# Patient Record
Sex: MALE | Race: WHITE | Employment: OTHER | URBAN - METROPOLITAN AREA
[De-identification: names, ages, dates, MRNs, and addresses within clinical notes are randomized per-mention and may not be internally consistent; named-entity substitution may affect disease eponyms.]

---

## 2017-05-07 PROBLEM — J44.1 COPD EXACERBATION (HCC): Status: ACTIVE | Noted: 2017-05-07

## 2017-05-08 PROBLEM — J96.01 ACUTE HYPOXEMIC RESPIRATORY FAILURE (HCC): Status: ACTIVE | Noted: 2017-05-08

## 2017-05-08 PROBLEM — Z86.73 HISTORY OF STROKE: Chronic | Status: ACTIVE | Noted: 2017-05-08

## 2017-05-08 PROBLEM — B34.8 RHINOVIRUS: Status: ACTIVE | Noted: 2017-05-08

## 2017-05-09 PROBLEM — F17.200 TOBACCO DEPENDENCE: Status: ACTIVE | Noted: 2017-05-09

## 2017-05-09 PROBLEM — I71.40 ABDOMINAL AORTIC ANEURYSM WITHOUT RUPTURE: Status: ACTIVE | Noted: 2017-05-09

## 2017-08-04 PROBLEM — E43 SEVERE MALNUTRITION (HCC): Chronic | Status: ACTIVE | Noted: 2017-08-04

## 2017-09-19 PROBLEM — J44.9 COPD, SEVERE (HCC): Status: ACTIVE | Noted: 2017-09-19

## 2017-09-19 PROBLEM — J44.9 COPD, MODERATE (HCC): Status: ACTIVE | Noted: 2017-09-19

## 2018-04-24 ENCOUNTER — OFFICE VISIT (OUTPATIENT)
Dept: VASCULAR SURGERY | Age: 68
End: 2018-04-24
Payer: COMMERCIAL

## 2018-04-24 DIAGNOSIS — Z86.79 S/P AAA REPAIR USING BIFURCATION GRAFT: Primary | ICD-10-CM

## 2018-04-24 DIAGNOSIS — Z95.828 S/P AAA REPAIR USING BIFURCATION GRAFT: Primary | ICD-10-CM

## 2018-04-24 PROCEDURE — 99212 OFFICE O/P EST SF 10 MIN: CPT | Performed by: SURGERY

## 2018-06-25 ENCOUNTER — OFFICE VISIT (OUTPATIENT)
Dept: CARDIOLOGY CLINIC | Age: 68
End: 2018-06-25
Payer: COMMERCIAL

## 2018-06-25 VITALS
DIASTOLIC BLOOD PRESSURE: 78 MMHG | RESPIRATION RATE: 16 BRPM | BODY MASS INDEX: 18.88 KG/M2 | WEIGHT: 131.9 LBS | HEART RATE: 75 BPM | SYSTOLIC BLOOD PRESSURE: 102 MMHG | HEIGHT: 70 IN

## 2018-06-25 DIAGNOSIS — I71.40 ABDOMINAL AORTIC ANEURYSM WITHOUT RUPTURE: Primary | ICD-10-CM

## 2018-06-25 PROCEDURE — 93000 ELECTROCARDIOGRAM COMPLETE: CPT | Performed by: INTERNAL MEDICINE

## 2018-06-25 PROCEDURE — 99214 OFFICE O/P EST MOD 30 MIN: CPT | Performed by: INTERNAL MEDICINE

## 2018-09-24 ENCOUNTER — TELEPHONE (OUTPATIENT)
Dept: VASCULAR SURGERY | Age: 68
End: 2018-09-24

## 2018-09-24 NOTE — TELEPHONE ENCOUNTER
Message left on pt's voice mail for a return call to schedule a CTA abd/pelvis prior to next scheduled visit with Dr. Shahida Rothman

## 2018-10-17 DIAGNOSIS — I71.40 ABDOMINAL AORTIC ANEURYSM WITHOUT RUPTURE: Primary | ICD-10-CM

## 2018-10-18 ENCOUNTER — HOSPITAL ENCOUNTER (OUTPATIENT)
Age: 68
Discharge: HOME | End: 2018-10-18
Payer: COMMERCIAL

## 2018-10-18 DIAGNOSIS — I71.40 ABDOMINAL AORTIC ANEURYSM WITHOUT RUPTURE: ICD-10-CM

## 2018-10-18 LAB
ANION GAP SERPL CALCULATED.3IONS-SCNC: 9 MMOL/L (ref 7–16)
BUN BLDV-MCNC: 12 MG/DL (ref 8–23)
CALCIUM SERPL-MCNC: 8.7 MG/DL (ref 8.6–10.2)
CHLORIDE BLD-SCNC: 107 MMOL/L (ref 98–107)
CO2: 26 MMOL/L (ref 22–29)
CREAT SERPL-MCNC: 1.2 MG/DL (ref 0.7–1.2)
GFR AFRICAN AMERICAN: >60
GFR NON-AFRICAN AMERICAN: 60 ML/MIN/1.73
GLUCOSE BLD-MCNC: 101 MG/DL (ref 74–109)
POTASSIUM SERPL-SCNC: 4.2 MMOL/L (ref 3.5–5)
SODIUM BLD-SCNC: 142 MMOL/L (ref 132–146)

## 2018-10-18 PROCEDURE — 80048 BASIC METABOLIC PNL TOTAL CA: CPT

## 2018-10-18 PROCEDURE — 36415 COLL VENOUS BLD VENIPUNCTURE: CPT

## 2018-10-22 ENCOUNTER — HOSPITAL ENCOUNTER (OUTPATIENT)
Dept: CT IMAGING | Age: 68
Discharge: HOME | End: 2018-10-24
Payer: COMMERCIAL

## 2018-10-22 DIAGNOSIS — Z95.828 S/P AAA REPAIR USING BIFURCATION GRAFT: ICD-10-CM

## 2018-10-22 DIAGNOSIS — Z86.79 S/P AAA REPAIR USING BIFURCATION GRAFT: ICD-10-CM

## 2018-10-22 PROCEDURE — 74174 CTA ABD&PLVS W/CONTRAST: CPT

## 2018-10-22 PROCEDURE — 6360000004 HC RX CONTRAST MEDICATION: Performed by: RADIOLOGY

## 2018-10-22 RX ADMIN — IOPAMIDOL 100 ML: 755 INJECTION, SOLUTION INTRAVENOUS at 15:41

## 2018-10-23 ENCOUNTER — OFFICE VISIT (OUTPATIENT)
Dept: VASCULAR SURGERY | Age: 68
End: 2018-10-23
Payer: COMMERCIAL

## 2018-10-23 DIAGNOSIS — Z86.79 S/P AAA REPAIR USING BIFURCATION GRAFT: Primary | ICD-10-CM

## 2018-10-23 DIAGNOSIS — Z95.828 S/P AAA REPAIR USING BIFURCATION GRAFT: Primary | ICD-10-CM

## 2018-10-23 PROCEDURE — 99213 OFFICE O/P EST LOW 20 MIN: CPT | Performed by: SURGERY

## 2018-12-10 ENCOUNTER — TELEPHONE (OUTPATIENT)
Dept: PHARMACY | Facility: CLINIC | Age: 68
End: 2018-12-10

## 2018-12-11 NOTE — TELEPHONE ENCOUNTER
Noted PharmD candidate's outreach. Will sign off at this time. Dionna Pryor, PharmD, [address]: [phone] C: [phone]  Department, toll free [phone], option 7      For Pharmacy Admin Tracking Only    PHSO: Yes  Total # of Interventions Recommended: 1  - New Order #: 1 New Medication Order Reason(s):  Adherence  - Maintenance Safety Lab Monitoring #: 1  - New Therapy Lab Monitoring #: 0  Total Interventions Accepted: 0  Time Spent (min): 10

## 2019-05-23 ENCOUNTER — TELEPHONE (OUTPATIENT)
Dept: PHARMACY | Facility: CLINIC | Age: 69
End: 2019-05-23

## 2019-05-23 NOTE — TELEPHONE ENCOUNTER
Identified care gap per [employer]: ATORVASTATIN TAB 20MG   Per records, appears 30-day supply last filled 2019     Per Juan Jose Man: [address]: [phone]    Medication: ATORVASTATIN TAB 20MG  Last 3 fill dates: 2019, 2019, 2019  Day supply: 30, 30, 30  Refills remainin  Directions: 1 tab po daily   Insurance billed: Vincent Holcomb   Prescribing Provider: Guanaco Elena MD    Attempting to reach patient to discuss getting atorvastatin tab 20mg filled for a 90ds. Unable to leave a voicemail due to number being disconnected. Will send mychart and letter to patient.      [address] [facility], toll free: [phone], option 7

## 2019-05-23 NOTE — LETTER
[address]  [address], [place] [address]  Phone: [phone]  Fax: [phone] [facility]   [address] [address]           05/23/19     Dear Dilia Cee,    We tried to reach you recently regarding your atorvastatin tab 20mg, but were unable to reach you on the telephone. We have on file that you are currently taking atorvastatin tab 20mg. If you are no longer taking it or taking it differently than above, please call us at the number below so that we can discuss this and update your medication profile.      This medication can be filled for a 3-month supply to save you time and trips to the pharmacy  if you would like assistance in switching your prescriptions to a 3-month supply, please contact us        Sincerely,      [address]  Phone: [phone], option 7

## 2019-05-29 NOTE — TELEPHONE ENCOUNTER
2nd attempt to contact this patient regarding the previous message    CLINICAL PHARMACY: [facility]  Patient unavailable at the time of call. Left following message on home TAD: please call back at toll-free [phone] option 7 to retrieve previous message. Letter mailed to patient. [address] [facility], toll free: [phone], option 7    CLINICAL PHARMACY CONSULT: MED RECONCILIATION/REVIEW ADDENDUM    For Pharmacy Admin Tracking Only    PHSO: Yes  Total # of Interventions Recommended: 1  - New Order #: 0 New Medication Order Reason(s):  Adherence  - Maintenance Safety Lab Monitoring #: 1  - New Therapy Lab Monitoring #: 0  Recommended intervention potential cost savings: 0  Total Interventions Accepted: 0  Time Spent (min): [address]

## 2019-08-13 ENCOUNTER — OFFICE VISIT (OUTPATIENT)
Dept: CARDIOLOGY CLINIC | Age: 69
End: 2019-08-13
Payer: COMMERCIAL

## 2019-08-13 VITALS
HEIGHT: 70 IN | SYSTOLIC BLOOD PRESSURE: 128 MMHG | DIASTOLIC BLOOD PRESSURE: 84 MMHG | WEIGHT: 130 LBS | HEART RATE: 60 BPM | RESPIRATION RATE: 16 BRPM | BODY MASS INDEX: 18.61 KG/M2

## 2019-08-13 DIAGNOSIS — I35.0 NONRHEUMATIC AORTIC VALVE STENOSIS: Primary | ICD-10-CM

## 2019-08-13 PROCEDURE — 99214 OFFICE O/P EST MOD 30 MIN: CPT | Performed by: INTERNAL MEDICINE

## 2019-08-13 PROCEDURE — 93000 ELECTROCARDIOGRAM COMPLETE: CPT | Performed by: INTERNAL MEDICINE

## 2019-10-04 ENCOUNTER — TELEPHONE (OUTPATIENT)
Dept: ADMINISTRATIVE | Age: 69
End: 2019-10-04

## 2019-10-16 ENCOUNTER — TELEPHONE (OUTPATIENT)
Dept: PHARMACY | Facility: CLINIC | Age: 69
End: 2019-10-16

## 2019-10-20 ENCOUNTER — HOSPITAL ENCOUNTER (OUTPATIENT)
Dept: ULTRASOUND IMAGING | Age: 69
Discharge: HOME | End: 2019-10-22
Payer: COMMERCIAL

## 2019-10-20 DIAGNOSIS — Z86.79 S/P AAA REPAIR USING BIFURCATION GRAFT: ICD-10-CM

## 2019-10-20 DIAGNOSIS — Z95.828 S/P AAA REPAIR USING BIFURCATION GRAFT: ICD-10-CM

## 2019-10-20 PROCEDURE — 76775 US EXAM ABDO BACK WALL LIM: CPT

## 2019-10-29 ENCOUNTER — OFFICE VISIT (OUTPATIENT)
Dept: VASCULAR SURGERY | Age: 69
End: 2019-10-29
Payer: COMMERCIAL

## 2019-10-29 VITALS
HEIGHT: 70 IN | HEART RATE: 81 BPM | SYSTOLIC BLOOD PRESSURE: 118 MMHG | WEIGHT: 130 LBS | OXYGEN SATURATION: 95 % | DIASTOLIC BLOOD PRESSURE: 64 MMHG | BODY MASS INDEX: 18.61 KG/M2

## 2019-10-29 DIAGNOSIS — Z95.828 S/P AAA REPAIR USING BIFURCATION GRAFT: Primary | ICD-10-CM

## 2019-10-29 DIAGNOSIS — Z86.79 S/P AAA REPAIR USING BIFURCATION GRAFT: Primary | ICD-10-CM

## 2019-10-29 PROCEDURE — 99213 OFFICE O/P EST LOW 20 MIN: CPT | Performed by: SURGERY

## 2019-11-19 ENCOUNTER — TELEPHONE (OUTPATIENT)
Dept: PHARMACY | Facility: CLINIC | Age: 69
End: 2019-11-19

## 2019-12-22 ENCOUNTER — APPOINTMENT (OUTPATIENT)
Dept: GENERAL RADIOLOGY | Age: 69
End: 2019-12-22
Payer: COMMERCIAL

## 2019-12-22 ENCOUNTER — HOSPITAL ENCOUNTER (EMERGENCY)
Age: 69
Discharge: HOME | End: 2019-12-22
Attending: EMERGENCY MEDICINE
Payer: COMMERCIAL

## 2019-12-22 VITALS
DIASTOLIC BLOOD PRESSURE: 72 MMHG | HEIGHT: 70 IN | HEART RATE: 104 BPM | BODY MASS INDEX: 19.18 KG/M2 | SYSTOLIC BLOOD PRESSURE: 158 MMHG | WEIGHT: 134 LBS | RESPIRATION RATE: 18 BRPM | OXYGEN SATURATION: 94 % | TEMPERATURE: 98 F

## 2019-12-22 DIAGNOSIS — J44.1 COPD EXACERBATION (HCC): Primary | ICD-10-CM

## 2019-12-22 LAB
ALBUMIN SERPL-MCNC: 4.2 G/DL (ref 3.5–5.2)
ALP BLD-CCNC: 107 U/L (ref 40–129)
ALT SERPL-CCNC: 18 U/L (ref 0–40)
ANION GAP SERPL CALCULATED.3IONS-SCNC: 14 MMOL/L (ref 7–16)
AST SERPL-CCNC: 22 U/L (ref 0–39)
BASOPHILS ABSOLUTE: 0.01 E9/L (ref 0–0.2)
BASOPHILS RELATIVE PERCENT: 0.1 % (ref 0–2)
BILIRUB SERPL-MCNC: 0.4 MG/DL (ref 0–1.2)
BUN BLDV-MCNC: 31 MG/DL (ref 8–23)
CALCIUM SERPL-MCNC: 9.4 MG/DL (ref 8.6–10.2)
CHLORIDE BLD-SCNC: 101 MMOL/L (ref 98–107)
CO2: 23 MMOL/L (ref 22–29)
CREAT SERPL-MCNC: 1.2 MG/DL (ref 0.7–1.2)
EKG ATRIAL RATE: 119 BPM
EKG P AXIS: 91 DEGREES
EKG P-R INTERVAL: 216 MS
EKG Q-T INTERVAL: 450 MS
EKG QRS DURATION: 82 MS
EKG QTC CALCULATION (BAZETT): 633 MS
EKG R AXIS: 53 DEGREES
EKG T AXIS: 77 DEGREES
EKG VENTRICULAR RATE: 119 BPM
EOSINOPHILS ABSOLUTE: 0 E9/L (ref 0.05–0.5)
EOSINOPHILS RELATIVE PERCENT: 0 % (ref 0–6)
GFR AFRICAN AMERICAN: >60
GFR NON-AFRICAN AMERICAN: 60 ML/MIN/1.73
GLUCOSE BLD-MCNC: 137 MG/DL (ref 74–99)
HCT VFR BLD CALC: 45 % (ref 37–54)
HEMOGLOBIN: 14.7 G/DL (ref 12.5–16.5)
IMMATURE GRANULOCYTES #: 0.03 E9/L
IMMATURE GRANULOCYTES %: 0.3 % (ref 0–5)
LACTIC ACID: 3 MMOL/L (ref 0.5–2.2)
LYMPHOCYTES ABSOLUTE: 0.9 E9/L (ref 1.5–4)
LYMPHOCYTES RELATIVE PERCENT: 9.4 % (ref 20–42)
MCH RBC QN AUTO: 33.3 PG (ref 26–35)
MCHC RBC AUTO-ENTMCNC: 32.7 % (ref 32–34.5)
MCV RBC AUTO: 102 FL (ref 80–99.9)
MONOCYTES ABSOLUTE: 0.7 E9/L (ref 0.1–0.95)
MONOCYTES RELATIVE PERCENT: 7.3 % (ref 2–12)
NEUTROPHILS ABSOLUTE: 7.93 E9/L (ref 1.8–7.3)
NEUTROPHILS RELATIVE PERCENT: 82.9 % (ref 43–80)
PDW BLD-RTO: 13.2 FL (ref 11.5–15)
PLATELET # BLD: 131 E9/L (ref 130–450)
PMV BLD AUTO: 9.7 FL (ref 7–12)
POTASSIUM REFLEX MAGNESIUM: 4.2 MMOL/L (ref 3.5–5)
PRO-BNP: 1145 PG/ML (ref 0–125)
RBC # BLD: 4.41 E12/L (ref 3.8–5.8)
SODIUM BLD-SCNC: 138 MMOL/L (ref 132–146)
TOTAL PROTEIN: 7.1 G/DL (ref 6.4–8.3)
TROPONIN: <0.01 NG/ML (ref 0–0.03)
WBC # BLD: 9.6 E9/L (ref 4.5–11.5)

## 2019-12-22 PROCEDURE — 80053 COMPREHEN METABOLIC PANEL: CPT

## 2019-12-22 PROCEDURE — 83880 ASSAY OF NATRIURETIC PEPTIDE: CPT

## 2019-12-22 PROCEDURE — 93005 ELECTROCARDIOGRAM TRACING: CPT | Performed by: EMERGENCY MEDICINE

## 2019-12-22 PROCEDURE — 94664 DEMO&/EVAL PT USE INHALER: CPT

## 2019-12-22 PROCEDURE — 94640 AIRWAY INHALATION TREATMENT: CPT

## 2019-12-22 PROCEDURE — 83605 ASSAY OF LACTIC ACID: CPT

## 2019-12-22 PROCEDURE — 99285 EMERGENCY DEPT VISIT HI MDM: CPT

## 2019-12-22 PROCEDURE — 6370000000 HC RX 637 (ALT 250 FOR IP): Performed by: EMERGENCY MEDICINE

## 2019-12-22 PROCEDURE — 71045 X-RAY EXAM CHEST 1 VIEW: CPT

## 2019-12-22 PROCEDURE — 84484 ASSAY OF TROPONIN QUANT: CPT

## 2019-12-22 PROCEDURE — 85025 COMPLETE CBC W/AUTO DIFF WBC: CPT

## 2019-12-22 RX ORDER — IPRATROPIUM BROMIDE AND ALBUTEROL SULFATE 2.5; .5 MG/3ML; MG/3ML
1 SOLUTION RESPIRATORY (INHALATION) EVERY 4 HOURS PRN
Qty: 360 ML | Refills: 0 | Status: SHIPPED | OUTPATIENT
Start: 2019-12-22 | End: 2020-12-18

## 2019-12-22 RX ORDER — NEBULIZER ACCESSORIES
1 KIT MISCELLANEOUS DAILY PRN
Qty: 1 KIT | Refills: 0 | Status: SHIPPED | OUTPATIENT
Start: 2019-12-22 | End: 2020-12-18

## 2019-12-22 RX ORDER — IPRATROPIUM BROMIDE AND ALBUTEROL SULFATE 2.5; .5 MG/3ML; MG/3ML
3 SOLUTION RESPIRATORY (INHALATION) ONCE
Status: COMPLETED | OUTPATIENT
Start: 2019-12-22 | End: 2019-12-22

## 2019-12-22 RX ADMIN — IPRATROPIUM BROMIDE AND ALBUTEROL SULFATE 3 AMPULE: .5; 3 SOLUTION RESPIRATORY (INHALATION) at 01:19

## 2019-12-22 ASSESSMENT — ENCOUNTER SYMPTOMS
SHORTNESS OF BREATH: 1
FACIAL SWELLING: 0
CHEST TIGHTNESS: 0
EYE REDNESS: 0
WHEEZING: 1
COUGH: 1
ALLERGIC/IMMUNOLOGIC NEGATIVE: 1
SORE THROAT: 0
ABDOMINAL PAIN: 0
SPUTUM PRODUCTION: 1
DIARRHEA: 0
VOMITING: 0
PHOTOPHOBIA: 0
NAUSEA: 0
CONSTIPATION: 0
EYE PAIN: 0

## 2020-06-12 ENCOUNTER — TELEPHONE (OUTPATIENT)
Dept: CASE MANAGEMENT | Age: 70
End: 2020-06-12

## 2020-06-15 ENCOUNTER — HOSPITAL ENCOUNTER (OUTPATIENT)
Dept: CT IMAGING | Age: 70
Discharge: HOME | End: 2020-06-17
Payer: MEDICARE

## 2020-06-15 PROCEDURE — G0297 LDCT FOR LUNG CA SCREEN: HCPCS

## 2020-06-16 ENCOUNTER — TELEPHONE (OUTPATIENT)
Dept: CASE MANAGEMENT | Age: 70
End: 2020-06-16

## 2020-09-04 ENCOUNTER — HOSPITAL ENCOUNTER (EMERGENCY)
Age: 70
Discharge: HOME | End: 2020-09-04
Attending: EMERGENCY MEDICINE
Payer: MEDICARE

## 2020-09-04 ENCOUNTER — APPOINTMENT (OUTPATIENT)
Dept: ULTRASOUND IMAGING | Age: 70
End: 2020-09-04
Payer: MEDICARE

## 2020-09-04 VITALS
RESPIRATION RATE: 18 BRPM | OXYGEN SATURATION: 97 % | BODY MASS INDEX: 17.22 KG/M2 | DIASTOLIC BLOOD PRESSURE: 78 MMHG | WEIGHT: 123 LBS | HEART RATE: 83 BPM | SYSTOLIC BLOOD PRESSURE: 112 MMHG | TEMPERATURE: 97.5 F | HEIGHT: 71 IN

## 2020-09-04 PROCEDURE — 99283 EMERGENCY DEPT VISIT LOW MDM: CPT

## 2020-09-04 PROCEDURE — 99282 EMERGENCY DEPT VISIT SF MDM: CPT

## 2020-09-04 PROCEDURE — 93971 EXTREMITY STUDY: CPT

## 2020-09-04 ASSESSMENT — PAIN SCALES - GENERAL: PAINLEVEL_OUTOF10: 5

## 2020-09-04 ASSESSMENT — PAIN DESCRIPTION - PAIN TYPE: TYPE: ACUTE PAIN

## 2020-09-04 ASSESSMENT — PAIN DESCRIPTION - DESCRIPTORS: DESCRIPTORS: SHARP

## 2020-09-04 ASSESSMENT — PAIN DESCRIPTION - LOCATION: LOCATION: LEG

## 2020-09-04 ASSESSMENT — PAIN DESCRIPTION - ORIENTATION: ORIENTATION: LOWER

## 2020-09-04 ASSESSMENT — PAIN DESCRIPTION - FREQUENCY: FREQUENCY: INTERMITTENT

## 2020-09-04 NOTE — ED NOTES
Assisted MD with pedal pulse location via doppler. Patient tolerated well. Dorsalis pedis and posterior tibial located per doppler.      Denisse Gonzales RN  09/04/20 0286

## 2020-09-04 NOTE — ED PROVIDER NOTES
Chief complaint: Leg pain      HPI:  9/4/20, Time: 4:23 PM EDT      HPI       Ash Toney is a 69 y.o. male presenting to the ED for leg pain. The patient states that he began approximately 4 days ago with leg pain. The patient denies any injury. The patient states that the pain is located in the left leg around the left calf and lateral aspect of the distal left leg. Pain is described as aching currently rated 4 out of 10. The pain is mildly improved with ambulation. The pain is present mostly with rest.  There is no numbness, weakness or paresthesias. He did not try any treatments prior to arrival.  The patient notes that he feels he is having some increasing edema of the left lower extremity and is concerned that he does have potential blood clot. The patient has no history of DVT or PE, is not on any hormone replacement therapy, denies any active malignancy, recent surgeries or long periods of travel sitting in a car or plane. ROS:   Review of Systems   Musculoskeletal: Positive for arthralgias and myalgias. Neurological: Negative for weakness and numbness. All other systems reviewed and are negative.      --------------------------------------------- PAST HISTORY ---------------------------------------------  Past Medical History:  has a past medical history of Abdominal aortic aneurysm without rupture (HCC), Arthritis, AS (aortic stenosis), Cerebral artery occlusion with cerebral infarction ([facility] [address] COPD (chronic obstructive pulmonary disease) ([facility] [address] Emphysema of lung ([facility] [address] History of blood transfusion, Hyperlipidemia, Pneumonia, and Tobacco dependence. Past Surgical History:  has a past surgical history that includes hernia repair; Ankle surgery; Skull fracture elevation; eye surgery (Bilateral, approx 2014); Cardiac catheterization (07/14/2017); Tooth Extraction;  Aortic valve replacement; AAA repair, endovascular (10/12/2017); AAA repair, endovascular (10/12/2017); and Cardiac surgery. Social History:  reports that he has been smoking cigarettes. He has a 50.00 pack-year smoking history. He has never used smokeless tobacco. He reports that he does not drink alcohol or use drugs. Family History: family history includes Heart Disease in his brother and mother; Stroke in his father. The patients home medications have been reviewed. Allergies: Patient has no known allergies. ---------------------------------------------------PHYSICAL EXAM--------------------------------------    Constitutional/General: Alert and oriented x3, well appearing, non toxic in NAD  Head: Normocephalic and atraumatic  Mouth: Oropharynx clear, handling secretions, no trismus  Neck: Supple, full ROM,  Pulmonary: Lungs clear to auscultation bilaterally, no wheezes, rales, or rhonchi. Not in respiratory distress    Cardiovascular:  Regular rate. Regular rhythm. No murmurs  Chest: no chest wall tenderness  Abdomen: Soft. Non tender. Non distended. +BS. No rebound, guarding, or rigidity. No pulsatile masses appreciated. Musculoskeletal: Moves all extremities x 4. Warm and well perfused, no clubbing, cyanosis, or edema. Capillary refill <3 seconds, there is no point bony tenderness to palpation of the left lower extremity. All compartments of the left lower extremity are soft. There is 1+ dorsalis pedis and posterior tibial pulses which were confirmed with the Doppler. There is sensation to gross touch intact. Skin: warm and dry. No rashes. Neurologic: GCS 15, no gross focal neurologic deficits  Psych: Normal Affect    -------------------------------------------------- RESULTS -------------------------------------------------  I have personally reviewed all laboratory and imaging results for this patient. Results are listed below. LABS:  No results found for this visit on 09/04/20.     RADIOLOGY:  Interpreted by Radiologist.  US DUP LOWER EXTREMITY LEFT RAFAEL   Final Result   Negative for evidence of deep venous thrombosis in the left lower   extremity by color and spectral Doppler, as well as 2-D grayscale   ultrasound imaging.                   ------------------------- NURSING NOTES AND VITALS REVIEWED ---------------------------   The nursing notes within the ED encounter and vital signs as below have been reviewed by myself. /78   Pulse 83   Temp 97.5 °F (36.4 °C) (Infrared)   Resp 18   Ht 5' 11\" (1.803 m)   Wt 123 lb (55.8 kg)   SpO2 97%   BMI 17.16 kg/m²   Oxygen Saturation Interpretation: Normal    The patients available past medical records and past encounters were reviewed. ------------------------------ ED COURSE/MEDICAL DECISION MAKING----------------------  Medications - No data to display          Medical Decision Making:   The patient is a 78-year-old male presenting emergency department with a chief complaint of leg pain and swelling. The left lower extremity is neurovascular intact, all compartments are soft. No bony tenderness. The patient did have imaging which was negative for any DVT. Patient be discharged follow-up outpatient. Re-Evaluations/Consultations:             ED Course as of Sep 04 1625   Fri Sep 04, 2020   1620 The patient is in the bed in no acute distress. Results discussed. Patient be discharged. [MT]      ED Course User Index  [MT] Bianca Fortune DO               This patient's ED course included: History, physical examination, reevaluation prior to disposition, imaging    This patient has remained hemodynamically stable during their ED course. Counseling: The emergency provider has spoken with the patient and discussed todays results, in addition to providing specific details for the plan of care and counseling regarding the diagnosis and prognosis.   Questions are answered at this time and they are agreeable with the plan.       --------------------------------- IMPRESSION AND DISPOSITION

## 2020-09-24 ENCOUNTER — HOSPITAL ENCOUNTER (OUTPATIENT)
Dept: CT IMAGING | Age: 70
Discharge: HOME | End: 2020-09-26
Payer: MEDICARE

## 2020-09-24 PROCEDURE — 74176 CT ABD & PELVIS W/O CONTRAST: CPT

## 2020-10-06 ENCOUNTER — HOSPITAL ENCOUNTER (OUTPATIENT)
Age: 70
Discharge: HOME | End: 2020-10-08
Payer: MEDICARE

## 2020-10-06 PROCEDURE — 87088 URINE BACTERIA CULTURE: CPT

## 2020-10-06 PROCEDURE — 88112 CYTOPATH CELL ENHANCE TECH: CPT

## 2020-10-08 LAB — URINE CULTURE, ROUTINE: NORMAL

## 2020-11-10 ENCOUNTER — HOSPITAL ENCOUNTER (OUTPATIENT)
Dept: CARDIOLOGY | Age: 70
Discharge: HOME | End: 2020-11-10
Payer: MEDICARE

## 2020-11-10 ENCOUNTER — OFFICE VISIT (OUTPATIENT)
Dept: VASCULAR SURGERY | Age: 70
End: 2020-11-10
Payer: MEDICARE

## 2020-11-10 VITALS — BODY MASS INDEX: 17.47 KG/M2 | WEIGHT: 122 LBS | RESPIRATION RATE: 16 BRPM | HEIGHT: 70 IN

## 2020-11-10 PROCEDURE — 93978 VASCULAR STUDY: CPT

## 2020-11-10 PROCEDURE — 99213 OFFICE O/P EST LOW 20 MIN: CPT | Performed by: SURGERY

## 2020-11-10 NOTE — PROGRESS NOTES
[facility] - [facility]    This is a pre read worksheet - prior to official physician interpretation    bAbey Snyder  1950  Date of study: 11/10/20    Indication for study:  AAA  Study :  Aortic Ultrasound    Diameter Aorta:     Proximal:   cm     Mid:   cm     Distal:  5.4 x 5.9 cm     Right iliac: 1.7 x 1.7 cm     Left iliac: 1.4 x 1.5 cm    Additional comments: unable to document leak          LAST STUDY  9/24/2020 CT  5.4 cm

## 2020-11-10 NOTE — PROGRESS NOTES
Vascular Surgery Outpatient Progress Note      Chief Complaint   Patient presents with   Estrellita Soto     s/p AAA       HISTORY OF PRESENT ILLNESS:                The patient is a 69 y.o. male who returns for follow-up evaluation of endovascular repair of abdominal aortic aneurysm with fenestrated graft. The patient has been experiencing hematuria but denies any new problems since I saw him. Past Medical History:        Diagnosis Date    Abdominal aortic aneurysm without rupture ([facility] [address] 5/9/2017    Arthritis     AS (aortic stenosis)     Cerebral artery occlusion with cerebral infarction [facility]) 2009    TIA/CVA with aphasia that resolved    COPD (chronic obstructive pulmonary disease) (HCC)     Emphysema of lung ([facility] [address]     History of blood transfusion     Hyperlipidemia     Pneumonia     Tobacco dependence 5/9/2017     Past Surgical History:        Procedure Laterality Date    ABDOMINAL AORTIC ANEURYSM REPAIR, ENDOVASCULAR  10/12/2017    Zenith Fenestrated. Delatore    ABDOMINAL AORTIC ANEURYSM REPAIR, ENDOVASCULAR  10/12/2017    ANKLE SURGERY      AORTIC VALVE REPLACEMENT      CARDIAC CATHETERIZATION  07/14/2017    Dr. Kingsley Noss- No blockages    CARDIAC SURGERY      EYE SURGERY Bilateral approx 2014    cataracts removal w/lens implants    HERNIA REPAIR      SKULL FRACTURE ELEVATION      TOOTH EXTRACTION      full mouth extraction     Current Medications:   Prior to Admission medications    Medication Sig Start Date End Date Taking?  Authorizing Provider   metoprolol tartrate (LOPRESSOR) 25 MG tablet take 1 tablet by mouth twice a day 7/6/20  Yes Gavin Atkinson, DO   albuterol sulfate HFA (VENTOLIN HFA) 108 (90 Base) MCG/ACT inhaler Inhale 2 puffs into the lungs every 4 hours as needed for Wheezing or Shortness of Breath 5/29/20  Yes Bradley Spence, DO   ipratropium-albuterol (DUONEB) 0.5-2.5 (3) MG/3ML SOLN nebulizer solution Inhale 3 mLs into the lungs every 4 hours as needed for Shortness of Breath 12/22/19  Yes Berhane Motley, DO   Respiratory Therapy Supplies (NEBULIZER/TUBING/MOUTHPIECE) KIT 1 kit by Does not apply route daily as needed (COPD) 12/22/19  Yes Berhane Motley, DO   Respiratory Therapy Supplies (NEBULIZER COMPRESSOR) KIT 1 kit by Does not apply route once for 1 dose 12/22/19 11/10/20 Yes Rachel Luna, DO   Nutritional Supplements (BOOST HIGH PROTEIN PO) Take by mouth   Yes Historical Provider, MD   Tiotropium Bromide-Olodaterol (STIOLTO RESPIMAT) 2.5-2.5 MCG/ACT AERS Inhale 2 puffs into the lungs daily Takes 1 puff in morning and after dinner  Use day of surgery   Yes Historical Provider, MD   atorvastatin (LIPITOR) 20 MG tablet 20 mg daily  8/6/15  Yes Historical Provider, MD   aspirin 81 MG tablet Take 81 mg by mouth every other day    Yes Historical Provider, MD     Allergies:  Patient has no known allergies.     Social History     Socioeconomic History    Marital status:      Spouse name: Not on file    Number of children: Not on file    Years of education: Not on file    Highest education level: Not on file   Occupational History    Occupation: retired-   Social Needs    Financial resource strain: Not on file    Food insecurity     Worry: Not on file     Inability: Not on file   [place] needs     Medical: Not on file     Non-medical: Not on file   Tobacco Use    Smoking status: Current Every Day Smoker     Packs/day: 1.00     Years: 50.00     Pack years: 50.00     Types: Cigarettes    Smokeless tobacco: Never Used    Tobacco comment: currently smokes 1.0 ppd   Substance and Sexual Activity    Alcohol use: No     Alcohol/week: 0.0 standard drinks    Drug use: No    Sexual activity: Not Currently   Lifestyle    Physical activity     Days per week: Not on file     Minutes per session: Not on file    Stress: Not on file   Relationships    Social connections     Talks on phone: Not on file     Gets together: Not on file     Attends Advent service: Not on file     Active member of club or organization: Not on file     Attends meetings of clubs or organizations: Not on file     Relationship status: Not on file    Intimate partner violence     Fear of current or ex partner: Not on file     Emotionally abused: Not on file     Physically abused: Not on file     Forced sexual activity: Not on file   Other Topics Concern    Not on file   Social History Narrative    Not on file        Family History   Problem Relation Age of Onset    Heart Disease Mother     Stroke Father     Heart Disease Brother        REVIEW OF SYSTEMS (New symptoms):    Eyes:      Blurred vision:  No [x]/Yes []               Diplopia:   No [x]/Yes []               Vision loss:       No [x]/Yes []   Ears, nose, throat:             Hearing loss:    No [x]/Yes []      Vertigo:   No [x]/Yes []                       Swallowing problem:  No [x]/Yes []               Nose bleeds:   No [x]/Yes []      Voice hoarseness:  No [x]/Yes []  Respiratory:             Cough:   No [x]/Yes []      Pleuritic chest pain:  No [x]/Yes []                        Dyspnea:   No []/Yes [x]      Wheezing:   No []/Yes [x]  Cardiovascular:             Angina:   No [x]/Yes []      Palpitations:   No [x]/Yes []          Claudication:    No [x]/Yes []      Leg swelling:   No [x]/Yes []  Gastrointestinal:             Nausea or vomiting:  No [x]/Yes []               Abdominal pain:  No [x]/Yes []                     Intestinal bleeding: No [x]/Yes []  Musculoskeletal:             Leg pain:   No [x]/Yes []      Back pain:   No [x]/Yes []                    Weakness:   No [x]/Yes []  Neurologic:             Numbness:   No [x]/Yes []      Paralysis:   No [x]/Yes []                       Headaches:   No [x]/Yes []  Hematologic, lymphatic:   Anemia:   No [x]/Yes []              Bleeding or bruising:  No [x]/Yes []              Fevers or chills: No [x]/Yes []  Endocrine:             Temp intolerance:   No [x]/Yes [] Polydipsia, polyuria:  No [x]/Yes []  Skin:              Rash:    No [x]/Yes []      Ulcers:   No [x]/Yes []              Abnorm pigment: No [x]/Yes []  :              Frequency/urgency:  No [x]/Yes []      Hematuria:    No []/Yes [x]                      Incontinence:    No [x]/Yes []    PHYSICAL EXAM:  Vitals:    11/10/20 0823   Resp: 16     General Appearance: alert and oriented to person, place and time, in no acute distress, frail  Neurologic: no cranial nerve deficit, speech normal  Head: normocephalic and atraumatic  Eyes: extraocular eye movements intact, conjunctivae normal  ENT: external ear and ear canal normal bilaterally, nose without deformity, no carotid bruits  Pulmonary/Chest: normal air movement, no respiratory distress  Cardiovascular: normal rate, regular rhythm, no murmur  Abdomen: non-distended, no masses  Musculoskeletal: no joint deformity or tenderness  Extremities: no leg edema bilaterally  Skin: warm and dry, no rash or erythema    PULSE EXAM      Right      Left   Brachial     Radial     Femoral     Popliteal     Dorsalis Pedis     Posterior Tibial     (3=normal, 2=diminished, 1=barely palpable, 4=widened)    RADIOLOGY: SA today    Problem List Items Addressed This Visit     S/P AAA repair using bifurcation graft - Primary    Relevant Orders    US ABDOMINAL AORTA LIMITED          We performed an ultrasound today that measures the aorta slightly larger to 5.9 cm. Given his overall condition, I feel that I would not plan for CAT scan at this time as I would need intravenous contrast.  I will have him return in 6 months with a repeat ultrasound and perform a CAT scan if there is any continued aneurysm expansion. Return in about 6 months (around 5/10/2021) for testing.

## 2020-12-15 ENCOUNTER — HOSPITAL ENCOUNTER (EMERGENCY)
Age: 70
Discharge: HOME | DRG: 312 | End: 2020-12-15
Attending: EMERGENCY MEDICINE
Payer: MEDICARE

## 2020-12-15 ENCOUNTER — APPOINTMENT (OUTPATIENT)
Dept: CT IMAGING | Age: 70
DRG: 312 | End: 2020-12-15
Payer: MEDICARE

## 2020-12-15 ENCOUNTER — APPOINTMENT (OUTPATIENT)
Dept: GENERAL RADIOLOGY | Age: 70
DRG: 312 | End: 2020-12-15
Payer: MEDICARE

## 2020-12-15 VITALS
OXYGEN SATURATION: 96 % | BODY MASS INDEX: 16.52 KG/M2 | DIASTOLIC BLOOD PRESSURE: 84 MMHG | HEIGHT: 71 IN | SYSTOLIC BLOOD PRESSURE: 122 MMHG | RESPIRATION RATE: 16 BRPM | HEART RATE: 95 BPM | TEMPERATURE: 97.3 F | WEIGHT: 118 LBS

## 2020-12-15 LAB
ALBUMIN SERPL-MCNC: 3.6 G/DL (ref 3.5–5.2)
ALP BLD-CCNC: 83 U/L (ref 40–129)
ALT SERPL-CCNC: 12 U/L (ref 0–40)
ANION GAP SERPL CALCULATED.3IONS-SCNC: 10 MMOL/L (ref 7–16)
AST SERPL-CCNC: 16 U/L (ref 0–39)
BASOPHILS ABSOLUTE: 0.06 E9/L (ref 0–0.2)
BASOPHILS RELATIVE PERCENT: 0.8 % (ref 0–2)
BILIRUB SERPL-MCNC: 0.6 MG/DL (ref 0–1.2)
BUN BLDV-MCNC: 16 MG/DL (ref 8–23)
CALCIUM SERPL-MCNC: 9 MG/DL (ref 8.6–10.2)
CHLORIDE BLD-SCNC: 100 MMOL/L (ref 98–107)
CO2: 24 MMOL/L (ref 22–29)
CREAT SERPL-MCNC: 0.9 MG/DL (ref 0.7–1.2)
EKG ATRIAL RATE: 85 BPM
EKG P AXIS: 81 DEGREES
EKG P-R INTERVAL: 224 MS
EKG Q-T INTERVAL: 390 MS
EKG QRS DURATION: 88 MS
EKG QTC CALCULATION (BAZETT): 464 MS
EKG R AXIS: 25 DEGREES
EKG T AXIS: 66 DEGREES
EKG VENTRICULAR RATE: 85 BPM
EOSINOPHILS ABSOLUTE: 0.04 E9/L (ref 0.05–0.5)
EOSINOPHILS RELATIVE PERCENT: 0.5 % (ref 0–6)
GFR AFRICAN AMERICAN: >60
GFR NON-AFRICAN AMERICAN: >60 ML/MIN/1.73
GLUCOSE BLD-MCNC: 108 MG/DL (ref 74–99)
HCT VFR BLD CALC: 36.9 % (ref 37–54)
HEMOGLOBIN: 12.5 G/DL (ref 12.5–16.5)
IMMATURE GRANULOCYTES #: 0.02 E9/L
IMMATURE GRANULOCYTES %: 0.3 % (ref 0–5)
LACTIC ACID, SEPSIS: 0.6 MMOL/L (ref 0.5–1.9)
LIPASE: 18 U/L (ref 13–60)
LYMPHOCYTES ABSOLUTE: 1.29 E9/L (ref 1.5–4)
LYMPHOCYTES RELATIVE PERCENT: 16.7 % (ref 20–42)
MCH RBC QN AUTO: 32.7 PG (ref 26–35)
MCHC RBC AUTO-ENTMCNC: 33.9 % (ref 32–34.5)
MCV RBC AUTO: 96.6 FL (ref 80–99.9)
MONOCYTES ABSOLUTE: 0.67 E9/L (ref 0.1–0.95)
MONOCYTES RELATIVE PERCENT: 8.7 % (ref 2–12)
NEUTROPHILS ABSOLUTE: 5.65 E9/L (ref 1.8–7.3)
NEUTROPHILS RELATIVE PERCENT: 73 % (ref 43–80)
PDW BLD-RTO: 12.6 FL (ref 11.5–15)
PLATELET # BLD: 157 E9/L (ref 130–450)
PMV BLD AUTO: 9.2 FL (ref 7–12)
POTASSIUM REFLEX MAGNESIUM: 4.1 MMOL/L (ref 3.5–5)
RBC # BLD: 3.82 E12/L (ref 3.8–5.8)
SODIUM BLD-SCNC: 134 MMOL/L (ref 132–146)
TOTAL PROTEIN: 7.1 G/DL (ref 6.4–8.3)
TROPONIN: <0.01 NG/ML (ref 0–0.03)
WBC # BLD: 7.7 E9/L (ref 4.5–11.5)

## 2020-12-15 PROCEDURE — 2580000003 HC RX 258: Performed by: RADIOLOGY

## 2020-12-15 PROCEDURE — 83605 ASSAY OF LACTIC ACID: CPT

## 2020-12-15 PROCEDURE — 80053 COMPREHEN METABOLIC PANEL: CPT

## 2020-12-15 PROCEDURE — 74022 RADEX COMPL AQT ABD SERIES: CPT

## 2020-12-15 PROCEDURE — 99214 OFFICE O/P EST MOD 30 MIN: CPT | Performed by: SURGERY

## 2020-12-15 PROCEDURE — 6360000004 HC RX CONTRAST MEDICATION: Performed by: RADIOLOGY

## 2020-12-15 PROCEDURE — 83690 ASSAY OF LIPASE: CPT

## 2020-12-15 PROCEDURE — 74177 CT ABD & PELVIS W/CONTRAST: CPT

## 2020-12-15 PROCEDURE — 84484 ASSAY OF TROPONIN QUANT: CPT

## 2020-12-15 PROCEDURE — 99283 EMERGENCY DEPT VISIT LOW MDM: CPT

## 2020-12-15 PROCEDURE — 93005 ELECTROCARDIOGRAM TRACING: CPT | Performed by: NURSE PRACTITIONER

## 2020-12-15 PROCEDURE — 6370000000 HC RX 637 (ALT 250 FOR IP): Performed by: NURSE PRACTITIONER

## 2020-12-15 PROCEDURE — 85025 COMPLETE CBC W/AUTO DIFF WBC: CPT

## 2020-12-15 PROCEDURE — 93010 ELECTROCARDIOGRAM REPORT: CPT | Performed by: INTERNAL MEDICINE

## 2020-12-15 RX ORDER — ONDANSETRON 4 MG/1
4 TABLET, ORALLY DISINTEGRATING ORAL EVERY 8 HOURS PRN
Qty: 15 TABLET | Refills: 0 | Status: ON HOLD | OUTPATIENT
Start: 2020-12-15 | End: 2020-12-22

## 2020-12-15 RX ORDER — SODIUM CHLORIDE 0.9 % (FLUSH) 0.9 %
10 SYRINGE (ML) INJECTION ONCE
Status: COMPLETED | OUTPATIENT
Start: 2020-12-15 | End: 2020-12-15

## 2020-12-15 RX ORDER — POLYETHYLENE GLYCOL 3350 17 G/17G
17 POWDER, FOR SOLUTION ORAL DAILY PRN
Qty: 527 G | Refills: 1 | Status: ON HOLD | OUTPATIENT
Start: 2020-12-15 | End: 2020-12-22 | Stop reason: HOSPADM

## 2020-12-15 RX ORDER — ONDANSETRON 4 MG/1
4 TABLET, ORALLY DISINTEGRATING ORAL ONCE
Status: COMPLETED | OUTPATIENT
Start: 2020-12-15 | End: 2020-12-15

## 2020-12-15 RX ADMIN — Medication 10 ML: at 13:56

## 2020-12-15 RX ADMIN — IOPAMIDOL 90 ML: 755 INJECTION, SOLUTION INTRAVENOUS at 13:56

## 2020-12-15 RX ADMIN — ONDANSETRON 4 MG: 4 TABLET, ORALLY DISINTEGRATING ORAL at 13:19

## 2020-12-15 ASSESSMENT — PAIN DESCRIPTION - LOCATION: LOCATION: ABDOMEN

## 2020-12-15 ASSESSMENT — PAIN DESCRIPTION - DESCRIPTORS: DESCRIPTORS: ACHING

## 2020-12-15 ASSESSMENT — PAIN SCALES - GENERAL: PAINLEVEL_OUTOF10: 9

## 2020-12-15 ASSESSMENT — PAIN DESCRIPTION - PAIN TYPE: TYPE: ACUTE PAIN

## 2020-12-15 NOTE — ED NOTES
FIRST PROVIDER CONTACT ASSESSMENT NOTE      Department of Emergency Medicine   Admit Date: No admission date for patient encounter. Chief Complaint: Abdominal Pain, Constipation, and Emesis      History of Present Illness:    Opal Espana is a 69 y.o. male who presents to the ED for abdominal pain with constipation and emesis over the last several days. He is alert and oriented, abdomen soft without pulsatile mass.   Skin pink warm and dry.        -----------------END OF FIRST PROVIDER CONTACT ASSESSMENT NOTE--------------  Electronically signed by COREEN Fofana CNP   DD: 12/15/20               COREEN Fofana CNP  12/15/20 1112

## 2020-12-15 NOTE — ED NOTES
Per Tan Peter in XR, patient still over in radiology.       [id number], RN  12/15/20 6196

## 2020-12-15 NOTE — PROGRESS NOTES
Patient with a history of fenestrated stent graft. I reviewed the CT scan there is a slight increase in the aneurysm size. With some contrast.  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 At this point there is no gross signs of rupture. I do not find that this is causing any of his abdominal pain or discomfort. Suggest further work-up from GI. He can follow-up with Dr. Edgardo Wall upon discharge.

## 2020-12-15 NOTE — CONSULTS
07/14/2017    Dr. Kingsley Noss- No blockages    CARDIAC SURGERY      EYE SURGERY Bilateral approx 2014    cataracts removal w/lens implants    HERNIA REPAIR      SKULL FRACTURE ELEVATION      TOOTH EXTRACTION      full mouth extraction     Current Medications: Allergies:  Patient has no known allergies.     Social History     Socioeconomic History    Marital status:      Spouse name: Not on file    Number of children: Not on file    Years of education: Not on file    Highest education level: Not on file   Occupational History    Occupation: retired-   Social Needs    Financial resource strain: Not on file    Food insecurity     Worry: Not on file     Inability: Not on file   [employer] needs     Medical: Not on file     Non-medical: Not on file   Tobacco Use    Smoking status: Current Every Day Smoker     Packs/day: 1.00     Years: 50.00     Pack years: 50.00     Types: Cigarettes    Smokeless tobacco: Never Used    Tobacco comment: currently smokes 1.0 ppd   Substance and Sexual Activity    Alcohol use: No     Alcohol/week: 0.0 standard drinks    Drug use: No    Sexual activity: Not Currently   Lifestyle    Physical activity     Days per week: Not on file     Minutes per session: Not on file    Stress: Not on file   Relationships    Social connections     Talks on phone: Not on file     Gets together: Not on file     Attends Episcopal service: Not on file     Active member of club or organization: Not on file     Attends meetings of clubs or organizations: Not on file     Relationship status: Not on file    Intimate partner violence     Fear of current or ex partner: Not on file     Emotionally abused: Not on file     Physically abused: Not on file     Forced sexual activity: Not on file   Other Topics Concern    Not on file   Social History Narrative    Not on file        Family History   Problem Relation Age of Onset    Heart Disease Mother     Stroke Father    oRse Her Heart Disease Brother        PHYSICAL EXAM:    /84   Pulse 95   Temp 97.3 °F (36.3 °C)   Resp 16   Ht 5' 11\" (1.803 m)   Wt 118 lb (53.5 kg)   SpO2 96%   BMI 16.46 kg/m²   CONSTITUTIONAL:  awake, alert, cooperative, no apparent distress, and appears stated age, cachetic  NECK:  supple, symmetrical, trachea midline,no jugular venous distension  LUNGS:  no increased work of breathing, good air exchange  CARDIOVASCULAR:  regular rate and rhythm   ABDOMEN:  soft, non-distended, non-tender, Aorta is not palpable  SKIN:  no bruising or bleeding  EXTREMITIES:   R UE Swelling absent Incisions absent       5/5 Strength  L UE Swelling absent Incisions absent       5/5 Strength  R LE Edema absent  Incisions absent    Varicose veins absent    Wounds absent, normalcaprefill   5/5 Strength, neuropathy is absent  L LE Edema absent  Incisions absent    Varicose veins absent    Wounds absent, normalcaprefill   5/5 Strength, neuropathy is absent  R brachial  L brachial    R radial 2 L radial 2   R femoral 2 L femoral 2   R popliteal  L popliteal    R posterior tibial  L posterior tibial    R dorsalis pedis 1 L dorsalis pedis 1     LABS:    Lab Results   Component Value Date    WBC 7.7 12/15/2020    HGB 12.5 12/15/2020    HCT 36.9 (L) 12/15/2020     12/15/2020    PROTIME 12.6 (H) 09/29/2017    INR 1.2 09/29/2017    K 4.1 12/15/2020    BUN 16 12/15/2020    CREATININE 0.9 12/15/2020       RADIOLOGY:    Assesment/Plan  Patient is a 68 yo M who presents with constipation, abdominal pain, weight loss and 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    - No gross signs of rupture, slight increase in size. - Abdominal pain not likely caused by aneurysm  - Follow up with Dr. Kathy Avalos outpatient  - Patient seen and examined with Dr. Aristeo Kauffman      Electronically signed by Florian Lozada MD on 12/15/2020 at 4:17 PM      Patient was seen and examined.   Agree with above.    He has palpable bilateral brachial and radial pulses DP PTs are palpable. I personally reviewed the scan. 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 See my additional progress note. He will follow up with Dr. Hunter Flaherty. Is recently seen Dr. Hunter Flaherty and can follow-up with his regular scheduled appointment.     Kamari Croft

## 2020-12-15 NOTE — ED PROVIDER NOTES
ED Attending  CC: Vivian Bernstein [id number]  Department of Emergency Medicine   ED  Encounter Note  Admit Date/RoomTime: 12/15/2020 12:01 PM  ED Room: 40/40    NAME: Fanta Carter  : 1950  MRN: [id number]     Chief Complaint:  Abdominal Pain, Constipation, and Emesis    History of Present Illness        Fanta Carter is a 69 y.o. old male who presents to the emergency department by private vehicle, for waxing and waning aching, cramping pain in the periumbilical area without radiation which began several week(s) prior to arrival. This has been ongoing and he has been seen by Dr. Tai Castaneda, his PCP for this. He is awaiting to be seen by GI for a colonoscopy but denies having an appointment scheduled yet. He came to the ED today because he started having nausea and some dark colored emesis without black or coffee grounds. He denies being on anticoagulants. The pain is aggravated by eating and drinking which he has had poor appetite and associated weight loss, as well as constipation which is common for him and relieved by nothing. There has been NO syncope, chest pain, shortness of breath, back pain, dysuria, hematuria, black or bloody stools. ROS   Pertinent positives and negatives are stated within HPI, all other systems reviewed and are negative. Past Medical History:  has a past medical history of Abdominal aortic aneurysm without rupture ([address] Arthritis, AS (aortic stenosis), Cerebral artery occlusion with cerebral infarction ([address] COPD (chronic obstructive pulmonary disease) ([address] Emphysema of lung ([address] History of blood transfusion, Hyperlipidemia, Pneumonia, and Tobacco dependence. Surgical History:  has a past surgical history that includes hernia repair; Ankle surgery; Skull fracture elevation; eye surgery (Bilateral, approx ); Cardiac catheterization (2017); Tooth Extraction;  Aortic valve replacement; AAA repair, endovascular (10/12/2017); AAA repair, endovascular (10/12/2017); and Cardiac surgery. Social History:  reports that he has been smoking cigarettes. He has a 50.00 pack-year smoking history. He has never used smokeless tobacco. He reports that he does not drink alcohol or use drugs. Family History: family history includes Heart Disease in his brother and mother; Stroke in his father. Allergies: Patient has no known allergies. Physical Exam   Oxygen Saturation Interpretation: Normal.        ED Triage Vitals   BP Temp Temp src Pulse Resp SpO2 Height Weight   12/15/20 1106 12/15/20 1022 -- 12/15/20 1022 12/15/20 1106 12/15/20 1022 12/15/20 1106 12/15/20 1106   122/84 97.3 °F (36.3 °C)  118 16 95 % 5' 11\" (1.803 m) 118 lb (53.5 kg)         General Appearance/Constitutional:  Alert, development consistent with age. Patient cachectic. HEENT:  NC/NT. PERRLA. Airway patent. Oral mucosa moist.  Neck:  Supple. No lymphadenopathy. Respiratory: Lungs Clear to auscultation and breath sounds equal. No increased work of breathing or retractions. CV:  Regular rate and rhythm. No resting tachycardia. Strong radial pulses symmetrical. Strong posterior tibial pulses symmetrical.  GI:  Abdomen flat, non-distended. Bowel sounds: normal bowel sounds. Tenderness: There is mild tenderness present - located diffusely in the entire abdomen, there is no point tenderness. There is no rebound tenderness. There is no guarding. There is no distension. There is no pulsatile mass. .           Liver: not enlarged. Spleen:  non-palpable. Back: CVA Tenderness: No.  Integument:  Normal turgor. Warm, dry, without visible rash, unless noted elsewhere. Neurological:  Orientation age-appropriate. Motor functions intact.     Lab / Imaging Results   (All laboratory and radiology results have been personally reviewed by myself)  Labs:  Results for orders placed or performed during the hospital encounter of 12/15/20 Comprehensive Metabolic Panel w/ Reflex to MG   Result Value Ref Range    Sodium 134 132 - 146 mmol/L    Potassium reflex Magnesium 4.1 3.5 - 5.0 mmol/L    Chloride 100 98 - 107 mmol/L    CO2 24 22 - 29 mmol/L    Anion Gap 10 7 - 16 mmol/L    Glucose 108 (H) 74 - 99 mg/dL    BUN 16 8 - 23 mg/dL    CREATININE 0.9 0.7 - 1.2 mg/dL    GFR Non-African American >60 >=60 mL/min/1.73    GFR African American >60     Calcium 9.0 8.6 - 10.2 mg/dL    Total Protein 7.1 6.4 - 8.3 g/dL    Alb 3.6 3.5 - 5.2 g/dL    Total Bilirubin 0.6 0.0 - 1.2 mg/dL    Alkaline Phosphatase 83 40 - 129 U/L    ALT 12 0 - 40 U/L    AST 16 0 - 39 U/L   CBC Auto Differential   Result Value Ref Range    WBC 7.7 4.5 - 11.5 E9/L    RBC 3.82 3.80 - 5.80 E12/L    Hemoglobin 12.5 12.5 - 16.5 g/dL    Hematocrit 36.9 (L) 37.0 - 54.0 %    MCV 96.6 80.0 - 99.9 fL    MCH 32.7 26.0 - 35.0 pg    MCHC 33.9 32.0 - 34.5 %    RDW 12.6 11.5 - 15.0 fL    Platelets 989 251 - 011 E9/L    MPV 9.2 7.0 - 12.0 fL    Neutrophils % 73.0 43.0 - 80.0 %    Immature Granulocytes % 0.3 0.0 - 5.0 %    Lymphocytes % 16.7 (L) 20.0 - 42.0 %    Monocytes % 8.7 2.0 - 12.0 %    Eosinophils % 0.5 0.0 - 6.0 %    Basophils % 0.8 0.0 - 2.0 %    Neutrophils Absolute 5.65 1.80 - 7.30 E9/L    Immature Granulocytes # 0.02 E9/L    Lymphocytes Absolute 1.29 (L) 1.50 - 4.00 E9/L    Monocytes Absolute 0.67 0.10 - 0.95 E9/L    Eosinophils Absolute 0.04 (L) 0.05 - 0.50 E9/L    Basophils Absolute 0.06 0.00 - 0.20 E9/L   Troponin   Result Value Ref Range    Troponin <0.01 0.00 - 0.03 ng/mL   Lipase   Result Value Ref Range    Lipase 18 13 - 60 U/L   Lactate, Sepsis   Result Value Ref Range    Lactic Acid, Sepsis 0.6 0.5 - 1.9 mmol/L   EKG 12 Lead   Result Value Ref Range    Ventricular Rate 85 BPM    Atrial Rate 85 BPM    P-R Interval 224 ms    QRS Duration 88 ms    Q-T Interval 390 ms    QTc Calculation (Bazett) 464 ms    P Axis 81 degrees    R Axis 25 degrees    T Axis 66 degrees     Imaging:   All himself. Patient presentation discussed as well. Per him patient is nonsurgical at this time. Time: [facility] and spoke to Dr. Gracia Vides. Advised of labs and CT result. Also discussed vascular consult. Given the patient prefers outpatient management and this has been an ongoing issue, plan is for discharge home on miralax to address the previous complaints of constipation and zofran for vomiting and Dr. Gracia Vides will continue to facilitate outpatient colonoscopy with Dr. Brenda Sotelo. Time: 1 Dr. Maranda Craig with vascular surgery evaluated patient and per vascular stand point OK for patient to be discharged. Procedures:   none    MDM:  Patient evaluated by Dr. Eladio Mckeon as well. Labs and diagnostics as resulted above. Given the radiologist CT interpretation of endoleak with small increase in aneurysm size, vascular surgery was consulted as recommended. The patient was evaluated by a surgical resident and I spoke with Dr. Harleen Estrada who reviewed the images himself advising of no need for surgical intervention at this time. Patient may continue outpatient management with Dr. Bernadette Brunson. Given there is no other findings indicative of inpatient management and patient prefers to go home, plan is for miralax and zofran ODT with outpatient follow up with Dr. Gracia Vides as he will facilitate GI follow up. Patient is well-appearing, nontoxic, no emesis and tolerating oral fluids. He was offered to stay in the hospital and prefers this plan especially with COVID-19. He is aware of signs and symptoms to watch for indicative of reevaluation in the emergency department. Patient departed in stable condition. Plan of Care/Counseling:  Emergency Attending Physician, Surgery Resident and I reviewed today's visit with the patient in addition to providing specific details for the plan of care and counseling regarding the diagnosis and prognosis. Questions are answered at this time and are agreeable with the plan. Assessment      1. Generalized abdominal pain    2. Abdominal aneurysm ([address]    3. Constipation, unspecified constipation type      This patient's ED course included: a personal history and physicial examination, re-evaluation prior to disposition and multiple bedside re-evaluations  This patient has remained hemodynamically stable, remained unchanged and been closely monitored during their ED course. Plan   Discharge to home  Patient condition is stable. New Medications     Discharge Medication List as of 12/15/2020  3:24 PM      START taking these medications    Details   polyethylene glycol (MIRALAX) 17 g packet Take 17 g by mouth daily as needed for Constipation, Disp-527 g, R-1Print      ondansetron (ZOFRAN-ODT) 4 MG disintegrating tablet Take 1 tablet by mouth every 8 hours as needed for Nausea or Vomiting, Disp-15 tablet, R-0Print           Electronically signed by COREEN Lewis CNP   DD: 12/15/20  **This report was transcribed using voice recognition software. Every effort was made to ensure accuracy; however, inadvertent computerized transcription errors may be present.   END OF PROVIDER NOTE       COREEN Lewis CNP  12/15/20 [id number]

## 2020-12-15 NOTE — ED NOTES
Patient in XR at this time, XR to bring patient back to ED room 40 when done      Barney Rosales, RN  12/15/20 3583

## 2020-12-17 ASSESSMENT — PAIN SCALES - GENERAL: PAINLEVEL_OUTOF10: 0

## 2020-12-18 ENCOUNTER — HOSPITAL ENCOUNTER (INPATIENT)
Age: 70
LOS: 4 days | Discharge: HOME | DRG: 312 | End: 2020-12-22
Attending: EMERGENCY MEDICINE | Admitting: INTERNAL MEDICINE
Payer: MEDICARE

## 2020-12-18 ENCOUNTER — APPOINTMENT (OUTPATIENT)
Dept: GENERAL RADIOLOGY | Age: 70
DRG: 312 | End: 2020-12-18
Payer: MEDICARE

## 2020-12-18 PROBLEM — R62.7 FAILURE TO THRIVE IN ADULT: Status: ACTIVE | Noted: 2020-12-18

## 2020-12-18 PROBLEM — R55 SYNCOPE AND COLLAPSE: Status: ACTIVE | Noted: 2020-12-18

## 2020-12-18 LAB
ALBUMIN SERPL-MCNC: 3.9 G/DL (ref 3.5–5.2)
ALP BLD-CCNC: 87 U/L (ref 40–129)
ALT SERPL-CCNC: 12 U/L (ref 0–40)
ANION GAP SERPL CALCULATED.3IONS-SCNC: 12 MMOL/L (ref 7–16)
AST SERPL-CCNC: 18 U/L (ref 0–39)
BASOPHILS ABSOLUTE: 0.03 E9/L (ref 0–0.2)
BASOPHILS RELATIVE PERCENT: 0.3 % (ref 0–2)
BILIRUB SERPL-MCNC: 0.6 MG/DL (ref 0–1.2)
BUN BLDV-MCNC: 30 MG/DL (ref 8–23)
CALCIUM SERPL-MCNC: 9.3 MG/DL (ref 8.6–10.2)
CHLORIDE BLD-SCNC: 97 MMOL/L (ref 98–107)
CO2: 26 MMOL/L (ref 22–29)
CREAT SERPL-MCNC: 1.2 MG/DL (ref 0.7–1.2)
EKG ATRIAL RATE: 84 BPM
EKG P AXIS: 90 DEGREES
EKG P-R INTERVAL: 252 MS
EKG Q-T INTERVAL: 400 MS
EKG QRS DURATION: 86 MS
EKG QTC CALCULATION (BAZETT): 472 MS
EKG R AXIS: 79 DEGREES
EKG T AXIS: 72 DEGREES
EKG VENTRICULAR RATE: 84 BPM
EOSINOPHILS ABSOLUTE: 0.01 E9/L (ref 0.05–0.5)
EOSINOPHILS RELATIVE PERCENT: 0.1 % (ref 0–6)
GFR AFRICAN AMERICAN: >60
GFR NON-AFRICAN AMERICAN: 60 ML/MIN/1.73
GLUCOSE BLD-MCNC: 124 MG/DL (ref 74–99)
HCT VFR BLD CALC: 40.8 % (ref 37–54)
HEMOGLOBIN: 13.3 G/DL (ref 12.5–16.5)
IMMATURE GRANULOCYTES #: 0.04 E9/L
IMMATURE GRANULOCYTES %: 0.4 % (ref 0–5)
LACTIC ACID: 1.2 MMOL/L (ref 0.5–2.2)
LYMPHOCYTES ABSOLUTE: 0.84 E9/L (ref 1.5–4)
LYMPHOCYTES RELATIVE PERCENT: 8.7 % (ref 20–42)
MCH RBC QN AUTO: 32.4 PG (ref 26–35)
MCHC RBC AUTO-ENTMCNC: 32.6 % (ref 32–34.5)
MCV RBC AUTO: 99.3 FL (ref 80–99.9)
MONOCYTES ABSOLUTE: 0.67 E9/L (ref 0.1–0.95)
MONOCYTES RELATIVE PERCENT: 6.9 % (ref 2–12)
NEUTROPHILS ABSOLUTE: 8.12 E9/L (ref 1.8–7.3)
NEUTROPHILS RELATIVE PERCENT: 83.6 % (ref 43–80)
PDW BLD-RTO: 12.6 FL (ref 11.5–15)
PLATELET # BLD: 167 E9/L (ref 130–450)
PMV BLD AUTO: 9.2 FL (ref 7–12)
POTASSIUM REFLEX MAGNESIUM: 4.3 MMOL/L (ref 3.5–5)
RBC # BLD: 4.11 E12/L (ref 3.8–5.8)
SODIUM BLD-SCNC: 135 MMOL/L (ref 132–146)
TOTAL PROTEIN: 7.5 G/DL (ref 6.4–8.3)
TROPONIN: <0.01 NG/ML (ref 0–0.03)
WBC # BLD: 9.7 E9/L (ref 4.5–11.5)

## 2020-12-18 PROCEDURE — 93005 ELECTROCARDIOGRAM TRACING: CPT | Performed by: STUDENT IN AN ORGANIZED HEALTH CARE EDUCATION/TRAINING PROGRAM

## 2020-12-18 PROCEDURE — 74019 RADEX ABDOMEN 2 VIEWS: CPT

## 2020-12-18 PROCEDURE — 80053 COMPREHEN METABOLIC PANEL: CPT

## 2020-12-18 PROCEDURE — 2060000000 HC ICU INTERMEDIATE R&B

## 2020-12-18 PROCEDURE — 6370000000 HC RX 637 (ALT 250 FOR IP): Performed by: STUDENT IN AN ORGANIZED HEALTH CARE EDUCATION/TRAINING PROGRAM

## 2020-12-18 PROCEDURE — 84484 ASSAY OF TROPONIN QUANT: CPT

## 2020-12-18 PROCEDURE — 85025 COMPLETE CBC W/AUTO DIFF WBC: CPT

## 2020-12-18 PROCEDURE — 83605 ASSAY OF LACTIC ACID: CPT

## 2020-12-18 PROCEDURE — 6370000000 HC RX 637 (ALT 250 FOR IP): Performed by: INTERNAL MEDICINE

## 2020-12-18 PROCEDURE — 2580000003 HC RX 258: Performed by: STUDENT IN AN ORGANIZED HEALTH CARE EDUCATION/TRAINING PROGRAM

## 2020-12-18 PROCEDURE — 99284 EMERGENCY DEPT VISIT MOD MDM: CPT

## 2020-12-18 PROCEDURE — 93010 ELECTROCARDIOGRAM REPORT: CPT | Performed by: INTERNAL MEDICINE

## 2020-12-18 RX ORDER — ASPIRIN 81 MG/1
81 TABLET, CHEWABLE ORAL EVERY OTHER DAY
Status: DISCONTINUED | OUTPATIENT
Start: 2020-12-18 | End: 2020-12-22 | Stop reason: HOSPADM

## 2020-12-18 RX ORDER — ATORVASTATIN CALCIUM 20 MG/1
20 TABLET, FILM COATED ORAL DAILY
Status: DISCONTINUED | OUTPATIENT
Start: 2020-12-18 | End: 2020-12-22 | Stop reason: HOSPADM

## 2020-12-18 RX ORDER — POLYETHYLENE GLYCOL 3350 17 G/17G
17 POWDER, FOR SOLUTION ORAL DAILY PRN
Status: DISCONTINUED | OUTPATIENT
Start: 2020-12-18 | End: 2020-12-22 | Stop reason: HOSPADM

## 2020-12-18 RX ORDER — IPRATROPIUM BROMIDE AND ALBUTEROL SULFATE 2.5; .5 MG/3ML; MG/3ML
1 SOLUTION RESPIRATORY (INHALATION) EVERY 4 HOURS PRN
Status: DISCONTINUED | OUTPATIENT
Start: 2020-12-18 | End: 2020-12-22 | Stop reason: HOSPADM

## 2020-12-18 RX ORDER — POLYETHYLENE GLYCOL 3350, SODIUM CHLORIDE, SODIUM BICARBONATE, POTASSIUM CHLORIDE 420; 11.2; 5.72; 1.48 G/4L; G/4L; G/4L; G/4L
4000 POWDER, FOR SOLUTION ORAL ONCE
Status: COMPLETED | OUTPATIENT
Start: 2020-12-20 | End: 2020-12-20

## 2020-12-18 RX ORDER — POLYETHYLENE GLYCOL 3350 17 G/17G
17 POWDER, FOR SOLUTION ORAL DAILY PRN
Status: DISCONTINUED | OUTPATIENT
Start: 2020-12-18 | End: 2020-12-18

## 2020-12-18 RX ORDER — 0.9 % SODIUM CHLORIDE 0.9 %
1000 INTRAVENOUS SOLUTION INTRAVENOUS ONCE
Status: COMPLETED | OUTPATIENT
Start: 2020-12-18 | End: 2020-12-18

## 2020-12-18 RX ORDER — ONDANSETRON 4 MG/1
4 TABLET, ORALLY DISINTEGRATING ORAL EVERY 8 HOURS PRN
Status: DISCONTINUED | OUTPATIENT
Start: 2020-12-18 | End: 2020-12-22 | Stop reason: HOSPADM

## 2020-12-18 RX ORDER — SODIUM PHOSPHATE, DIBASIC AND SODIUM PHOSPHATE, MONOBASIC 7; 19 G/133ML; G/133ML
1 ENEMA RECTAL DAILY
Status: ACTIVE | OUTPATIENT
Start: 2020-12-18 | End: 2020-12-21

## 2020-12-18 RX ADMIN — BISACODYL 5 MG: 5 TABLET, COATED ORAL at 18:06

## 2020-12-18 RX ADMIN — POLYETHYLENE GLYCOL 3350, SODIUM SULFATE ANHYDROUS, SODIUM BICARBONATE, SODIUM CHLORIDE, POTASSIUM CHLORIDE 2000 ML: 236; 22.74; 6.74; 5.86; 2.97 POWDER, FOR SOLUTION ORAL at 18:06

## 2020-12-18 RX ADMIN — METOPROLOL TARTRATE 25 MG: 25 TABLET, FILM COATED ORAL at 21:59

## 2020-12-18 RX ADMIN — ATORVASTATIN CALCIUM 20 MG: 20 TABLET, FILM COATED ORAL at 18:06

## 2020-12-18 RX ADMIN — SODIUM CHLORIDE 1000 ML: 9 INJECTION, SOLUTION INTRAVENOUS at 11:02

## 2020-12-18 ASSESSMENT — ENCOUNTER SYMPTOMS
NAUSEA: 1
COUGH: 0
SHORTNESS OF BREATH: 0
DIARRHEA: 0
VOMITING: 1
ABDOMINAL PAIN: 1
BACK PAIN: 0
VOICE CHANGE: 0
PHOTOPHOBIA: 0

## 2020-12-18 ASSESSMENT — PAIN DESCRIPTION - PAIN TYPE: TYPE: ACUTE PAIN

## 2020-12-18 ASSESSMENT — PAIN SCALES - GENERAL
PAINLEVEL_OUTOF10: 0
PAINLEVEL_OUTOF10: 0
PAINLEVEL_OUTOF10: 10
PAINLEVEL_OUTOF10: 0

## 2020-12-18 ASSESSMENT — PAIN DESCRIPTION - ORIENTATION: ORIENTATION: MID

## 2020-12-18 ASSESSMENT — PAIN DESCRIPTION - LOCATION: LOCATION: ABDOMEN

## 2020-12-18 ASSESSMENT — PAIN DESCRIPTION - DESCRIPTORS: DESCRIPTORS: BURNING;CONSTANT;SHARP

## 2020-12-18 NOTE — ED PROVIDER NOTES
The patient is a 65-year-old male with a history of AAA status post surgical repair in October 2017 who presents to the emergency department complaining of abdominal pain. Patient symptoms were sudden onset, have been constant, and moderate in severity. The patient states that he has been constipated for the past 1/2 to 2 weeks. Patient states that his last bowel movement was about 2 weeks ago. He states that he was seen here 5 days ago for similar symptoms. He was discharged home on laxatives, but he still has not had a bowel movement. The patient complains of mid periumbilical abdominal pain. He states it is nonradiating. Patient is also complaining of nausea with several episodes of vomiting prior to arrival.  The patient tried laxatives at home without any relief. Per chart review the patient was found to have a slight endovascular leak. Vascular surgery was consulted to evaluate the patient and recommended outpatient follow-up, and no surgical intervention at this time. The patient denies any fever, chills, hematochezia, melena, hematemesis, dysuria, hematuria, recent abdominal surgeries, or other acute symptoms or concerns. The history is provided by the patient. Review of Systems   Constitutional: Negative for chills, fatigue and fever. HENT: Negative for congestion and voice change. Eyes: Negative for photophobia and visual disturbance. Respiratory: Negative for cough and shortness of breath. Cardiovascular: Negative for chest pain, palpitations and leg swelling. Gastrointestinal: Positive for abdominal pain, nausea and vomiting. Negative for diarrhea. Genitourinary: Negative for dysuria, flank pain and frequency. Musculoskeletal: Negative for back pain and neck pain. Skin: Negative for rash and wound. Neurological: Negative for dizziness, syncope, weakness, light-headedness and headaches. All other systems reviewed and are negative.        Physical Exam  Vitals signs and nursing note reviewed. Constitutional:       General: He is not in acute distress. Appearance: He is well-developed and underweight. He is not ill-appearing, toxic-appearing or diaphoretic. HENT:      Head: Normocephalic and atraumatic. Nose: Nose normal.      Mouth/Throat:      Lips: Pink. No lesions. Mouth: Mucous membranes are moist.   Eyes:      General: Lids are normal.   Neck:      Musculoskeletal: Normal range of motion and neck supple. Cardiovascular:      Rate and Rhythm: Normal rate and regular rhythm. Pulses:           Dorsalis pedis pulses are 2+ on the right side and 2+ on the left side. Heart sounds: Normal heart sounds, S1 normal and S2 normal. No murmur. Pulmonary:      Effort: Pulmonary effort is normal. No respiratory distress. Breath sounds: Normal breath sounds and air entry. No decreased breath sounds, wheezing, rhonchi or rales. Abdominal:      General: Bowel sounds are normal.      Palpations: Abdomen is soft. Tenderness: There is abdominal tenderness (Mild diffuse abdominal pain, no rigidity, no rebound tenderness, no guarding) in the periumbilical area. There is no guarding or rebound. Skin:     General: Skin is warm and dry. Neurological:      Mental Status: He is alert and oriented to person, place, and time. Motor: No tremor or abnormal muscle tone. Coordination: Coordination normal.          Procedures     MDM  Number of Diagnoses or Management Options  Constipation, unspecified constipation type  Endoleak of aortic graft ([address]  Generalized abdominal pain  Diagnosis management comments: The patient is a 77-year-old male who presents to the emergency department complaint abdominal pain. He is slightly hypertensive otherwise hemodynamically stable, nontoxic in appearance, no acute distress. Labs within normal limits, and x-ray evident of constipation bowel pattern.   Did provide patient with an enema in the emergency department. Consulted with family doctor who states that he would like the patient admitted for failure to thrive. He states that the patient has been losing a significant amount of weight and has had multiple ER visits for constipation. He states that he is concerned he has not had a colonoscopy in the past.  He states that he has been try to set this up for him as an outpatient but has been unsuccessful. Discussed results and plan of care with patient he verbalized understanding and agreement to treatment plan and admission. ED Course as of Dec 18 1319   Fri Dec 18, 2020   1312 Re-evaluated the patient. He is resting comfortably and in no acute distress. [KG]   18 Consulted with the patient's PCP, Dr. Valentino Rhein, who states that he would like the patient admitted. He states that the patient has been losing weight and has had multiple visits to the emergency department for constipation. He states that he has never had a colonoscopy. He states that he has tried to schedule this as an outpatient multiple times but has been unsuccessful. [KG]      ED Course User Index  [KG] Pollo Reza DO          EKG: This EKG is signed and interpreted by me. Rate: 84  Rhythm: Sinus  Interpretation: Sinus rhythm with first-degree AV block, normal axis, LVH,  Comparison: stable as compared to patient's most recent EKG      ED Course as of Dec 18 1319   Fri Dec 18, 2020   1312 Re-evaluated the patient. He is resting comfortably and in no acute distress. [KG]   18 Consulted with the patient's PCP, Dr. Valentino Rhein, who states that he would like the patient admitted. He states that the patient has been losing weight and has had multiple visits to the emergency department for constipation. He states that he has never had a colonoscopy. He states that he has tried to schedule this as an outpatient multiple times but has been unsuccessful.       [KG]      ED Course User Index  [KG] Nelsy Yi DO Milady       --------------------------------------------- PAST HISTORY ---------------------------------------------  Past Medical History:  has a past medical history of Abdominal aortic aneurysm without rupture ([facility] Arthritis, AS (aortic stenosis), Cerebral artery occlusion with cerebral infarction ([facility] COPD (chronic obstructive pulmonary disease) ([facility] Emphysema of lung ([facility] History of blood transfusion, Hyperlipidemia, Pneumonia, and Tobacco dependence. Past Surgical History:  has a past surgical history that includes hernia repair; Ankle surgery; Skull fracture elevation; eye surgery (Bilateral, approx 2014); Cardiac catheterization (07/14/2017); Tooth Extraction; Aortic valve replacement; AAA repair, endovascular (10/12/2017); AAA repair, endovascular (10/12/2017); and Cardiac surgery. Social History:  reports that he has been smoking cigarettes. He has a 50.00 pack-year smoking history. He has never used smokeless tobacco. He reports that he does not drink alcohol or use drugs. Family History: family history includes Heart Disease in his brother and mother; Stroke in his father. The patients home medications have been reviewed. Allergies: Patient has no known allergies.     -------------------------------------------------- RESULTS -------------------------------------------------    LABS:  Results for orders placed or performed during the hospital encounter of 12/18/20   CBC auto differential   Result Value Ref Range    WBC 9.7 4.5 - 11.5 E9/L    RBC 4.11 3.80 - 5.80 E12/L    Hemoglobin 13.3 12.5 - 16.5 g/dL    Hematocrit 40.8 37.0 - 54.0 %    MCV 99.3 80.0 - 99.9 fL    MCH 32.4 26.0 - 35.0 pg    MCHC 32.6 32.0 - 34.5 %    RDW 12.6 11.5 - 15.0 fL    Platelets 411 804 - 565 E9/L    MPV 9.2 7.0 - 12.0 fL    Neutrophils % 83.6 (H) 43.0 - 80.0 %    Immature Granulocytes % 0.4 0.0 - 5.0 %    Lymphocytes % 8.7 (L) 20.0 - 42.0 %    Monocytes % 6.9 2.0 - 12.0 %    Eosinophils % 0.1 0.0 - 6.0 %    Basophils % 0.3 0.0 - 2.0 %    Neutrophils Absolute 8.12 (H) 1.80 - 7.30 E9/L    Immature Granulocytes # 0.04 E9/L    Lymphocytes Absolute 0.84 (L) 1.50 - 4.00 E9/L    Monocytes Absolute 0.67 0.10 - 0.95 E9/L    Eosinophils Absolute 0.01 (L) 0.05 - 0.50 E9/L    Basophils Absolute 0.03 0.00 - 0.20 E9/L   Comprehensive Metabolic Panel w/ Reflex to MG   Result Value Ref Range    Sodium 135 132 - 146 mmol/L    Potassium reflex Magnesium 4.3 3.5 - 5.0 mmol/L    Chloride 97 (L) 98 - 107 mmol/L    CO2 26 22 - 29 mmol/L    Anion Gap 12 7 - 16 mmol/L    Glucose 124 (H) 74 - 99 mg/dL    BUN 30 (H) 8 - 23 mg/dL    CREATININE 1.2 0.7 - 1.2 mg/dL    GFR Non-African American 60 >=60 mL/min/1.73    GFR African American >60     Calcium 9.3 8.6 - 10.2 mg/dL    Total Protein 7.5 6.4 - 8.3 g/dL    Alb 3.9 3.5 - 5.2 g/dL    Total Bilirubin 0.6 0.0 - 1.2 mg/dL    Alkaline Phosphatase 87 40 - 129 U/L    ALT 12 0 - 40 U/L    AST 18 0 - 39 U/L   Troponin   Result Value Ref Range    Troponin <0.01 0.00 - 0.03 ng/mL   LACTIC ACID, PLASMA   Result Value Ref Range    Lactic Acid 1.2 0.5 - 2.2 mmol/L   EKG 12 Lead   Result Value Ref Range    Ventricular Rate 84 BPM    Atrial Rate 84 BPM    P-R Interval 252 ms    QRS Duration 86 ms    Q-T Interval 400 ms    QTc Calculation (Bazett) 472 ms    P Axis 90 degrees    R Axis 79 degrees    T Axis 72 degrees       RADIOLOGY:  XR ABDOMEN (2 VIEWS)   Final Result   1. Moderate gaseous distension of the bowel. No radiographic evidence of a   mechanical obstruction. 2. Abdominal aortic aneurysm repair with an endovascular stent.          ------------------------- NURSING NOTES AND VITALS REVIEWED ---------------------------  Date / Time Roomed:  12/18/2020 10:03 AM  ED Bed Assignment:  14A/14A-14    The nursing notes within the ED encounter and vital signs as below have been reviewed.      Patient Vitals for the past 24 hrs:   BP Temp Pulse Resp SpO2 Height Weight   12/18/20 1000 (!) 166/104 -- -- 15 -- 5' 11\" (1.803 m) 118 lb (53.5 kg)   12/18/20 0956 -- 97.5 °F (36.4 °C) 82 -- 97 % -- --       Oxygen Saturation Interpretation: Normal    ------------------------------------------ PROGRESS NOTES ------------------------------------------  Re-evaluation(s): Patients symptoms show no change  Repeat physical examination is not changed    Counseling:  I have spoken with the patient and discussed todays results, in addition to providing specific details for the plan of care and counseling regarding the diagnosis and prognosis. Their questions are answered at this time and they are agreeable with the plan of admission.    --------------------------------- ADDITIONAL PROVIDER NOTES ---------------------------------  Consultations: Spoke with Dr. Juan Hartman. Discussed case. They will admit the patient. This patient's ED course included: a personal history and physicial examination, re-evaluation prior to disposition, multiple bedside re-evaluations, IV medications, cardiac monitoring, continuous pulse oximetry and complex medical decision making and emergency management    This patient has remained hemodynamically stable during their ED course. Diagnosis:  1. Adult failure to thrive    2. Generalized abdominal pain    3. Constipation, unspecified constipation type    4. Endoleak of aortic graft (HCC)        Disposition:  Patient's disposition: Admit to med/surg floor  Patient's condition is stable.          Tyra Faulkner DO  Resident  12/18/20 0223

## 2020-12-18 NOTE — FLOWSHEET NOTE
Consult placed to Walter Harding answering service. Dr. Walter Esqueda given consult and [facility] also given consult.

## 2020-12-18 NOTE — H&P
[address]                  [address] [facility],  [facility]                              HISTORY AND PHYSICAL    PATIENT NAME: Sedrick Benitez                   :        1950  MED REC NO:   [id number]                            ROOM:       9902  ACCOUNT NO:   [de-identified]                           ADMIT DATE: 2020  PROVIDER:     Tae Bhatti DO    CHIEF COMPLAINT:  Abdominal pain and \"I passed out. \"    HISTORY OF PRESENT ILLNESS:  The patient is a 79-year-old gentleman with  longstanding history of chronic obstructive pulmonary disease, severe,  with previous repair of abdominal aortic aneurysm who describes onset of  symptoms over the last several months including progressively worsening  lower abdominal pain with constipation and progressive weight loss. He  has actually lost approximately 23 pounds over the last 12 months or so. He was in the ER several days earlier, sent home with plans to follow up  for outpatient colonoscopy. In the interim, he has had worsening  constipation, more abdominal pain, and actually had a sudden syncopal  event on the a.m. of this admission witnessed by his family. There were  no prodromal symptoms or postictal lethargy or confusion. He describes  severe abdominal pain with no bowel movement for a long period of time  over several weeks or more. No melena. No hematochezia. Some nausea,  some vomiting. No fevers or night sweats. No chest pain. MEDICATIONS:  See chart. PAST MEDICAL HISTORY:  Chronic obstructive pulmonary disease,  hypertension, hyperlipidemia, abdominal aortic aneurysm repair, negative  recent urologic evaluation. SOCIAL HISTORY:  He is an active smoker. No known alcohol. FAMILY HISTORY:  Not available. REVIEW OF SYSTEMS:  NEUROMUSCULAR:  No headache or dizziness. Syncopal episode x1 on a.m.  of this admission. RESPIRATORY:  Admits to chronic shortness of breath.   No cough, fevers,  night sweats. CARDIOVASCULAR:  No chest pains or palpitations reported. GASTROINTESTINAL:  Constipation, abdominal pain. No melena. No  hematochezia. Nausea and vomiting last several days. GENITOURINARY:  No dysuria, hematuria, oliguria. MUSCULOSKELETAL:  Denies. HEMATOLOGIC:  Denies. PSYCHIATRIC:  Denies. PHYSICAL EXAMINATION:  GENERAL:  The patient is an ill-appearing, thin 65-year-old gentleman  who is alert and oriented. HEENT:  Head normal size and shape. Pupils equal and reactive. Sclerae  were clear. Extraocular movements were intact. Fundi were not  visualized. Tympanic membranes were not visualized. Oral mucous  membranes were clear. NECK:  Supple. Neck veins were flat. No jugular venous distention. No  carotid bruits. No palpable lymphadenopathy. LUNGS:  Diminished breath sounds bilaterally. CARDIOVASCULAR:  Regular rate and rhythm with distant tones. ABDOMEN:  Scaphoid contour, local tenderness lower abdomen. Hypoactive  bowel sounds. Femoral pulses symmetric. GENITOURINARY:  Deferred. RECTAL:  Deferred. EXTREMITIES:  No edema, no clubbing, no cyanosis. No asymmetry of lower  extremity circumference. NEUROLOGIC:  No focal neurologic deficits. CLINICAL IMPRESSION:  1. Syncope. 2.  Abdominal pain with weight loss and constipation. 3.  Severe COPD. 4.  Status post repair of abdominal aortic aneurysm. 5.  Hypertension. 6.  Hyperlipidemia.         eJns Mayo DO    D: 12/18/2020 15:12:15       T: 12/18/2020 15:21:40     [email or web address]  Job#: [id number]     Doc#: [id number]    CC:

## 2020-12-18 NOTE — PROGRESS NOTES
Patient seen and examined earlier today in the ED  Ongoing abdominal pain with constipation and weight loss   This is second ED visit this week for these symptoms  Family describes sudden syncopal event this AM as well  Alert and oriented  RRR with distant tones  Diminished breath sounds secondary to COPD  Abdomen guarded with hypoactive BS  Will admit to telemetry   Consult general surgery

## 2020-12-18 NOTE — CONSULTS
GENERAL SURGERY  CONSULT NOTE  12/18/2020    Physician Consulted: Dr. Lowell Michael   Reason for Consult: Abdominal pain, constipation, weight loss  Referring Physician: Dr. Freddy ROGERS  Nazanin Fitzgerald is a 69 y.o. male who presents for evaluation of abdominal pain, constipation, weight loss. This is patient's second hospital visit in the last week. Patient presented 12/15 with the same symptoms. On CT scan it showed a small type II endovascular leak of a previous fenestrated EVAR (Román 2017). Determined that this was most likely not the cause of his abdominal pain as it was recently evaluated in the outpatient setting. Patient was discharged at this time on laxatives to which he has not responded to. When discussing current symptoms with patient patient states that he has not had a bowel movement in over a week. On personally reviewing the scan that were completed on the 15th, there is a considerable amount of stool within the colon. Abdominal x-ray today 12/18 shows gas bubbles throughout the bowel. Patient tells me that he has had over a 40 pound weight loss since the summer as well as ongoing chronic constipation around the same time. Patient states that only recently has become slightly nauseated and has had a decreased ability to eat due to the nausea. Patient tells me that he has never had a colonoscopy before. Patient is an active smoker with a little over a 50-pack-year smoking history. Patient states that he no longer drinks any alcohol and has not for some time.       Past Medical History:   Diagnosis Date    Abdominal aortic aneurysm without rupture ([address] 5/9/2017    Arthritis     AS (aortic stenosis)     Cerebral artery occlusion with cerebral infarction [facility]) 2009    TIA/CVA with aphasia that resolved    COPD (chronic obstructive pulmonary disease) (HCC)     Emphysema of lung (HCC)     History of blood transfusion     Hyperlipidemia     Pneumonia     Tobacco dependence 5/9/2017 Past Surgical History:   Procedure Laterality Date    ABDOMINAL AORTIC ANEURYSM REPAIR, ENDOVASCULAR  10/12/2017    Zenith Fenestrated. Román    ABDOMINAL AORTIC ANEURYSM REPAIR, ENDOVASCULAR  10/12/2017    ANKLE SURGERY      AORTIC VALVE REPLACEMENT      CARDIAC CATHETERIZATION  07/14/2017    Dr. Phillip Spring- No blockages    CARDIAC SURGERY      EYE SURGERY Bilateral approx 2014    cataracts removal w/lens implants    HERNIA REPAIR      SKULL FRACTURE ELEVATION      TOOTH EXTRACTION      full mouth extraction       Medications Prior to Admission:    Prior to Admission medications    Medication Sig Start Date End Date Taking?  Authorizing Provider   metoprolol tartrate (LOPRESSOR) 25 MG tablet Take 25 mg by mouth 2 times daily   Yes Historical Provider, MD   polyethylene glycol (MIRALAX) 17 g packet Take 17 g by mouth daily as needed for Constipation 12/15/20 1/14/21 Yes COREEN Perez CNP   ondansetron (ZOFRAN-ODT) 4 MG disintegrating tablet Take 1 tablet by mouth every 8 hours as needed for Nausea or Vomiting 12/15/20 12/20/20 Yes COREEN Perez CNP   atorvastatin (LIPITOR) 20 MG tablet Take 20 mg by mouth daily  8/6/15  Yes Historical Provider, MD   aspirin 81 MG tablet Take 81 mg by mouth every other day     Historical Provider, MD       No Known Allergies    Family History   Problem Relation Age of Onset    Heart Disease Mother     Stroke Father     Heart Disease Brother        Social History     Tobacco Use    Smoking status: Current Every Day Smoker     Packs/day: 1.00     Years: 50.00     Pack years: 50.00     Types: Cigarettes    Smokeless tobacco: Never Used    Tobacco comment: currently smokes 1.0 ppd   Substance Use Topics    Alcohol use: No     Alcohol/week: 0.0 standard drinks    Drug use: No         Review of Systems   General ROS: positive for  - weight loss  Hematological and Lymphatic ROS: negative for - bleeding problems or blood clots  Respiratory ROS: no cough, shortness of breath, or wheezing  Cardiovascular ROS: no chest pain or dyspnea on exertion, AAA-endovascular repair in October 2017 recently evaluated 3 days ago for possible small type II endoleak  Gastrointestinal ROS: Constipation, new onset since spring. Recent hospital ER visit for constipation 3 days ago  Genito-Urinary ROS: no dysuria, trouble voiding, or hematuria  Musculoskeletal ROS: positive for - muscular weakness  negative for - joint pain or joint swelling      PHYSICAL EXAM:    Vitals:    12/18/20 1507   BP: (!) 140/85   Pulse: 79   Resp:    Temp:    SpO2:        General Appearance:  awake, alert, oriented, in no acute distress  Skin: Cachectic, frail appearing  Head/face:  Temporal wasting  Eyes:  PERRL  Lungs:  No chest wall tenderness. Heart:  Heart regular rate and rhythm  Abdomen: Soft, nondistended, nontender however patient states he had abdominal pain and is now just uncomfortable. Unable to reproduce any abdominal pain on exam  Extremities: pulses present in all extremities  Male Rectal:  Rectal exam refused by patient. LABS:    CBC  Recent Labs     12/18/20  1054   WBC 9.7   HGB 13.3   HCT 40.8        BMP  Recent Labs     12/18/20  1054      K 4.3   CL 97*   CO2 26   BUN 30*   CREATININE 1.2   CALCIUM 9.3     Liver Function  Recent Labs     12/18/20  1054   BILITOT 0.6   AST 18   ALT 12   ALKPHOS 87   PROT 7.5   LABALBU 3.9     No results for input(s): LACTATE in the last 72 hours. No results for input(s): INR, PTT in the last 72 hours. Invalid input(s): PT    RADIOLOGY    Xr Abdomen (2 Views)    Result Date: 12/18/2020  EXAMINATION: TWO XRAY VIEWS OF THE ABDOMEN 12/18/2020 11:29 am COMPARISON: None. HISTORY: ORDERING SYSTEM PROVIDED HISTORY: abd pain x2 weeks TECHNOLOGIST PROVIDED HISTORY: Reason for exam:->abd pain x2 weeks What reading provider will be dictating this exam?->CRC FINDINGS: Moderate gaseous distension of the bowel loops is seen.   There is no radiographic evidence of bowel obstruction. A bifurcated aortoiliac stent is seen. The heart is normal in size. A prosthetic aortic valve is seen. 1. Moderate gaseous distension of the bowel. No radiographic evidence of a mechanical obstruction. 2. Abdominal aortic aneurysm repair with an endovascular stent. ASSESSMENT:  69 y.o. male with abdominal pain, constipation, weight loss ongoing for the last several months.   This is patient's second ER visit in the last week    PLAN:  Overall care per primary  We will start patient on 2-day prep using GoLYTELY  Will give enemas and suppositories to help aid in evacuation of stool contents  We will attempt colonoscopy on Sunday or Monday to evaluate constipation and weight loss  Clear liquid diet, NPO on Sunday 12/20  EGD and c-scope on 12/21  Pain per primary  Discussed with Dr. Yonathan Soto    Electronically signed by Ana Luisa Mason DO on 12/18/20 at 4:07 PM EST

## 2020-12-19 LAB
ALBUMIN SERPL-MCNC: 3.5 G/DL (ref 3.5–5.2)
ALP BLD-CCNC: 83 U/L (ref 40–129)
ALT SERPL-CCNC: 10 U/L (ref 0–40)
ANION GAP SERPL CALCULATED.3IONS-SCNC: 13 MMOL/L (ref 7–16)
AST SERPL-CCNC: 14 U/L (ref 0–39)
BILIRUB SERPL-MCNC: 0.7 MG/DL (ref 0–1.2)
BUN BLDV-MCNC: 30 MG/DL (ref 8–23)
C-REACTIVE PROTEIN: 7.4 MG/DL (ref 0–0.4)
CALCIUM SERPL-MCNC: 9.2 MG/DL (ref 8.6–10.2)
CHLORIDE BLD-SCNC: 99 MMOL/L (ref 98–107)
CO2: 23 MMOL/L (ref 22–29)
CREAT SERPL-MCNC: 1.2 MG/DL (ref 0.7–1.2)
GFR AFRICAN AMERICAN: >60
GFR NON-AFRICAN AMERICAN: 60 ML/MIN/1.73
GLUCOSE BLD-MCNC: 83 MG/DL (ref 74–99)
HCT VFR BLD CALC: 38.2 % (ref 37–54)
HEMOGLOBIN: 12.6 G/DL (ref 12.5–16.5)
MCH RBC QN AUTO: 32.4 PG (ref 26–35)
MCHC RBC AUTO-ENTMCNC: 33 % (ref 32–34.5)
MCV RBC AUTO: 98.2 FL (ref 80–99.9)
PDW BLD-RTO: 12.8 FL (ref 11.5–15)
PLATELET # BLD: 163 E9/L (ref 130–450)
PMV BLD AUTO: 10.3 FL (ref 7–12)
POTASSIUM SERPL-SCNC: 4 MMOL/L (ref 3.5–5)
RBC # BLD: 3.89 E12/L (ref 3.8–5.8)
SEDIMENTATION RATE, ERYTHROCYTE: 45 MM/HR (ref 0–15)
SODIUM BLD-SCNC: 135 MMOL/L (ref 132–146)
TOTAL PROTEIN: 7.1 G/DL (ref 6.4–8.3)
WBC # BLD: 12.7 E9/L (ref 4.5–11.5)

## 2020-12-19 PROCEDURE — 6370000000 HC RX 637 (ALT 250 FOR IP): Performed by: INTERNAL MEDICINE

## 2020-12-19 PROCEDURE — 85651 RBC SED RATE NONAUTOMATED: CPT

## 2020-12-19 PROCEDURE — 6370000000 HC RX 637 (ALT 250 FOR IP): Performed by: STUDENT IN AN ORGANIZED HEALTH CARE EDUCATION/TRAINING PROGRAM

## 2020-12-19 PROCEDURE — 80053 COMPREHEN METABOLIC PANEL: CPT

## 2020-12-19 PROCEDURE — 36415 COLL VENOUS BLD VENIPUNCTURE: CPT

## 2020-12-19 PROCEDURE — 85027 COMPLETE CBC AUTOMATED: CPT

## 2020-12-19 PROCEDURE — 86140 C-REACTIVE PROTEIN: CPT

## 2020-12-19 PROCEDURE — 2060000000 HC ICU INTERMEDIATE R&B

## 2020-12-19 PROCEDURE — 2580000003 HC RX 258

## 2020-12-19 RX ORDER — NICOTINE 21 MG/24HR
1 PATCH, TRANSDERMAL 24 HOURS TRANSDERMAL DAILY
Status: DISCONTINUED | OUTPATIENT
Start: 2020-12-19 | End: 2020-12-22 | Stop reason: HOSPADM

## 2020-12-19 RX ORDER — SODIUM CHLORIDE 0.9 % (FLUSH) 0.9 %
SYRINGE (ML) INJECTION
Status: COMPLETED
Start: 2020-12-19 | End: 2020-12-19

## 2020-12-19 RX ADMIN — ATORVASTATIN CALCIUM 20 MG: 20 TABLET, FILM COATED ORAL at 09:48

## 2020-12-19 RX ADMIN — BISACODYL 5 MG: 5 TABLET, COATED ORAL at 09:46

## 2020-12-19 RX ADMIN — SODIUM CHLORIDE, PRESERVATIVE FREE 10 ML: 5 INJECTION INTRAVENOUS at 22:23

## 2020-12-19 RX ADMIN — SODIUM PHOSPHATE 1 ENEMA: 7; 19 ENEMA RECTAL at 09:47

## 2020-12-19 RX ADMIN — METOPROLOL TARTRATE 25 MG: 25 TABLET, FILM COATED ORAL at 09:46

## 2020-12-19 RX ADMIN — METOPROLOL TARTRATE 25 MG: 25 TABLET, FILM COATED ORAL at 22:23

## 2020-12-19 ASSESSMENT — PAIN SCALES - GENERAL: PAINLEVEL_OUTOF10: 1

## 2020-12-19 NOTE — PROGRESS NOTES
Feels somewhat better today  Afebrile , vs stable  RRR  Lungs clear with decreased BS  Abdomen soft with bowel sounds present  Elevated CRP and ESR  Continue present plan

## 2020-12-19 NOTE — PROGRESS NOTES
Perfect serve sent to Dr. Hero Villalpando regarding patients refusal of bowel prep. Will continue to encourage the patient to continue prep.

## 2020-12-19 NOTE — PROGRESS NOTES
Patient seen this morning. He has his prep at bedside and will drink over the course of two days, Planning for colonoscopy and EGD on 12/21.     Electronically signed by Alverto Villarreal DO on 12/19/2020 at 7:17 AM

## 2020-12-20 PROBLEM — E43 SEVERE PROTEIN-CALORIE MALNUTRITION (HCC): Status: ACTIVE | Noted: 2017-08-04

## 2020-12-20 LAB
ALBUMIN SERPL-MCNC: 3.3 G/DL (ref 3.5–5.2)
ALP BLD-CCNC: 85 U/L (ref 40–129)
ALT SERPL-CCNC: 10 U/L (ref 0–40)
ANION GAP SERPL CALCULATED.3IONS-SCNC: 13 MMOL/L (ref 7–16)
AST SERPL-CCNC: 17 U/L (ref 0–39)
BILIRUB SERPL-MCNC: 0.7 MG/DL (ref 0–1.2)
BUN BLDV-MCNC: 34 MG/DL (ref 8–23)
CALCIUM SERPL-MCNC: 8.7 MG/DL (ref 8.6–10.2)
CHLORIDE BLD-SCNC: 96 MMOL/L (ref 98–107)
CO2: 27 MMOL/L (ref 22–29)
CREAT SERPL-MCNC: 1.3 MG/DL (ref 0.7–1.2)
GFR AFRICAN AMERICAN: >60
GFR NON-AFRICAN AMERICAN: 54 ML/MIN/1.73
GLUCOSE BLD-MCNC: 73 MG/DL (ref 74–99)
HCT VFR BLD CALC: 36.7 % (ref 37–54)
HEMOGLOBIN: 11.9 G/DL (ref 12.5–16.5)
MCH RBC QN AUTO: 32.2 PG (ref 26–35)
MCHC RBC AUTO-ENTMCNC: 32.4 % (ref 32–34.5)
MCV RBC AUTO: 99.5 FL (ref 80–99.9)
PDW BLD-RTO: 12.7 FL (ref 11.5–15)
PLATELET # BLD: 155 E9/L (ref 130–450)
PMV BLD AUTO: 10.5 FL (ref 7–12)
POTASSIUM SERPL-SCNC: 3.7 MMOL/L (ref 3.5–5)
RBC # BLD: 3.69 E12/L (ref 3.8–5.8)
SODIUM BLD-SCNC: 136 MMOL/L (ref 132–146)
TOTAL PROTEIN: 6.5 G/DL (ref 6.4–8.3)
WBC # BLD: 6.9 E9/L (ref 4.5–11.5)

## 2020-12-20 PROCEDURE — 6370000000 HC RX 637 (ALT 250 FOR IP): Performed by: INTERNAL MEDICINE

## 2020-12-20 PROCEDURE — 6370000000 HC RX 637 (ALT 250 FOR IP): Performed by: STUDENT IN AN ORGANIZED HEALTH CARE EDUCATION/TRAINING PROGRAM

## 2020-12-20 PROCEDURE — 85027 COMPLETE CBC AUTOMATED: CPT

## 2020-12-20 PROCEDURE — 6360000002 HC RX W HCPCS: Performed by: INTERNAL MEDICINE

## 2020-12-20 PROCEDURE — 80053 COMPREHEN METABOLIC PANEL: CPT

## 2020-12-20 PROCEDURE — 2580000003 HC RX 258: Performed by: INTERNAL MEDICINE

## 2020-12-20 PROCEDURE — 2060000000 HC ICU INTERMEDIATE R&B

## 2020-12-20 PROCEDURE — 36415 COLL VENOUS BLD VENIPUNCTURE: CPT

## 2020-12-20 RX ORDER — POLYETHYLENE GLYCOL 3350, SODIUM CHLORIDE, SODIUM BICARBONATE, POTASSIUM CHLORIDE 420; 11.2; 5.72; 1.48 G/4L; G/4L; G/4L; G/4L
2000 POWDER, FOR SOLUTION ORAL ONCE
Status: DISCONTINUED | OUTPATIENT
Start: 2020-12-20 | End: 2020-12-20 | Stop reason: ALTCHOICE

## 2020-12-20 RX ORDER — SODIUM CHLORIDE 0.9 % (FLUSH) 0.9 %
10 SYRINGE (ML) INJECTION EVERY 12 HOURS SCHEDULED
Status: DISCONTINUED | OUTPATIENT
Start: 2020-12-20 | End: 2020-12-22 | Stop reason: HOSPADM

## 2020-12-20 RX ORDER — SODIUM CHLORIDE 0.9 % (FLUSH) 0.9 %
10 SYRINGE (ML) INJECTION PRN
Status: DISCONTINUED | OUTPATIENT
Start: 2020-12-20 | End: 2020-12-22 | Stop reason: HOSPADM

## 2020-12-20 RX ADMIN — BISACODYL 10 MG: 5 TABLET, COATED ORAL at 14:52

## 2020-12-20 RX ADMIN — POLYETHYLENE GLYCOL 3350, SODIUM SULFATE ANHYDROUS, SODIUM BICARBONATE, SODIUM CHLORIDE, POTASSIUM CHLORIDE 2000 ML: 236; 22.74; 6.74; 5.86; 2.97 POWDER, FOR SOLUTION ORAL at 16:05

## 2020-12-20 RX ADMIN — POLYETHYLENE GLYCOL 3350, SODIUM CHLORIDE, SODIUM BICARBONATE AND POTASSIUM CHLORIDE 4000 ML: 420; 5.72; 11.2; 1.48 POWDER, FOR SOLUTION ORAL at 08:00

## 2020-12-20 RX ADMIN — Medication 10 ML: at 21:38

## 2020-12-20 RX ADMIN — METOPROLOL TARTRATE 25 MG: 25 TABLET, FILM COATED ORAL at 08:49

## 2020-12-20 RX ADMIN — BISACODYL 10 MG: 5 TABLET, COATED ORAL at 11:55

## 2020-12-20 RX ADMIN — ATORVASTATIN CALCIUM 20 MG: 20 TABLET, FILM COATED ORAL at 08:46

## 2020-12-20 RX ADMIN — BISACODYL 5 MG: 5 TABLET, COATED ORAL at 08:45

## 2020-12-20 RX ADMIN — ASPIRIN 81 MG: 81 TABLET, CHEWABLE ORAL at 08:46

## 2020-12-20 RX ADMIN — ENOXAPARIN SODIUM 30 MG: 30 INJECTION SUBCUTANEOUS at 14:53

## 2020-12-20 RX ADMIN — SODIUM PHOSPHATE 1 ENEMA: 7; 19 ENEMA RECTAL at 11:54

## 2020-12-20 ASSESSMENT — PAIN SCALES - GENERAL
PAINLEVEL_OUTOF10: 0

## 2020-12-20 NOTE — PROGRESS NOTES
Feels better overall  Afebrile , vs stable  RRR  Lungs clear  Abdomen soft without tenderness  Have stopped metoprolol due to relative hypotension  For panendoscopy tomorrow

## 2020-12-20 NOTE — PLAN OF CARE
Problem: Skin Integrity:  Goal: Will show no infection signs and symptoms  Description: Will show no infection signs and symptoms  12/20/2020 0649 by Deborah Lopes RN  Outcome: Met This Shift  12/19/2020 2320 by Jasmin Dodge RN  Outcome: Met This Shift  Goal: Absence of new skin breakdown  Description: Absence of new skin breakdown  12/20/2020 0649 by Deborah Lopes RN  Outcome: Met This Shift  12/19/2020 2320 by Jasmin Dodge RN  Outcome: Met This Shift     Problem: Falls - Risk of:  Goal: Will remain free from falls  Description: Will remain free from falls  12/20/2020 0649 by Deborah Lopes RN  Outcome: Met This Shift  12/19/2020 2320 by Jasmin Dodge RN  Outcome: Met This Shift  Goal: Absence of physical injury  Description: Absence of physical injury  12/20/2020 0649 by Deborah Lopes RN  Outcome: Met This Shift  12/19/2020 2320 by Jasmin Dodge RN  Outcome: Met This Shift

## 2020-12-20 NOTE — PLAN OF CARE
Problem: Skin Integrity:  Goal: Will show no infection signs and symptoms  Outcome: Met This Shift  Goal: Absence of new skin breakdown  Outcome: Met This Shift

## 2020-12-20 NOTE — PROGRESS NOTES
Comprehensive Nutrition Assessment    Type and Reason for Visit:  Initial, Positive Nutrition Screen    Nutrition Recommendations/Plan:  Continue Current Diet  Pending EGD/CScope 12/21 w/ pending NPO status. Recommend to order ONS when medically appropriate. Nutrition Assessment:  Pt admit 2/2 abd pain, constipation, nausea w/ decreased oral intake r/t FTT. Pending EGD/Cscope. H/o COPD and AAA repair. Remains on clears. Malnutrition Assessment:  Malnutrition Status:  Severe malnutrition    Context:  Chronic Illness     Findings of the 6 clinical characteristics of malnutrition:  Energy Intake:  7 - 75% or less estimated energy requirements for 1 month or longer  Weight Loss:  Unable to assess     Body Fat Loss:  7 - Severe body fat loss     Muscle Mass Loss:  7 - Severe muscle mass loss    Fluid Accumulation:  No significant fluid accumulation     Strength:  Not Performed    Estimated Daily Nutrient Needs:  Energy (kcal):  MSJ 1312 x1.3= 1705; ; Weight Used for Energy Requirements:  Current     Protein (g):  70-80(1.3-1.5gm/kg CBW);  Weight Used for Protein Requirements:  Current          Nutrition Related Findings:  active BS, soft abd, + I/Os, no noted edema, constipation, abd pain, nausea, cachetic      Wounds:  None       Current Nutrition Therapies:    DIET CLEAR LIQUID;  Diet NPO, After Midnight    Anthropometric Measures:  · Height: 5' 11\" (180.3 cm)  · Current Body Weight: 118 lb (53.5 kg)(12/18 no method; UTO updated wt x2 attempts)   · Usual Body Weight: (pt stated \"23-40#\" wt loss \"x6-12 mo\" per EMR wt range from 122-134# no methods on any wt's)     · Ideal Body Weight: 172 lbs; % Ideal Body Weight 68.6 %   · BMI: 16.5  · BMI Categories: Underweight (BMI less than 22) age over 72       Nutrition Diagnosis:   · Severe malnutrition, In context of chronic illness related to altered GI function as evidenced by severe loss of subcutaneous fat, severe muscle loss, poor intake prior to admission(subjective wt loss however unable to assess)    Nutrition Interventions:   Nutrition Education/Counseling:  Education not indicated   Coordination of Nutrition Care:  Continue to monitor while inpatient, Coordination of Community Care    Goals:  Pt to consume >75% of meals       Nutrition Monitoring and Evaluation:   Food/Nutrient Intake Outcomes:  Diet Advancement/Tolerance, Food and Nutrient Intake  Physical Signs/Symptoms Outcomes:  Biochemical Data, Nutrition Focused Physical Findings, Skin, Weight, GI Status, Nausea or Vomiting, Constipation, Fluid Status or Edema, Hemodynamic Status     Electronically signed by Barrera Lee, MS, RD, LD on 12/20/20 at 10:40 AM EST

## 2020-12-20 NOTE — PROGRESS NOTES
GENERAL SURGERY  DAILY PROGRESS NOTE  12/20/2020    Subjective:  No acute events overnight  He has his prep at bedside and will drink over the course of two days,   Planning for colonoscopy and EGD on 12/21. Having BM      Objective:  BP (!) 92/58   Pulse 76   Temp 97.8 °F (36.6 °C) (Temporal)   Resp 18   Ht 5' 11\" (1.803 m)   Wt 118 lb (53.5 kg)   SpO2 98%   BMI 16.46 kg/m²     GENERAL:  Laying in bed, awake,no apparent distress  HEAD: Normocephalic  LUNGS:  No increased work of breathing  CARDIOVASCULAR:  RR  ABDOMEN:  Soft, non-tender, non-distended      Assessment/Plan:  69 y.o. male with abdominal pain, constipation, weight loss ongoing for the last several months.   This is patient's second ER visit in the last week    Overall care per primary   Will continue with prep for EGD and c-scope      Electronically signed by Maeve Mathew DO on 12/20/2020 at 8:25 AMPatient seen

## 2020-12-21 ENCOUNTER — ANESTHESIA (OUTPATIENT)
Dept: ENDOSCOPY | Age: 70
DRG: 312 | End: 2020-12-21
Payer: MEDICARE

## 2020-12-21 ENCOUNTER — ANESTHESIA EVENT (OUTPATIENT)
Dept: ENDOSCOPY | Age: 70
DRG: 312 | End: 2020-12-21
Payer: MEDICARE

## 2020-12-21 VITALS
RESPIRATION RATE: 16 BRPM | DIASTOLIC BLOOD PRESSURE: 61 MMHG | SYSTOLIC BLOOD PRESSURE: 102 MMHG | OXYGEN SATURATION: 100 %

## 2020-12-21 LAB
ALBUMIN SERPL-MCNC: 3.1 G/DL (ref 3.5–5.2)
ALP BLD-CCNC: 76 U/L (ref 40–129)
ALT SERPL-CCNC: 10 U/L (ref 0–40)
ANION GAP SERPL CALCULATED.3IONS-SCNC: 13 MMOL/L (ref 7–16)
AST SERPL-CCNC: 16 U/L (ref 0–39)
BILIRUB SERPL-MCNC: 0.5 MG/DL (ref 0–1.2)
BUN BLDV-MCNC: 27 MG/DL (ref 8–23)
CALCIUM SERPL-MCNC: 8.5 MG/DL (ref 8.6–10.2)
CHLORIDE BLD-SCNC: 97 MMOL/L (ref 98–107)
CO2: 24 MMOL/L (ref 22–29)
CREAT SERPL-MCNC: 1 MG/DL (ref 0.7–1.2)
GFR AFRICAN AMERICAN: >60
GFR NON-AFRICAN AMERICAN: >60 ML/MIN/1.73
GLUCOSE BLD-MCNC: 73 MG/DL (ref 74–99)
HCT VFR BLD CALC: 36.1 % (ref 37–54)
HEMOGLOBIN: 12.1 G/DL (ref 12.5–16.5)
MCH RBC QN AUTO: 32.7 PG (ref 26–35)
MCHC RBC AUTO-ENTMCNC: 33.5 % (ref 32–34.5)
MCV RBC AUTO: 97.6 FL (ref 80–99.9)
PDW BLD-RTO: 12.6 FL (ref 11.5–15)
PLATELET # BLD: 146 E9/L (ref 130–450)
PMV BLD AUTO: 10.2 FL (ref 7–12)
POTASSIUM SERPL-SCNC: 3.3 MMOL/L (ref 3.5–5)
RBC # BLD: 3.7 E12/L (ref 3.8–5.8)
SODIUM BLD-SCNC: 134 MMOL/L (ref 132–146)
TOTAL PROTEIN: 6.5 G/DL (ref 6.4–8.3)
WBC # BLD: 7.1 E9/L (ref 4.5–11.5)

## 2020-12-21 PROCEDURE — 6360000002 HC RX W HCPCS: Performed by: INTERNAL MEDICINE

## 2020-12-21 PROCEDURE — 2140000000 HC CCU INTERMEDIATE R&B

## 2020-12-21 PROCEDURE — 2709999900 HC NON-CHARGEABLE SUPPLY: Performed by: SURGERY

## 2020-12-21 PROCEDURE — 2580000003 HC RX 258: Performed by: SURGERY

## 2020-12-21 PROCEDURE — 0DJ08ZZ INSPECTION OF UPPER INTESTINAL TRACT, VIA NATURAL OR ARTIFICIAL OPENING ENDOSCOPIC: ICD-10-PCS | Performed by: SURGERY

## 2020-12-21 PROCEDURE — 3609027000 HC COLONOSCOPY: Performed by: SURGERY

## 2020-12-21 PROCEDURE — 85027 COMPLETE CBC AUTOMATED: CPT

## 2020-12-21 PROCEDURE — 3700000000 HC ANESTHESIA ATTENDED CARE: Performed by: SURGERY

## 2020-12-21 PROCEDURE — 7100000000 HC PACU RECOVERY - FIRST 15 MIN: Performed by: SURGERY

## 2020-12-21 PROCEDURE — 3609017100 HC EGD: Performed by: SURGERY

## 2020-12-21 PROCEDURE — 80053 COMPREHEN METABOLIC PANEL: CPT

## 2020-12-21 PROCEDURE — 6370000000 HC RX 637 (ALT 250 FOR IP): Performed by: INTERNAL MEDICINE

## 2020-12-21 PROCEDURE — 2580000003 HC RX 258: Performed by: INTERNAL MEDICINE

## 2020-12-21 PROCEDURE — 36415 COLL VENOUS BLD VENIPUNCTURE: CPT

## 2020-12-21 PROCEDURE — 7100000001 HC PACU RECOVERY - ADDTL 15 MIN: Performed by: SURGERY

## 2020-12-21 PROCEDURE — 6360000002 HC RX W HCPCS: Performed by: NURSE ANESTHETIST, CERTIFIED REGISTERED

## 2020-12-21 PROCEDURE — 6360000002 HC RX W HCPCS: Performed by: SURGERY

## 2020-12-21 PROCEDURE — 0DJD8ZZ INSPECTION OF LOWER INTESTINAL TRACT, VIA NATURAL OR ARTIFICIAL OPENING ENDOSCOPIC: ICD-10-PCS | Performed by: SURGERY

## 2020-12-21 PROCEDURE — 2580000003 HC RX 258: Performed by: NURSE ANESTHETIST, CERTIFIED REGISTERED

## 2020-12-21 PROCEDURE — 3700000001 HC ADD 15 MINUTES (ANESTHESIA): Performed by: SURGERY

## 2020-12-21 RX ORDER — PROPOFOL 10 MG/ML
INJECTION, EMULSION INTRAVENOUS PRN
Status: DISCONTINUED | OUTPATIENT
Start: 2020-12-21 | End: 2020-12-21 | Stop reason: SDUPTHER

## 2020-12-21 RX ORDER — POTASSIUM CHLORIDE 7.45 MG/ML
10 INJECTION INTRAVENOUS
Status: COMPLETED | OUTPATIENT
Start: 2020-12-21 | End: 2020-12-21

## 2020-12-21 RX ORDER — PROPOFOL 10 MG/ML
INJECTION, EMULSION INTRAVENOUS CONTINUOUS PRN
Status: DISCONTINUED | OUTPATIENT
Start: 2020-12-21 | End: 2020-12-21 | Stop reason: SDUPTHER

## 2020-12-21 RX ORDER — SODIUM CHLORIDE 9 MG/ML
INJECTION, SOLUTION INTRAVENOUS CONTINUOUS PRN
Status: DISCONTINUED | OUTPATIENT
Start: 2020-12-21 | End: 2020-12-21 | Stop reason: SDUPTHER

## 2020-12-21 RX ORDER — POTASSIUM CHLORIDE 7.45 MG/ML
INJECTION INTRAVENOUS
Status: COMPLETED
Start: 2020-12-21 | End: 2020-12-21

## 2020-12-21 RX ADMIN — Medication 10 ML: at 09:19

## 2020-12-21 RX ADMIN — PROPOFOL 100 MG: 10 INJECTION, EMULSION INTRAVENOUS at 15:04

## 2020-12-21 RX ADMIN — Medication 10 ML: at 21:15

## 2020-12-21 RX ADMIN — SODIUM CHLORIDE: 9 INJECTION, SOLUTION INTRAVENOUS at 15:00

## 2020-12-21 RX ADMIN — PROPOFOL 100 MCG/KG/MIN: 10 INJECTION, EMULSION INTRAVENOUS at 15:04

## 2020-12-21 RX ADMIN — POTASSIUM CHLORIDE 10 MEQ: 10 INJECTION, SOLUTION INTRAVENOUS at 10:59

## 2020-12-21 RX ADMIN — POTASSIUM CHLORIDE 10 MEQ: 10 INJECTION, SOLUTION INTRAVENOUS at 16:40

## 2020-12-21 RX ADMIN — SODIUM CHLORIDE: 9 INJECTION, SOLUTION INTRAVENOUS at 15:34

## 2020-12-21 ASSESSMENT — PAIN SCALES - GENERAL
PAINLEVEL_OUTOF10: 0
PAINLEVEL_OUTOF10: 0

## 2020-12-21 NOTE — OP NOTE
scope was advanced through the pylorus into the first and second portion of the duodenum making the note of grossly normal mucosa. The scope was then withdrawn back into the stomach where it was retroflexed. There was no hiatal hernia noted. The scope was then straightened and stomach was visualized revealing no masses. The stomach was desufflated. The scope was then withdrawn the entire length of the esophagus, making the note of a normal esophagus without masses, ulcerations, or lesions noted. The scope was withdrawn entirely. The patient tolerated the procedure well and there were no complications. Next a digital rectal exam was performed and failed to reveal any obstructing masses or lesions other than decreased rectal tone. A colonoscope was inserted into the patient's anus and passed through the rectum, sigmoid, descending, transverse, and ascending colon all the way to the level of the cecum with some difficulty due to tortous colon. Occasional pressure by nursing staff was applied. Visualization of the cecum was confirmed by visualization of the ileo-cecal valve, confluence of the tinea, and by visualization of the light in the RLQ on the anterior abdominal wall. The scope was then withdrawn the entire length of the colon. Fair prep was visualized. There were no masses, polyps, or lesions noted except a ulceration in the rectum. Upon reaching the anus, the scope was retroflexed. There were grade 1, nonbleeding internal hemorrhoids noted. The scope was straightened and withdrawn entirely. The patient tolerated the procedure well and there were no complications.         Electronically signed by Fernanda Alvarenga MD on 12/21/2020 at 4:10 PM

## 2020-12-21 NOTE — ANESTHESIA PRE PROCEDURE
Department of Anesthesiology  Preprocedure Note       Name:  Rafat Daley   Age:  69 y.o.  :  1950                                          MRN:  [id number]         Date:  2020      Surgeon: Thea Mercado):  Clifton Up MD    Procedure: Procedure(s):  EGD DIAGNOSTIC ONLY  COLONOSCOPY DIAGNOSTIC    Medications prior to admission:   Prior to Admission medications    Medication Sig Start Date End Date Taking?  Authorizing Provider   metoprolol tartrate (LOPRESSOR) 25 MG tablet Take 25 mg by mouth 2 times daily   Yes Historical Provider, MD   polyethylene glycol (MIRALAX) 17 g packet Take 17 g by mouth daily as needed for Constipation 12/15/20 1/14/21 Yes Sahara Rivas APRN - CNP   atorvastatin (LIPITOR) 20 MG tablet Take 20 mg by mouth daily  8/6/15  Yes Historical Provider, MD   aspirin 81 MG tablet Take 81 mg by mouth every other day     Historical Provider, MD       Current medications:    Current Facility-Administered Medications   Medication Dose Route Frequency Provider Last Rate Last Admin    potassium chloride 10 MEQ/100ML IVPB (Peripheral Line)             enoxaparin (LOVENOX) injection 30 mg  30 mg Subcutaneous Daily Deshawn Jhaveri MD   Stopped at 20 0321    sodium chloride flush 0.9 % injection 10 mL  10 mL Intravenous 2 times per day Deshawn Jhaveri MD   10 mL at 20 0919    sodium chloride flush 0.9 % injection 10 mL  10 mL Intravenous PRN Deshawn Jhaveri MD        nicotine (NICODERM CQ) 21 MG/24HR 1 patch  1 patch Transdermal Daily Deshawn Jhaveri MD   1 patch at 20 0920    aspirin chewable tablet 81 mg  81 mg Oral Every Other Day Deshawn Jhaveri MD   81 mg at 20 0846    atorvastatin (LIPITOR) tablet 20 mg  20 mg Oral Daily Deshawn Jhaveri MD   Stopped at 20 0937    ipratropium-albuterol (DUONEB) nebulizer solution 3 mL  1 vial Inhalation Q4H PRN Deshawn Jhaveri MD        ondansetron (ZOFRAN-ODT) disintegrating tablet 4 mg  4 mg Oral Q8H PRN Wendy Leon MD        bisacodyl (DULCOLAX) EC tablet 5 mg  5 mg Oral Daily Gianmarino C Gianfrate, DO   Stopped at 12/21/20 9162    polyethylene glycol (GLYCOLAX) packet 17 g  17 g Oral Daily PRN Gianmarino C Gianfrate, DO           Allergies:  No Known Allergies    Problem List:    Patient Active Problem List   Diagnosis Code    Mixed hyperlipidemia E78.2    Acute hypoxemic respiratory failure (HCC) J96.01    Rhinovirus B34.8    History of stroke Z86.73    Abdominal aneurysm (HCC) I71.4    Tobacco dependence F17.200    Aortic valve stenosis I35.0    Nonrheumatic aortic valve stenosis I35.0    Severe protein-calorie malnutrition ([facility] E43    COPD, moderate (HCC) J44.9    S/P AAA repair using bifurcation graft Z95.828, Z86.79    Generalized abdominal pain R10.84    Failure to thrive in adult R62.7    Syncope and collapse R55       Past Medical History:        Diagnosis Date    Abdominal aortic aneurysm without rupture ([facility] 5/9/2017    Arthritis     AS (aortic stenosis)     Cerebral artery occlusion with cerebral infarction [facility]) 2009    TIA/CVA with aphasia that resolved    COPD (chronic obstructive pulmonary disease) (HCC)     Emphysema of lung ([facility]     History of blood transfusion     Hyperlipidemia     Pneumonia     Tobacco dependence 5/9/2017       Past Surgical History:        Procedure Laterality Date    ABDOMINAL AORTIC ANEURYSM REPAIR, ENDOVASCULAR  10/12/2017    Zenith Fenestrated.   [place]    ABDOMINAL AORTIC ANEURYSM REPAIR, ENDOVASCULAR  10/12/2017    ANKLE SURGERY      AORTIC VALVE REPLACEMENT      CARDIAC CATHETERIZATION  07/14/2017    Dr. Forman Neighbors- No blockages    CARDIAC SURGERY      EYE SURGERY Bilateral approx 2014    cataracts removal w/lens implants    HERNIA REPAIR      SKULL FRACTURE ELEVATION      TOOTH EXTRACTION      full mouth extraction       Social History:    Social History     Tobacco Use    Smoking status: Current Every Day Smoker     Packs/day: 1.00     Years: 50.00     Pack years: 50.00     Types: Cigarettes    Smokeless tobacco: Never Used    Tobacco comment: currently smokes 1.0 ppd   Substance Use Topics    Alcohol use: No     Alcohol/week: 0.0 standard drinks                                Ready to quit: Not Answered  Counseling given: Not Answered  Comment: currently smokes 1.0 ppd      Vital Signs (Current):   Vitals:    12/20/20 2315 12/21/20 0740 12/21/20 1158 12/21/20 1541   BP: (!) 97/59 127/69 (!) 151/84 102/61   Pulse: 75 72 67 69   Resp: 16 16 16 16   Temp: 36.9 °C (98.4 °F) 36.8 °C (98.2 °F) 36.9 °C (98.4 °F)    TempSrc: Temporal  Oral    SpO2: 97% 96% 94% 98%   Weight:   102 lb 4 oz (46.4 kg)    Height:   5' 11\" (1.803 m)                                               BP Readings from Last 3 Encounters:   12/21/20 102/61   12/21/20 102/61   12/15/20 122/84       NPO Status:                                                                                 BMI:   Wt Readings from Last 3 Encounters:   12/21/20 102 lb 4 oz (46.4 kg)   12/15/20 118 lb (53.5 kg)   11/10/20 122 lb (55.3 kg)     Body mass index is 14.26 kg/m². CBC:   Lab Results   Component Value Date    WBC 7.1 12/21/2020    RBC 3.70 12/21/2020    HGB 12.1 12/21/2020    HCT 36.1 12/21/2020    MCV 97.6 12/21/2020    RDW 12.6 12/21/2020     12/21/2020       CMP:   Lab Results   Component Value Date     12/21/2020    K 3.3 12/21/2020    K 4.3 12/18/2020    CL 97 12/21/2020    CO2 24 12/21/2020    BUN 27 12/21/2020    CREATININE 1.0 12/21/2020    GFRAA >60 12/21/2020    LABGLOM >60 12/21/2020    GLUCOSE 73 12/21/2020    PROT 6.5 12/21/2020    CALCIUM 8.5 12/21/2020    BILITOT 0.5 12/21/2020    ALKPHOS 76 12/21/2020    AST 16 12/21/2020    ALT 10 12/21/2020       POC Tests: No results for input(s): POCGLU, POCNA, POCK, POCCL, POCBUN, POCHEMO, POCHCT in the last 72 hours.     Coags:   Lab Results   Component Value Date    PROTIME 12.6 09/29/2017 INR 1.2 09/29/2017    APTT 31.3 09/29/2017       HCG (If Applicable): No results found for: PREGTESTUR, PREGSERUM, HCG, HCGQUANT     ABGs: No results found for: PHART, PO2ART, NUD5XTH, BZN3PLP, BEART, F8EPSHXE     Type & Screen (If Applicable):  No results found for: LABABO, LABRH    Drug/Infectious Status (If Applicable):  No results found for: HIV, HEPCAB    COVID-19 Screening (If Applicable): No results found for: COVID19      Anesthesia Evaluation  Patient summary reviewed and Nursing notes reviewed no history of anesthetic complications:   Airway: Mallampati: II        Dental:    (+) upper dentures      Pulmonary: breath sounds clear to auscultation  (+) pneumonia:                             Cardiovascular:            Rhythm: regular  Rate: normal                    Neuro/Psych:   (+) CVA:,             GI/Hepatic/Renal:             Endo/Other:                     Abdominal:       Abdomen: soft. Vascular:                                        Anesthesia Plan      MAC     ASA 4       Induction: intravenous. Anesthetic plan and risks discussed with patient. Plan discussed with attending.                   COREEN Oconnor - GRADY   12/21/2020

## 2020-12-21 NOTE — PLAN OF CARE
Problem: Skin Integrity:  Goal: Will show no infection signs and symptoms  Description: Will show no infection signs and symptoms  12/21/2020 0227 by Dora Mckeon RN  Outcome: Met This Shift  12/20/2020 2345 by Kiet Enrique RN  Outcome: Met This Shift  Goal: Absence of new skin breakdown  Description: Absence of new skin breakdown  12/21/2020 0227 by Dora Mckeon RN  Outcome: Met This Shift  12/20/2020 2345 by Kiet Enrique RN  Outcome: Met This Shift     Problem: Falls - Risk of:  Goal: Will remain free from falls  Description: Will remain free from falls  12/21/2020 0227 by Dora Mckeon RN  Outcome: Met This Shift  12/20/2020 2345 by Kiet Enrique RN  Outcome: Met This Shift  Goal: Absence of physical injury  Description: Absence of physical injury  12/21/2020 0227 by Dora Mckeon RN  Outcome: Met This Shift  12/20/2020 2345 by Kiet Enrique RN  Outcome: Met This Shift

## 2020-12-21 NOTE — PROGRESS NOTES
GENERAL SURGERY  DAILY PROGRESS NOTE  12/21/2020    Subjective:  No acute events overnight. Per the nursing staff, patient is having mucous/clear BMs. Plan for scopes today.     Objective:  BP (!) 97/59   Pulse 75   Temp 98.4 °F (36.9 °C) (Temporal)   Resp 16   Ht 5' 11\" (1.803 m)   Wt 118 lb (53.5 kg)   SpO2 97%   BMI 16.46 kg/m²     GENERAL:  Laying in bed, awake, no apparent distress  HEAD: Normocephalic  LUNGS:  No increased work of breathing  CARDIOVASCULAR:  RR and slightly hypotensive  ABDOMEN:  Soft, non-tender, non-distended      Assessment/Plan:  69 y.o. male with abdominal pain, constipation, weight loss ongoing for the last several months.    - NPO for procedure today  - Plan for EGD/colonoscopy  - Dispo pending findings of scopes  - Overall care per primary     Electronically signed by Romina Chowdary MD on 12/21/2020 at 6:12 AM

## 2020-12-21 NOTE — CARE COORDINATION
SW met with patient in room. He states he lives home with his wife, daughter and grandchildren in a 1 story home. He states he uses no DME at home and is independent. He does still drive. PCP is Dr Nickolas Hebert, he states pharmacy is [facility] in the [place]. Patient reports history of [facility] at [place] and [place]  through [facility]. He is to have Scopes done today. We talked about transition of care and he reports plan is home, he does not feel he will have any needs but [facility].

## 2020-12-21 NOTE — ANESTHESIA POSTPROCEDURE EVALUATION
Department of Anesthesiology  Postprocedure Note    Patient: Mindy Patino  MRN: [id number]  YOB: 1950  Date of evaluation: 12/21/2020  Time:  5:05 PM     Procedure Summary     Date: 12/21/20 Room / Location: [address] [place] [address] / [facility]    Anesthesia Start: 1500 Anesthesia Stop: 1308    Procedures:       EGD DIAGNOSTIC ONLY (N/A )      COLONOSCOPY DIAGNOSTIC (N/A ) Diagnosis: (.)    Surgeons: Analilia aSlas MD Responsible Provider: Rk Wilhelm MD    Anesthesia Type: MAC ASA Status: 4          Anesthesia Type: No value filed. Alcira Phase I: Alcira Score: 10    Alcira Phase II:      Last vitals: Reviewed and per EMR flowsheets.        Anesthesia Post Evaluation    Patient location during evaluation: PACU  Patient participation: complete - patient participated  Level of consciousness: awake  Pain score: 0  Airway patency: patent  Nausea & Vomiting: no nausea  Complications: no  Cardiovascular status: hemodynamically stable  Respiratory status: acceptable  Hydration status: stable

## 2020-12-21 NOTE — PROGRESS NOTES
Pt states he can't drink any more prep. Stool light brown liquid with small sheds of stool. Encouraged to drink one more glass to complete the 2000 cc.

## 2020-12-22 VITALS
TEMPERATURE: 97.7 F | SYSTOLIC BLOOD PRESSURE: 141 MMHG | OXYGEN SATURATION: 96 % | HEART RATE: 78 BPM | RESPIRATION RATE: 16 BRPM | BODY MASS INDEX: 14.31 KG/M2 | DIASTOLIC BLOOD PRESSURE: 94 MMHG | WEIGHT: 102.25 LBS | HEIGHT: 71 IN

## 2020-12-22 LAB
ALBUMIN SERPL-MCNC: 3.3 G/DL (ref 3.5–5.2)
ALP BLD-CCNC: 82 U/L (ref 40–129)
ALT SERPL-CCNC: 10 U/L (ref 0–40)
ANION GAP SERPL CALCULATED.3IONS-SCNC: 18 MMOL/L (ref 7–16)
AST SERPL-CCNC: 17 U/L (ref 0–39)
BACTERIA: ABNORMAL /HPF
BILIRUB SERPL-MCNC: 0.6 MG/DL (ref 0–1.2)
BILIRUBIN URINE: NEGATIVE
BLOOD, URINE: ABNORMAL
BUN BLDV-MCNC: 17 MG/DL (ref 8–23)
CALCIUM SERPL-MCNC: 8.7 MG/DL (ref 8.6–10.2)
CHLORIDE BLD-SCNC: 97 MMOL/L (ref 98–107)
CLARITY: CLEAR
CO2: 20 MMOL/L (ref 22–29)
COLOR: YELLOW
CREAT SERPL-MCNC: 0.8 MG/DL (ref 0.7–1.2)
EPITHELIAL CELLS, UA: ABNORMAL /HPF
GFR AFRICAN AMERICAN: >60
GFR NON-AFRICAN AMERICAN: >60 ML/MIN/1.73
GLUCOSE BLD-MCNC: 61 MG/DL (ref 74–99)
GLUCOSE URINE: NEGATIVE MG/DL
HCT VFR BLD CALC: 38.1 % (ref 37–54)
HEMOGLOBIN: 12.8 G/DL (ref 12.5–16.5)
KETONES, URINE: >=80 MG/DL
LEUKOCYTE ESTERASE, URINE: NEGATIVE
MCH RBC QN AUTO: 32.7 PG (ref 26–35)
MCHC RBC AUTO-ENTMCNC: 33.6 % (ref 32–34.5)
MCV RBC AUTO: 97.4 FL (ref 80–99.9)
NITRITE, URINE: NEGATIVE
PDW BLD-RTO: 12.4 FL (ref 11.5–15)
PH UA: 6 (ref 5–9)
PLATELET # BLD: 159 E9/L (ref 130–450)
PMV BLD AUTO: 10.4 FL (ref 7–12)
POTASSIUM SERPL-SCNC: 3.9 MMOL/L (ref 3.5–5)
PROTEIN UA: NEGATIVE MG/DL
RBC # BLD: 3.91 E12/L (ref 3.8–5.8)
RBC UA: >20 /HPF (ref 0–2)
SODIUM BLD-SCNC: 135 MMOL/L (ref 132–146)
SPECIFIC GRAVITY UA: 1.02 (ref 1–1.03)
TOTAL PROTEIN: 6.9 G/DL (ref 6.4–8.3)
UROBILINOGEN, URINE: 0.2 E.U./DL
WBC # BLD: 7.9 E9/L (ref 4.5–11.5)
WBC UA: ABNORMAL /HPF (ref 0–5)

## 2020-12-22 PROCEDURE — 6370000000 HC RX 637 (ALT 250 FOR IP): Performed by: SURGERY

## 2020-12-22 PROCEDURE — 36415 COLL VENOUS BLD VENIPUNCTURE: CPT

## 2020-12-22 PROCEDURE — 80053 COMPREHEN METABOLIC PANEL: CPT

## 2020-12-22 PROCEDURE — 85027 COMPLETE CBC AUTOMATED: CPT

## 2020-12-22 PROCEDURE — 2580000003 HC RX 258: Performed by: SURGERY

## 2020-12-22 PROCEDURE — 81001 URINALYSIS AUTO W/SCOPE: CPT

## 2020-12-22 RX ORDER — TIOTROPIUM BROMIDE AND OLODATEROL 3.124; 2.736 UG/1; UG/1
2 SPRAY, METERED RESPIRATORY (INHALATION) DAILY
Qty: 1 INHALER | Refills: 0
Start: 2020-12-22 | End: 2021-01-01 | Stop reason: ALTCHOICE

## 2020-12-22 RX ORDER — IPRATROPIUM BROMIDE AND ALBUTEROL SULFATE 2.5; .5 MG/3ML; MG/3ML
1 SOLUTION RESPIRATORY (INHALATION) EVERY 4 HOURS PRN
Qty: 360 ML | Refills: 0 | Status: ON HOLD
Start: 2020-12-22 | End: 2021-01-01 | Stop reason: HOSPADM

## 2020-12-22 RX ORDER — ALBUTEROL SULFATE 90 UG/1
2 AEROSOL, METERED RESPIRATORY (INHALATION) EVERY 4 HOURS PRN
Qty: 1 INHALER | Refills: 6
Start: 2020-12-22 | End: 2021-01-01 | Stop reason: SDUPTHER

## 2020-12-22 RX ORDER — ONDANSETRON 4 MG/1
4 TABLET, ORALLY DISINTEGRATING ORAL EVERY 8 HOURS PRN
Qty: 15 TABLET | Refills: 0
Start: 2020-12-22 | End: 2020-12-27

## 2020-12-22 RX ADMIN — ASPIRIN 81 MG: 81 TABLET, CHEWABLE ORAL at 08:39

## 2020-12-22 RX ADMIN — ATORVASTATIN CALCIUM 20 MG: 20 TABLET, FILM COATED ORAL at 08:39

## 2020-12-22 RX ADMIN — Medication 10 ML: at 09:00

## 2020-12-22 RX ADMIN — BISACODYL 5 MG: 5 TABLET, COATED ORAL at 08:38

## 2020-12-22 ASSESSMENT — PAIN SCALES - GENERAL: PAINLEVEL_OUTOF10: 0

## 2020-12-22 NOTE — PROGRESS NOTES
GENERAL SURGERY  DAILY PROGRESS NOTE  12/22/2020    Subjective:  No acute events overnight. Pt doing well. Complains of some abdominal cramps. Denies any nausea/vomiting. Wants to go home. Objective:  BP (!) 142/78   Pulse 68   Temp 97.5 °F (36.4 °C) (Temporal)   Resp 18   Ht 5' 11\" (1.803 m)   Wt 102 lb 4 oz (46.4 kg)   SpO2 96%   BMI 14.26 kg/m²     GENERAL:  Laying in bed, awake, no apparent distress  HEAD: Normocephalic  LUNGS:  No increased work of breathing  CARDIOVASCULAR:  RR and normotensive  ABDOMEN:  Soft, non-tender, non-distended    Assessment/Plan:  69 y.o. male with abdominal pain, constipation, weight loss ongoing for the last several months.  EGD 12/21 normal, colonoscopy with rectal ulcer    - Okay for diet from surgical POV, advance as tolerated  - Pain control  - Likely will need to follow-up with GI outpatient  - Overall care per primary     Electronically signed by Haley Berger MD on 12/22/2020 at 5:57 AM

## 2020-12-24 NOTE — DISCHARGE SUMMARY
[address]                  [address] [facility],  [facility]                               DISCHARGE SUMMARY    PATIENT NAME: Ashok Newman                   :        1950  MED REC NO:   [id number]                            ROOM:       8211  ACCOUNT NO:   [de-identified]                           ADMIT DATE: 2020  PROVIDER:     Linda Cortes DO                  DISCHARGE DATE:  2020      This patient is a 29-year-old male with longstanding history of severe  chronic obstructive pulmonary disease with previous repair of abdominal  aortic aneurysm. He describes onset of symptoms over the last several  months including progressively worsening lower abdominal pain with  constipation and unexplained progressive weight loss. He has actually  lost about 23 pounds over the past 12 months or so. He was in the ER  several days earlier, sent home with plans for followup colonoscopy. In  the interim, he had worsening constipation, more abdominal pain and a  sudden syncopal episode, which prompted return back to the emergency  room. This time, he was admitted. There were no prodromal symptoms or  postictal lethargy involved with the syncopal event. He describes  severe abdominal pain with no bowel movements for a long period of time. The patient's routine labs were unremarkable. CT imaging was also  within normal limits. The patient was admitted to the telemetry unit. He had no arrhythmias. He was seen by General Surgery. He was given  laxatives, enemas and GoLYTELY bowel prep with resolution of his  abdominal pain. He ultimately underwent panendoscopy. EGD and  colonoscopy were within normal limits. The patient was discharged to  home on 2020. CONDITION ON DISCHARGE:  Improved. DISPOSITION:  Home. FINAL DIAGNOSES:  1. Syncope, likely secondary to poor oral intake and orthostatic  hypotension. 2.  Abdominal pain.   3. Weight loss. 4.  Constipation. 5.  Severe COPD. 6.  History of abdominal aortic aneurysm repair. 7.  Hypertension. 8.  Hyperlipidemia. 9.  Moderate protein-calorie malnutrition. During the course of his hospitalization, he had relative hypotension. At that point, his beta blockers were stopped. However, his blood  pressures did increase and the plan was to resume these at the time of  discharge. The patient was discharged to home. Condition on discharge  improved. Disposition home. Diagnoses as above. MEDICATIONS:  1. Ventolin HFA two puffs every 4 hours p.r.n.  2.  DuoNeb aerosols every 4 hours p.r.n.  3.  Stiolto Respimat two puffs daily. 4.  Zofran 4 mg every 8 hours p.r.n.  5.  Metoprolol tartrate 25 mg twice a day. 6.  Atorvastatin 20 mg daily. 7.  Aspirin 81 mg daily.         Alem Short DO    D: 12/23/2020 15:22:34       T: 12/23/2020 15:29:09     [email or web address]  Job#: [id number]     Doc#: [id number]    CC:

## 2021-01-01 ENCOUNTER — TELEPHONE (OUTPATIENT)
Dept: CARDIOLOGY CLINIC | Age: 71
End: 2021-01-01

## 2021-01-01 ENCOUNTER — APPOINTMENT (OUTPATIENT)
Dept: GENERAL RADIOLOGY | Age: 71
DRG: 682 | End: 2021-01-01
Payer: MEDICARE

## 2021-01-01 ENCOUNTER — OFFICE VISIT (OUTPATIENT)
Dept: VASCULAR SURGERY | Age: 71
End: 2021-01-01
Payer: MEDICARE

## 2021-01-01 ENCOUNTER — APPOINTMENT (OUTPATIENT)
Dept: CT IMAGING | Age: 71
DRG: 314 | End: 2021-01-01
Payer: MEDICARE

## 2021-01-01 ENCOUNTER — APPOINTMENT (OUTPATIENT)
Dept: GENERAL RADIOLOGY | Age: 71
End: 2021-01-01
Payer: MEDICARE

## 2021-01-01 ENCOUNTER — HOSPITAL ENCOUNTER (OUTPATIENT)
Dept: INFUSION THERAPY | Age: 71
Setting detail: INFUSION SERIES
Discharge: HOME | End: 2021-12-15
Payer: MEDICARE

## 2021-01-01 ENCOUNTER — APPOINTMENT (OUTPATIENT)
Dept: GENERAL RADIOLOGY | Age: 71
DRG: 314 | End: 2021-01-01
Payer: MEDICARE

## 2021-01-01 ENCOUNTER — HOSPITAL ENCOUNTER (INPATIENT)
Age: 71
LOS: 10 days | Discharge: ANOTHER HOSPITAL | DRG: 314 | End: 2021-06-06
Attending: EMERGENCY MEDICINE | Admitting: INTERNAL MEDICINE
Payer: MEDICARE

## 2021-01-01 ENCOUNTER — APPOINTMENT (OUTPATIENT)
Dept: CT IMAGING | Age: 71
DRG: 190 | End: 2021-01-01
Payer: MEDICARE

## 2021-01-01 ENCOUNTER — HOSPITAL ENCOUNTER (OUTPATIENT)
Dept: INFUSION THERAPY | Age: 71
Setting detail: INFUSION SERIES
Discharge: HOME | End: 2021-11-03
Payer: MEDICARE

## 2021-01-01 ENCOUNTER — HOSPITAL ENCOUNTER (OUTPATIENT)
Dept: INFUSION THERAPY | Age: 71
Setting detail: INFUSION SERIES
Discharge: HOME | End: 2021-07-29
Payer: MEDICARE

## 2021-01-01 ENCOUNTER — APPOINTMENT (OUTPATIENT)
Dept: CT IMAGING | Age: 71
DRG: 682 | End: 2021-01-01
Payer: MEDICARE

## 2021-01-01 ENCOUNTER — HOSPITAL ENCOUNTER (OUTPATIENT)
Dept: INFUSION THERAPY | Age: 71
Setting detail: INFUSION SERIES
Discharge: HOME | End: 2021-12-22
Payer: MEDICARE

## 2021-01-01 ENCOUNTER — HOSPITAL ENCOUNTER (EMERGENCY)
Age: 71
Discharge: HOME | End: 2021-03-17
Attending: EMERGENCY MEDICINE
Payer: MEDICARE

## 2021-01-01 ENCOUNTER — HOSPITAL ENCOUNTER (EMERGENCY)
Age: 71
Discharge: HOME | End: 2021-05-20
Attending: EMERGENCY MEDICINE
Payer: MEDICARE

## 2021-01-01 ENCOUNTER — HOSPITAL ENCOUNTER (OUTPATIENT)
Dept: INFUSION THERAPY | Age: 71
Setting detail: INFUSION SERIES
Discharge: HOME | End: 2021-12-29
Payer: MEDICARE

## 2021-01-01 ENCOUNTER — TELEPHONE (OUTPATIENT)
Dept: VASCULAR SURGERY | Age: 71
End: 2021-01-01

## 2021-01-01 ENCOUNTER — APPOINTMENT (OUTPATIENT)
Dept: CT IMAGING | Age: 71
End: 2021-01-01
Payer: MEDICARE

## 2021-01-01 ENCOUNTER — HOSPITAL ENCOUNTER (OUTPATIENT)
Dept: INFUSION THERAPY | Age: 71
Setting detail: INFUSION SERIES
Discharge: HOME | End: 2021-11-17
Payer: MEDICARE

## 2021-01-01 ENCOUNTER — HOSPITAL ENCOUNTER (OUTPATIENT)
Dept: INFUSION THERAPY | Age: 71
Setting detail: INFUSION SERIES
Discharge: HOME | End: 2021-11-24
Payer: MEDICARE

## 2021-01-01 ENCOUNTER — HOSPITAL ENCOUNTER (INPATIENT)
Age: 71
LOS: 5 days | Discharge: HOME | DRG: 190 | End: 2021-09-21
Attending: STUDENT IN AN ORGANIZED HEALTH CARE EDUCATION/TRAINING PROGRAM | Admitting: INTERNAL MEDICINE
Payer: MEDICARE

## 2021-01-01 ENCOUNTER — APPOINTMENT (OUTPATIENT)
Dept: GENERAL RADIOLOGY | Age: 71
DRG: 190 | End: 2021-01-01
Payer: MEDICARE

## 2021-01-01 ENCOUNTER — HOSPITAL ENCOUNTER (OUTPATIENT)
Dept: INFUSION THERAPY | Age: 71
Setting detail: INFUSION SERIES
Discharge: HOME | End: 2021-10-27
Payer: MEDICARE

## 2021-01-01 ENCOUNTER — HOSPITAL ENCOUNTER (OUTPATIENT)
Dept: INFUSION THERAPY | Age: 71
Setting detail: INFUSION SERIES
Discharge: HOME | End: 2021-10-20
Payer: MEDICARE

## 2021-01-01 ENCOUNTER — HOSPITAL ENCOUNTER (OUTPATIENT)
Dept: ULTRASOUND IMAGING | Age: 71
Discharge: HOME | End: 2021-11-10
Payer: MEDICARE

## 2021-01-01 ENCOUNTER — HOSPITAL ENCOUNTER (OUTPATIENT)
Age: 71
Discharge: HOME | End: 2021-07-28
Payer: MEDICARE

## 2021-01-01 ENCOUNTER — HOSPITAL ENCOUNTER (OUTPATIENT)
Dept: INFUSION THERAPY | Age: 71
Setting detail: INFUSION SERIES
Discharge: HOME | End: 2021-11-10
Payer: MEDICARE

## 2021-01-01 ENCOUNTER — HOSPITAL ENCOUNTER (OUTPATIENT)
Dept: INFUSION THERAPY | Age: 71
Setting detail: INFUSION SERIES
Discharge: HOME | End: 2021-10-04
Payer: MEDICARE

## 2021-01-01 ENCOUNTER — HOSPITAL ENCOUNTER (OUTPATIENT)
Dept: INFUSION THERAPY | Age: 71
Setting detail: INFUSION SERIES
Discharge: HOME | End: 2021-10-07
Payer: MEDICARE

## 2021-01-01 ENCOUNTER — HOSPITAL ENCOUNTER (EMERGENCY)
Age: 71
Discharge: ANOTHER HOSPITAL | End: 2021-01-29
Attending: EMERGENCY MEDICINE
Payer: MEDICARE

## 2021-01-01 ENCOUNTER — TELEPHONE (OUTPATIENT)
Dept: ADMINISTRATIVE | Age: 71
End: 2021-01-01

## 2021-01-01 ENCOUNTER — HOSPITAL ENCOUNTER (OUTPATIENT)
Dept: INFUSION THERAPY | Age: 71
Setting detail: INFUSION SERIES
Discharge: HOME | End: 2021-10-13
Payer: MEDICARE

## 2021-01-01 ENCOUNTER — HOSPITAL ENCOUNTER (INPATIENT)
Age: 71
LOS: 9 days | Discharge: HOME HEALTH | DRG: 682 | End: 2021-04-19
Attending: EMERGENCY MEDICINE | Admitting: INTERNAL MEDICINE
Payer: MEDICARE

## 2021-01-01 ENCOUNTER — HOSPITAL ENCOUNTER (OUTPATIENT)
Dept: INFUSION THERAPY | Age: 71
Setting detail: INFUSION SERIES
Discharge: HOME | End: 2021-12-08
Payer: MEDICARE

## 2021-01-01 ENCOUNTER — OFFICE VISIT (OUTPATIENT)
Dept: CARDIOLOGY CLINIC | Age: 71
End: 2021-01-01
Payer: MEDICARE

## 2021-01-01 ENCOUNTER — HOSPITAL ENCOUNTER (OUTPATIENT)
Dept: INFUSION THERAPY | Age: 71
Setting detail: INFUSION SERIES
Discharge: HOME | End: 2021-12-01
Payer: MEDICARE

## 2021-01-01 VITALS
HEIGHT: 70 IN | OXYGEN SATURATION: 100 % | TEMPERATURE: 97.4 F | SYSTOLIC BLOOD PRESSURE: 90 MMHG | HEART RATE: 61 BPM | RESPIRATION RATE: 16 BRPM | DIASTOLIC BLOOD PRESSURE: 56 MMHG | WEIGHT: 81 LBS | BODY MASS INDEX: 11.6 KG/M2

## 2021-01-01 VITALS
TEMPERATURE: 97.5 F | WEIGHT: 82 LBS | HEART RATE: 65 BPM | DIASTOLIC BLOOD PRESSURE: 50 MMHG | DIASTOLIC BLOOD PRESSURE: 56 MMHG | BODY MASS INDEX: 11.48 KG/M2 | HEART RATE: 62 BPM | HEIGHT: 71 IN | TEMPERATURE: 97.1 F | SYSTOLIC BLOOD PRESSURE: 97 MMHG | RESPIRATION RATE: 18 BRPM | OXYGEN SATURATION: 100 % | SYSTOLIC BLOOD PRESSURE: 80 MMHG | OXYGEN SATURATION: 100 % | RESPIRATION RATE: 18 BRPM

## 2021-01-01 VITALS
RESPIRATION RATE: 18 BRPM | OXYGEN SATURATION: 98 % | HEART RATE: 77 BPM | SYSTOLIC BLOOD PRESSURE: 108 MMHG | DIASTOLIC BLOOD PRESSURE: 70 MMHG | TEMPERATURE: 98.8 F

## 2021-01-01 VITALS
OXYGEN SATURATION: 100 % | HEART RATE: 79 BPM | WEIGHT: 83 LBS | TEMPERATURE: 94.3 F | HEIGHT: 71 IN | DIASTOLIC BLOOD PRESSURE: 58 MMHG | BODY MASS INDEX: 11.62 KG/M2 | RESPIRATION RATE: 18 BRPM | SYSTOLIC BLOOD PRESSURE: 90 MMHG

## 2021-01-01 VITALS
HEART RATE: 78 BPM | TEMPERATURE: 97.8 F | OXYGEN SATURATION: 97 % | DIASTOLIC BLOOD PRESSURE: 57 MMHG | SYSTOLIC BLOOD PRESSURE: 104 MMHG | RESPIRATION RATE: 14 BRPM

## 2021-01-01 VITALS
RESPIRATION RATE: 16 BRPM | SYSTOLIC BLOOD PRESSURE: 84 MMHG | HEIGHT: 70 IN | DIASTOLIC BLOOD PRESSURE: 60 MMHG | OXYGEN SATURATION: 97 % | TEMPERATURE: 97.4 F | OXYGEN SATURATION: 99 % | DIASTOLIC BLOOD PRESSURE: 66 MMHG | SYSTOLIC BLOOD PRESSURE: 86 MMHG | BODY MASS INDEX: 14.32 KG/M2 | BODY MASS INDEX: 14.35 KG/M2 | HEART RATE: 78 BPM | TEMPERATURE: 97.4 F | WEIGHT: 100 LBS | HEART RATE: 62 BPM | WEIGHT: 100 LBS | RESPIRATION RATE: 16 BRPM

## 2021-01-01 VITALS
RESPIRATION RATE: 16 BRPM | BODY MASS INDEX: 13.58 KG/M2 | WEIGHT: 97 LBS | OXYGEN SATURATION: 96 % | SYSTOLIC BLOOD PRESSURE: 113 MMHG | HEART RATE: 87 BPM | DIASTOLIC BLOOD PRESSURE: 53 MMHG | HEIGHT: 71 IN | TEMPERATURE: 97.8 F

## 2021-01-01 VITALS — HEIGHT: 71 IN | BODY MASS INDEX: 11.2 KG/M2 | WEIGHT: 80 LBS

## 2021-01-01 VITALS
WEIGHT: 97 LBS | TEMPERATURE: 97.6 F | HEART RATE: 84 BPM | BODY MASS INDEX: 13.89 KG/M2 | HEIGHT: 70 IN | RESPIRATION RATE: 16 BRPM | DIASTOLIC BLOOD PRESSURE: 65 MMHG | OXYGEN SATURATION: 100 % | SYSTOLIC BLOOD PRESSURE: 110 MMHG

## 2021-01-01 VITALS
DIASTOLIC BLOOD PRESSURE: 61 MMHG | HEART RATE: 80 BPM | RESPIRATION RATE: 16 BRPM | TEMPERATURE: 97.6 F | OXYGEN SATURATION: 97 % | SYSTOLIC BLOOD PRESSURE: 90 MMHG

## 2021-01-01 VITALS
BODY MASS INDEX: 14.32 KG/M2 | TEMPERATURE: 97.7 F | RESPIRATION RATE: 18 BRPM | HEART RATE: 95 BPM | HEIGHT: 70 IN | DIASTOLIC BLOOD PRESSURE: 66 MMHG | WEIGHT: 100 LBS | SYSTOLIC BLOOD PRESSURE: 87 MMHG | OXYGEN SATURATION: 98 %

## 2021-01-01 VITALS
TEMPERATURE: 97.8 F | DIASTOLIC BLOOD PRESSURE: 72 MMHG | OXYGEN SATURATION: 96 % | HEIGHT: 71 IN | RESPIRATION RATE: 16 BRPM | HEART RATE: 76 BPM | SYSTOLIC BLOOD PRESSURE: 110 MMHG | WEIGHT: 93 LBS | BODY MASS INDEX: 13.02 KG/M2

## 2021-01-01 VITALS
SYSTOLIC BLOOD PRESSURE: 100 MMHG | BODY MASS INDEX: 11.62 KG/M2 | OXYGEN SATURATION: 100 % | TEMPERATURE: 94 F | DIASTOLIC BLOOD PRESSURE: 62 MMHG | WEIGHT: 81 LBS | HEART RATE: 75 BPM | RESPIRATION RATE: 18 BRPM

## 2021-01-01 VITALS
RESPIRATION RATE: 16 BRPM | TEMPERATURE: 97.8 F | HEART RATE: 71 BPM | OXYGEN SATURATION: 100 % | SYSTOLIC BLOOD PRESSURE: 87 MMHG | DIASTOLIC BLOOD PRESSURE: 61 MMHG

## 2021-01-01 VITALS
HEART RATE: 97 BPM | RESPIRATION RATE: 16 BRPM | DIASTOLIC BLOOD PRESSURE: 60 MMHG | WEIGHT: 106 LBS | BODY MASS INDEX: 15.17 KG/M2 | HEIGHT: 70 IN | SYSTOLIC BLOOD PRESSURE: 108 MMHG

## 2021-01-01 VITALS — WEIGHT: 93 LBS | HEIGHT: 71 IN | BODY MASS INDEX: 13.02 KG/M2

## 2021-01-01 VITALS
WEIGHT: 104.4 LBS | DIASTOLIC BLOOD PRESSURE: 80 MMHG | RESPIRATION RATE: 16 BRPM | BODY MASS INDEX: 14.62 KG/M2 | HEIGHT: 71 IN | HEART RATE: 76 BPM | SYSTOLIC BLOOD PRESSURE: 118 MMHG

## 2021-01-01 VITALS
HEART RATE: 73 BPM | TEMPERATURE: 93.5 F | OXYGEN SATURATION: 96 % | DIASTOLIC BLOOD PRESSURE: 64 MMHG | RESPIRATION RATE: 18 BRPM | SYSTOLIC BLOOD PRESSURE: 86 MMHG

## 2021-01-01 VITALS
HEIGHT: 70 IN | HEART RATE: 67 BPM | RESPIRATION RATE: 18 BRPM | SYSTOLIC BLOOD PRESSURE: 121 MMHG | TEMPERATURE: 97.6 F | OXYGEN SATURATION: 95 % | WEIGHT: 101.2 LBS | BODY MASS INDEX: 14.49 KG/M2 | DIASTOLIC BLOOD PRESSURE: 67 MMHG

## 2021-01-01 VITALS
OXYGEN SATURATION: 99 % | HEART RATE: 89 BPM | RESPIRATION RATE: 18 BRPM | HEIGHT: 70 IN | WEIGHT: 96 LBS | SYSTOLIC BLOOD PRESSURE: 137 MMHG | TEMPERATURE: 97.5 F | BODY MASS INDEX: 13.74 KG/M2 | DIASTOLIC BLOOD PRESSURE: 86 MMHG

## 2021-01-01 VITALS
HEART RATE: 65 BPM | RESPIRATION RATE: 18 BRPM | DIASTOLIC BLOOD PRESSURE: 60 MMHG | TEMPERATURE: 94.7 F | OXYGEN SATURATION: 100 % | SYSTOLIC BLOOD PRESSURE: 83 MMHG

## 2021-01-01 VITALS
RESPIRATION RATE: 16 BRPM | SYSTOLIC BLOOD PRESSURE: 80 MMHG | OXYGEN SATURATION: 97 % | HEART RATE: 95 BPM | TEMPERATURE: 97.4 F | DIASTOLIC BLOOD PRESSURE: 67 MMHG

## 2021-01-01 DIAGNOSIS — N18.31 CHRONIC KIDNEY DISEASE, STAGE 3A (HCC): Primary | ICD-10-CM

## 2021-01-01 DIAGNOSIS — I10 ESSENTIAL HYPERTENSION: Primary | ICD-10-CM

## 2021-01-01 DIAGNOSIS — R77.8 ELEVATED TROPONIN: ICD-10-CM

## 2021-01-01 DIAGNOSIS — N18.31 CHRONIC KIDNEY DISEASE, STAGE 3A (HCC): ICD-10-CM

## 2021-01-01 DIAGNOSIS — Z95.828 S/P REPAIR OF ABDOMINAL AORTIC ANEURYSM USING BIFURCATION GRAFT: Primary | ICD-10-CM

## 2021-01-01 DIAGNOSIS — R31.9 URINARY TRACT INFECTION WITH HEMATURIA, SITE UNSPECIFIED: Primary | ICD-10-CM

## 2021-01-01 DIAGNOSIS — J18.9 PNEUMONIA DUE TO INFECTIOUS ORGANISM, UNSPECIFIED LATERALITY, UNSPECIFIED PART OF LUNG: Primary | ICD-10-CM

## 2021-01-01 DIAGNOSIS — Z86.79 S/P REPAIR OF ABDOMINAL AORTIC ANEURYSM USING BIFURCATION GRAFT: Primary | ICD-10-CM

## 2021-01-01 DIAGNOSIS — T85.598A FEEDING TUBE OBSTRUCTION, INITIAL ENCOUNTER: Primary | ICD-10-CM

## 2021-01-01 DIAGNOSIS — N30.00 ACUTE CYSTITIS WITHOUT HEMATURIA: Primary | ICD-10-CM

## 2021-01-01 DIAGNOSIS — I48.0 PAF (PAROXYSMAL ATRIAL FIBRILLATION) (HCC): ICD-10-CM

## 2021-01-01 DIAGNOSIS — Z86.79 S/P AAA REPAIR USING BIFURCATION GRAFT: Primary | ICD-10-CM

## 2021-01-01 DIAGNOSIS — J44.9 COPD, MODERATE (HCC): ICD-10-CM

## 2021-01-01 DIAGNOSIS — J96.01 ACUTE RESPIRATORY FAILURE WITH HYPOXIA (HCC): ICD-10-CM

## 2021-01-01 DIAGNOSIS — E43 SEVERE PROTEIN-CALORIE MALNUTRITION (HCC): ICD-10-CM

## 2021-01-01 DIAGNOSIS — Z95.828 S/P AAA REPAIR USING BIFURCATION GRAFT: ICD-10-CM

## 2021-01-01 DIAGNOSIS — I72.8 ANEURYSM OF SUPERIOR MESENTERIC ARTERY (HCC): Primary | ICD-10-CM

## 2021-01-01 DIAGNOSIS — D64.9 ANEMIA, UNSPECIFIED TYPE: ICD-10-CM

## 2021-01-01 DIAGNOSIS — N17.9 AKI (ACUTE KIDNEY INJURY) (HCC): ICD-10-CM

## 2021-01-01 DIAGNOSIS — I71.40 ABDOMINAL AORTIC ANEURYSM (AAA) WITHOUT RUPTURE: ICD-10-CM

## 2021-01-01 DIAGNOSIS — J43.9 PULMONARY EMPHYSEMA, UNSPECIFIED EMPHYSEMA TYPE (HCC): ICD-10-CM

## 2021-01-01 DIAGNOSIS — I71.40 ABDOMINAL AORTIC ANEURYSM (AAA) WITHOUT RUPTURE: Primary | ICD-10-CM

## 2021-01-01 DIAGNOSIS — N39.0 URINARY TRACT INFECTION WITH HEMATURIA, SITE UNSPECIFIED: Primary | ICD-10-CM

## 2021-01-01 DIAGNOSIS — Z86.79 S/P AAA REPAIR USING BIFURCATION GRAFT: ICD-10-CM

## 2021-01-01 DIAGNOSIS — J96.01 ACUTE RESPIRATORY FAILURE WITH HYPOXIA (HCC): Primary | ICD-10-CM

## 2021-01-01 DIAGNOSIS — Z95.828 S/P AAA REPAIR USING BIFURCATION GRAFT: Primary | ICD-10-CM

## 2021-01-01 DIAGNOSIS — R62.7 FAILURE TO THRIVE IN ADULT: ICD-10-CM

## 2021-01-01 LAB
ABO/RH: NORMAL
ACINETOBACTER BAUMANNII BY PCR: NOT DETECTED
ADDENDUM ELECTROPHORESIS URINE RANDOM: NORMAL
ADENOVIRUS BY PCR: NOT DETECTED
ALBUMIN SERPL-MCNC: 2.3 G/DL (ref 3.5–5.2)
ALBUMIN SERPL-MCNC: 2.6 G/DL (ref 3.5–5.2)
ALBUMIN SERPL-MCNC: 2.7 G/DL (ref 3.5–5.2)
ALBUMIN SERPL-MCNC: 2.7 G/DL (ref 3.5–5.2)
ALBUMIN SERPL-MCNC: 2.8 G/DL (ref 3.5–4.7)
ALBUMIN SERPL-MCNC: 3 G/DL (ref 3.5–5.2)
ALBUMIN SERPL-MCNC: 3.2 G/DL (ref 3.5–5.2)
ALBUMIN SERPL-MCNC: 3.3 G/DL (ref 3.5–5.2)
ALBUMIN SERPL-MCNC: 3.8 G/DL (ref 3.5–5.2)
ALP BLD-CCNC: 106 U/L (ref 40–129)
ALP BLD-CCNC: 130 U/L (ref 40–129)
ALP BLD-CCNC: 155 U/L (ref 40–129)
ALP BLD-CCNC: 444 U/L (ref 40–129)
ALP BLD-CCNC: 616 U/L (ref 40–129)
ALP BLD-CCNC: 75 U/L (ref 40–129)
ALP BLD-CCNC: 870 U/L (ref 40–129)
ALP BLD-CCNC: 91 U/L (ref 40–129)
ALPHA-1 ANTITRYPSIN PHENOTYPE: ABNORMAL
ALPHA-1 ANTITRYPSIN: 206 MG/DL (ref 90–200)
ALPHA-1-GLOBULIN: 0.3 G/DL (ref 0.2–0.4)
ALPHA-2-GLOBULIN: 0.8 G/FL (ref 0.5–1)
ALT SERPL-CCNC: 104 U/L (ref 0–40)
ALT SERPL-CCNC: 14 U/L (ref 0–40)
ALT SERPL-CCNC: 15 U/L (ref 0–40)
ALT SERPL-CCNC: 27 U/L (ref 0–40)
ALT SERPL-CCNC: 32 U/L (ref 0–40)
ALT SERPL-CCNC: 41 U/L (ref 0–40)
ALT SERPL-CCNC: 43 U/L (ref 0–40)
ALT SERPL-CCNC: 49 U/L (ref 0–40)
ANION GAP SERPL CALCULATED.3IONS-SCNC: 10 MMOL/L (ref 7–16)
ANION GAP SERPL CALCULATED.3IONS-SCNC: 11 MMOL/L (ref 7–16)
ANION GAP SERPL CALCULATED.3IONS-SCNC: 12 MMOL/L (ref 7–16)
ANION GAP SERPL CALCULATED.3IONS-SCNC: 13 MMOL/L (ref 7–16)
ANION GAP SERPL CALCULATED.3IONS-SCNC: 14 MMOL/L (ref 7–16)
ANION GAP SERPL CALCULATED.3IONS-SCNC: 16 MMOL/L (ref 7–16)
ANION GAP SERPL CALCULATED.3IONS-SCNC: 17 MMOL/L (ref 7–16)
ANION GAP SERPL CALCULATED.3IONS-SCNC: 20 MMOL/L (ref 7–16)
ANION GAP SERPL CALCULATED.3IONS-SCNC: 22 MMOL/L (ref 7–16)
ANION GAP SERPL CALCULATED.3IONS-SCNC: 5 MMOL/L (ref 7–16)
ANION GAP SERPL CALCULATED.3IONS-SCNC: 5 MMOL/L (ref 7–16)
ANION GAP SERPL CALCULATED.3IONS-SCNC: 6 MMOL/L (ref 7–16)
ANION GAP SERPL CALCULATED.3IONS-SCNC: 6 MMOL/L (ref 7–16)
ANION GAP SERPL CALCULATED.3IONS-SCNC: 8 MMOL/L (ref 7–16)
ANION GAP SERPL CALCULATED.3IONS-SCNC: 9 MMOL/L (ref 7–16)
ANION GAP SERPL CALCULATED.3IONS-SCNC: 9 MMOL/L (ref 7–16)
ANISOCYTOSIS: ABNORMAL
ANTIBODY SCREEN: NORMAL
APTT: 27.5 SEC (ref 24.5–35.1)
APTT: 29.6 SEC (ref 24.5–35.1)
AST SERPL-CCNC: 17 U/L (ref 0–39)
AST SERPL-CCNC: 19 U/L (ref 0–39)
AST SERPL-CCNC: 22 U/L (ref 0–39)
AST SERPL-CCNC: 28 U/L (ref 0–39)
AST SERPL-CCNC: 37 U/L (ref 0–39)
AST SERPL-CCNC: 39 U/L (ref 0–39)
AST SERPL-CCNC: 45 U/L (ref 0–39)
AST SERPL-CCNC: 83 U/L (ref 0–39)
B.E.: -3.3 MMOL/L (ref -3–3)
BACTERIA: ABNORMAL /HPF
BASOPHILS ABSOLUTE: 0 E9/L (ref 0–0.2)
BASOPHILS ABSOLUTE: 0 E9/L (ref 0–0.2)
BASOPHILS ABSOLUTE: 0.01 E9/L (ref 0–0.2)
BASOPHILS ABSOLUTE: 0.02 E9/L (ref 0–0.2)
BASOPHILS ABSOLUTE: 0.03 E9/L (ref 0–0.2)
BASOPHILS ABSOLUTE: 0.04 E9/L (ref 0–0.2)
BASOPHILS ABSOLUTE: 0.05 E9/L (ref 0–0.2)
BASOPHILS RELATIVE PERCENT: 0 % (ref 0–2)
BASOPHILS RELATIVE PERCENT: 0 % (ref 0–2)
BASOPHILS RELATIVE PERCENT: 0.1 % (ref 0–2)
BASOPHILS RELATIVE PERCENT: 0.1 % (ref 0–2)
BASOPHILS RELATIVE PERCENT: 0.2 % (ref 0–2)
BASOPHILS RELATIVE PERCENT: 0.3 % (ref 0–2)
BASOPHILS RELATIVE PERCENT: 0.3 % (ref 0–2)
BASOPHILS RELATIVE PERCENT: 0.4 % (ref 0–2)
BASOPHILS RELATIVE PERCENT: 0.5 % (ref 0–2)
BASOPHILS RELATIVE PERCENT: 0.5 % (ref 0–2)
BASOPHILS RELATIVE PERCENT: 0.6 % (ref 0–2)
BASOPHILS RELATIVE PERCENT: 0.6 % (ref 0–2)
BASOPHILS RELATIVE PERCENT: 0.7 % (ref 0–2)
BASOPHILS RELATIVE PERCENT: 0.8 % (ref 0–2)
BETA GLOBULIN: 0.8 G/DL (ref 0.8–1.3)
BILIRUB SERPL-MCNC: 0.3 MG/DL (ref 0–1.2)
BILIRUB SERPL-MCNC: 0.4 MG/DL (ref 0–1.2)
BILIRUB SERPL-MCNC: 0.7 MG/DL (ref 0–1.2)
BILIRUB SERPL-MCNC: 0.9 MG/DL (ref 0–1.2)
BILIRUB SERPL-MCNC: 1 MG/DL (ref 0–1.2)
BILIRUB SERPL-MCNC: 1.4 MG/DL (ref 0–1.2)
BILIRUBIN DIRECT: 0.3 MG/DL (ref 0–0.3)
BILIRUBIN URINE: ABNORMAL
BILIRUBIN URINE: NEGATIVE
BILIRUBIN, INDIRECT: 0.6 MG/DL (ref 0–1)
BLOOD BANK DISPENSE STATUS: NORMAL
BLOOD BANK PRODUCT CODE: NORMAL
BLOOD CULTURE, ROUTINE: NORMAL
BLOOD, URINE: ABNORMAL
BORDETELLA PARAPERTUSSIS BY PCR: NOT DETECTED
BORDETELLA PERTUSSIS BY PCR: NOT DETECTED
BOTTLE TYPE: ABNORMAL
BPU ID: NORMAL
BUN BLDV-MCNC: 14 MG/DL (ref 8–23)
BUN BLDV-MCNC: 20 MG/DL (ref 6–23)
BUN BLDV-MCNC: 24 MG/DL (ref 6–23)
BUN BLDV-MCNC: 32 MG/DL (ref 6–23)
BUN BLDV-MCNC: 35 MG/DL (ref 6–23)
BUN BLDV-MCNC: 35 MG/DL (ref 8–23)
BUN BLDV-MCNC: 35 MG/DL (ref 8–23)
BUN BLDV-MCNC: 36 MG/DL (ref 6–23)
BUN BLDV-MCNC: 38 MG/DL (ref 6–23)
BUN BLDV-MCNC: 38 MG/DL (ref 8–23)
BUN BLDV-MCNC: 41 MG/DL (ref 6–23)
BUN BLDV-MCNC: 44 MG/DL (ref 8–23)
BUN BLDV-MCNC: 45 MG/DL (ref 6–23)
BUN BLDV-MCNC: 45 MG/DL (ref 6–23)
BUN BLDV-MCNC: 46 MG/DL (ref 6–23)
BUN BLDV-MCNC: 46 MG/DL (ref 6–23)
BUN BLDV-MCNC: 49 MG/DL (ref 6–23)
BUN BLDV-MCNC: 54 MG/DL (ref 6–23)
BUN BLDV-MCNC: 55 MG/DL (ref 6–23)
BUN BLDV-MCNC: 64 MG/DL (ref 6–23)
BUN BLDV-MCNC: 69 MG/DL (ref 6–23)
BUN BLDV-MCNC: 70 MG/DL (ref 8–23)
BUN BLDV-MCNC: 74 MG/DL (ref 8–23)
BUN BLDV-MCNC: 74 MG/DL (ref 8–23)
BUN BLDV-MCNC: 77 MG/DL (ref 6–23)
BUN BLDV-MCNC: 78 MG/DL (ref 6–23)
BUN BLDV-MCNC: 82 MG/DL (ref 8–23)
BUN BLDV-MCNC: 83 MG/DL (ref 6–23)
BUN BLDV-MCNC: 89 MG/DL (ref 8–23)
BUN BLDV-MCNC: 91 MG/DL (ref 6–23)
BUN BLDV-MCNC: 91 MG/DL (ref 8–23)
BUN BLDV-MCNC: 96 MG/DL (ref 8–23)
BURR CELLS: ABNORMAL
C-REACTIVE PROTEIN: 10 MG/DL (ref 0–0.4)
C-REACTIVE PROTEIN: 4.6 MG/DL (ref 0–0.4)
CALCIUM IONIZED: 1.12 MMOL/L (ref 1.15–1.33)
CALCIUM IONIZED: 1.14 MMOL/L (ref 1.15–1.33)
CALCIUM IONIZED: 1.18 MMOL/L (ref 1.15–1.33)
CALCIUM SERPL-MCNC: 10.3 MG/DL (ref 8.6–10.2)
CALCIUM SERPL-MCNC: 10.6 MG/DL (ref 8.6–10.2)
CALCIUM SERPL-MCNC: 7.3 MG/DL (ref 8.6–10.2)
CALCIUM SERPL-MCNC: 7.5 MG/DL (ref 8.6–10.2)
CALCIUM SERPL-MCNC: 7.5 MG/DL (ref 8.6–10.2)
CALCIUM SERPL-MCNC: 7.6 MG/DL (ref 8.6–10.2)
CALCIUM SERPL-MCNC: 7.7 MG/DL (ref 8.6–10.2)
CALCIUM SERPL-MCNC: 7.7 MG/DL (ref 8.6–10.2)
CALCIUM SERPL-MCNC: 7.8 MG/DL (ref 8.6–10.2)
CALCIUM SERPL-MCNC: 7.9 MG/DL (ref 8.6–10.2)
CALCIUM SERPL-MCNC: 7.9 MG/DL (ref 8.6–10.2)
CALCIUM SERPL-MCNC: 8 MG/DL (ref 8.6–10.2)
CALCIUM SERPL-MCNC: 8.1 MG/DL (ref 8.6–10.2)
CALCIUM SERPL-MCNC: 8.2 MG/DL (ref 8.6–10.2)
CALCIUM SERPL-MCNC: 8.2 MG/DL (ref 8.6–10.2)
CALCIUM SERPL-MCNC: 8.3 MG/DL (ref 8.6–10.2)
CALCIUM SERPL-MCNC: 8.4 MG/DL (ref 8.6–10.2)
CALCIUM SERPL-MCNC: 8.8 MG/DL (ref 8.6–10.2)
CALCIUM SERPL-MCNC: 8.8 MG/DL (ref 8.6–10.2)
CALCIUM SERPL-MCNC: 8.9 MG/DL (ref 8.6–10.2)
CALCIUM SERPL-MCNC: 9 MG/DL (ref 8.6–10.2)
CALCIUM SERPL-MCNC: 9.1 MG/DL (ref 8.6–10.2)
CALCIUM SERPL-MCNC: 9.3 MG/DL (ref 8.6–10.2)
CALCIUM SERPL-MCNC: 9.4 MG/DL (ref 8.6–10.2)
CALCIUM SERPL-MCNC: 9.7 MG/DL (ref 8.6–10.2)
CANDIDA ALBICANS BY PCR: NOT DETECTED
CANDIDA GLABRATA BY PCR: NOT DETECTED
CANDIDA KRUSEI BY PCR: NOT DETECTED
CANDIDA PARAPSILOSIS BY PCR: NOT DETECTED
CANDIDA TROPICALIS BY PCR: NOT DETECTED
CHLAMYDOPHILIA PNEUMONIAE BY PCR: NOT DETECTED
CHLORIDE BLD-SCNC: 101 MMOL/L (ref 98–107)
CHLORIDE BLD-SCNC: 102 MMOL/L (ref 98–107)
CHLORIDE BLD-SCNC: 103 MMOL/L (ref 98–107)
CHLORIDE BLD-SCNC: 104 MMOL/L (ref 98–107)
CHLORIDE BLD-SCNC: 104 MMOL/L (ref 98–107)
CHLORIDE BLD-SCNC: 105 MMOL/L (ref 98–107)
CHLORIDE BLD-SCNC: 105 MMOL/L (ref 98–107)
CHLORIDE BLD-SCNC: 106 MMOL/L (ref 98–107)
CHLORIDE BLD-SCNC: 106 MMOL/L (ref 98–107)
CHLORIDE BLD-SCNC: 109 MMOL/L (ref 98–107)
CHLORIDE BLD-SCNC: 110 MMOL/L (ref 98–107)
CHLORIDE BLD-SCNC: 90 MMOL/L (ref 98–107)
CHLORIDE BLD-SCNC: 91 MMOL/L (ref 98–107)
CHLORIDE BLD-SCNC: 93 MMOL/L (ref 98–107)
CHLORIDE BLD-SCNC: 95 MMOL/L (ref 98–107)
CHLORIDE BLD-SCNC: 96 MMOL/L (ref 98–107)
CHLORIDE BLD-SCNC: 97 MMOL/L (ref 98–107)
CHLORIDE BLD-SCNC: 98 MMOL/L (ref 98–107)
CHLORIDE BLD-SCNC: 99 MMOL/L (ref 98–107)
CHLORIDE URINE RANDOM: <20 MMOL/L
CK MB: 1.9 NG/ML (ref 0–7.7)
CLARITY: ABNORMAL
CLARITY: ABNORMAL
CLARITY: CLEAR
CLARITY: CLEAR
CO2: 17 MMOL/L (ref 22–29)
CO2: 18 MMOL/L (ref 22–29)
CO2: 19 MMOL/L (ref 22–29)
CO2: 21 MMOL/L (ref 22–29)
CO2: 24 MMOL/L (ref 22–29)
CO2: 25 MMOL/L (ref 22–29)
CO2: 26 MMOL/L (ref 22–29)
CO2: 27 MMOL/L (ref 22–29)
CO2: 28 MMOL/L (ref 22–29)
CO2: 29 MMOL/L (ref 22–29)
CO2: 29 MMOL/L (ref 22–29)
CO2: 32 MMOL/L (ref 22–29)
CO2: 33 MMOL/L (ref 22–29)
CO2: 33 MMOL/L (ref 22–29)
CO2: 34 MMOL/L (ref 22–29)
COHB: 0.8 % (ref 0–1.5)
COLOR: YELLOW
CORONAVIRUS 229E BY PCR: NOT DETECTED
CORONAVIRUS HKU1 BY PCR: NOT DETECTED
CORONAVIRUS NL63 BY PCR: NOT DETECTED
CORONAVIRUS OC43 BY PCR: NOT DETECTED
CREAT SERPL-MCNC: 0.9 MG/DL (ref 0.7–1.2)
CREAT SERPL-MCNC: 1.2 MG/DL (ref 0.7–1.2)
CREAT SERPL-MCNC: 1.3 MG/DL (ref 0.7–1.2)
CREAT SERPL-MCNC: 1.4 MG/DL (ref 0.7–1.2)
CREAT SERPL-MCNC: 1.5 MG/DL (ref 0.7–1.2)
CREAT SERPL-MCNC: 1.7 MG/DL (ref 0.7–1.2)
CREAT SERPL-MCNC: 1.7 MG/DL (ref 0.7–1.2)
CREAT SERPL-MCNC: 1.8 MG/DL (ref 0.7–1.2)
CREAT SERPL-MCNC: 2.1 MG/DL (ref 0.7–1.2)
CREAT SERPL-MCNC: 2.2 MG/DL (ref 0.7–1.2)
CREAT SERPL-MCNC: 2.3 MG/DL (ref 0.7–1.2)
CREAT SERPL-MCNC: 2.3 MG/DL (ref 0.7–1.2)
CREAT SERPL-MCNC: 2.5 MG/DL (ref 0.7–1.2)
CREAT SERPL-MCNC: 2.6 MG/DL (ref 0.7–1.2)
CREAT SERPL-MCNC: 2.7 MG/DL (ref 0.7–1.2)
CREAT SERPL-MCNC: 2.7 MG/DL (ref 0.7–1.2)
CREAT SERPL-MCNC: 2.8 MG/DL (ref 0.7–1.2)
CREAT SERPL-MCNC: 2.8 MG/DL (ref 0.7–1.2)
CREAT SERPL-MCNC: 2.9 MG/DL (ref 0.7–1.2)
CREAT SERPL-MCNC: 2.9 MG/DL (ref 0.7–1.2)
CREAT SERPL-MCNC: 3 MG/DL (ref 0.7–1.2)
CREAT SERPL-MCNC: 3.1 MG/DL (ref 0.7–1.2)
CREAT SERPL-MCNC: 3.1 MG/DL (ref 0.7–1.2)
CREAT SERPL-MCNC: 3.3 MG/DL (ref 0.7–1.2)
CREAT SERPL-MCNC: 3.8 MG/DL (ref 0.7–1.2)
CREAT SERPL-MCNC: 4.1 MG/DL (ref 0.7–1.2)
CREATININE URINE: 187 MG/DL (ref 40–278)
CREATININE URINE: 55 MG/DL (ref 40–278)
CREATININE URINE: 80 MG/DL (ref 40–278)
CRITICAL: ABNORMAL
CULTURE CATHETER TIP: ABNORMAL
CULTURE CATHETER TIP: ABNORMAL
CULTURE, BLOOD 2: ABNORMAL
CULTURE, BLOOD 2: ABNORMAL
CULTURE, BLOOD 2: NORMAL
CULTURE, BLOOD 3: NORMAL
CULTURE, RESPIRATORY: ABNORMAL
CULTURE, RESPIRATORY: ABNORMAL
DATE ANALYZED: ABNORMAL
DATE OF COLLECTION: ABNORMAL
DESCRIPTION BLOOD BANK: NORMAL
EKG ATRIAL RATE: 119 BPM
EKG ATRIAL RATE: 122 BPM
EKG ATRIAL RATE: 129 BPM
EKG ATRIAL RATE: 77 BPM
EKG ATRIAL RATE: 98 BPM
EKG ATRIAL RATE: 99 BPM
EKG P AXIS: 81 DEGREES
EKG P AXIS: 88 DEGREES
EKG P AXIS: 88 DEGREES
EKG P AXIS: 89 DEGREES
EKG P AXIS: 90 DEGREES
EKG P AXIS: 95 DEGREES
EKG P-R INTERVAL: 160 MS
EKG P-R INTERVAL: 162 MS
EKG P-R INTERVAL: 166 MS
EKG P-R INTERVAL: 170 MS
EKG P-R INTERVAL: 170 MS
EKG P-R INTERVAL: 176 MS
EKG Q-T INTERVAL: 300 MS
EKG Q-T INTERVAL: 304 MS
EKG Q-T INTERVAL: 344 MS
EKG Q-T INTERVAL: 356 MS
EKG Q-T INTERVAL: 370 MS
EKG Q-T INTERVAL: 400 MS
EKG QRS DURATION: 70 MS
EKG QRS DURATION: 72 MS
EKG QRS DURATION: 84 MS
EKG QRS DURATION: 88 MS
EKG QTC CALCULATION (BAZETT): 433 MS
EKG QTC CALCULATION (BAZETT): 439 MS
EKG QTC CALCULATION (BAZETT): 452 MS
EKG QTC CALCULATION (BAZETT): 456 MS
EKG QTC CALCULATION (BAZETT): 472 MS
EKG QTC CALCULATION (BAZETT): 483 MS
EKG R AXIS: -5 DEGREES
EKG R AXIS: -6 DEGREES
EKG R AXIS: 12 DEGREES
EKG R AXIS: 24 DEGREES
EKG R AXIS: 5 DEGREES
EKG R AXIS: 69 DEGREES
EKG T AXIS: 71 DEGREES
EKG T AXIS: 75 DEGREES
EKG T AXIS: 82 DEGREES
EKG T AXIS: 83 DEGREES
EKG T AXIS: 88 DEGREES
EKG T AXIS: 89 DEGREES
EKG VENTRICULAR RATE: 119 BPM
EKG VENTRICULAR RATE: 122 BPM
EKG VENTRICULAR RATE: 129 BPM
EKG VENTRICULAR RATE: 77 BPM
EKG VENTRICULAR RATE: 98 BPM
EKG VENTRICULAR RATE: 99 BPM
ELECTROPHORESIS: ABNORMAL
ENTEROBACTER CLOACAE COMPLEX BY PCR: NOT DETECTED
ENTEROBACTERALES BY PCR: NOT DETECTED
ENTEROCOCCUS BY PCR: NOT DETECTED
EOSINOPHIL, URINE: 0 % (ref 0–1)
EOSINOPHIL, URINE: 0 % (ref 0–1)
EOSINOPHILS ABSOLUTE: 0 E9/L (ref 0.05–0.5)
EOSINOPHILS ABSOLUTE: 0.01 E9/L (ref 0.05–0.5)
EOSINOPHILS ABSOLUTE: 0.03 E9/L (ref 0.05–0.5)
EOSINOPHILS ABSOLUTE: 0.04 E9/L (ref 0.05–0.5)
EOSINOPHILS ABSOLUTE: 0.05 E9/L (ref 0.05–0.5)
EOSINOPHILS ABSOLUTE: 0.06 E9/L (ref 0.05–0.5)
EOSINOPHILS ABSOLUTE: 0.12 E9/L (ref 0.05–0.5)
EOSINOPHILS RELATIVE PERCENT: 0 % (ref 0–6)
EOSINOPHILS RELATIVE PERCENT: 0.1 % (ref 0–6)
EOSINOPHILS RELATIVE PERCENT: 0.4 % (ref 0–6)
EOSINOPHILS RELATIVE PERCENT: 0.5 % (ref 0–6)
EOSINOPHILS RELATIVE PERCENT: 0.6 % (ref 0–6)
EOSINOPHILS RELATIVE PERCENT: 0.7 % (ref 0–6)
EOSINOPHILS RELATIVE PERCENT: 0.9 % (ref 0–6)
EOSINOPHILS RELATIVE PERCENT: 0.9 % (ref 0–6)
EOSINOPHILS RELATIVE PERCENT: 1 % (ref 0–6)
EOSINOPHILS RELATIVE PERCENT: 2.9 % (ref 0–6)
EPITHELIAL CELLS, UA: ABNORMAL /HPF
ESCHERICHIA COLI BY PCR: NOT DETECTED
FERRITIN: 1006 NG/ML
FERRITIN: 545 NG/ML
FERRITIN: 649 NG/ML
FOLATE: 10.7 NG/ML (ref 4.8–24.2)
FOLATE: 19.5 NG/ML (ref 4.8–24.2)
GAMMA GLOBULIN: 1.6 G/DL (ref 0.7–1.6)
GFR AFRICAN AMERICAN: 17
GFR AFRICAN AMERICAN: 19
GFR AFRICAN AMERICAN: 22
GFR AFRICAN AMERICAN: 24
GFR AFRICAN AMERICAN: 24
GFR AFRICAN AMERICAN: 25
GFR AFRICAN AMERICAN: 26
GFR AFRICAN AMERICAN: 26
GFR AFRICAN AMERICAN: 27
GFR AFRICAN AMERICAN: 27
GFR AFRICAN AMERICAN: 28
GFR AFRICAN AMERICAN: 28
GFR AFRICAN AMERICAN: 30
GFR AFRICAN AMERICAN: 31
GFR AFRICAN AMERICAN: 34
GFR AFRICAN AMERICAN: 34
GFR AFRICAN AMERICAN: 36
GFR AFRICAN AMERICAN: 38
GFR AFRICAN AMERICAN: 45
GFR AFRICAN AMERICAN: 48
GFR AFRICAN AMERICAN: 48
GFR AFRICAN AMERICAN: 56
GFR AFRICAN AMERICAN: >60
GFR NON-AFRICAN AMERICAN: 14 ML/MIN/1.73
GFR NON-AFRICAN AMERICAN: 16 ML/MIN/1.73
GFR NON-AFRICAN AMERICAN: 19 ML/MIN/1.73
GFR NON-AFRICAN AMERICAN: 20 ML/MIN/1.73
GFR NON-AFRICAN AMERICAN: 20 ML/MIN/1.73
GFR NON-AFRICAN AMERICAN: 21 ML/MIN/1.73
GFR NON-AFRICAN AMERICAN: 21 ML/MIN/1.73
GFR NON-AFRICAN AMERICAN: 22 ML/MIN/1.73
GFR NON-AFRICAN AMERICAN: 23 ML/MIN/1.73
GFR NON-AFRICAN AMERICAN: 23 ML/MIN/1.73
GFR NON-AFRICAN AMERICAN: 24 ML/MIN/1.73
GFR NON-AFRICAN AMERICAN: 25 ML/MIN/1.73
GFR NON-AFRICAN AMERICAN: 28 ML/MIN/1.73
GFR NON-AFRICAN AMERICAN: 28 ML/MIN/1.73
GFR NON-AFRICAN AMERICAN: 30 ML/MIN/1.73
GFR NON-AFRICAN AMERICAN: 31 ML/MIN/1.73
GFR NON-AFRICAN AMERICAN: 37 ML/MIN/1.73
GFR NON-AFRICAN AMERICAN: 40 ML/MIN/1.73
GFR NON-AFRICAN AMERICAN: 40 ML/MIN/1.73
GFR NON-AFRICAN AMERICAN: 46 ML/MIN/1.73
GFR NON-AFRICAN AMERICAN: 50 ML/MIN/1.73
GFR NON-AFRICAN AMERICAN: 54 ML/MIN/1.73
GFR NON-AFRICAN AMERICAN: 60 ML/MIN/1.73
GFR NON-AFRICAN AMERICAN: >60 ML/MIN/1.73
GLUCOSE BLD-MCNC: 102 MG/DL (ref 74–99)
GLUCOSE BLD-MCNC: 103 MG/DL (ref 74–99)
GLUCOSE BLD-MCNC: 104 MG/DL (ref 74–99)
GLUCOSE BLD-MCNC: 105 MG/DL (ref 74–99)
GLUCOSE BLD-MCNC: 106 MG/DL (ref 74–99)
GLUCOSE BLD-MCNC: 108 MG/DL (ref 74–99)
GLUCOSE BLD-MCNC: 110 MG/DL (ref 74–99)
GLUCOSE BLD-MCNC: 115 MG/DL (ref 74–99)
GLUCOSE BLD-MCNC: 115 MG/DL (ref 74–99)
GLUCOSE BLD-MCNC: 117 MG/DL (ref 74–99)
GLUCOSE BLD-MCNC: 118 MG/DL (ref 74–99)
GLUCOSE BLD-MCNC: 120 MG/DL (ref 74–99)
GLUCOSE BLD-MCNC: 120 MG/DL (ref 74–99)
GLUCOSE BLD-MCNC: 124 MG/DL (ref 74–99)
GLUCOSE BLD-MCNC: 126 MG/DL (ref 74–99)
GLUCOSE BLD-MCNC: 132 MG/DL (ref 74–99)
GLUCOSE BLD-MCNC: 133 MG/DL (ref 74–99)
GLUCOSE BLD-MCNC: 137 MG/DL (ref 74–99)
GLUCOSE BLD-MCNC: 139 MG/DL (ref 74–99)
GLUCOSE BLD-MCNC: 153 MG/DL (ref 74–99)
GLUCOSE BLD-MCNC: 162 MG/DL (ref 74–99)
GLUCOSE BLD-MCNC: 187 MG/DL (ref 74–99)
GLUCOSE BLD-MCNC: 201 MG/DL (ref 74–99)
GLUCOSE BLD-MCNC: 211 MG/DL (ref 74–99)
GLUCOSE BLD-MCNC: 230 MG/DL (ref 74–99)
GLUCOSE BLD-MCNC: 51 MG/DL (ref 74–99)
GLUCOSE BLD-MCNC: 72 MG/DL (ref 74–99)
GLUCOSE BLD-MCNC: 75 MG/DL (ref 74–99)
GLUCOSE BLD-MCNC: 76 MG/DL (ref 74–99)
GLUCOSE BLD-MCNC: 88 MG/DL (ref 74–99)
GLUCOSE BLD-MCNC: 98 MG/DL (ref 74–99)
GLUCOSE BLD-MCNC: 99 MG/DL (ref 74–99)
GLUCOSE URINE: NEGATIVE MG/DL
HAEMOPHILUS INFLUENZAE BY PCR: NOT DETECTED
HCO3: 20.5 MMOL/L (ref 22–26)
HCT VFR BLD CALC: 20.8 % (ref 37–54)
HCT VFR BLD CALC: 21.5 % (ref 37–54)
HCT VFR BLD CALC: 21.5 % (ref 37–54)
HCT VFR BLD CALC: 21.8 % (ref 37–54)
HCT VFR BLD CALC: 22.7 % (ref 37–54)
HCT VFR BLD CALC: 23.2 % (ref 37–54)
HCT VFR BLD CALC: 23.5 % (ref 37–54)
HCT VFR BLD CALC: 23.6 % (ref 37–54)
HCT VFR BLD CALC: 23.9 % (ref 37–54)
HCT VFR BLD CALC: 24.1 % (ref 37–54)
HCT VFR BLD CALC: 24.2 % (ref 37–54)
HCT VFR BLD CALC: 24.4 % (ref 37–54)
HCT VFR BLD CALC: 25 % (ref 37–54)
HCT VFR BLD CALC: 25.1 % (ref 37–54)
HCT VFR BLD CALC: 25.3 % (ref 37–54)
HCT VFR BLD CALC: 25.7 % (ref 37–54)
HCT VFR BLD CALC: 25.7 % (ref 37–54)
HCT VFR BLD CALC: 25.8 % (ref 37–54)
HCT VFR BLD CALC: 25.9 % (ref 37–54)
HCT VFR BLD CALC: 26.3 % (ref 37–54)
HCT VFR BLD CALC: 26.5 % (ref 37–54)
HCT VFR BLD CALC: 26.6 % (ref 37–54)
HCT VFR BLD CALC: 26.6 % (ref 37–54)
HCT VFR BLD CALC: 26.7 % (ref 37–54)
HCT VFR BLD CALC: 28.3 % (ref 37–54)
HCT VFR BLD CALC: 28.6 % (ref 37–54)
HCT VFR BLD CALC: 28.7 % (ref 37–54)
HCT VFR BLD CALC: 28.8 % (ref 37–54)
HCT VFR BLD CALC: 28.9 % (ref 37–54)
HCT VFR BLD CALC: 29.5 % (ref 37–54)
HCT VFR BLD CALC: 29.7 % (ref 37–54)
HCT VFR BLD CALC: 29.8 % (ref 37–54)
HCT VFR BLD CALC: 30.2 % (ref 37–54)
HCT VFR BLD CALC: 31.1 % (ref 37–54)
HCT VFR BLD CALC: 32.7 % (ref 37–54)
HCT VFR BLD CALC: 33.2 % (ref 37–54)
HCT VFR BLD CALC: 33.5 % (ref 37–54)
HCT VFR BLD CALC: 34 % (ref 37–54)
HCT VFR BLD CALC: 34.5 % (ref 37–54)
HCT VFR BLD CALC: 35.8 % (ref 37–54)
HCT VFR BLD CALC: 36 % (ref 37–54)
HCT VFR BLD CALC: 36.1 % (ref 37–54)
HCT VFR BLD CALC: 36.2 % (ref 37–54)
HCT VFR BLD CALC: 36.2 % (ref 37–54)
HCT VFR BLD CALC: 37.9 % (ref 37–54)
HCT VFR BLD CALC: 42.2 % (ref 37–54)
HEMOGLOBIN: 10.1 G/DL (ref 12.5–16.5)
HEMOGLOBIN: 10.9 G/DL (ref 12.5–16.5)
HEMOGLOBIN: 11 G/DL (ref 12.5–16.5)
HEMOGLOBIN: 11.4 G/DL (ref 12.5–16.5)
HEMOGLOBIN: 11.5 G/DL (ref 12.5–16.5)
HEMOGLOBIN: 11.6 G/DL (ref 12.5–16.5)
HEMOGLOBIN: 11.6 G/DL (ref 12.5–16.5)
HEMOGLOBIN: 11.8 G/DL (ref 12.5–16.5)
HEMOGLOBIN: 12.3 G/DL (ref 12.5–16.5)
HEMOGLOBIN: 12.7 G/DL (ref 12.5–16.5)
HEMOGLOBIN: 13.9 G/DL (ref 12.5–16.5)
HEMOGLOBIN: 5.9 G/DL (ref 12.5–16.5)
HEMOGLOBIN: 6.9 G/DL (ref 12.5–16.5)
HEMOGLOBIN: 6.9 G/DL (ref 12.5–16.5)
HEMOGLOBIN: 7 G/DL (ref 12.5–16.5)
HEMOGLOBIN: 7.3 G/DL (ref 12.5–16.5)
HEMOGLOBIN: 7.5 G/DL (ref 12.5–16.5)
HEMOGLOBIN: 7.5 G/DL (ref 12.5–16.5)
HEMOGLOBIN: 7.6 G/DL (ref 12.5–16.5)
HEMOGLOBIN: 7.7 G/DL (ref 12.5–16.5)
HEMOGLOBIN: 7.8 G/DL (ref 12.5–16.5)
HEMOGLOBIN: 7.8 G/DL (ref 12.5–16.5)
HEMOGLOBIN: 7.9 G/DL (ref 12.5–16.5)
HEMOGLOBIN: 8.1 G/DL (ref 12.5–16.5)
HEMOGLOBIN: 8.1 G/DL (ref 12.5–16.5)
HEMOGLOBIN: 8.2 G/DL (ref 12.5–16.5)
HEMOGLOBIN: 8.3 G/DL (ref 12.5–16.5)
HEMOGLOBIN: 8.3 G/DL (ref 12.5–16.5)
HEMOGLOBIN: 8.4 G/DL (ref 12.5–16.5)
HEMOGLOBIN: 8.5 G/DL (ref 12.5–16.5)
HEMOGLOBIN: 8.6 G/DL (ref 12.5–16.5)
HEMOGLOBIN: 8.6 G/DL (ref 12.5–16.5)
HEMOGLOBIN: 8.7 G/DL (ref 12.5–16.5)
HEMOGLOBIN: 9.2 G/DL (ref 12.5–16.5)
HEMOGLOBIN: 9.3 G/DL (ref 12.5–16.5)
HEMOGLOBIN: 9.4 G/DL (ref 12.5–16.5)
HEMOGLOBIN: 9.6 G/DL (ref 12.5–16.5)
HEMOGLOBIN: 9.8 G/DL (ref 12.5–16.5)
HEMOGLOBIN: 9.9 G/DL (ref 12.5–16.5)
HHB: 1.4 % (ref 0–5)
HUMAN METAPNEUMOVIRUS BY PCR: NOT DETECTED
HUMAN RHINOVIRUS/ENTEROVIRUS BY PCR: NOT DETECTED
HYPOCHROMIA: ABNORMAL
HYPOCHROMIA: ABNORMAL
IMMATURE GRANULOCYTES #: 0.01 E9/L
IMMATURE GRANULOCYTES #: 0.02 E9/L
IMMATURE GRANULOCYTES #: 0.03 E9/L
IMMATURE GRANULOCYTES #: 0.04 E9/L
IMMATURE GRANULOCYTES #: 0.05 E9/L
IMMATURE GRANULOCYTES #: 0.05 E9/L
IMMATURE GRANULOCYTES #: 0.06 E9/L
IMMATURE GRANULOCYTES #: 0.07 E9/L
IMMATURE GRANULOCYTES %: 0.2 % (ref 0–5)
IMMATURE GRANULOCYTES %: 0.3 % (ref 0–5)
IMMATURE GRANULOCYTES %: 0.4 % (ref 0–5)
IMMATURE GRANULOCYTES %: 0.5 % (ref 0–5)
IMMATURE GRANULOCYTES %: 0.6 % (ref 0–5)
IMMATURE GRANULOCYTES %: 0.6 % (ref 0–5)
IMMATURE GRANULOCYTES %: 0.7 % (ref 0–5)
IMMUNOFIXATION URINE: NORMAL
INFLUENZA A BY PCR: NOT DETECTED
INFLUENZA B BY PCR: NOT DETECTED
INR BLD: 1
INR BLD: 1.1
IRON SATURATION: 24 % (ref 20–55)
IRON SATURATION: 31 % (ref 20–55)
IRON SATURATION: 65 % (ref 20–55)
IRON: 38 MCG/DL (ref 59–158)
IRON: 48 MCG/DL (ref 59–158)
IRON: 70 MCG/DL (ref 59–158)
KETONES, URINE: NEGATIVE MG/DL
KLEBSIELLA OXYTOCA BY PCR: NOT DETECTED
KLEBSIELLA PNEUMONIAE GROUP BY PCR: NOT DETECTED
L. PNEUMOPHILA SEROGP 1 UR AG: NORMAL
LAB: ABNORMAL
LACTIC ACID, SEPSIS: 0.9 MMOL/L (ref 0.5–1.9)
LACTIC ACID, SEPSIS: 1.5 MMOL/L (ref 0.5–1.9)
LACTIC ACID: 0.5 MMOL/L (ref 0.5–2.2)
LACTIC ACID: 0.7 MMOL/L (ref 0.5–2.2)
LACTIC ACID: 1 MMOL/L (ref 0.5–2.2)
LACTIC ACID: 1.2 MMOL/L (ref 0.5–2.2)
LACTIC ACID: 1.8 MMOL/L (ref 0.5–2.2)
LACTIC ACID: 2.1 MMOL/L (ref 0.5–2.2)
LACTIC ACID: 2.3 MMOL/L (ref 0.5–2.2)
LACTIC ACID: 5.2 MMOL/L (ref 0.5–2.2)
LEUKOCYTE ESTERASE, URINE: ABNORMAL
LEUKOCYTE ESTERASE, URINE: NEGATIVE
LIPASE: 15 U/L (ref 13–60)
LIPASE: 91 U/L (ref 13–60)
LISTERIA MONOCYTOGENES BY PCR: NOT DETECTED
LV EF: 60 %
LVEF MODALITY: NORMAL
LYMPHOCYTES ABSOLUTE: 0.34 E9/L (ref 1.5–4)
LYMPHOCYTES ABSOLUTE: 0.44 E9/L (ref 1.5–4)
LYMPHOCYTES ABSOLUTE: 0.48 E9/L (ref 1.5–4)
LYMPHOCYTES ABSOLUTE: 0.51 E9/L (ref 1.5–4)
LYMPHOCYTES ABSOLUTE: 0.52 E9/L (ref 1.5–4)
LYMPHOCYTES ABSOLUTE: 0.89 E9/L (ref 1.5–4)
LYMPHOCYTES ABSOLUTE: 0.89 E9/L (ref 1.5–4)
LYMPHOCYTES ABSOLUTE: 1.04 E9/L (ref 1.5–4)
LYMPHOCYTES ABSOLUTE: 1.15 E9/L (ref 1.5–4)
LYMPHOCYTES ABSOLUTE: 1.24 E9/L (ref 1.5–4)
LYMPHOCYTES ABSOLUTE: 1.25 E9/L (ref 1.5–4)
LYMPHOCYTES ABSOLUTE: 1.26 E9/L (ref 1.5–4)
LYMPHOCYTES ABSOLUTE: 1.3 E9/L (ref 1.5–4)
LYMPHOCYTES ABSOLUTE: 1.37 E9/L (ref 1.5–4)
LYMPHOCYTES ABSOLUTE: 1.47 E9/L (ref 1.5–4)
LYMPHOCYTES ABSOLUTE: 1.56 E9/L (ref 1.5–4)
LYMPHOCYTES ABSOLUTE: 1.62 E9/L (ref 1.5–4)
LYMPHOCYTES ABSOLUTE: 1.77 E9/L (ref 1.5–4)
LYMPHOCYTES ABSOLUTE: 1.81 E9/L (ref 1.5–4)
LYMPHOCYTES ABSOLUTE: 2.11 E9/L (ref 1.5–4)
LYMPHOCYTES RELATIVE PERCENT: 11.7 % (ref 20–42)
LYMPHOCYTES RELATIVE PERCENT: 12.4 % (ref 20–42)
LYMPHOCYTES RELATIVE PERCENT: 13.7 % (ref 20–42)
LYMPHOCYTES RELATIVE PERCENT: 14.5 % (ref 20–42)
LYMPHOCYTES RELATIVE PERCENT: 16.1 % (ref 20–42)
LYMPHOCYTES RELATIVE PERCENT: 17.5 % (ref 20–42)
LYMPHOCYTES RELATIVE PERCENT: 17.9 % (ref 20–42)
LYMPHOCYTES RELATIVE PERCENT: 20.4 % (ref 20–42)
LYMPHOCYTES RELATIVE PERCENT: 21.2 % (ref 20–42)
LYMPHOCYTES RELATIVE PERCENT: 22.5 % (ref 20–42)
LYMPHOCYTES RELATIVE PERCENT: 22.6 % (ref 20–42)
LYMPHOCYTES RELATIVE PERCENT: 23.7 % (ref 20–42)
LYMPHOCYTES RELATIVE PERCENT: 23.7 % (ref 20–42)
LYMPHOCYTES RELATIVE PERCENT: 25.5 % (ref 20–42)
LYMPHOCYTES RELATIVE PERCENT: 28.8 % (ref 20–42)
LYMPHOCYTES RELATIVE PERCENT: 33.2 % (ref 20–42)
LYMPHOCYTES RELATIVE PERCENT: 4.6 % (ref 20–42)
LYMPHOCYTES RELATIVE PERCENT: 4.6 % (ref 20–42)
LYMPHOCYTES RELATIVE PERCENT: 8.2 % (ref 20–42)
LYMPHOCYTES RELATIVE PERCENT: 9.7 % (ref 20–42)
Lab: ABNORMAL
MAGNESIUM: 1.7 MG/DL (ref 1.6–2.6)
MAGNESIUM: 1.7 MG/DL (ref 1.6–2.6)
MAGNESIUM: 1.8 MG/DL (ref 1.6–2.6)
MAGNESIUM: 1.9 MG/DL (ref 1.6–2.6)
MAGNESIUM: 2 MG/DL (ref 1.6–2.6)
MAGNESIUM: 2.1 MG/DL (ref 1.6–2.6)
MAGNESIUM: 2.3 MG/DL (ref 1.6–2.6)
MAGNESIUM: 2.3 MG/DL (ref 1.6–2.6)
MAGNESIUM: 2.5 MG/DL (ref 1.6–2.6)
MAGNESIUM: 2.6 MG/DL (ref 1.6–2.6)
MCH RBC QN AUTO: 31.4 PG (ref 26–35)
MCH RBC QN AUTO: 31.4 PG (ref 26–35)
MCH RBC QN AUTO: 31.6 PG (ref 26–35)
MCH RBC QN AUTO: 31.6 PG (ref 26–35)
MCH RBC QN AUTO: 31.8 PG (ref 26–35)
MCH RBC QN AUTO: 31.9 PG (ref 26–35)
MCH RBC QN AUTO: 32 PG (ref 26–35)
MCH RBC QN AUTO: 32 PG (ref 26–35)
MCH RBC QN AUTO: 32.1 PG (ref 26–35)
MCH RBC QN AUTO: 32.1 PG (ref 26–35)
MCH RBC QN AUTO: 32.2 PG (ref 26–35)
MCH RBC QN AUTO: 32.3 PG (ref 26–35)
MCH RBC QN AUTO: 32.3 PG (ref 26–35)
MCH RBC QN AUTO: 32.4 PG (ref 26–35)
MCH RBC QN AUTO: 32.5 PG (ref 26–35)
MCH RBC QN AUTO: 32.6 PG (ref 26–35)
MCH RBC QN AUTO: 32.6 PG (ref 26–35)
MCH RBC QN AUTO: 32.8 PG (ref 26–35)
MCH RBC QN AUTO: 32.8 PG (ref 26–35)
MCH RBC QN AUTO: 32.9 PG (ref 26–35)
MCH RBC QN AUTO: 33 PG (ref 26–35)
MCH RBC QN AUTO: 33.1 PG (ref 26–35)
MCH RBC QN AUTO: 33.2 PG (ref 26–35)
MCH RBC QN AUTO: 33.2 PG (ref 26–35)
MCH RBC QN AUTO: 33.3 PG (ref 26–35)
MCH RBC QN AUTO: 33.5 PG (ref 26–35)
MCH RBC QN AUTO: 33.6 PG (ref 26–35)
MCH RBC QN AUTO: 34.2 PG (ref 26–35)
MCH RBC QN AUTO: 34.3 PG (ref 26–35)
MCH RBC QN AUTO: 34.6 PG (ref 26–35)
MCHC RBC AUTO-ENTMCNC: 30.7 % (ref 32–34.5)
MCHC RBC AUTO-ENTMCNC: 30.9 % (ref 32–34.5)
MCHC RBC AUTO-ENTMCNC: 31.5 % (ref 32–34.5)
MCHC RBC AUTO-ENTMCNC: 31.7 % (ref 32–34.5)
MCHC RBC AUTO-ENTMCNC: 31.8 % (ref 32–34.5)
MCHC RBC AUTO-ENTMCNC: 31.8 % (ref 32–34.5)
MCHC RBC AUTO-ENTMCNC: 31.9 % (ref 32–34.5)
MCHC RBC AUTO-ENTMCNC: 32 % (ref 32–34.5)
MCHC RBC AUTO-ENTMCNC: 32 % (ref 32–34.5)
MCHC RBC AUTO-ENTMCNC: 32.1 % (ref 32–34.5)
MCHC RBC AUTO-ENTMCNC: 32.2 % (ref 32–34.5)
MCHC RBC AUTO-ENTMCNC: 32.3 % (ref 32–34.5)
MCHC RBC AUTO-ENTMCNC: 32.4 % (ref 32–34.5)
MCHC RBC AUTO-ENTMCNC: 32.6 % (ref 32–34.5)
MCHC RBC AUTO-ENTMCNC: 32.6 % (ref 32–34.5)
MCHC RBC AUTO-ENTMCNC: 32.7 % (ref 32–34.5)
MCHC RBC AUTO-ENTMCNC: 32.8 % (ref 32–34.5)
MCHC RBC AUTO-ENTMCNC: 32.8 % (ref 32–34.5)
MCHC RBC AUTO-ENTMCNC: 32.9 % (ref 32–34.5)
MCHC RBC AUTO-ENTMCNC: 33 % (ref 32–34.5)
MCHC RBC AUTO-ENTMCNC: 33.1 % (ref 32–34.5)
MCHC RBC AUTO-ENTMCNC: 33.5 % (ref 32–34.5)
MCHC RBC AUTO-ENTMCNC: 33.6 % (ref 32–34.5)
MCHC RBC AUTO-ENTMCNC: 33.9 % (ref 32–34.5)
MCHC RBC AUTO-ENTMCNC: 34 % (ref 32–34.5)
MCHC RBC AUTO-ENTMCNC: 34.4 % (ref 32–34.5)
MCV RBC AUTO: 100 FL (ref 80–99.9)
MCV RBC AUTO: 100.3 FL (ref 80–99.9)
MCV RBC AUTO: 100.4 FL (ref 80–99.9)
MCV RBC AUTO: 100.6 FL (ref 80–99.9)
MCV RBC AUTO: 100.7 FL (ref 80–99.9)
MCV RBC AUTO: 100.9 FL (ref 80–99.9)
MCV RBC AUTO: 101.3 FL (ref 80–99.9)
MCV RBC AUTO: 101.5 FL (ref 80–99.9)
MCV RBC AUTO: 102 FL (ref 80–99.9)
MCV RBC AUTO: 102.2 FL (ref 80–99.9)
MCV RBC AUTO: 102.5 FL (ref 80–99.9)
MCV RBC AUTO: 102.9 FL (ref 80–99.9)
MCV RBC AUTO: 103.5 FL (ref 80–99.9)
MCV RBC AUTO: 104 FL (ref 80–99.9)
MCV RBC AUTO: 104 FL (ref 80–99.9)
MCV RBC AUTO: 104.4 FL (ref 80–99.9)
MCV RBC AUTO: 104.5 FL (ref 80–99.9)
MCV RBC AUTO: 104.6 FL (ref 80–99.9)
MCV RBC AUTO: 105.3 FL (ref 80–99.9)
MCV RBC AUTO: 105.7 FL (ref 80–99.9)
MCV RBC AUTO: 106.6 FL (ref 80–99.9)
MCV RBC AUTO: 106.8 FL (ref 80–99.9)
MCV RBC AUTO: 107.5 FL (ref 80–99.9)
MCV RBC AUTO: 93.7 FL (ref 80–99.9)
MCV RBC AUTO: 95.5 FL (ref 80–99.9)
MCV RBC AUTO: 95.9 FL (ref 80–99.9)
MCV RBC AUTO: 96 FL (ref 80–99.9)
MCV RBC AUTO: 96.7 FL (ref 80–99.9)
MCV RBC AUTO: 98.1 FL (ref 80–99.9)
MCV RBC AUTO: 98.4 FL (ref 80–99.9)
MCV RBC AUTO: 98.4 FL (ref 80–99.9)
MCV RBC AUTO: 98.5 FL (ref 80–99.9)
MCV RBC AUTO: 98.6 FL (ref 80–99.9)
MCV RBC AUTO: 98.8 FL (ref 80–99.9)
MCV RBC AUTO: 98.8 FL (ref 80–99.9)
MCV RBC AUTO: 99.2 FL (ref 80–99.9)
MCV RBC AUTO: 99.4 FL (ref 80–99.9)
MCV RBC AUTO: 99.7 FL (ref 80–99.9)
METER GLUCOSE: 107 MG/DL (ref 74–99)
METER GLUCOSE: 119 MG/DL (ref 74–99)
METER GLUCOSE: 119 MG/DL (ref 74–99)
METER GLUCOSE: 120 MG/DL (ref 74–99)
METER GLUCOSE: 127 MG/DL (ref 74–99)
METER GLUCOSE: 130 MG/DL (ref 74–99)
METER GLUCOSE: 132 MG/DL (ref 74–99)
METER GLUCOSE: 135 MG/DL (ref 74–99)
METER GLUCOSE: 138 MG/DL (ref 74–99)
METER GLUCOSE: 146 MG/DL (ref 74–99)
METER GLUCOSE: 146 MG/DL (ref 74–99)
METER GLUCOSE: 149 MG/DL (ref 74–99)
METER GLUCOSE: 150 MG/DL (ref 74–99)
METER GLUCOSE: 153 MG/DL (ref 74–99)
METER GLUCOSE: 166 MG/DL (ref 74–99)
METHB: 0.1 % (ref 0–1.5)
METHICILLIN RESISTANCE MECA/C  BY PCR: NOT DETECTED
MODE: ABNORMAL
MONOCYTES ABSOLUTE: 0.12 E9/L (ref 0.1–0.95)
MONOCYTES ABSOLUTE: 0.15 E9/L (ref 0.1–0.95)
MONOCYTES ABSOLUTE: 0.18 E9/L (ref 0.1–0.95)
MONOCYTES ABSOLUTE: 0.18 E9/L (ref 0.1–0.95)
MONOCYTES ABSOLUTE: 0.33 E9/L (ref 0.1–0.95)
MONOCYTES ABSOLUTE: 0.43 E9/L (ref 0.1–0.95)
MONOCYTES ABSOLUTE: 0.49 E9/L (ref 0.1–0.95)
MONOCYTES ABSOLUTE: 0.51 E9/L (ref 0.1–0.95)
MONOCYTES ABSOLUTE: 0.52 E9/L (ref 0.1–0.95)
MONOCYTES ABSOLUTE: 0.52 E9/L (ref 0.1–0.95)
MONOCYTES ABSOLUTE: 0.56 E9/L (ref 0.1–0.95)
MONOCYTES ABSOLUTE: 0.58 E9/L (ref 0.1–0.95)
MONOCYTES ABSOLUTE: 0.63 E9/L (ref 0.1–0.95)
MONOCYTES ABSOLUTE: 0.64 E9/L (ref 0.1–0.95)
MONOCYTES ABSOLUTE: 0.67 E9/L (ref 0.1–0.95)
MONOCYTES ABSOLUTE: 0.68 E9/L (ref 0.1–0.95)
MONOCYTES ABSOLUTE: 0.76 E9/L (ref 0.1–0.95)
MONOCYTES ABSOLUTE: 0.85 E9/L (ref 0.1–0.95)
MONOCYTES ABSOLUTE: 1.05 E9/L (ref 0.1–0.95)
MONOCYTES ABSOLUTE: 1.11 E9/L (ref 0.1–0.95)
MONOCYTES RELATIVE PERCENT: 10.2 % (ref 2–12)
MONOCYTES RELATIVE PERCENT: 10.2 % (ref 2–12)
MONOCYTES RELATIVE PERCENT: 10.4 % (ref 2–12)
MONOCYTES RELATIVE PERCENT: 10.5 % (ref 2–12)
MONOCYTES RELATIVE PERCENT: 11.2 % (ref 2–12)
MONOCYTES RELATIVE PERCENT: 2 % (ref 2–12)
MONOCYTES RELATIVE PERCENT: 2.2 % (ref 2–12)
MONOCYTES RELATIVE PERCENT: 2.9 % (ref 2–12)
MONOCYTES RELATIVE PERCENT: 20.2 % (ref 2–12)
MONOCYTES RELATIVE PERCENT: 4.4 % (ref 2–12)
MONOCYTES RELATIVE PERCENT: 5.1 % (ref 2–12)
MONOCYTES RELATIVE PERCENT: 6.3 % (ref 2–12)
MONOCYTES RELATIVE PERCENT: 7.3 % (ref 2–12)
MONOCYTES RELATIVE PERCENT: 7.6 % (ref 2–12)
MONOCYTES RELATIVE PERCENT: 7.8 % (ref 2–12)
MONOCYTES RELATIVE PERCENT: 8 % (ref 2–12)
MONOCYTES RELATIVE PERCENT: 8.9 % (ref 2–12)
MONOCYTES RELATIVE PERCENT: 9.1 % (ref 2–12)
MONOCYTES RELATIVE PERCENT: 9.1 % (ref 2–12)
MONOCYTES RELATIVE PERCENT: 9.5 % (ref 2–12)
MYCOPLASMA PNEUMONIAE BY PCR: NOT DETECTED
NEISSERIA MENINGITIDIS BY PCR: NOT DETECTED
NEUTROPHILS ABSOLUTE: 10.42 E9/L (ref 1.8–7.3)
NEUTROPHILS ABSOLUTE: 11.19 E9/L (ref 1.8–7.3)
NEUTROPHILS ABSOLUTE: 2.16 E9/L (ref 1.8–7.3)
NEUTROPHILS ABSOLUTE: 2.82 E9/L (ref 1.8–7.3)
NEUTROPHILS ABSOLUTE: 2.94 E9/L (ref 1.8–7.3)
NEUTROPHILS ABSOLUTE: 3.01 E9/L (ref 1.8–7.3)
NEUTROPHILS ABSOLUTE: 3.08 E9/L (ref 1.8–7.3)
NEUTROPHILS ABSOLUTE: 3.4 E9/L (ref 1.8–7.3)
NEUTROPHILS ABSOLUTE: 3.61 E9/L (ref 1.8–7.3)
NEUTROPHILS ABSOLUTE: 4.38 E9/L (ref 1.8–7.3)
NEUTROPHILS ABSOLUTE: 4.53 E9/L (ref 1.8–7.3)
NEUTROPHILS ABSOLUTE: 4.59 E9/L (ref 1.8–7.3)
NEUTROPHILS ABSOLUTE: 4.7 E9/L (ref 1.8–7.3)
NEUTROPHILS ABSOLUTE: 4.76 E9/L (ref 1.8–7.3)
NEUTROPHILS ABSOLUTE: 5.03 E9/L (ref 1.8–7.3)
NEUTROPHILS ABSOLUTE: 5.36 E9/L (ref 1.8–7.3)
NEUTROPHILS ABSOLUTE: 5.72 E9/L (ref 1.8–7.3)
NEUTROPHILS ABSOLUTE: 6.09 E9/L (ref 1.8–7.3)
NEUTROPHILS ABSOLUTE: 6.84 E9/L (ref 1.8–7.3)
NEUTROPHILS ABSOLUTE: 9.84 E9/L (ref 1.8–7.3)
NEUTROPHILS RELATIVE PERCENT: 52.3 % (ref 43–80)
NEUTROPHILS RELATIVE PERCENT: 56 % (ref 43–80)
NEUTROPHILS RELATIVE PERCENT: 58.8 % (ref 43–80)
NEUTROPHILS RELATIVE PERCENT: 63.8 % (ref 43–80)
NEUTROPHILS RELATIVE PERCENT: 65.1 % (ref 43–80)
NEUTROPHILS RELATIVE PERCENT: 66.7 % (ref 43–80)
NEUTROPHILS RELATIVE PERCENT: 67.3 % (ref 43–80)
NEUTROPHILS RELATIVE PERCENT: 67.5 % (ref 43–80)
NEUTROPHILS RELATIVE PERCENT: 68.4 % (ref 43–80)
NEUTROPHILS RELATIVE PERCENT: 71.2 % (ref 43–80)
NEUTROPHILS RELATIVE PERCENT: 71.7 % (ref 43–80)
NEUTROPHILS RELATIVE PERCENT: 74.5 % (ref 43–80)
NEUTROPHILS RELATIVE PERCENT: 75 % (ref 43–80)
NEUTROPHILS RELATIVE PERCENT: 79.8 % (ref 43–80)
NEUTROPHILS RELATIVE PERCENT: 80.3 % (ref 43–80)
NEUTROPHILS RELATIVE PERCENT: 82.7 % (ref 43–80)
NEUTROPHILS RELATIVE PERCENT: 83.4 % (ref 43–80)
NEUTROPHILS RELATIVE PERCENT: 89.2 % (ref 43–80)
NEUTROPHILS RELATIVE PERCENT: 92 % (ref 43–80)
NEUTROPHILS RELATIVE PERCENT: 93 % (ref 43–80)
NITRITE, URINE: NEGATIVE
NITRITE, URINE: POSITIVE
O2 CONTENT: 17.5 ML/DL
O2 SATURATION: 98.6 % (ref 92–98.5)
O2HB: 97.7 % (ref 94–97)
OCCULT BLOOD, OTHER: ABNORMAL
OPERATOR ID: 2485
ORDER NUMBER: ABNORMAL
ORGANISM: ABNORMAL
OSMOLALITY URINE: 490 MOSM/KG (ref 300–900)
OVALOCYTES: ABNORMAL
PARAINFLUENZA VIRUS 1 BY PCR: NOT DETECTED
PARAINFLUENZA VIRUS 2 BY PCR: NOT DETECTED
PARAINFLUENZA VIRUS 3 BY PCR: NOT DETECTED
PARAINFLUENZA VIRUS 4 BY PCR: NOT DETECTED
PARATHYROID HORMONE INTACT: 101 PG/ML (ref 15–65)
PARATHYROID HORMONE INTACT: 32 PG/ML (ref 15–65)
PARATHYROID HORMONE INTACT: 97 PG/ML (ref 15–65)
PATIENT TEMP: 37 C
PCO2: 32.9 MMHG (ref 35–45)
PDW BLD-RTO: 14 FL (ref 11.5–15)
PDW BLD-RTO: 14.9 FL (ref 11.5–15)
PDW BLD-RTO: 14.9 FL (ref 11.5–15)
PDW BLD-RTO: 15.2 FL (ref 11.5–15)
PDW BLD-RTO: 15.2 FL (ref 11.5–15)
PDW BLD-RTO: 15.3 FL (ref 11.5–15)
PDW BLD-RTO: 15.4 FL (ref 11.5–15)
PDW BLD-RTO: 15.4 FL (ref 11.5–15)
PDW BLD-RTO: 15.5 FL (ref 11.5–15)
PDW BLD-RTO: 15.5 FL (ref 11.5–15)
PDW BLD-RTO: 15.6 FL (ref 11.5–15)
PDW BLD-RTO: 15.7 FL (ref 11.5–15)
PDW BLD-RTO: 15.7 FL (ref 11.5–15)
PDW BLD-RTO: 15.8 FL (ref 11.5–15)
PDW BLD-RTO: 15.9 FL (ref 11.5–15)
PDW BLD-RTO: 15.9 FL (ref 11.5–15)
PDW BLD-RTO: 16 FL (ref 11.5–15)
PDW BLD-RTO: 16 FL (ref 11.5–15)
PDW BLD-RTO: 16.1 FL (ref 11.5–15)
PDW BLD-RTO: 16.2 FL (ref 11.5–15)
PDW BLD-RTO: 16.3 FL (ref 11.5–15)
PDW BLD-RTO: 16.4 FL (ref 11.5–15)
PDW BLD-RTO: 16.4 FL (ref 11.5–15)
PDW BLD-RTO: 16.5 FL (ref 11.5–15)
PDW BLD-RTO: 16.6 FL (ref 11.5–15)
PDW BLD-RTO: 16.7 FL (ref 11.5–15)
PDW BLD-RTO: 17 FL (ref 11.5–15)
PDW BLD-RTO: 17.6 FL (ref 11.5–15)
PDW BLD-RTO: 17.6 FL (ref 11.5–15)
PDW BLD-RTO: 17.7 FL (ref 11.5–15)
PDW BLD-RTO: 17.9 FL (ref 11.5–15)
PDW BLD-RTO: 18.1 FL (ref 11.5–15)
PH BLOOD GAS: 7.41 (ref 7.35–7.45)
PH UA: 6 (ref 5–9)
PH UA: 6.5 (ref 5–9)
PH UA: 7.5 (ref 5–9)
PH UA: 7.5 (ref 5–9)
PHOSPHORUS: 1.9 MG/DL (ref 2.5–4.5)
PHOSPHORUS: 2.1 MG/DL (ref 2.5–4.5)
PHOSPHORUS: 3.2 MG/DL (ref 2.5–4.5)
PHOSPHORUS: 3.3 MG/DL (ref 2.5–4.5)
PHOSPHORUS: 3.4 MG/DL (ref 2.5–4.5)
PHOSPHORUS: 3.5 MG/DL (ref 2.5–4.5)
PHOSPHORUS: 3.6 MG/DL (ref 2.5–4.5)
PHOSPHORUS: 3.8 MG/DL (ref 2.5–4.5)
PHOSPHORUS: 3.8 MG/DL (ref 2.5–4.5)
PHOSPHORUS: 4.7 MG/DL (ref 2.5–4.5)
PHOSPHORUS: 5.5 MG/DL (ref 2.5–4.5)
PHOSPHORUS: 5.8 MG/DL (ref 2.5–4.5)
PHOSPHORUS: 5.8 MG/DL (ref 2.5–4.5)
PHOSPHORUS: 5.9 MG/DL (ref 2.5–4.5)
PHOSPHORUS: 7.7 MG/DL (ref 2.5–4.5)
PHOSPHORUS: 9.2 MG/DL (ref 2.5–4.5)
PLATELET # BLD: 106 E9/L (ref 130–450)
PLATELET # BLD: 107 E9/L (ref 130–450)
PLATELET # BLD: 108 E9/L (ref 130–450)
PLATELET # BLD: 108 E9/L (ref 130–450)
PLATELET # BLD: 109 E9/L (ref 130–450)
PLATELET # BLD: 110 E9/L (ref 130–450)
PLATELET # BLD: 111 E9/L (ref 130–450)
PLATELET # BLD: 113 E9/L (ref 130–450)
PLATELET # BLD: 113 E9/L (ref 130–450)
PLATELET # BLD: 114 E9/L (ref 130–450)
PLATELET # BLD: 115 E9/L (ref 130–450)
PLATELET # BLD: 115 E9/L (ref 130–450)
PLATELET # BLD: 116 E9/L (ref 130–450)
PLATELET # BLD: 117 E9/L (ref 130–450)
PLATELET # BLD: 117 E9/L (ref 130–450)
PLATELET # BLD: 119 E9/L (ref 130–450)
PLATELET # BLD: 123 E9/L (ref 130–450)
PLATELET # BLD: 126 E9/L (ref 130–450)
PLATELET # BLD: 127 E9/L (ref 130–450)
PLATELET # BLD: 127 E9/L (ref 130–450)
PLATELET # BLD: 128 E9/L (ref 130–450)
PLATELET # BLD: 128 E9/L (ref 130–450)
PLATELET # BLD: 129 E9/L (ref 130–450)
PLATELET # BLD: 131 E9/L (ref 130–450)
PLATELET # BLD: 132 E9/L (ref 130–450)
PLATELET # BLD: 133 E9/L (ref 130–450)
PLATELET # BLD: 134 E9/L (ref 130–450)
PLATELET # BLD: 136 E9/L (ref 130–450)
PLATELET # BLD: 141 E9/L (ref 130–450)
PLATELET # BLD: 145 E9/L (ref 130–450)
PLATELET # BLD: 152 E9/L (ref 130–450)
PLATELET # BLD: 154 E9/L (ref 130–450)
PLATELET # BLD: 158 E9/L (ref 130–450)
PLATELET # BLD: 166 E9/L (ref 130–450)
PLATELET # BLD: 185 E9/L (ref 130–450)
PLATELET # BLD: 190 E9/L (ref 130–450)
PLATELET # BLD: 203 E9/L (ref 130–450)
PLATELET # BLD: 229 E9/L (ref 130–450)
PLATELET # BLD: 83 E9/L (ref 130–450)
PLATELET # BLD: 85 E9/L (ref 130–450)
PLATELET # BLD: 95 E9/L (ref 130–450)
PLATELET # BLD: 96 E9/L (ref 130–450)
PLATELET CONFIRMATION: NORMAL
PMV BLD AUTO: 10.1 FL (ref 7–12)
PMV BLD AUTO: 10.3 FL (ref 7–12)
PMV BLD AUTO: 10.4 FL (ref 7–12)
PMV BLD AUTO: 10.5 FL (ref 7–12)
PMV BLD AUTO: 10.5 FL (ref 7–12)
PMV BLD AUTO: 10.8 FL (ref 7–12)
PMV BLD AUTO: 10.9 FL (ref 7–12)
PMV BLD AUTO: 11 FL (ref 7–12)
PMV BLD AUTO: 11.2 FL (ref 7–12)
PMV BLD AUTO: 11.4 FL (ref 7–12)
PMV BLD AUTO: 11.5 FL (ref 7–12)
PMV BLD AUTO: 11.6 FL (ref 7–12)
PMV BLD AUTO: 11.7 FL (ref 7–12)
PMV BLD AUTO: 11.8 FL (ref 7–12)
PMV BLD AUTO: 11.8 FL (ref 7–12)
PMV BLD AUTO: 12 FL (ref 7–12)
PMV BLD AUTO: 12.1 FL (ref 7–12)
PMV BLD AUTO: 12.4 FL (ref 7–12)
PMV BLD AUTO: 12.8 FL (ref 7–12)
PMV BLD AUTO: 13 FL (ref 7–12)
PMV BLD AUTO: 13.6 FL (ref 7–12)
PMV BLD AUTO: 9.2 FL (ref 7–12)
PMV BLD AUTO: 9.5 FL (ref 7–12)
PMV BLD AUTO: 9.5 FL (ref 7–12)
PMV BLD AUTO: 9.7 FL (ref 7–12)
PMV BLD AUTO: 9.8 FL (ref 7–12)
PMV BLD AUTO: 9.8 FL (ref 7–12)
PMV BLD AUTO: 9.9 FL (ref 7–12)
PMV BLD AUTO: 9.9 FL (ref 7–12)
PO2: 137.6 MMHG (ref 75–100)
POIKILOCYTES: ABNORMAL
POLYCHROMASIA: ABNORMAL
POTASSIUM REFLEX MAGNESIUM: 3.9 MMOL/L (ref 3.5–5)
POTASSIUM REFLEX MAGNESIUM: 4 MMOL/L (ref 3.5–5)
POTASSIUM REFLEX MAGNESIUM: 4.3 MMOL/L (ref 3.5–5)
POTASSIUM REFLEX MAGNESIUM: 4.4 MMOL/L (ref 3.5–5)
POTASSIUM REFLEX MAGNESIUM: 4.6 MMOL/L (ref 3.5–5)
POTASSIUM REFLEX MAGNESIUM: 4.7 MMOL/L (ref 3.5–5)
POTASSIUM REFLEX MAGNESIUM: 5.1 MMOL/L (ref 3.5–5)
POTASSIUM REFLEX MAGNESIUM: 6 MMOL/L (ref 3.5–5)
POTASSIUM SERPL-SCNC: 3.1 MMOL/L (ref 3.5–5)
POTASSIUM SERPL-SCNC: 3.2 MMOL/L (ref 3.5–5)
POTASSIUM SERPL-SCNC: 3.2 MMOL/L (ref 3.5–5)
POTASSIUM SERPL-SCNC: 3.4 MMOL/L (ref 3.5–5)
POTASSIUM SERPL-SCNC: 3.5 MMOL/L (ref 3.5–5)
POTASSIUM SERPL-SCNC: 3.5 MMOL/L (ref 3.5–5)
POTASSIUM SERPL-SCNC: 3.6 MMOL/L (ref 3.5–5)
POTASSIUM SERPL-SCNC: 3.6 MMOL/L (ref 3.5–5)
POTASSIUM SERPL-SCNC: 3.7 MMOL/L (ref 3.5–5)
POTASSIUM SERPL-SCNC: 3.9 MMOL/L (ref 3.5–5)
POTASSIUM SERPL-SCNC: 3.9 MMOL/L (ref 3.5–5)
POTASSIUM SERPL-SCNC: 4.3 MMOL/L (ref 3.5–5)
POTASSIUM SERPL-SCNC: 4.3 MMOL/L (ref 3.5–5)
POTASSIUM SERPL-SCNC: 4.4 MMOL/L (ref 3.5–5)
POTASSIUM SERPL-SCNC: 4.4 MMOL/L (ref 3.5–5)
POTASSIUM SERPL-SCNC: 4.5 MMOL/L (ref 3.5–5)
POTASSIUM SERPL-SCNC: 4.6 MMOL/L (ref 3.5–5)
POTASSIUM SERPL-SCNC: 4.7 MMOL/L (ref 3.5–5)
POTASSIUM SERPL-SCNC: 4.7 MMOL/L (ref 3.5–5)
POTASSIUM SERPL-SCNC: 4.9 MMOL/L (ref 3.5–5)
POTASSIUM SERPL-SCNC: 4.9 MMOL/L (ref 3.5–5)
POTASSIUM SERPL-SCNC: 5.2 MMOL/L (ref 3.5–5)
POTASSIUM SERPL-SCNC: 5.2 MMOL/L (ref 3.5–5)
POTASSIUM, UR: 85.3 MMOL/L
PRO-BNP: 1813 PG/ML (ref 0–125)
PRO-BNP: 2206 PG/ML (ref 0–125)
PRO-BNP: 2604 PG/ML (ref 0–125)
PROCALCITONIN: 2.87 NG/ML (ref 0–0.08)
PROCALCITONIN: 5.77 NG/ML (ref 0–0.08)
PROCALCITONIN: 8.99 NG/ML (ref 0–0.08)
PROSTATE SPECIFIC ANTIGEN: 0.1 NG/ML (ref 0–4)
PROTEIN PROTEIN: 23 MG/DL (ref 0–12)
PROTEIN UA: 30 MG/DL
PROTEIN UA: 30 MG/DL
PROTEIN UA: NEGATIVE MG/DL
PROTEIN UA: NEGATIVE MG/DL
PROTEUS SPECIES BY PCR: NOT DETECTED
PROTHROMBIN TIME: 11.3 SEC (ref 9.3–12.4)
PROTHROMBIN TIME: 12.1 SEC (ref 9.3–12.4)
PROTHROMBIN TIME: 12.6 SEC (ref 9.3–12.4)
PROTHROMBIN TIME: 12.6 SEC (ref 9.3–12.4)
PSEUDOMONAS AERUGINOSA BY PCR: NOT DETECTED
RBC # BLD: 2.06 E12/L (ref 3.8–5.8)
RBC # BLD: 2.09 E12/L (ref 3.8–5.8)
RBC # BLD: 2.13 E12/L (ref 3.8–5.8)
RBC # BLD: 2.16 E12/L (ref 3.8–5.8)
RBC # BLD: 2.27 E12/L (ref 3.8–5.8)
RBC # BLD: 2.28 E12/L (ref 3.8–5.8)
RBC # BLD: 2.29 E12/L (ref 3.8–5.8)
RBC # BLD: 2.3 E12/L (ref 3.8–5.8)
RBC # BLD: 2.34 E12/L (ref 3.8–5.8)
RBC # BLD: 2.44 E12/L (ref 3.8–5.8)
RBC # BLD: 2.44 E12/L (ref 3.8–5.8)
RBC # BLD: 2.53 E12/L (ref 3.8–5.8)
RBC # BLD: 2.58 E12/L (ref 3.8–5.8)
RBC # BLD: 2.58 E12/L (ref 3.8–5.8)
RBC # BLD: 2.6 E12/L (ref 3.8–5.8)
RBC # BLD: 2.61 E12/L (ref 3.8–5.8)
RBC # BLD: 2.62 E12/L (ref 3.8–5.8)
RBC # BLD: 2.62 E12/L (ref 3.8–5.8)
RBC # BLD: 2.71 E12/L (ref 3.8–5.8)
RBC # BLD: 2.76 E12/L (ref 3.8–5.8)
RBC # BLD: 2.77 E12/L (ref 3.8–5.8)
RBC # BLD: 2.79 E12/L (ref 3.8–5.8)
RBC # BLD: 2.82 E12/L (ref 3.8–5.8)
RBC # BLD: 2.82 E12/L (ref 3.8–5.8)
RBC # BLD: 2.89 E12/L (ref 3.8–5.8)
RBC # BLD: 2.96 E12/L (ref 3.8–5.8)
RBC # BLD: 2.97 E12/L (ref 3.8–5.8)
RBC # BLD: 3.01 E12/L (ref 3.8–5.8)
RBC # BLD: 3.05 E12/L (ref 3.8–5.8)
RBC # BLD: 3.11 E12/L (ref 3.8–5.8)
RBC # BLD: 3.32 E12/L (ref 3.8–5.8)
RBC # BLD: 3.37 E12/L (ref 3.8–5.8)
RBC # BLD: 3.38 E12/L (ref 3.8–5.8)
RBC # BLD: 3.39 E12/L (ref 3.8–5.8)
RBC # BLD: 3.47 E12/L (ref 3.8–5.8)
RBC # BLD: 3.63 E12/L (ref 3.8–5.8)
RBC # BLD: 3.66 E12/L (ref 3.8–5.8)
RBC # BLD: 3.67 E12/L (ref 3.8–5.8)
RBC # BLD: 3.67 E12/L (ref 3.8–5.8)
RBC # BLD: 3.82 E12/L (ref 3.8–5.8)
RBC # BLD: 3.92 E12/L (ref 3.8–5.8)
RBC # BLD: 4.4 E12/L (ref 3.8–5.8)
RBC UA: ABNORMAL /HPF (ref 0–2)
REASON FOR REJECTION: NORMAL
REASON FOR REJECTION: NORMAL
REJECTED TEST: NORMAL
REJECTED TEST: NORMAL
RESPIRATORY SYNCYTIAL VIRUS BY PCR: NOT DETECTED
SARS-COV-2, NAAT: NOT DETECTED
SARS-COV-2, PCR: NOT DETECTED
SEDIMENTATION RATE, ERYTHROCYTE: 47 MM/HR (ref 0–15)
SEDIMENTATION RATE, ERYTHROCYTE: 72 MM/HR (ref 0–15)
SERRATIA MARCESCENS BY PCR: NOT DETECTED
SMEAR, RESPIRATORY: ABNORMAL
SODIUM BLD-SCNC: 132 MMOL/L (ref 132–146)
SODIUM BLD-SCNC: 133 MMOL/L (ref 132–146)
SODIUM BLD-SCNC: 133 MMOL/L (ref 132–146)
SODIUM BLD-SCNC: 134 MMOL/L (ref 132–146)
SODIUM BLD-SCNC: 135 MMOL/L (ref 132–146)
SODIUM BLD-SCNC: 136 MMOL/L (ref 132–146)
SODIUM BLD-SCNC: 137 MMOL/L (ref 132–146)
SODIUM BLD-SCNC: 138 MMOL/L (ref 132–146)
SODIUM BLD-SCNC: 139 MMOL/L (ref 132–146)
SODIUM BLD-SCNC: 139 MMOL/L (ref 132–146)
SODIUM BLD-SCNC: 140 MMOL/L (ref 132–146)
SODIUM BLD-SCNC: 140 MMOL/L (ref 132–146)
SODIUM BLD-SCNC: 141 MMOL/L (ref 132–146)
SODIUM URINE: 37 MMOL/L
SODIUM URINE: <20 MMOL/L
SODIUM URINE: <20 MMOL/L
SOURCE OF BLOOD CULTURE: ABNORMAL
SOURCE, BLOOD GAS: ABNORMAL
SPECIFIC GRAVITY UA: 1.01 (ref 1–1.03)
SPECIFIC GRAVITY UA: 1.02 (ref 1–1.03)
STAPHYLOCOCCUS AUREUS BY PCR: NOT DETECTED
STAPHYLOCOCCUS SPECIES BY PCR: DETECTED
STOMATOCYTES: ABNORMAL
STREP PNEUMONIAE ANTIGEN, URINE: NORMAL
STREPTOCOCCUS AGALACTIAE BY PCR: NOT DETECTED
STREPTOCOCCUS PNEUMONIAE BY PCR: NOT DETECTED
STREPTOCOCCUS PYOGENES  BY PCR: NOT DETECTED
STREPTOCOCCUS SPECIES BY PCR: NOT DETECTED
THB: 12.6 G/DL (ref 11.5–16.5)
TIME ANALYZED: 30
TOTAL CK: 36 U/L (ref 20–200)
TOTAL IRON BINDING CAPACITY: 107 MCG/DL (ref 250–450)
TOTAL IRON BINDING CAPACITY: 156 MCG/DL (ref 250–450)
TOTAL IRON BINDING CAPACITY: 161 MCG/DL (ref 250–450)
TOTAL PROTEIN: 5.2 G/DL (ref 6.4–8.3)
TOTAL PROTEIN: 6.2 G/DL (ref 6.4–8.3)
TOTAL PROTEIN: 6.3 G/DL (ref 6.4–8.3)
TOTAL PROTEIN: 6.6 G/DL (ref 6.4–8.3)
TOTAL PROTEIN: 6.7 G/DL (ref 6.4–8.3)
TOTAL PROTEIN: 6.8 G/DL (ref 6.4–8.3)
TOTAL PROTEIN: 7.1 G/DL (ref 6.4–8.3)
TOTAL PROTEIN: 7.6 G/DL (ref 6.4–8.3)
TOTAL PROTEIN: 9.2 G/DL (ref 6.4–8.3)
TRIGL SERPL-MCNC: 66 MG/DL (ref 0–149)
TROPONIN, HIGH SENSITIVITY: 51 NG/L (ref 0–11)
TROPONIN, HIGH SENSITIVITY: 52 NG/L (ref 0–11)
TROPONIN: 0.02 NG/ML (ref 0–0.03)
TROPONIN: 0.03 NG/ML (ref 0–0.03)
TROPONIN: 0.05 NG/ML (ref 0–0.03)
URIC ACID, SERUM: 10.9 MG/DL (ref 3.4–7)
URIC ACID, SERUM: 5.4 MG/DL (ref 3.4–7)
URIC ACID, SERUM: 8.2 MG/DL (ref 3.4–7)
URIC ACID, SERUM: 8.4 MG/DL (ref 3.4–7)
URINE CULTURE, ROUTINE: ABNORMAL
URINE CULTURE, ROUTINE: NORMAL
UROBILINOGEN, URINE: 0.2 E.U./DL
UROBILINOGEN, URINE: 1 E.U./DL
VANCOMYCIN RANDOM: 15.6 MCG/ML (ref 5–40)
VANCOMYCIN RANDOM: 19.3 MCG/ML (ref 5–40)
VANCOMYCIN RANDOM: 22 MCG/ML (ref 5–40)
VANCOMYCIN RANDOM: 23.1 MCG/ML (ref 5–40)
VANCOMYCIN RANDOM: 25.9 MCG/ML (ref 5–40)
VANCOMYCIN TROUGH: 16.9 MCG/ML (ref 5–16)
VITAMIN B-12: 608 PG/ML (ref 211–946)
VITAMIN B-12: 814 PG/ML (ref 211–946)
VITAMIN D 25-HYDROXY: 26 NG/ML (ref 30–100)
VITAMIN D 25-HYDROXY: 32 NG/ML (ref 30–100)
WBC # BLD: 11.3 E9/L (ref 4.5–11.5)
WBC # BLD: 13.1 E9/L (ref 4.5–11.5)
WBC # BLD: 13.4 E9/L (ref 4.5–11.5)
WBC # BLD: 3.5 E9/L (ref 4.5–11.5)
WBC # BLD: 4.1 E9/L (ref 4.5–11.5)
WBC # BLD: 4.1 E9/L (ref 4.5–11.5)
WBC # BLD: 4.4 E9/L (ref 4.5–11.5)
WBC # BLD: 4.5 E9/L (ref 4.5–11.5)
WBC # BLD: 4.7 E9/L (ref 4.5–11.5)
WBC # BLD: 4.9 E9/L (ref 4.5–11.5)
WBC # BLD: 5.1 E9/L (ref 4.5–11.5)
WBC # BLD: 5.3 E9/L (ref 4.5–11.5)
WBC # BLD: 5.4 E9/L (ref 4.5–11.5)
WBC # BLD: 5.4 E9/L (ref 4.5–11.5)
WBC # BLD: 5.5 E9/L (ref 4.5–11.5)
WBC # BLD: 5.6 E9/L (ref 4.5–11.5)
WBC # BLD: 5.7 E9/L (ref 4.5–11.5)
WBC # BLD: 5.8 E9/L (ref 4.5–11.5)
WBC # BLD: 5.9 E9/L (ref 4.5–11.5)
WBC # BLD: 5.9 E9/L (ref 4.5–11.5)
WBC # BLD: 6 E9/L (ref 4.5–11.5)
WBC # BLD: 6.1 E9/L (ref 4.5–11.5)
WBC # BLD: 6.3 E9/L (ref 4.5–11.5)
WBC # BLD: 6.5 E9/L (ref 4.5–11.5)
WBC # BLD: 6.6 E9/L (ref 4.5–11.5)
WBC # BLD: 6.6 E9/L (ref 4.5–11.5)
WBC # BLD: 6.7 E9/L (ref 4.5–11.5)
WBC # BLD: 6.8 E9/L (ref 4.5–11.5)
WBC # BLD: 7 E9/L (ref 4.5–11.5)
WBC # BLD: 7 E9/L (ref 4.5–11.5)
WBC # BLD: 7.1 E9/L (ref 4.5–11.5)
WBC # BLD: 7.2 E9/L (ref 4.5–11.5)
WBC # BLD: 7.3 E9/L (ref 4.5–11.5)
WBC # BLD: 7.4 E9/L (ref 4.5–11.5)
WBC # BLD: 7.6 E9/L (ref 4.5–11.5)
WBC # BLD: 7.8 E9/L (ref 4.5–11.5)
WBC # BLD: 7.9 E9/L (ref 4.5–11.5)
WBC # BLD: 8.4 E9/L (ref 4.5–11.5)
WBC UA: ABNORMAL /HPF (ref 0–5)
YEAST: PRESENT /HPF

## 2021-01-01 PROCEDURE — 74176 CT ABD & PELVIS W/O CONTRAST: CPT

## 2021-01-01 PROCEDURE — 71045 X-RAY EXAM CHEST 1 VIEW: CPT

## 2021-01-01 PROCEDURE — 80048 BASIC METABOLIC PNL TOTAL CA: CPT

## 2021-01-01 PROCEDURE — 6360000002 HC RX W HCPCS: Performed by: NURSE PRACTITIONER

## 2021-01-01 PROCEDURE — 2580000003 HC RX 258: Performed by: INTERNAL MEDICINE

## 2021-01-01 PROCEDURE — 36415 COLL VENOUS BLD VENIPUNCTURE: CPT

## 2021-01-01 PROCEDURE — 97530 THERAPEUTIC ACTIVITIES: CPT

## 2021-01-01 PROCEDURE — 87106 FUNGI IDENTIFICATION YEAST: CPT

## 2021-01-01 PROCEDURE — 83735 ASSAY OF MAGNESIUM: CPT

## 2021-01-01 PROCEDURE — 99213 OFFICE O/P EST LOW 20 MIN: CPT | Performed by: SURGERY

## 2021-01-01 PROCEDURE — 2580000003 HC RX 258: Performed by: SPECIALIST

## 2021-01-01 PROCEDURE — 2060000000 HC ICU INTERMEDIATE R&B

## 2021-01-01 PROCEDURE — 84550 ASSAY OF BLOOD/URIC ACID: CPT

## 2021-01-01 PROCEDURE — 86850 RBC ANTIBODY SCREEN: CPT

## 2021-01-01 PROCEDURE — 6360000002 HC RX W HCPCS: Performed by: INTERNAL MEDICINE

## 2021-01-01 PROCEDURE — U0002 COVID-19 LAB TEST NON-CDC: HCPCS

## 2021-01-01 PROCEDURE — 85025 COMPLETE CBC W/AUTO DIFF WBC: CPT

## 2021-01-01 PROCEDURE — 83550 IRON BINDING TEST: CPT

## 2021-01-01 PROCEDURE — 6360000002 HC RX W HCPCS: Performed by: SURGERY

## 2021-01-01 PROCEDURE — 94664 DEMO&/EVAL PT USE INHALER: CPT

## 2021-01-01 PROCEDURE — 96372 THER/PROPH/DIAG INJ SC/IM: CPT

## 2021-01-01 PROCEDURE — 93306 TTE W/DOPPLER COMPLETE: CPT

## 2021-01-01 PROCEDURE — 6370000000 HC RX 637 (ALT 250 FOR IP): Performed by: INTERNAL MEDICINE

## 2021-01-01 PROCEDURE — 83605 ASSAY OF LACTIC ACID: CPT

## 2021-01-01 PROCEDURE — 6370000000 HC RX 637 (ALT 250 FOR IP): Performed by: NURSE PRACTITIONER

## 2021-01-01 PROCEDURE — 83540 ASSAY OF IRON: CPT

## 2021-01-01 PROCEDURE — 85027 COMPLETE CBC AUTOMATED: CPT

## 2021-01-01 PROCEDURE — 2580000003 HC RX 258: Performed by: NURSE PRACTITIONER

## 2021-01-01 PROCEDURE — 70450 CT HEAD/BRAIN W/O DYE: CPT

## 2021-01-01 PROCEDURE — 84156 ASSAY OF PROTEIN URINE: CPT

## 2021-01-01 PROCEDURE — 36592 COLLECT BLOOD FROM PICC: CPT

## 2021-01-01 PROCEDURE — 2700000000 HC OXYGEN THERAPY PER DAY

## 2021-01-01 PROCEDURE — 86923 COMPATIBILITY TEST ELECTRIC: CPT

## 2021-01-01 PROCEDURE — 84100 ASSAY OF PHOSPHORUS: CPT

## 2021-01-01 PROCEDURE — 83880 ASSAY OF NATRIURETIC PEPTIDE: CPT

## 2021-01-01 PROCEDURE — 2500000003 HC RX 250 WO HCPCS: Performed by: SURGERY

## 2021-01-01 PROCEDURE — 2580000003 HC RX 258: Performed by: SURGERY

## 2021-01-01 PROCEDURE — 85610 PROTHROMBIN TIME: CPT

## 2021-01-01 PROCEDURE — 97535 SELF CARE MNGMENT TRAINING: CPT

## 2021-01-01 PROCEDURE — 82103 ALPHA-1-ANTITRYPSIN TOTAL: CPT

## 2021-01-01 PROCEDURE — 2580000003 HC RX 258: Performed by: PHYSICIAN ASSISTANT

## 2021-01-01 PROCEDURE — 84484 ASSAY OF TROPONIN QUANT: CPT

## 2021-01-01 PROCEDURE — 74018 RADEX ABDOMEN 1 VIEW: CPT

## 2021-01-01 PROCEDURE — 82728 ASSAY OF FERRITIN: CPT

## 2021-01-01 PROCEDURE — 94640 AIRWAY INHALATION TREATMENT: CPT

## 2021-01-01 PROCEDURE — 99233 SBSQ HOSP IP/OBS HIGH 50: CPT | Performed by: STUDENT IN AN ORGANIZED HEALTH CARE EDUCATION/TRAINING PROGRAM

## 2021-01-01 PROCEDURE — 36591 DRAW BLOOD OFF VENOUS DEVICE: CPT

## 2021-01-01 PROCEDURE — 2580000003 HC RX 258

## 2021-01-01 PROCEDURE — 6360000002 HC RX W HCPCS: Performed by: SPECIALIST

## 2021-01-01 PROCEDURE — 99284 EMERGENCY DEPT VISIT MOD MDM: CPT

## 2021-01-01 PROCEDURE — 80053 COMPREHEN METABOLIC PANEL: CPT

## 2021-01-01 PROCEDURE — 83970 ASSAY OF PARATHORMONE: CPT

## 2021-01-01 PROCEDURE — 99285 EMERGENCY DEPT VISIT HI MDM: CPT

## 2021-01-01 PROCEDURE — 87150 DNA/RNA AMPLIFIED PROBE: CPT

## 2021-01-01 PROCEDURE — 85014 HEMATOCRIT: CPT

## 2021-01-01 PROCEDURE — 2580000003 HC RX 258: Performed by: EMERGENCY MEDICINE

## 2021-01-01 PROCEDURE — 82550 ASSAY OF CK (CPK): CPT

## 2021-01-01 PROCEDURE — 80202 ASSAY OF VANCOMYCIN: CPT

## 2021-01-01 PROCEDURE — 85018 HEMOGLOBIN: CPT

## 2021-01-01 PROCEDURE — 87077 CULTURE AEROBIC IDENTIFY: CPT

## 2021-01-01 PROCEDURE — 82962 GLUCOSE BLOOD TEST: CPT

## 2021-01-01 PROCEDURE — 81001 URINALYSIS AUTO W/SCOPE: CPT

## 2021-01-01 PROCEDURE — 84153 ASSAY OF PSA TOTAL: CPT

## 2021-01-01 PROCEDURE — 2580000003 HC RX 258: Performed by: STUDENT IN AN ORGANIZED HEALTH CARE EDUCATION/TRAINING PROGRAM

## 2021-01-01 PROCEDURE — 2500000003 HC RX 250 WO HCPCS: Performed by: INTERNAL MEDICINE

## 2021-01-01 PROCEDURE — 1200000000 HC SEMI PRIVATE

## 2021-01-01 PROCEDURE — 93010 ELECTROCARDIOGRAM REPORT: CPT | Performed by: INTERNAL MEDICINE

## 2021-01-01 PROCEDURE — 6370000000 HC RX 637 (ALT 250 FOR IP): Performed by: CLINICAL NURSE SPECIALIST

## 2021-01-01 PROCEDURE — 87070 CULTURE OTHR SPECIMN AEROBIC: CPT

## 2021-01-01 PROCEDURE — 97165 OT EVAL LOW COMPLEX 30 MIN: CPT

## 2021-01-01 PROCEDURE — 83935 ASSAY OF URINE OSMOLALITY: CPT

## 2021-01-01 PROCEDURE — C9113 INJ PANTOPRAZOLE SODIUM, VIA: HCPCS | Performed by: SURGERY

## 2021-01-01 PROCEDURE — 87186 SC STD MICRODIL/AGAR DIL: CPT

## 2021-01-01 PROCEDURE — 93005 ELECTROCARDIOGRAM TRACING: CPT | Performed by: INTERNAL MEDICINE

## 2021-01-01 PROCEDURE — 82607 VITAMIN B-12: CPT

## 2021-01-01 PROCEDURE — 82570 ASSAY OF URINE CREATININE: CPT

## 2021-01-01 PROCEDURE — 74022 RADEX COMPL AQT ABD SERIES: CPT

## 2021-01-01 PROCEDURE — 2500000003 HC RX 250 WO HCPCS: Performed by: NURSE PRACTITIONER

## 2021-01-01 PROCEDURE — 87088 URINE BACTERIA CULTURE: CPT

## 2021-01-01 PROCEDURE — 85730 THROMBOPLASTIN TIME PARTIAL: CPT

## 2021-01-01 PROCEDURE — 93005 ELECTROCARDIOGRAM TRACING: CPT | Performed by: EMERGENCY MEDICINE

## 2021-01-01 PROCEDURE — 97161 PT EVAL LOW COMPLEX 20 MIN: CPT

## 2021-01-01 PROCEDURE — 87040 BLOOD CULTURE FOR BACTERIA: CPT

## 2021-01-01 PROCEDURE — 87206 SMEAR FLUORESCENT/ACID STAI: CPT

## 2021-01-01 PROCEDURE — 99291 CRITICAL CARE FIRST HOUR: CPT | Performed by: INTERNAL MEDICINE

## 2021-01-01 PROCEDURE — 82306 VITAMIN D 25 HYDROXY: CPT

## 2021-01-01 PROCEDURE — 82553 CREATINE MB FRACTION: CPT

## 2021-01-01 PROCEDURE — 84145 PROCALCITONIN (PCT): CPT

## 2021-01-01 PROCEDURE — 86900 BLOOD TYPING SEROLOGIC ABO: CPT

## 2021-01-01 PROCEDURE — 93005 ELECTROCARDIOGRAM TRACING: CPT | Performed by: PHYSICIAN ASSISTANT

## 2021-01-01 PROCEDURE — 36569 INSJ PICC 5 YR+ W/O IMAGING: CPT

## 2021-01-01 PROCEDURE — 83690 ASSAY OF LIPASE: CPT

## 2021-01-01 PROCEDURE — 82330 ASSAY OF CALCIUM: CPT

## 2021-01-01 PROCEDURE — 6360000002 HC RX W HCPCS: Performed by: CLINICAL NURSE SPECIALIST

## 2021-01-01 PROCEDURE — 6360000002 HC RX W HCPCS

## 2021-01-01 PROCEDURE — P9016 RBC LEUKOCYTES REDUCED: HCPCS

## 2021-01-01 PROCEDURE — 86901 BLOOD TYPING SEROLOGIC RH(D): CPT

## 2021-01-01 PROCEDURE — 96523 IRRIG DRUG DELIVERY DEVICE: CPT

## 2021-01-01 PROCEDURE — 87635 SARS-COV-2 COVID-19 AMP PRB: CPT

## 2021-01-01 PROCEDURE — 84300 ASSAY OF URINE SODIUM: CPT

## 2021-01-01 PROCEDURE — 93005 ELECTROCARDIOGRAM TRACING: CPT | Performed by: NURSE PRACTITIONER

## 2021-01-01 PROCEDURE — 82746 ASSAY OF FOLIC ACID SERUM: CPT

## 2021-01-01 PROCEDURE — C1751 CATH, INF, PER/CENT/MIDLINE: HCPCS

## 2021-01-01 PROCEDURE — 6360000004 HC RX CONTRAST MEDICATION: Performed by: RADIOLOGY

## 2021-01-01 PROCEDURE — 82104 ALPHA-1-ANTITRYPSIN PHENO: CPT

## 2021-01-01 PROCEDURE — 0202U NFCT DS 22 TRGT SARS-COV-2: CPT

## 2021-01-01 PROCEDURE — 84478 ASSAY OF TRIGLYCERIDES: CPT

## 2021-01-01 PROCEDURE — 99214 OFFICE O/P EST MOD 30 MIN: CPT | Performed by: INTERNAL MEDICINE

## 2021-01-01 PROCEDURE — C9113 INJ PANTOPRAZOLE SODIUM, VIA: HCPCS | Performed by: STUDENT IN AN ORGANIZED HEALTH CARE EDUCATION/TRAINING PROGRAM

## 2021-01-01 PROCEDURE — 99222 1ST HOSP IP/OBS MODERATE 55: CPT | Performed by: NURSE PRACTITIONER

## 2021-01-01 PROCEDURE — 86140 C-REACTIVE PROTEIN: CPT

## 2021-01-01 PROCEDURE — 36430 TRANSFUSION BLD/BLD COMPNT: CPT

## 2021-01-01 PROCEDURE — 80076 HEPATIC FUNCTION PANEL: CPT

## 2021-01-01 PROCEDURE — 84133 ASSAY OF URINE POTASSIUM: CPT

## 2021-01-01 PROCEDURE — 88112 CYTOPATH CELL ENHANCE TECH: CPT

## 2021-01-01 PROCEDURE — 6370000000 HC RX 637 (ALT 250 FOR IP): Performed by: FAMILY MEDICINE

## 2021-01-01 PROCEDURE — 85651 RBC SED RATE NONAUTOMATED: CPT

## 2021-01-01 PROCEDURE — 84165 PROTEIN E-PHORESIS SERUM: CPT

## 2021-01-01 PROCEDURE — 84166 PROTEIN E-PHORESIS/URINE/CSF: CPT

## 2021-01-01 PROCEDURE — 71250 CT THORAX DX C-: CPT

## 2021-01-01 PROCEDURE — 6360000002 HC RX W HCPCS: Performed by: EMERGENCY MEDICINE

## 2021-01-01 PROCEDURE — 99232 SBSQ HOSP IP/OBS MODERATE 35: CPT | Performed by: SURGERY

## 2021-01-01 PROCEDURE — 86334 IMMUNOFIX E-PHORESIS SERUM: CPT

## 2021-01-01 PROCEDURE — 82436 ASSAY OF URINE CHLORIDE: CPT

## 2021-01-01 PROCEDURE — 6370000000 HC RX 637 (ALT 250 FOR IP): Performed by: EMERGENCY MEDICINE

## 2021-01-01 PROCEDURE — 96365 THER/PROPH/DIAG IV INF INIT: CPT

## 2021-01-01 PROCEDURE — 82805 BLOOD GASES W/O2 SATURATION: CPT

## 2021-01-01 PROCEDURE — 96374 THER/PROPH/DIAG INJ IV PUSH: CPT

## 2021-01-01 PROCEDURE — 2500000003 HC RX 250 WO HCPCS: Performed by: STUDENT IN AN ORGANIZED HEALTH CARE EDUCATION/TRAINING PROGRAM

## 2021-01-01 PROCEDURE — 74177 CT ABD & PELVIS W/CONTRAST: CPT

## 2021-01-01 PROCEDURE — 76937 US GUIDE VASCULAR ACCESS: CPT

## 2021-01-01 PROCEDURE — 6360000002 HC RX W HCPCS: Performed by: PHYSICIAN ASSISTANT

## 2021-01-01 PROCEDURE — 02HV33Z INSERTION OF INFUSION DEVICE INTO SUPERIOR VENA CAVA, PERCUTANEOUS APPROACH: ICD-10-PCS | Performed by: STUDENT IN AN ORGANIZED HEALTH CARE EDUCATION/TRAINING PROGRAM

## 2021-01-01 PROCEDURE — 76775 US EXAM ABDO BACK WALL LIM: CPT

## 2021-01-01 PROCEDURE — 84132 ASSAY OF SERUM POTASSIUM: CPT

## 2021-01-01 PROCEDURE — 93000 ELECTROCARDIOGRAM COMPLETE: CPT | Performed by: INTERNAL MEDICINE

## 2021-01-01 PROCEDURE — 87205 SMEAR GRAM STAIN: CPT

## 2021-01-01 PROCEDURE — 6360000002 HC RX W HCPCS: Performed by: STUDENT IN AN ORGANIZED HEALTH CARE EDUCATION/TRAINING PROGRAM

## 2021-01-01 PROCEDURE — 87449 NOS EACH ORGANISM AG IA: CPT

## 2021-01-01 PROCEDURE — 0HQ1XZZ REPAIR FACE SKIN, EXTERNAL APPROACH: ICD-10-PCS | Performed by: SURGERY

## 2021-01-01 PROCEDURE — 97110 THERAPEUTIC EXERCISES: CPT

## 2021-01-01 PROCEDURE — 99283 EMERGENCY DEPT VISIT LOW MDM: CPT

## 2021-01-01 PROCEDURE — 71046 X-RAY EXAM CHEST 2 VIEWS: CPT

## 2021-01-01 RX ORDER — SODIUM CHLORIDE, SODIUM LACTATE, POTASSIUM CHLORIDE, CALCIUM CHLORIDE 600; 310; 30; 20 MG/100ML; MG/100ML; MG/100ML; MG/100ML
INJECTION, SOLUTION INTRAVENOUS CONTINUOUS
Status: ACTIVE | OUTPATIENT
Start: 2021-01-01 | End: 2021-01-01

## 2021-01-01 RX ORDER — LIDOCAINE HYDROCHLORIDE 10 MG/ML
5 INJECTION, SOLUTION EPIDURAL; INFILTRATION; INTRACAUDAL; PERINEURAL ONCE
Status: COMPLETED | OUTPATIENT
Start: 2021-01-01 | End: 2021-01-01

## 2021-01-01 RX ORDER — HEPARIN SODIUM (PORCINE) LOCK FLUSH IV SOLN 100 UNIT/ML 100 UNIT/ML
300 SOLUTION INTRAVENOUS PRN
Status: CANCELLED | OUTPATIENT
Start: 2021-01-01

## 2021-01-01 RX ORDER — ATORVASTATIN CALCIUM 20 MG/1
20 TABLET, FILM COATED ORAL NIGHTLY
Status: DISCONTINUED | OUTPATIENT
Start: 2021-01-01 | End: 2021-01-01 | Stop reason: HOSPADM

## 2021-01-01 RX ORDER — ALBUTEROL SULFATE 2.5 MG/3ML
2.5 SOLUTION RESPIRATORY (INHALATION) EVERY 6 HOURS PRN
Status: DISCONTINUED | OUTPATIENT
Start: 2021-01-01 | End: 2021-01-01 | Stop reason: HOSPADM

## 2021-01-01 RX ORDER — HEPARIN SODIUM (PORCINE) LOCK FLUSH IV SOLN 100 UNIT/ML 100 UNIT/ML
3 SOLUTION INTRAVENOUS EVERY 12 HOURS SCHEDULED
Status: DISCONTINUED | OUTPATIENT
Start: 2021-01-01 | End: 2021-01-01 | Stop reason: HOSPADM

## 2021-01-01 RX ORDER — LEVOFLOXACIN 500 MG/1
500 TABLET, FILM COATED ORAL DAILY
Status: DISCONTINUED | OUTPATIENT
Start: 2021-01-01 | End: 2021-01-01

## 2021-01-01 RX ORDER — HEPARIN SODIUM (PORCINE) LOCK FLUSH IV SOLN 100 UNIT/ML 100 UNIT/ML
300 SOLUTION INTRAVENOUS PRN
Status: DISCONTINUED | OUTPATIENT
Start: 2021-01-01 | End: 2021-01-01 | Stop reason: HOSPADM

## 2021-01-01 RX ORDER — SODIUM CHLORIDE 9 MG/ML
25 INJECTION, SOLUTION INTRAVENOUS PRN
Status: DISCONTINUED | OUTPATIENT
Start: 2021-01-01 | End: 2021-01-01 | Stop reason: HOSPADM

## 2021-01-01 RX ORDER — SODIUM CHLORIDE 0.9 % (FLUSH) 0.9 %
10 SYRINGE (ML) INJECTION PRN
Status: CANCELLED | OUTPATIENT
Start: 2021-01-01

## 2021-01-01 RX ORDER — PREDNISONE 20 MG/1
20 TABLET ORAL DAILY
Qty: 3 TABLET | Refills: 0 | Status: SHIPPED | OUTPATIENT
Start: 2021-01-01 | End: 2021-01-01 | Stop reason: SDUPTHER

## 2021-01-01 RX ORDER — SODIUM CHLORIDE 0.9 % (FLUSH) 0.9 %
10 SYRINGE (ML) INJECTION PRN
Status: DISCONTINUED | OUTPATIENT
Start: 2021-01-01 | End: 2021-01-01 | Stop reason: HOSPADM

## 2021-01-01 RX ORDER — PANTOPRAZOLE SODIUM 40 MG/10ML
40 INJECTION, POWDER, LYOPHILIZED, FOR SOLUTION INTRAVENOUS 2 TIMES DAILY
Status: DISCONTINUED | OUTPATIENT
Start: 2021-01-01 | End: 2021-01-01

## 2021-01-01 RX ORDER — ASPIRIN 81 MG/1
81 TABLET ORAL DAILY
Status: DISCONTINUED | OUTPATIENT
Start: 2021-01-01 | End: 2021-01-01 | Stop reason: HOSPADM

## 2021-01-01 RX ORDER — ASPIRIN 81 MG/1
81 TABLET ORAL EVERY OTHER DAY
Status: DISCONTINUED | OUTPATIENT
Start: 2021-01-01 | End: 2021-01-01 | Stop reason: HOSPADM

## 2021-01-01 RX ORDER — IPRATROPIUM BROMIDE AND ALBUTEROL SULFATE 2.5; .5 MG/3ML; MG/3ML
1 SOLUTION RESPIRATORY (INHALATION)
Status: DISCONTINUED | OUTPATIENT
Start: 2021-01-01 | End: 2021-01-01

## 2021-01-01 RX ORDER — CHOLECALCIFEROL (VITAMIN D3) 50 MCG
2000 TABLET ORAL DAILY
Qty: 60 TABLET | Refills: 0 | Status: SHIPPED | OUTPATIENT
Start: 2021-01-01 | End: 2021-01-01 | Stop reason: ALTCHOICE

## 2021-01-01 RX ORDER — SODIUM CHLORIDE 0.9 % (FLUSH) 0.9 %
5-40 SYRINGE (ML) INJECTION PRN
Status: DISCONTINUED | OUTPATIENT
Start: 2021-01-01 | End: 2021-01-01 | Stop reason: HOSPADM

## 2021-01-01 RX ORDER — BUDESONIDE 0.5 MG/2ML
0.5 INHALANT ORAL 2 TIMES DAILY
Status: DISCONTINUED | OUTPATIENT
Start: 2021-01-01 | End: 2021-01-01 | Stop reason: HOSPADM

## 2021-01-01 RX ORDER — SODIUM CHLORIDE 9 MG/ML
INJECTION, SOLUTION INTRAVENOUS PRN
Status: DISCONTINUED | OUTPATIENT
Start: 2021-01-01 | End: 2021-01-01 | Stop reason: HOSPADM

## 2021-01-01 RX ORDER — IPRATROPIUM BROMIDE AND ALBUTEROL SULFATE 2.5; .5 MG/3ML; MG/3ML
1 SOLUTION RESPIRATORY (INHALATION)
Status: DISCONTINUED | OUTPATIENT
Start: 2021-01-01 | End: 2021-01-01 | Stop reason: HOSPADM

## 2021-01-01 RX ORDER — SODIUM CHLORIDE 9 MG/ML
INJECTION, SOLUTION INTRAVENOUS CONTINUOUS
Status: DISCONTINUED | OUTPATIENT
Start: 2021-01-01 | End: 2021-01-01

## 2021-01-01 RX ORDER — DRONABINOL 5 MG/1
5 CAPSULE ORAL
COMMUNITY
End: 2021-01-01 | Stop reason: ALTCHOICE

## 2021-01-01 RX ORDER — GUAIFENESIN 400 MG/1
400 TABLET ORAL 4 TIMES DAILY
Status: DISCONTINUED | OUTPATIENT
Start: 2021-01-01 | End: 2021-01-01 | Stop reason: HOSPADM

## 2021-01-01 RX ORDER — HEPARIN SODIUM (PORCINE) LOCK FLUSH IV SOLN 100 UNIT/ML 100 UNIT/ML
300 SOLUTION INTRAVENOUS PRN
Status: ACTIVE | OUTPATIENT
Start: 2021-01-01 | End: 2021-01-01

## 2021-01-01 RX ORDER — HEPARIN SODIUM (PORCINE) LOCK FLUSH IV SOLN 100 UNIT/ML 100 UNIT/ML
300 SOLUTION INTRAVENOUS 2 TIMES DAILY
Status: DISCONTINUED | OUTPATIENT
Start: 2021-01-01 | End: 2021-01-01 | Stop reason: SDUPTHER

## 2021-01-01 RX ORDER — HEPARIN SODIUM (PORCINE) LOCK FLUSH IV SOLN 100 UNIT/ML 100 UNIT/ML
3 SOLUTION INTRAVENOUS PRN
Status: DISCONTINUED | OUTPATIENT
Start: 2021-01-01 | End: 2021-01-01 | Stop reason: HOSPADM

## 2021-01-01 RX ORDER — 0.9 % SODIUM CHLORIDE 0.9 %
500 INTRAVENOUS SOLUTION INTRAVENOUS ONCE
Status: COMPLETED | OUTPATIENT
Start: 2021-01-01 | End: 2021-01-01

## 2021-01-01 RX ORDER — ONDANSETRON 2 MG/ML
4 INJECTION INTRAMUSCULAR; INTRAVENOUS ONCE
Status: COMPLETED | OUTPATIENT
Start: 2021-01-01 | End: 2021-01-01

## 2021-01-01 RX ORDER — PROCHLORPERAZINE EDISYLATE 5 MG/ML
5 INJECTION INTRAMUSCULAR; INTRAVENOUS EVERY 6 HOURS PRN
Status: DISCONTINUED | OUTPATIENT
Start: 2021-01-01 | End: 2021-01-01 | Stop reason: HOSPADM

## 2021-01-01 RX ORDER — DOXYCYCLINE HYCLATE 100 MG/1
100 CAPSULE ORAL EVERY 12 HOURS SCHEDULED
Status: DISCONTINUED | OUTPATIENT
Start: 2021-01-01 | End: 2021-01-01

## 2021-01-01 RX ORDER — PANTOPRAZOLE SODIUM 40 MG/1
40 TABLET, DELAYED RELEASE ORAL DAILY
COMMUNITY
Start: 2021-01-01

## 2021-01-01 RX ORDER — BUDESONIDE 0.5 MG/2ML
500 INHALANT ORAL 2 TIMES DAILY
Status: DISCONTINUED | OUTPATIENT
Start: 2021-01-01 | End: 2021-01-01 | Stop reason: HOSPADM

## 2021-01-01 RX ORDER — PANTOPRAZOLE SODIUM 40 MG/1
40 TABLET, DELAYED RELEASE ORAL
Status: DISCONTINUED | OUTPATIENT
Start: 2021-01-01 | End: 2021-01-01 | Stop reason: HOSPADM

## 2021-01-01 RX ORDER — POTASSIUM CHLORIDE 7.45 MG/ML
10 INJECTION INTRAVENOUS
Status: COMPLETED | OUTPATIENT
Start: 2021-01-01 | End: 2021-01-01

## 2021-01-01 RX ORDER — ACETAMINOPHEN 325 MG/1
650 TABLET ORAL EVERY 6 HOURS PRN
Status: DISCONTINUED | OUTPATIENT
Start: 2021-01-01 | End: 2021-01-01 | Stop reason: HOSPADM

## 2021-01-01 RX ORDER — SODIUM BICARBONATE 325 MG/1
325 TABLET ORAL 2 TIMES DAILY
COMMUNITY

## 2021-01-01 RX ORDER — SENNA PLUS 8.6 MG/1
1 TABLET ORAL DAILY PRN
Status: DISCONTINUED | OUTPATIENT
Start: 2021-01-01 | End: 2021-01-01 | Stop reason: HOSPADM

## 2021-01-01 RX ORDER — PROMETHAZINE HYDROCHLORIDE 25 MG/1
12.5 TABLET ORAL EVERY 6 HOURS PRN
Status: DISCONTINUED | OUTPATIENT
Start: 2021-01-01 | End: 2021-01-01 | Stop reason: HOSPADM

## 2021-01-01 RX ORDER — PREDNISONE 20 MG/1
40 TABLET ORAL DAILY
Qty: 6 TABLET | Refills: 0 | Status: SHIPPED | OUTPATIENT
Start: 2021-01-01 | End: 2021-01-01

## 2021-01-01 RX ORDER — LEVOFLOXACIN 500 MG/1
500 TABLET, FILM COATED ORAL EVERY OTHER DAY
Status: DISCONTINUED | OUTPATIENT
Start: 2021-01-01 | End: 2021-01-01 | Stop reason: HOSPADM

## 2021-01-01 RX ORDER — ONDANSETRON 2 MG/ML
4 INJECTION INTRAMUSCULAR; INTRAVENOUS EVERY 6 HOURS PRN
Status: DISCONTINUED | OUTPATIENT
Start: 2021-01-01 | End: 2021-01-01 | Stop reason: HOSPADM

## 2021-01-01 RX ORDER — ONDANSETRON 4 MG/1
4 TABLET, ORALLY DISINTEGRATING ORAL EVERY 8 HOURS PRN
Status: DISCONTINUED | OUTPATIENT
Start: 2021-01-01 | End: 2021-01-01 | Stop reason: HOSPADM

## 2021-01-01 RX ORDER — DRONABINOL 2.5 MG/1
5 CAPSULE ORAL
Status: DISCONTINUED | OUTPATIENT
Start: 2021-01-01 | End: 2021-01-01 | Stop reason: HOSPADM

## 2021-01-01 RX ORDER — IPRATROPIUM BROMIDE AND ALBUTEROL SULFATE 2.5; .5 MG/3ML; MG/3ML
1 SOLUTION RESPIRATORY (INHALATION) ONCE
Status: COMPLETED | OUTPATIENT
Start: 2021-01-01 | End: 2021-01-01

## 2021-01-01 RX ORDER — LEVALBUTEROL INHALATION SOLUTION 0.63 MG/3ML
0.63 SOLUTION RESPIRATORY (INHALATION) EVERY 8 HOURS
Status: DISCONTINUED | OUTPATIENT
Start: 2021-01-01 | End: 2021-01-01 | Stop reason: HOSPADM

## 2021-01-01 RX ORDER — ACETAMINOPHEN 650 MG/1
650 SUPPOSITORY RECTAL EVERY 6 HOURS PRN
Status: DISCONTINUED | OUTPATIENT
Start: 2021-01-01 | End: 2021-01-01 | Stop reason: HOSPADM

## 2021-01-01 RX ORDER — ARFORMOTEROL TARTRATE 15 UG/2ML
15 SOLUTION RESPIRATORY (INHALATION) 2 TIMES DAILY
Status: DISCONTINUED | OUTPATIENT
Start: 2021-01-01 | End: 2021-01-01 | Stop reason: HOSPADM

## 2021-01-01 RX ORDER — SODIUM CHLORIDE 0.9 % (FLUSH) 0.9 %
SYRINGE (ML) INJECTION
Status: COMPLETED
Start: 2021-01-01 | End: 2021-01-01

## 2021-01-01 RX ORDER — NICOTINE 21 MG/24HR
1 PATCH, TRANSDERMAL 24 HOURS TRANSDERMAL DAILY
Status: DISCONTINUED | OUTPATIENT
Start: 2021-01-01 | End: 2021-01-01 | Stop reason: HOSPADM

## 2021-01-01 RX ORDER — HEPARIN SODIUM 10000 [USP'U]/ML
5000 INJECTION, SOLUTION INTRAVENOUS; SUBCUTANEOUS EVERY 12 HOURS
Status: DISCONTINUED | OUTPATIENT
Start: 2021-01-01 | End: 2021-01-01 | Stop reason: HOSPADM

## 2021-01-01 RX ORDER — PREDNISONE 10 MG/1
10 TABLET ORAL DAILY
Qty: 4 TABLET | Refills: 0 | Status: SHIPPED | OUTPATIENT
Start: 2021-01-01 | End: 2021-01-01

## 2021-01-01 RX ORDER — SODIUM CHLORIDE 0.9 % (FLUSH) 0.9 %
5-40 SYRINGE (ML) INJECTION EVERY 12 HOURS SCHEDULED
Status: DISCONTINUED | OUTPATIENT
Start: 2021-01-01 | End: 2021-01-01 | Stop reason: SDUPTHER

## 2021-01-01 RX ORDER — SODIUM CHLORIDE 9 MG/ML
INJECTION, SOLUTION INTRAVENOUS ONCE
Status: DISCONTINUED | OUTPATIENT
Start: 2021-01-01 | End: 2021-01-01

## 2021-01-01 RX ORDER — DRONABINOL 2.5 MG/1
2.5 CAPSULE ORAL 2 TIMES DAILY
Qty: 60 CAPSULE | Refills: 0 | Status: SHIPPED | OUTPATIENT
Start: 2021-01-01 | End: 2021-01-01

## 2021-01-01 RX ORDER — METHYLPREDNISOLONE SODIUM SUCCINATE 40 MG/ML
40 INJECTION, POWDER, LYOPHILIZED, FOR SOLUTION INTRAMUSCULAR; INTRAVENOUS EVERY 8 HOURS
Status: DISCONTINUED | OUTPATIENT
Start: 2021-01-01 | End: 2021-01-01

## 2021-01-01 RX ORDER — ALBUTEROL SULFATE 90 UG/1
2 AEROSOL, METERED RESPIRATORY (INHALATION) EVERY 4 HOURS PRN
Status: DISCONTINUED | OUTPATIENT
Start: 2021-01-01 | End: 2021-01-01 | Stop reason: SDUPTHER

## 2021-01-01 RX ORDER — TIOTROPIUM BROMIDE AND OLODATEROL 3.124; 2.736 UG/1; UG/1
2 SPRAY, METERED RESPIRATORY (INHALATION) NIGHTLY
COMMUNITY
Start: 2020-12-22

## 2021-01-01 RX ORDER — PREDNISONE 20 MG/1
40 TABLET ORAL DAILY
Status: DISCONTINUED | OUTPATIENT
Start: 2021-01-01 | End: 2021-01-01 | Stop reason: HOSPADM

## 2021-01-01 RX ORDER — DRONABINOL 2.5 MG/1
2.5 CAPSULE ORAL 2 TIMES DAILY
Status: DISCONTINUED | OUTPATIENT
Start: 2021-01-01 | End: 2021-01-01 | Stop reason: HOSPADM

## 2021-01-01 RX ORDER — DEXTROSE, SODIUM CHLORIDE, SODIUM LACTATE, POTASSIUM CHLORIDE, AND CALCIUM CHLORIDE 5; .6; .31; .03; .02 G/100ML; G/100ML; G/100ML; G/100ML; G/100ML
INJECTION, SOLUTION INTRAVENOUS CONTINUOUS
Status: DISCONTINUED | OUTPATIENT
Start: 2021-01-01 | End: 2021-01-01 | Stop reason: HOSPADM

## 2021-01-01 RX ORDER — SODIUM BICARBONATE 650 MG/1
1300 TABLET ORAL 2 TIMES DAILY
Status: DISCONTINUED | OUTPATIENT
Start: 2021-01-01 | End: 2021-01-01 | Stop reason: HOSPADM

## 2021-01-01 RX ORDER — SODIUM CHLORIDE 0.9 % (FLUSH) 0.9 %
5-40 SYRINGE (ML) INJECTION EVERY 12 HOURS SCHEDULED
Status: DISCONTINUED | OUTPATIENT
Start: 2021-01-01 | End: 2021-01-01 | Stop reason: HOSPADM

## 2021-01-01 RX ORDER — ATORVASTATIN CALCIUM 20 MG/1
20 TABLET, FILM COATED ORAL DAILY
Status: DISCONTINUED | OUTPATIENT
Start: 2021-01-01 | End: 2021-01-01 | Stop reason: HOSPADM

## 2021-01-01 RX ORDER — ACETAMINOPHEN 650 MG/1
650 SUPPOSITORY RECTAL EVERY 6 HOURS PRN
Status: DISCONTINUED | OUTPATIENT
Start: 2021-01-01 | End: 2021-01-01

## 2021-01-01 RX ORDER — IPRATROPIUM BROMIDE AND ALBUTEROL SULFATE 2.5; .5 MG/3ML; MG/3ML
1 SOLUTION RESPIRATORY (INHALATION) EVERY 4 HOURS PRN
Status: DISCONTINUED | OUTPATIENT
Start: 2021-01-01 | End: 2021-01-01 | Stop reason: HOSPADM

## 2021-01-01 RX ORDER — ONDANSETRON 4 MG/1
4 TABLET, ORALLY DISINTEGRATING ORAL EVERY 8 HOURS PRN
COMMUNITY
End: 2021-01-01 | Stop reason: ALTCHOICE

## 2021-01-01 RX ORDER — GUAIFENESIN 400 MG/1
400 TABLET ORAL PRN
Qty: 30 TABLET | Refills: 0 | Status: SHIPPED | OUTPATIENT
Start: 2021-01-01 | End: 2021-01-01

## 2021-01-01 RX ORDER — SODIUM CHLORIDE 9 MG/ML
25 INJECTION, SOLUTION INTRAVENOUS PRN
Status: DISCONTINUED | OUTPATIENT
Start: 2021-01-01 | End: 2021-01-01 | Stop reason: SDUPTHER

## 2021-01-01 RX ORDER — HEPARIN SODIUM (PORCINE) LOCK FLUSH IV SOLN 100 UNIT/ML 100 UNIT/ML
300 SOLUTION INTRAVENOUS PRN
Status: DISCONTINUED | OUTPATIENT
Start: 2021-01-01 | End: 2021-01-01 | Stop reason: SDUPTHER

## 2021-01-01 RX ORDER — SULFAMETHOXAZOLE AND TRIMETHOPRIM 800; 160 MG/1; MG/1
1 TABLET ORAL 2 TIMES DAILY
Status: ON HOLD | COMMUNITY
Start: 2021-01-01 | End: 2021-01-01 | Stop reason: HOSPADM

## 2021-01-01 RX ORDER — ASPIRIN 81 MG/1
81 TABLET, CHEWABLE ORAL EVERY OTHER DAY
Status: DISCONTINUED | OUTPATIENT
Start: 2021-01-01 | End: 2021-01-01 | Stop reason: HOSPADM

## 2021-01-01 RX ORDER — METHYLPREDNISOLONE SODIUM SUCCINATE 40 MG/ML
40 INJECTION, POWDER, LYOPHILIZED, FOR SOLUTION INTRAMUSCULAR; INTRAVENOUS DAILY
Status: COMPLETED | OUTPATIENT
Start: 2021-01-01 | End: 2021-01-01

## 2021-01-01 RX ORDER — ALBUTEROL SULFATE 2.5 MG/3ML
2.5 SOLUTION RESPIRATORY (INHALATION) EVERY 6 HOURS PRN
Qty: 360 ML | Refills: 3 | Status: SHIPPED | OUTPATIENT
Start: 2021-01-01

## 2021-01-01 RX ORDER — METHYLPREDNISOLONE SODIUM SUCCINATE 40 MG/ML
40 INJECTION, POWDER, LYOPHILIZED, FOR SOLUTION INTRAMUSCULAR; INTRAVENOUS EVERY 12 HOURS
Status: COMPLETED | OUTPATIENT
Start: 2021-01-01 | End: 2021-01-01

## 2021-01-01 RX ORDER — SODIUM CHLORIDE 9 MG/ML
INJECTION, SOLUTION INTRAVENOUS ONCE
Status: COMPLETED | OUTPATIENT
Start: 2021-01-01 | End: 2021-01-01

## 2021-01-01 RX ORDER — METOPROLOL SUCCINATE 25 MG/1
25 TABLET, EXTENDED RELEASE ORAL DAILY
Status: DISCONTINUED | OUTPATIENT
Start: 2021-01-01 | End: 2021-01-01 | Stop reason: HOSPADM

## 2021-01-01 RX ORDER — SODIUM CHLORIDE 9 MG/ML
INJECTION, SOLUTION INTRAVENOUS CONTINUOUS
Status: DISCONTINUED | OUTPATIENT
Start: 2021-01-01 | End: 2021-01-01 | Stop reason: HOSPADM

## 2021-01-01 RX ORDER — METOPROLOL TARTRATE 5 MG/5ML
INJECTION INTRAVENOUS
Status: DISCONTINUED
Start: 2021-01-01 | End: 2021-01-01 | Stop reason: DRUGHIGH

## 2021-01-01 RX ORDER — HEPARIN SODIUM (PORCINE) LOCK FLUSH IV SOLN 100 UNIT/ML 100 UNIT/ML
SOLUTION INTRAVENOUS
Status: DISCONTINUED
Start: 2021-01-01 | End: 2021-01-01 | Stop reason: HOSPADM

## 2021-01-01 RX ORDER — IPRATROPIUM BROMIDE AND ALBUTEROL SULFATE 2.5; .5 MG/3ML; MG/3ML
1 SOLUTION RESPIRATORY (INHALATION) EVERY 4 HOURS PRN
Status: DISCONTINUED | OUTPATIENT
Start: 2021-01-01 | End: 2021-01-01

## 2021-01-01 RX ORDER — PREDNISONE 20 MG/1
20 TABLET ORAL DAILY
Qty: 3 TABLET | Refills: 0 | Status: SHIPPED | OUTPATIENT
Start: 2021-01-01 | End: 2021-01-01

## 2021-01-01 RX ORDER — SODIUM BICARBONATE 650 MG/1
1300 TABLET ORAL 2 TIMES DAILY
Qty: 60 TABLET | Refills: 0 | Status: SHIPPED | OUTPATIENT
Start: 2021-01-01 | End: 2021-01-01

## 2021-01-01 RX ORDER — SODIUM CHLORIDE, SODIUM LACTATE, POTASSIUM CHLORIDE, CALCIUM CHLORIDE 600; 310; 30; 20 MG/100ML; MG/100ML; MG/100ML; MG/100ML
INJECTION, SOLUTION INTRAVENOUS CONTINUOUS
Status: DISCONTINUED | OUTPATIENT
Start: 2021-01-01 | End: 2021-01-01

## 2021-01-01 RX ORDER — DEXTROSE MONOHYDRATE 25 G/50ML
12.5 INJECTION, SOLUTION INTRAVENOUS ONCE
Status: COMPLETED | OUTPATIENT
Start: 2021-01-01 | End: 2021-01-01

## 2021-01-01 RX ORDER — SODIUM CHLORIDE 0.9 % (FLUSH) 0.9 %
10 SYRINGE (ML) INJECTION PRN
Status: ACTIVE | OUTPATIENT
Start: 2021-01-01 | End: 2021-01-01

## 2021-01-01 RX ORDER — VITAMIN B COMPLEX
2000 TABLET ORAL DAILY
Status: DISCONTINUED | OUTPATIENT
Start: 2021-01-01 | End: 2021-01-01 | Stop reason: HOSPADM

## 2021-01-01 RX ORDER — POLYETHYLENE GLYCOL 3350 17 G/17G
17 POWDER, FOR SOLUTION ORAL DAILY PRN
Status: DISCONTINUED | OUTPATIENT
Start: 2021-01-01 | End: 2021-01-01 | Stop reason: HOSPADM

## 2021-01-01 RX ORDER — ONDANSETRON 2 MG/ML
4 INJECTION INTRAMUSCULAR; INTRAVENOUS EVERY 6 HOURS PRN
Status: DISCONTINUED | OUTPATIENT
Start: 2021-01-01 | End: 2021-01-01 | Stop reason: ALTCHOICE

## 2021-01-01 RX ORDER — LIDOCAINE HYDROCHLORIDE 10 MG/ML
5 INJECTION, SOLUTION EPIDURAL; INFILTRATION; INTRACAUDAL; PERINEURAL ONCE
Status: DISCONTINUED | OUTPATIENT
Start: 2021-01-01 | End: 2021-01-01 | Stop reason: HOSPADM

## 2021-01-01 RX ORDER — LEVOFLOXACIN 500 MG/1
500 TABLET, FILM COATED ORAL EVERY OTHER DAY
Qty: 5 TABLET | Refills: 0 | Status: SHIPPED | OUTPATIENT
Start: 2021-01-01 | End: 2021-01-01

## 2021-01-01 RX ORDER — FAMOTIDINE 20 MG/1
20 TABLET, FILM COATED ORAL 2 TIMES DAILY
Status: DISCONTINUED | OUTPATIENT
Start: 2021-01-01 | End: 2021-01-01 | Stop reason: HOSPADM

## 2021-01-01 RX ORDER — DEXTROSE, SODIUM CHLORIDE, AND POTASSIUM CHLORIDE 5; .45; .15 G/100ML; G/100ML; G/100ML
INJECTION INTRAVENOUS DAILY
Status: DISCONTINUED | OUTPATIENT
Start: 2021-01-01 | End: 2021-01-01

## 2021-01-01 RX ORDER — DEXTROSE, SODIUM CHLORIDE, AND POTASSIUM CHLORIDE 5; .45; .15 G/100ML; G/100ML; G/100ML
INJECTION INTRAVENOUS CONTINUOUS
Status: DISCONTINUED | OUTPATIENT
Start: 2021-01-01 | End: 2021-01-01 | Stop reason: HOSPADM

## 2021-01-01 RX ORDER — CEFDINIR 300 MG/1
300 CAPSULE ORAL 2 TIMES DAILY
Qty: 14 CAPSULE | Refills: 0 | Status: SHIPPED | OUTPATIENT
Start: 2021-01-01 | End: 2021-01-01

## 2021-01-01 RX ORDER — ACETAMINOPHEN 325 MG/1
650 TABLET ORAL EVERY 6 HOURS PRN
Status: DISCONTINUED | OUTPATIENT
Start: 2021-01-01 | End: 2021-01-01

## 2021-01-01 RX ORDER — SODIUM CHLORIDE 0.9 % (FLUSH) 0.9 %
5-40 SYRINGE (ML) INJECTION PRN
Status: DISCONTINUED | OUTPATIENT
Start: 2021-01-01 | End: 2021-01-01 | Stop reason: SDUPTHER

## 2021-01-01 RX ORDER — PANTOPRAZOLE SODIUM 40 MG/1
40 TABLET, DELAYED RELEASE ORAL
Status: DISCONTINUED | OUTPATIENT
Start: 2021-01-01 | End: 2021-01-01

## 2021-01-01 RX ORDER — METOPROLOL TARTRATE 5 MG/5ML
2.5 INJECTION INTRAVENOUS EVERY 6 HOURS
Status: DISCONTINUED | OUTPATIENT
Start: 2021-01-01 | End: 2021-01-01 | Stop reason: HOSPADM

## 2021-01-01 RX ORDER — ACETYLCYSTEINE 100 MG/ML
4 SOLUTION ORAL; RESPIRATORY (INHALATION) 2 TIMES DAILY
Status: DISCONTINUED | OUTPATIENT
Start: 2021-01-01 | End: 2021-01-01

## 2021-01-01 RX ORDER — SODIUM CHLORIDE 9 MG/ML
INJECTION, SOLUTION INTRAVENOUS EVERY 12 HOURS
Status: DISCONTINUED | OUTPATIENT
Start: 2021-01-01 | End: 2021-01-01 | Stop reason: ALTCHOICE

## 2021-01-01 RX ORDER — SODIUM CHLORIDE 9 MG/ML
INJECTION, SOLUTION INTRAVENOUS CONTINUOUS
Status: ACTIVE | OUTPATIENT
Start: 2021-01-01 | End: 2021-01-01

## 2021-01-01 RX ORDER — PANTOPRAZOLE SODIUM 40 MG/1
40 TABLET, DELAYED RELEASE ORAL DAILY
Status: DISCONTINUED | OUTPATIENT
Start: 2021-01-01 | End: 2021-01-01 | Stop reason: HOSPADM

## 2021-01-01 RX ADMIN — ONDANSETRON 4 MG: 2 INJECTION INTRAMUSCULAR; INTRAVENOUS at 17:49

## 2021-01-01 RX ADMIN — POTASSIUM CHLORIDE: 2 INJECTION, SOLUTION, CONCENTRATE INTRAVENOUS at 11:46

## 2021-01-01 RX ADMIN — SODIUM CHLORIDE, PRESERVATIVE FREE 10 ML: 5 INJECTION INTRAVENOUS at 22:02

## 2021-01-01 RX ADMIN — WATER 1000 MG: 1 INJECTION INTRAMUSCULAR; INTRAVENOUS; SUBCUTANEOUS at 20:01

## 2021-01-01 RX ADMIN — POTASSIUM CHLORIDE 10 MEQ: 10 INJECTION, SOLUTION INTRAVENOUS at 14:30

## 2021-01-01 RX ADMIN — IPRATROPIUM BROMIDE 0.5 MG: 0.5 SOLUTION RESPIRATORY (INHALATION) at 16:42

## 2021-01-01 RX ADMIN — SODIUM CHLORIDE, PRESERVATIVE FREE 10 ML: 5 INJECTION INTRAVENOUS at 20:00

## 2021-01-01 RX ADMIN — POTASSIUM CHLORIDE: 2 INJECTION, SOLUTION, CONCENTRATE INTRAVENOUS at 02:00

## 2021-01-01 RX ADMIN — DRONABINOL 2.5 MG: 2.5 CAPSULE ORAL at 09:20

## 2021-01-01 RX ADMIN — DRONABINOL 2.5 MG: 2.5 CAPSULE ORAL at 21:02

## 2021-01-01 RX ADMIN — BUDESONIDE 500 MCG: 0.5 SUSPENSION RESPIRATORY (INHALATION) at 20:52

## 2021-01-01 RX ADMIN — IPRATROPIUM BROMIDE 0.5 MG: 0.5 SOLUTION RESPIRATORY (INHALATION) at 11:53

## 2021-01-01 RX ADMIN — LEVALBUTEROL HYDROCHLORIDE 0.63 MG: 0.63 SOLUTION RESPIRATORY (INHALATION) at 08:38

## 2021-01-01 RX ADMIN — METOPROLOL TARTRATE 25 MG: 25 TABLET, FILM COATED ORAL at 22:50

## 2021-01-01 RX ADMIN — Medication 300 UNITS: at 15:03

## 2021-01-01 RX ADMIN — PANTOPRAZOLE SODIUM 40 MG: 40 TABLET, DELAYED RELEASE ORAL at 15:28

## 2021-01-01 RX ADMIN — IOHEXOL 50 ML: 240 INJECTION, SOLUTION INTRATHECAL; INTRAVASCULAR; INTRAVENOUS; ORAL at 14:45

## 2021-01-01 RX ADMIN — LEVALBUTEROL HYDROCHLORIDE 0.63 MG: 0.63 SOLUTION RESPIRATORY (INHALATION) at 08:22

## 2021-01-01 RX ADMIN — ENOXAPARIN SODIUM 30 MG: 30 INJECTION SUBCUTANEOUS at 08:54

## 2021-01-01 RX ADMIN — BUDESONIDE 500 MCG: 0.5 SUSPENSION RESPIRATORY (INHALATION) at 09:31

## 2021-01-01 RX ADMIN — ACETYLCYSTEINE 400 MG: 100 SOLUTION ORAL; RESPIRATORY (INHALATION) at 13:52

## 2021-01-01 RX ADMIN — SODIUM CHLORIDE: 9 INJECTION, SOLUTION INTRAVENOUS at 17:05

## 2021-01-01 RX ADMIN — SODIUM BICARBONATE: 84 INJECTION, SOLUTION INTRAVENOUS at 01:13

## 2021-01-01 RX ADMIN — Medication 5 ML: at 23:15

## 2021-01-01 RX ADMIN — SODIUM CHLORIDE, PRESERVATIVE FREE 10 ML: 5 INJECTION INTRAVENOUS at 13:46

## 2021-01-01 RX ADMIN — EPOETIN ALFA-EPBX 10000 UNITS: 10000 INJECTION, SOLUTION INTRAVENOUS; SUBCUTANEOUS at 15:16

## 2021-01-01 RX ADMIN — METHYLPREDNISOLONE SODIUM SUCCINATE 40 MG: 40 INJECTION, POWDER, LYOPHILIZED, FOR SOLUTION INTRAMUSCULAR; INTRAVENOUS at 23:59

## 2021-01-01 RX ADMIN — SODIUM CHLORIDE, PRESERVATIVE FREE 300 UNITS: 5 INJECTION INTRAVENOUS at 20:36

## 2021-01-01 RX ADMIN — METOPROLOL TARTRATE 37.5 MG: 25 TABLET, FILM COATED ORAL at 08:55

## 2021-01-01 RX ADMIN — DRONABINOL 5 MG: 2.5 CAPSULE ORAL at 06:26

## 2021-01-01 RX ADMIN — SODIUM CHLORIDE, POTASSIUM CHLORIDE, SODIUM LACTATE AND CALCIUM CHLORIDE: 600; 310; 30; 20 INJECTION, SOLUTION INTRAVENOUS at 17:29

## 2021-01-01 RX ADMIN — POTASSIUM CHLORIDE, DEXTROSE MONOHYDRATE AND SODIUM CHLORIDE: 150; 5; 450 INJECTION, SOLUTION INTRAVENOUS at 01:33

## 2021-01-01 RX ADMIN — SODIUM CHLORIDE, PRESERVATIVE FREE 10 ML: 5 INJECTION INTRAVENOUS at 08:58

## 2021-01-01 RX ADMIN — SODIUM CHLORIDE, PRESERVATIVE FREE 10 ML: 5 INJECTION INTRAVENOUS at 14:59

## 2021-01-01 RX ADMIN — FAMOTIDINE 20 MG: 20 TABLET ORAL at 20:14

## 2021-01-01 RX ADMIN — ARFORMOTEROL TARTRATE 15 MCG: 15 SOLUTION RESPIRATORY (INHALATION) at 09:08

## 2021-01-01 RX ADMIN — FAMOTIDINE 20 MG: 20 TABLET ORAL at 22:01

## 2021-01-01 RX ADMIN — PROMETHAZINE HYDROCHLORIDE 12.5 MG: 25 TABLET ORAL at 21:18

## 2021-01-01 RX ADMIN — ONDANSETRON 4 MG: 2 INJECTION INTRAMUSCULAR; INTRAVENOUS at 22:40

## 2021-01-01 RX ADMIN — IPRATROPIUM BROMIDE 0.5 MG: 0.5 SOLUTION RESPIRATORY (INHALATION) at 08:07

## 2021-01-01 RX ADMIN — METHYLPREDNISOLONE SODIUM SUCCINATE 40 MG: 40 INJECTION, POWDER, LYOPHILIZED, FOR SOLUTION INTRAMUSCULAR; INTRAVENOUS at 00:39

## 2021-01-01 RX ADMIN — Medication 300 UNITS: at 14:20

## 2021-01-01 RX ADMIN — GUAIFENESIN 400 MG: 400 TABLET ORAL at 12:01

## 2021-01-01 RX ADMIN — SODIUM CHLORIDE, PRESERVATIVE FREE 300 UNITS: 5 INJECTION INTRAVENOUS at 08:16

## 2021-01-01 RX ADMIN — METOPROLOL TARTRATE 37.5 MG: 25 TABLET, FILM COATED ORAL at 09:21

## 2021-01-01 RX ADMIN — PANTOPRAZOLE SODIUM 40 MG: 40 TABLET, DELAYED RELEASE ORAL at 08:55

## 2021-01-01 RX ADMIN — BUDESONIDE 500 MCG: 0.5 SUSPENSION RESPIRATORY (INHALATION) at 08:19

## 2021-01-01 RX ADMIN — SODIUM CHLORIDE, PRESERVATIVE FREE 10 ML: 5 INJECTION INTRAVENOUS at 13:32

## 2021-01-01 RX ADMIN — ATORVASTATIN CALCIUM 20 MG: 20 TABLET, FILM COATED ORAL at 08:24

## 2021-01-01 RX ADMIN — IPRATROPIUM BROMIDE 0.5 MG: 0.5 SOLUTION RESPIRATORY (INHALATION) at 21:14

## 2021-01-01 RX ADMIN — LEVALBUTEROL HYDROCHLORIDE 0.63 MG: 0.63 SOLUTION RESPIRATORY (INHALATION) at 16:42

## 2021-01-01 RX ADMIN — SODIUM CHLORIDE, PRESERVATIVE FREE 10 ML: 5 INJECTION INTRAVENOUS at 13:35

## 2021-01-01 RX ADMIN — SODIUM CHLORIDE, PRESERVATIVE FREE 10 ML: 5 INJECTION INTRAVENOUS at 23:31

## 2021-01-01 RX ADMIN — SODIUM BICARBONATE 1300 MG: 650 TABLET ORAL at 20:41

## 2021-01-01 RX ADMIN — GUAIFENESIN 400 MG: 400 TABLET ORAL at 12:54

## 2021-01-01 RX ADMIN — Medication 10 ML: at 21:07

## 2021-01-01 RX ADMIN — SODIUM CHLORIDE, PRESERVATIVE FREE 10 ML: 5 INJECTION INTRAVENOUS at 14:01

## 2021-01-01 RX ADMIN — ONDANSETRON 4 MG: 2 INJECTION INTRAMUSCULAR; INTRAVENOUS at 04:17

## 2021-01-01 RX ADMIN — SODIUM CHLORIDE, PRESERVATIVE FREE 10 ML: 5 INJECTION INTRAVENOUS at 14:36

## 2021-01-01 RX ADMIN — LEVOFLOXACIN 500 MG: 500 TABLET, FILM COATED ORAL at 13:40

## 2021-01-01 RX ADMIN — BUDESONIDE 500 MCG: 0.5 SUSPENSION RESPIRATORY (INHALATION) at 07:34

## 2021-01-01 RX ADMIN — ARFORMOTEROL TARTRATE 15 MCG: 15 SOLUTION RESPIRATORY (INHALATION) at 09:31

## 2021-01-01 RX ADMIN — ATORVASTATIN CALCIUM 20 MG: 20 TABLET, FILM COATED ORAL at 21:19

## 2021-01-01 RX ADMIN — POTASSIUM CHLORIDE, DEXTROSE MONOHYDRATE AND SODIUM CHLORIDE: 150; 5; 450 INJECTION, SOLUTION INTRAVENOUS at 00:46

## 2021-01-01 RX ADMIN — WATER 1000 MG: 1 INJECTION INTRAMUSCULAR; INTRAVENOUS; SUBCUTANEOUS at 12:08

## 2021-01-01 RX ADMIN — SODIUM CHLORIDE, PRESERVATIVE FREE 10 ML: 5 INJECTION INTRAVENOUS at 21:04

## 2021-01-01 RX ADMIN — METHYLPREDNISOLONE SODIUM SUCCINATE 40 MG: 40 INJECTION, POWDER, LYOPHILIZED, FOR SOLUTION INTRAMUSCULAR; INTRAVENOUS at 05:11

## 2021-01-01 RX ADMIN — HEPARIN SODIUM (PORCINE) LOCK FLUSH IV SOLN 100 UNIT/ML 300 UNITS: 100 SOLUTION at 13:37

## 2021-01-01 RX ADMIN — BUDESONIDE 500 MCG: 0.5 SUSPENSION RESPIRATORY (INHALATION) at 09:29

## 2021-01-01 RX ADMIN — METOPROLOL TARTRATE 2.5 MG: 5 INJECTION, SOLUTION INTRAVENOUS at 01:35

## 2021-01-01 RX ADMIN — SODIUM CHLORIDE, PRESERVATIVE FREE 20 ML: 5 INJECTION INTRAVENOUS at 17:45

## 2021-01-01 RX ADMIN — POTASSIUM CHLORIDE, DEXTROSE MONOHYDRATE AND SODIUM CHLORIDE: 150; 5; 450 INJECTION, SOLUTION INTRAVENOUS at 13:46

## 2021-01-01 RX ADMIN — HEPARIN SODIUM (PORCINE) LOCK FLUSH IV SOLN 100 UNIT/ML 500 UNITS: 100 SOLUTION at 13:36

## 2021-01-01 RX ADMIN — ASPIRIN 81 MG: 81 TABLET, COATED ORAL at 10:06

## 2021-01-01 RX ADMIN — GUAIFENESIN 400 MG: 400 TABLET ORAL at 08:55

## 2021-01-01 RX ADMIN — SODIUM CHLORIDE, PRESERVATIVE FREE 10 ML: 5 INJECTION INTRAVENOUS at 10:01

## 2021-01-01 RX ADMIN — HEPARIN SODIUM 5000 UNITS: 10000 INJECTION, SOLUTION INTRAVENOUS; SUBCUTANEOUS at 08:41

## 2021-01-01 RX ADMIN — SODIUM CHLORIDE, PRESERVATIVE FREE 10 ML: 5 INJECTION INTRAVENOUS at 10:23

## 2021-01-01 RX ADMIN — ARFORMOTEROL TARTRATE 15 MCG: 15 SOLUTION RESPIRATORY (INHALATION) at 07:34

## 2021-01-01 RX ADMIN — HEPARIN SODIUM (PORCINE) LOCK FLUSH IV SOLN 100 UNIT/ML 500 UNITS: 100 SOLUTION at 13:34

## 2021-01-01 RX ADMIN — POTASSIUM CHLORIDE: 2 INJECTION, SOLUTION, CONCENTRATE INTRAVENOUS at 18:01

## 2021-01-01 RX ADMIN — DRONABINOL 2.5 MG: 2.5 CAPSULE ORAL at 21:19

## 2021-01-01 RX ADMIN — GUAIFENESIN 400 MG: 400 TABLET ORAL at 09:20

## 2021-01-01 RX ADMIN — BUDESONIDE 500 MCG: 0.5 SUSPENSION RESPIRATORY (INHALATION) at 20:36

## 2021-01-01 RX ADMIN — ASPIRIN 81 MG: 81 TABLET, COATED ORAL at 08:56

## 2021-01-01 RX ADMIN — IPRATROPIUM BROMIDE 0.5 MG: 0.5 SOLUTION RESPIRATORY (INHALATION) at 20:27

## 2021-01-01 RX ADMIN — DIATRIZOATE MEGLUMINE AND DIATRIZOATE SODIUM 30 ML: 660; 100 LIQUID ORAL; RECTAL at 11:18

## 2021-01-01 RX ADMIN — DRONABINOL 2.5 MG: 2.5 CAPSULE ORAL at 08:54

## 2021-01-01 RX ADMIN — PANTOPRAZOLE SODIUM 40 MG: 40 INJECTION, POWDER, FOR SOLUTION INTRAVENOUS at 08:18

## 2021-01-01 RX ADMIN — SODIUM CHLORIDE, PRESERVATIVE FREE 10 ML: 5 INJECTION INTRAVENOUS at 20:31

## 2021-01-01 RX ADMIN — SODIUM CHLORIDE, PRESERVATIVE FREE 10 ML: 5 INJECTION INTRAVENOUS at 14:15

## 2021-01-01 RX ADMIN — SODIUM CHLORIDE, PRESERVATIVE FREE 10 ML: 5 INJECTION INTRAVENOUS at 19:21

## 2021-01-01 RX ADMIN — IPRATROPIUM BROMIDE 0.5 MG: 0.5 SOLUTION RESPIRATORY (INHALATION) at 09:31

## 2021-01-01 RX ADMIN — POTASSIUM CHLORIDE, DEXTROSE MONOHYDRATE AND SODIUM CHLORIDE: 150; 5; 450 INJECTION, SOLUTION INTRAVENOUS at 05:57

## 2021-01-01 RX ADMIN — METOPROLOL TARTRATE 37.5 MG: 25 TABLET, FILM COATED ORAL at 21:04

## 2021-01-01 RX ADMIN — PROCHLORPERAZINE EDISYLATE 5 MG: 5 INJECTION INTRAMUSCULAR; INTRAVENOUS at 22:55

## 2021-01-01 RX ADMIN — EPOETIN ALFA-EPBX 10000 UNITS: 10000 INJECTION, SOLUTION INTRAVENOUS; SUBCUTANEOUS at 16:28

## 2021-01-01 RX ADMIN — LIDOCAINE HYDROCHLORIDE 1.5 ML: 10 INJECTION, SOLUTION EPIDURAL; INFILTRATION; INTRACAUDAL; PERINEURAL at 16:30

## 2021-01-01 RX ADMIN — SODIUM CHLORIDE, PRESERVATIVE FREE 300 UNITS: 5 INJECTION INTRAVENOUS at 20:14

## 2021-01-01 RX ADMIN — Medication 10 ML: at 00:45

## 2021-01-01 RX ADMIN — SODIUM BICARBONATE: 84 INJECTION, SOLUTION INTRAVENOUS at 11:56

## 2021-01-01 RX ADMIN — METOPROLOL TARTRATE 25 MG: 25 TABLET, FILM COATED ORAL at 20:14

## 2021-01-01 RX ADMIN — Medication 10 ML: at 13:27

## 2021-01-01 RX ADMIN — POTASSIUM PHOSPHATE, MONOBASIC AND POTASSIUM PHOSPHATE, DIBASIC 30 MMOL: 224; 236 INJECTION, SOLUTION, CONCENTRATE INTRAVENOUS at 10:01

## 2021-01-01 RX ADMIN — BUDESONIDE 500 MCG: 0.5 SUSPENSION RESPIRATORY (INHALATION) at 09:09

## 2021-01-01 RX ADMIN — METHYLPREDNISOLONE SODIUM SUCCINATE 40 MG: 40 INJECTION, POWDER, LYOPHILIZED, FOR SOLUTION INTRAMUSCULAR; INTRAVENOUS at 13:12

## 2021-01-01 RX ADMIN — SODIUM CHLORIDE, POTASSIUM CHLORIDE, SODIUM LACTATE AND CALCIUM CHLORIDE: 600; 310; 30; 20 INJECTION, SOLUTION INTRAVENOUS at 14:03

## 2021-01-01 RX ADMIN — SODIUM CHLORIDE, PRESERVATIVE FREE 10 ML: 5 INJECTION INTRAVENOUS at 10:17

## 2021-01-01 RX ADMIN — WATER 10 ML: 1 INJECTION INTRAMUSCULAR; INTRAVENOUS; SUBCUTANEOUS at 05:11

## 2021-01-01 RX ADMIN — IPRATROPIUM BROMIDE 0.5 MG: 0.5 SOLUTION RESPIRATORY (INHALATION) at 08:13

## 2021-01-01 RX ADMIN — ARFORMOTEROL TARTRATE 15 MCG: 15 SOLUTION RESPIRATORY (INHALATION) at 07:53

## 2021-01-01 RX ADMIN — METOPROLOL TARTRATE 25 MG: 25 TABLET, FILM COATED ORAL at 08:33

## 2021-01-01 RX ADMIN — IPRATROPIUM BROMIDE 0.5 MG: 0.5 SOLUTION RESPIRATORY (INHALATION) at 11:08

## 2021-01-01 RX ADMIN — HEPARIN SODIUM 5000 UNITS: 10000 INJECTION, SOLUTION INTRAVENOUS; SUBCUTANEOUS at 00:23

## 2021-01-01 RX ADMIN — LEVALBUTEROL HYDROCHLORIDE 0.63 MG: 0.63 SOLUTION RESPIRATORY (INHALATION) at 19:57

## 2021-01-01 RX ADMIN — METOPROLOL TARTRATE 37.5 MG: 25 TABLET, FILM COATED ORAL at 10:06

## 2021-01-01 RX ADMIN — IPRATROPIUM BROMIDE 0.5 MG: 0.5 SOLUTION RESPIRATORY (INHALATION) at 19:57

## 2021-01-01 RX ADMIN — METOPROLOL TARTRATE 37.5 MG: 25 TABLET, FILM COATED ORAL at 20:42

## 2021-01-01 RX ADMIN — DOXYCYCLINE 100 MG: 100 INJECTION, POWDER, LYOPHILIZED, FOR SOLUTION INTRAVENOUS at 16:50

## 2021-01-01 RX ADMIN — EPOETIN ALFA-EPBX 10000 UNITS: 10000 INJECTION, SOLUTION INTRAVENOUS; SUBCUTANEOUS at 15:12

## 2021-01-01 RX ADMIN — HEPARIN SODIUM (PORCINE) LOCK FLUSH IV SOLN 100 UNIT/ML 300 UNITS: 100 SOLUTION at 13:59

## 2021-01-01 RX ADMIN — PANTOPRAZOLE SODIUM 40 MG: 40 INJECTION, POWDER, FOR SOLUTION INTRAVENOUS at 10:17

## 2021-01-01 RX ADMIN — HEPARIN SODIUM (PORCINE) LOCK FLUSH IV SOLN 100 UNIT/ML 300 UNITS: 100 SOLUTION at 13:35

## 2021-01-01 RX ADMIN — POTASSIUM CHLORIDE 10 MEQ: 10 INJECTION, SOLUTION INTRAVENOUS at 12:00

## 2021-01-01 RX ADMIN — METOPROLOL TARTRATE 2.5 MG: 5 INJECTION, SOLUTION INTRAVENOUS at 00:00

## 2021-01-01 RX ADMIN — SODIUM CHLORIDE, PRESERVATIVE FREE 10 ML: 5 INJECTION INTRAVENOUS at 14:08

## 2021-01-01 RX ADMIN — SODIUM CHLORIDE, PRESERVATIVE FREE 300 UNITS: 5 INJECTION INTRAVENOUS at 22:02

## 2021-01-01 RX ADMIN — SODIUM CHLORIDE, PRESERVATIVE FREE 10 ML: 5 INJECTION INTRAVENOUS at 20:03

## 2021-01-01 RX ADMIN — ATORVASTATIN CALCIUM 20 MG: 20 TABLET, FILM COATED ORAL at 22:01

## 2021-01-01 RX ADMIN — IPRATROPIUM BROMIDE 0.5 MG: 0.5 SOLUTION RESPIRATORY (INHALATION) at 16:31

## 2021-01-01 RX ADMIN — PANTOPRAZOLE SODIUM 40 MG: 40 INJECTION, POWDER, FOR SOLUTION INTRAVENOUS at 20:00

## 2021-01-01 RX ADMIN — METOPROLOL TARTRATE 25 MG: 25 TABLET, FILM COATED ORAL at 09:28

## 2021-01-01 RX ADMIN — SODIUM BICARBONATE: 84 INJECTION, SOLUTION INTRAVENOUS at 00:00

## 2021-01-01 RX ADMIN — SODIUM CHLORIDE, PRESERVATIVE FREE 10 ML: 5 INJECTION INTRAVENOUS at 13:58

## 2021-01-01 RX ADMIN — DRONABINOL 2.5 MG: 2.5 CAPSULE ORAL at 10:06

## 2021-01-01 RX ADMIN — POTASSIUM CHLORIDE 10 MEQ: 10 INJECTION, SOLUTION INTRAVENOUS at 14:10

## 2021-01-01 RX ADMIN — PANTOPRAZOLE SODIUM 40 MG: 40 TABLET, DELAYED RELEASE ORAL at 10:07

## 2021-01-01 RX ADMIN — ATORVASTATIN CALCIUM 20 MG: 20 TABLET, FILM COATED ORAL at 01:50

## 2021-01-01 RX ADMIN — LEVALBUTEROL HYDROCHLORIDE 0.63 MG: 0.63 SOLUTION RESPIRATORY (INHALATION) at 04:18

## 2021-01-01 RX ADMIN — DRONABINOL 5 MG: 2.5 CAPSULE ORAL at 16:46

## 2021-01-01 RX ADMIN — LEVALBUTEROL HYDROCHLORIDE 0.63 MG: 0.63 SOLUTION RESPIRATORY (INHALATION) at 12:15

## 2021-01-01 RX ADMIN — METOPROLOL TARTRATE 2.5 MG: 5 INJECTION, SOLUTION INTRAVENOUS at 08:24

## 2021-01-01 RX ADMIN — HEPARIN SODIUM (PORCINE) LOCK FLUSH IV SOLN 100 UNIT/ML 300 UNITS: 100 SOLUTION at 14:00

## 2021-01-01 RX ADMIN — LEVALBUTEROL HYDROCHLORIDE 0.63 MG: 0.63 SOLUTION RESPIRATORY (INHALATION) at 08:17

## 2021-01-01 RX ADMIN — ARFORMOTEROL TARTRATE 15 MCG: 15 SOLUTION RESPIRATORY (INHALATION) at 20:27

## 2021-01-01 RX ADMIN — ONDANSETRON 4 MG: 2 INJECTION INTRAMUSCULAR; INTRAVENOUS at 07:48

## 2021-01-01 RX ADMIN — SODIUM CHLORIDE, PRESERVATIVE FREE 10 ML: 5 INJECTION INTRAVENOUS at 13:33

## 2021-01-01 RX ADMIN — HEPARIN SODIUM 5000 UNITS: 10000 INJECTION, SOLUTION INTRAVENOUS; SUBCUTANEOUS at 10:57

## 2021-01-01 RX ADMIN — BUDESONIDE 500 MCG: 0.5 SUSPENSION RESPIRATORY (INHALATION) at 08:13

## 2021-01-01 RX ADMIN — BUDESONIDE 500 MCG: 0.5 SUSPENSION RESPIRATORY (INHALATION) at 21:11

## 2021-01-01 RX ADMIN — PANTOPRAZOLE SODIUM: 40 INJECTION, POWDER, FOR SOLUTION INTRAVENOUS at 18:42

## 2021-01-01 RX ADMIN — METHYLPREDNISOLONE SODIUM SUCCINATE 40 MG: 40 INJECTION, POWDER, LYOPHILIZED, FOR SOLUTION INTRAMUSCULAR; INTRAVENOUS at 00:18

## 2021-01-01 RX ADMIN — METOPROLOL TARTRATE 2.5 MG: 5 INJECTION, SOLUTION INTRAVENOUS at 13:38

## 2021-01-01 RX ADMIN — POTASSIUM CHLORIDE: 2 INJECTION, SOLUTION, CONCENTRATE INTRAVENOUS at 09:03

## 2021-01-01 RX ADMIN — Medication 2000 UNITS: at 09:20

## 2021-01-01 RX ADMIN — SODIUM CHLORIDE: 9 INJECTION, SOLUTION INTRAVENOUS at 18:36

## 2021-01-01 RX ADMIN — IPRATROPIUM BROMIDE 0.5 MG: 0.5 SOLUTION RESPIRATORY (INHALATION) at 12:19

## 2021-01-01 RX ADMIN — LEVALBUTEROL HYDROCHLORIDE 0.63 MG: 0.63 SOLUTION RESPIRATORY (INHALATION) at 15:38

## 2021-01-01 RX ADMIN — Medication 2000 UNITS: at 08:55

## 2021-01-01 RX ADMIN — IPRATROPIUM BROMIDE 0.5 MG: 0.5 SOLUTION RESPIRATORY (INHALATION) at 08:37

## 2021-01-01 RX ADMIN — HEPARIN SODIUM (PORCINE) LOCK FLUSH IV SOLN 100 UNIT/ML 300 UNITS: 100 SOLUTION at 13:34

## 2021-01-01 RX ADMIN — SODIUM CHLORIDE: 9 INJECTION, SOLUTION INTRAVENOUS at 13:22

## 2021-01-01 RX ADMIN — POTASSIUM CHLORIDE: 2 INJECTION, SOLUTION, CONCENTRATE INTRAVENOUS at 12:12

## 2021-01-01 RX ADMIN — SODIUM CHLORIDE: 9 INJECTION, SOLUTION INTRAVENOUS at 09:35

## 2021-01-01 RX ADMIN — SODIUM CHLORIDE, PRESERVATIVE FREE 10 ML: 5 INJECTION INTRAVENOUS at 10:07

## 2021-01-01 RX ADMIN — EPOETIN ALFA-EPBX 10000 UNITS: 10000 INJECTION, SOLUTION INTRAVENOUS; SUBCUTANEOUS at 14:07

## 2021-01-01 RX ADMIN — METOPROLOL TARTRATE 2.5 MG: 5 INJECTION, SOLUTION INTRAVENOUS at 13:46

## 2021-01-01 RX ADMIN — METOPROLOL TARTRATE 25 MG: 25 TABLET, FILM COATED ORAL at 22:01

## 2021-01-01 RX ADMIN — SODIUM ZIRCONIUM CYCLOSILICATE 10 G: 10 POWDER, FOR SUSPENSION ORAL at 12:00

## 2021-01-01 RX ADMIN — SODIUM CHLORIDE, PRESERVATIVE FREE 10 ML: 5 INJECTION INTRAVENOUS at 09:29

## 2021-01-01 RX ADMIN — HEPARIN SODIUM 5000 UNITS: 10000 INJECTION, SOLUTION INTRAVENOUS; SUBCUTANEOUS at 12:08

## 2021-01-01 RX ADMIN — ARFORMOTEROL TARTRATE 15 MCG: 15 SOLUTION RESPIRATORY (INHALATION) at 19:57

## 2021-01-01 RX ADMIN — ASPIRIN 81 MG: 81 TABLET, COATED ORAL at 11:32

## 2021-01-01 RX ADMIN — SODIUM CHLORIDE: 9 INJECTION, SOLUTION INTRAVENOUS at 04:08

## 2021-01-01 RX ADMIN — ARFORMOTEROL TARTRATE 15 MCG: 15 SOLUTION RESPIRATORY (INHALATION) at 08:37

## 2021-01-01 RX ADMIN — SODIUM CHLORIDE, SODIUM LACTATE, POTASSIUM CHLORIDE, CALCIUM CHLORIDE AND DEXTROSE MONOHYDRATE: 5; 600; 310; 30; 20 INJECTION, SOLUTION INTRAVENOUS at 06:07

## 2021-01-01 RX ADMIN — FAMOTIDINE 20 MG: 20 TABLET ORAL at 20:30

## 2021-01-01 RX ADMIN — POTASSIUM CHLORIDE 10 MEQ: 10 INJECTION, SOLUTION INTRAVENOUS at 16:00

## 2021-01-01 RX ADMIN — HEPARIN SODIUM (PORCINE) LOCK FLUSH IV SOLN 100 UNIT/ML 300 UNITS: 100 SOLUTION at 13:56

## 2021-01-01 RX ADMIN — METHYLPREDNISOLONE SODIUM SUCCINATE 40 MG: 40 INJECTION, POWDER, LYOPHILIZED, FOR SOLUTION INTRAMUSCULAR; INTRAVENOUS at 12:01

## 2021-01-01 RX ADMIN — ARFORMOTEROL TARTRATE 15 MCG: 15 SOLUTION RESPIRATORY (INHALATION) at 08:06

## 2021-01-01 RX ADMIN — Medication 10 ML: at 07:56

## 2021-01-01 RX ADMIN — Medication 300 UNITS: at 21:06

## 2021-01-01 RX ADMIN — ARFORMOTEROL TARTRATE 15 MCG: 15 SOLUTION RESPIRATORY (INHALATION) at 21:15

## 2021-01-01 RX ADMIN — SODIUM CHLORIDE, PRESERVATIVE FREE 10 ML: 5 INJECTION INTRAVENOUS at 08:15

## 2021-01-01 RX ADMIN — GUAIFENESIN 400 MG: 400 TABLET ORAL at 17:57

## 2021-01-01 RX ADMIN — ARFORMOTEROL TARTRATE 15 MCG: 15 SOLUTION RESPIRATORY (INHALATION) at 20:14

## 2021-01-01 RX ADMIN — IPRATROPIUM BROMIDE 0.5 MG: 0.5 SOLUTION RESPIRATORY (INHALATION) at 20:14

## 2021-01-01 RX ADMIN — ATORVASTATIN CALCIUM 20 MG: 20 TABLET, FILM COATED ORAL at 09:57

## 2021-01-01 RX ADMIN — SODIUM CHLORIDE, PRESERVATIVE FREE 10 ML: 5 INJECTION INTRAVENOUS at 08:33

## 2021-01-01 RX ADMIN — LEVALBUTEROL HYDROCHLORIDE 0.63 MG: 0.63 SOLUTION RESPIRATORY (INHALATION) at 09:09

## 2021-01-01 RX ADMIN — DRONABINOL 2.5 MG: 2.5 CAPSULE ORAL at 13:39

## 2021-01-01 RX ADMIN — ONDANSETRON 4 MG: 2 INJECTION INTRAMUSCULAR; INTRAVENOUS at 15:23

## 2021-01-01 RX ADMIN — SODIUM CHLORIDE, PRESERVATIVE FREE 10 ML: 5 INJECTION INTRAVENOUS at 13:37

## 2021-01-01 RX ADMIN — VANCOMYCIN HYDROCHLORIDE 750 MG: 1 INJECTION, POWDER, LYOPHILIZED, FOR SOLUTION INTRAVENOUS at 12:26

## 2021-01-01 RX ADMIN — BUDESONIDE 500 MCG: 0.5 SUSPENSION RESPIRATORY (INHALATION) at 08:06

## 2021-01-01 RX ADMIN — BUDESONIDE 500 MCG: 0.5 SUSPENSION RESPIRATORY (INHALATION) at 08:38

## 2021-01-01 RX ADMIN — PROCHLORPERAZINE EDISYLATE 5 MG: 5 INJECTION INTRAMUSCULAR; INTRAVENOUS at 14:29

## 2021-01-01 RX ADMIN — IPRATROPIUM BROMIDE 0.5 MG: 0.5 SOLUTION RESPIRATORY (INHALATION) at 08:15

## 2021-01-01 RX ADMIN — ACETYLCYSTEINE 400 MG: 100 SOLUTION ORAL; RESPIRATORY (INHALATION) at 09:29

## 2021-01-01 RX ADMIN — PANTOPRAZOLE SODIUM 40 MG: 40 INJECTION, POWDER, FOR SOLUTION INTRAVENOUS at 11:18

## 2021-01-01 RX ADMIN — GUAIFENESIN 400 MG: 400 TABLET ORAL at 21:19

## 2021-01-01 RX ADMIN — BUDESONIDE 500 MCG: 0.5 SUSPENSION RESPIRATORY (INHALATION) at 07:54

## 2021-01-01 RX ADMIN — BUDESONIDE 500 MCG: 0.5 SUSPENSION RESPIRATORY (INHALATION) at 20:14

## 2021-01-01 RX ADMIN — ARFORMOTEROL TARTRATE 15 MCG: 15 SOLUTION RESPIRATORY (INHALATION) at 08:13

## 2021-01-01 RX ADMIN — VANCOMYCIN HYDROCHLORIDE 750 MG: 1 INJECTION, POWDER, LYOPHILIZED, FOR SOLUTION INTRAVENOUS at 12:21

## 2021-01-01 RX ADMIN — PANTOPRAZOLE SODIUM 40 MG: 40 TABLET, DELAYED RELEASE ORAL at 09:20

## 2021-01-01 RX ADMIN — SODIUM BICARBONATE 1300 MG: 650 TABLET ORAL at 21:04

## 2021-01-01 RX ADMIN — DOXYCYCLINE HYCLATE 100 MG: 100 CAPSULE ORAL at 04:54

## 2021-01-01 RX ADMIN — ARFORMOTEROL TARTRATE 15 MCG: 15 SOLUTION RESPIRATORY (INHALATION) at 19:13

## 2021-01-01 RX ADMIN — SODIUM CHLORIDE, PRESERVATIVE FREE 10 ML: 5 INJECTION INTRAVENOUS at 09:23

## 2021-01-01 RX ADMIN — LEVALBUTEROL HYDROCHLORIDE 0.63 MG: 0.63 SOLUTION RESPIRATORY (INHALATION) at 08:07

## 2021-01-01 RX ADMIN — ATORVASTATIN CALCIUM 20 MG: 20 TABLET, FILM COATED ORAL at 21:03

## 2021-01-01 RX ADMIN — METOPROLOL TARTRATE 37.5 MG: 25 TABLET, FILM COATED ORAL at 01:50

## 2021-01-01 RX ADMIN — FAMOTIDINE 20 MG: 20 TABLET ORAL at 10:17

## 2021-01-01 RX ADMIN — SODIUM CHLORIDE, PRESERVATIVE FREE 10 ML: 5 INJECTION INTRAVENOUS at 21:19

## 2021-01-01 RX ADMIN — BUDESONIDE 500 MCG: 0.5 SUSPENSION RESPIRATORY (INHALATION) at 21:56

## 2021-01-01 RX ADMIN — ATORVASTATIN CALCIUM 20 MG: 20 TABLET, FILM COATED ORAL at 21:02

## 2021-01-01 RX ADMIN — SODIUM CHLORIDE, PRESERVATIVE FREE 300 UNITS: 5 INJECTION INTRAVENOUS at 20:31

## 2021-01-01 RX ADMIN — LEVALBUTEROL HYDROCHLORIDE 0.63 MG: 0.63 SOLUTION RESPIRATORY (INHALATION) at 20:15

## 2021-01-01 RX ADMIN — LEVALBUTEROL HYDROCHLORIDE 0.63 MG: 0.63 SOLUTION RESPIRATORY (INHALATION) at 09:31

## 2021-01-01 RX ADMIN — WATER 1000 MG: 1 INJECTION INTRAMUSCULAR; INTRAVENOUS; SUBCUTANEOUS at 11:56

## 2021-01-01 RX ADMIN — HEPARIN SODIUM 5000 UNITS: 10000 INJECTION, SOLUTION INTRAVENOUS; SUBCUTANEOUS at 20:11

## 2021-01-01 RX ADMIN — PANTOPRAZOLE SODIUM 40 MG: 40 INJECTION, POWDER, FOR SOLUTION INTRAVENOUS at 07:48

## 2021-01-01 RX ADMIN — SODIUM CHLORIDE: 9 INJECTION, SOLUTION INTRAVENOUS at 22:25

## 2021-01-01 RX ADMIN — IPRATROPIUM BROMIDE 0.5 MG: 0.5 SOLUTION RESPIRATORY (INHALATION) at 07:34

## 2021-01-01 RX ADMIN — IPRATROPIUM BROMIDE 0.5 MG: 0.5 SOLUTION RESPIRATORY (INHALATION) at 09:09

## 2021-01-01 RX ADMIN — DRONABINOL 5 MG: 2.5 CAPSULE ORAL at 06:21

## 2021-01-01 RX ADMIN — SODIUM CHLORIDE: 9 INJECTION, SOLUTION INTRAVENOUS at 21:57

## 2021-01-01 RX ADMIN — ENOXAPARIN SODIUM 30 MG: 30 INJECTION SUBCUTANEOUS at 11:32

## 2021-01-01 RX ADMIN — IPRATROPIUM BROMIDE AND ALBUTEROL SULFATE 1 AMPULE: .5; 3 SOLUTION RESPIRATORY (INHALATION) at 12:05

## 2021-01-01 RX ADMIN — PANTOPRAZOLE SODIUM 40 MG: 40 INJECTION, POWDER, FOR SOLUTION INTRAVENOUS at 20:31

## 2021-01-01 RX ADMIN — PANTOPRAZOLE SODIUM 40 MG: 40 INJECTION, POWDER, FOR SOLUTION INTRAVENOUS at 21:19

## 2021-01-01 RX ADMIN — IPRATROPIUM BROMIDE 0.5 MG: 0.5 SOLUTION RESPIRATORY (INHALATION) at 21:56

## 2021-01-01 RX ADMIN — METOPROLOL TARTRATE 37.5 MG: 25 TABLET, FILM COATED ORAL at 08:56

## 2021-01-01 RX ADMIN — METHYLPREDNISOLONE SODIUM SUCCINATE 40 MG: 40 INJECTION, POWDER, LYOPHILIZED, FOR SOLUTION INTRAMUSCULAR; INTRAVENOUS at 07:48

## 2021-01-01 RX ADMIN — POTASSIUM CHLORIDE: 2 INJECTION, SOLUTION, CONCENTRATE INTRAVENOUS at 11:56

## 2021-01-01 RX ADMIN — LEVALBUTEROL HYDROCHLORIDE 0.63 MG: 0.63 SOLUTION RESPIRATORY (INHALATION) at 16:31

## 2021-01-01 RX ADMIN — SODIUM CHLORIDE 500 ML: 9 INJECTION, SOLUTION INTRAVENOUS at 17:22

## 2021-01-01 RX ADMIN — ENOXAPARIN SODIUM 40 MG: 40 INJECTION SUBCUTANEOUS at 09:57

## 2021-01-01 RX ADMIN — ATORVASTATIN CALCIUM 20 MG: 20 TABLET, FILM COATED ORAL at 20:00

## 2021-01-01 RX ADMIN — IPRATROPIUM BROMIDE 0.5 MG: 0.5 SOLUTION RESPIRATORY (INHALATION) at 16:21

## 2021-01-01 RX ADMIN — METOPROLOL TARTRATE 25 MG: 25 TABLET, FILM COATED ORAL at 08:18

## 2021-01-01 RX ADMIN — METOPROLOL TARTRATE 2.5 MG: 5 INJECTION, SOLUTION INTRAVENOUS at 18:17

## 2021-01-01 RX ADMIN — METHYLPREDNISOLONE SODIUM SUCCINATE 40 MG: 40 INJECTION, POWDER, LYOPHILIZED, FOR SOLUTION INTRAMUSCULAR; INTRAVENOUS at 13:22

## 2021-01-01 RX ADMIN — ATORVASTATIN CALCIUM 20 MG: 20 TABLET, FILM COATED ORAL at 21:06

## 2021-01-01 RX ADMIN — SODIUM CHLORIDE 500 ML: 9 INJECTION, SOLUTION INTRAVENOUS at 07:39

## 2021-01-01 RX ADMIN — Medication 10 ML: at 13:26

## 2021-01-01 RX ADMIN — TRIMETHOBENZAMIDE HYDROCHLORIDE 200 MG: 100 INJECTION INTRAMUSCULAR at 21:34

## 2021-01-01 RX ADMIN — SODIUM CHLORIDE, PRESERVATIVE FREE 10 ML: 5 INJECTION INTRAVENOUS at 08:18

## 2021-01-01 RX ADMIN — ONDANSETRON 4 MG: 2 INJECTION INTRAMUSCULAR; INTRAVENOUS at 11:18

## 2021-01-01 RX ADMIN — BUDESONIDE 500 MCG: 0.5 SUSPENSION RESPIRATORY (INHALATION) at 19:57

## 2021-01-01 RX ADMIN — ATORVASTATIN CALCIUM 20 MG: 20 TABLET, FILM COATED ORAL at 20:36

## 2021-01-01 RX ADMIN — EPOETIN ALFA-EPBX 10000 UNITS: 10000 INJECTION, SOLUTION INTRAVENOUS; SUBCUTANEOUS at 14:23

## 2021-01-01 RX ADMIN — METHYLPREDNISOLONE SODIUM SUCCINATE 40 MG: 40 INJECTION, POWDER, LYOPHILIZED, FOR SOLUTION INTRAMUSCULAR; INTRAVENOUS at 10:17

## 2021-01-01 RX ADMIN — MEROPENEM 1000 MG: 1 INJECTION, POWDER, FOR SOLUTION INTRAVENOUS at 02:59

## 2021-01-01 RX ADMIN — ARFORMOTEROL TARTRATE 15 MCG: 15 SOLUTION RESPIRATORY (INHALATION) at 08:22

## 2021-01-01 RX ADMIN — SODIUM CHLORIDE, PRESERVATIVE FREE 20 ML: 5 INJECTION INTRAVENOUS at 15:05

## 2021-01-01 RX ADMIN — POTASSIUM CHLORIDE: 2 INJECTION, SOLUTION, CONCENTRATE INTRAVENOUS at 11:59

## 2021-01-01 RX ADMIN — DRONABINOL 2.5 MG: 2.5 CAPSULE ORAL at 20:42

## 2021-01-01 RX ADMIN — DRONABINOL 5 MG: 2.5 CAPSULE ORAL at 06:09

## 2021-01-01 RX ADMIN — LEVALBUTEROL HYDROCHLORIDE 0.63 MG: 0.63 SOLUTION RESPIRATORY (INHALATION) at 08:14

## 2021-01-01 RX ADMIN — Medication 300 UNITS: at 07:56

## 2021-01-01 RX ADMIN — SODIUM CHLORIDE, POTASSIUM CHLORIDE, SODIUM LACTATE AND CALCIUM CHLORIDE: 600; 310; 30; 20 INJECTION, SOLUTION INTRAVENOUS at 16:59

## 2021-01-01 RX ADMIN — BUDESONIDE 500 MCG: 0.5 SUSPENSION RESPIRATORY (INHALATION) at 20:27

## 2021-01-01 RX ADMIN — SODIUM BICARBONATE 1300 MG: 650 TABLET ORAL at 08:55

## 2021-01-01 RX ADMIN — Medication 300 UNITS: at 13:59

## 2021-01-01 RX ADMIN — GUAIFENESIN 400 MG: 400 TABLET ORAL at 10:06

## 2021-01-01 RX ADMIN — ARFORMOTEROL TARTRATE 15 MCG: 15 SOLUTION RESPIRATORY (INHALATION) at 09:29

## 2021-01-01 RX ADMIN — POTASSIUM CHLORIDE, DEXTROSE MONOHYDRATE AND SODIUM CHLORIDE: 150; 5; 450 INJECTION, SOLUTION INTRAVENOUS at 00:20

## 2021-01-01 RX ADMIN — IPRATROPIUM BROMIDE 0.5 MG: 0.5 SOLUTION RESPIRATORY (INHALATION) at 12:51

## 2021-01-01 RX ADMIN — ARFORMOTEROL TARTRATE 15 MCG: 15 SOLUTION RESPIRATORY (INHALATION) at 10:23

## 2021-01-01 RX ADMIN — METOPROLOL TARTRATE 37.5 MG: 25 TABLET, FILM COATED ORAL at 11:32

## 2021-01-01 RX ADMIN — VANCOMYCIN HYDROCHLORIDE 1000 MG: 1 INJECTION, POWDER, LYOPHILIZED, FOR SOLUTION INTRAVENOUS at 17:31

## 2021-01-01 RX ADMIN — PANTOPRAZOLE SODIUM 40 MG: 40 INJECTION, POWDER, FOR SOLUTION INTRAVENOUS at 08:35

## 2021-01-01 RX ADMIN — WATER 1.2 ML: 1 INJECTION INTRAMUSCULAR; INTRAVENOUS; SUBCUTANEOUS at 00:06

## 2021-01-01 RX ADMIN — SODIUM CHLORIDE, SODIUM LACTATE, POTASSIUM CHLORIDE, CALCIUM CHLORIDE AND DEXTROSE MONOHYDRATE: 5; 600; 310; 30; 20 INJECTION, SOLUTION INTRAVENOUS at 15:39

## 2021-01-01 RX ADMIN — PROCHLORPERAZINE EDISYLATE 5 MG: 5 INJECTION INTRAMUSCULAR; INTRAVENOUS at 08:27

## 2021-01-01 RX ADMIN — DRONABINOL 5 MG: 2.5 CAPSULE ORAL at 06:35

## 2021-01-01 RX ADMIN — ASPIRIN 81 MG: 81 TABLET, COATED ORAL at 21:06

## 2021-01-01 RX ADMIN — ASPIRIN 81 MG: 81 TABLET, CHEWABLE ORAL at 09:29

## 2021-01-01 RX ADMIN — SODIUM CHLORIDE, PRESERVATIVE FREE 10 ML: 5 INJECTION INTRAVENOUS at 20:15

## 2021-01-01 RX ADMIN — ATORVASTATIN CALCIUM 20 MG: 20 TABLET, FILM COATED ORAL at 20:30

## 2021-01-01 RX ADMIN — PANTOPRAZOLE SODIUM 40 MG: 40 TABLET, DELAYED RELEASE ORAL at 11:33

## 2021-01-01 RX ADMIN — HEPARIN SODIUM (PORCINE) LOCK FLUSH IV SOLN 100 UNIT/ML 300 UNITS: 100 SOLUTION at 14:07

## 2021-01-01 RX ADMIN — METOPROLOL TARTRATE 37.5 MG: 25 TABLET, FILM COATED ORAL at 23:27

## 2021-01-01 RX ADMIN — SODIUM CHLORIDE, PRESERVATIVE FREE 10 ML: 5 INJECTION INTRAVENOUS at 13:38

## 2021-01-01 RX ADMIN — GUAIFENESIN 400 MG: 400 TABLET ORAL at 17:07

## 2021-01-01 RX ADMIN — BUDESONIDE 500 MCG: 0.5 SUSPENSION RESPIRATORY (INHALATION) at 19:13

## 2021-01-01 RX ADMIN — METOPROLOL TARTRATE 2.5 MG: 5 INJECTION, SOLUTION INTRAVENOUS at 11:59

## 2021-01-01 RX ADMIN — SODIUM CHLORIDE, POTASSIUM CHLORIDE, SODIUM LACTATE AND CALCIUM CHLORIDE: 600; 310; 30; 20 INJECTION, SOLUTION INTRAVENOUS at 03:49

## 2021-01-01 RX ADMIN — HEPARIN SODIUM (PORCINE) LOCK FLUSH IV SOLN 100 UNIT/ML 300 UNITS: 100 SOLUTION at 13:39

## 2021-01-01 RX ADMIN — SODIUM CHLORIDE: 9 INJECTION, SOLUTION INTRAVENOUS at 23:24

## 2021-01-01 RX ADMIN — SODIUM CHLORIDE, PRESERVATIVE FREE 10 ML: 5 INJECTION INTRAVENOUS at 22:40

## 2021-01-01 RX ADMIN — POTASSIUM CHLORIDE: 2 INJECTION, SOLUTION, CONCENTRATE INTRAVENOUS at 13:46

## 2021-01-01 RX ADMIN — MEROPENEM 1000 MG: 1 INJECTION, POWDER, FOR SOLUTION INTRAVENOUS at 14:50

## 2021-01-01 RX ADMIN — ARFORMOTEROL TARTRATE 15 MCG: 15 SOLUTION RESPIRATORY (INHALATION) at 20:52

## 2021-01-01 RX ADMIN — PANTOPRAZOLE SODIUM 40 MG: 40 TABLET, DELAYED RELEASE ORAL at 16:46

## 2021-01-01 RX ADMIN — SODIUM CHLORIDE: 9 INJECTION, SOLUTION INTRAVENOUS at 17:15

## 2021-01-01 RX ADMIN — SODIUM CHLORIDE, PRESERVATIVE FREE 10 ML: 5 INJECTION INTRAVENOUS at 19:20

## 2021-01-01 RX ADMIN — POTASSIUM CHLORIDE, DEXTROSE MONOHYDRATE AND SODIUM CHLORIDE: 150; 5; 450 INJECTION, SOLUTION INTRAVENOUS at 22:11

## 2021-01-01 RX ADMIN — Medication 300 UNITS: at 14:21

## 2021-01-01 RX ADMIN — BUDESONIDE 500 MCG: 0.5 SUSPENSION RESPIRATORY (INHALATION) at 21:15

## 2021-01-01 RX ADMIN — HEPARIN SODIUM 5000 UNITS: 10000 INJECTION, SOLUTION INTRAVENOUS; SUBCUTANEOUS at 20:52

## 2021-01-01 RX ADMIN — HEPARIN SODIUM 5000 UNITS: 10000 INJECTION, SOLUTION INTRAVENOUS; SUBCUTANEOUS at 20:43

## 2021-01-01 RX ADMIN — POTASSIUM CHLORIDE: 2 INJECTION, SOLUTION, CONCENTRATE INTRAVENOUS at 19:20

## 2021-01-01 RX ADMIN — BUDESONIDE 500 MCG: 0.5 SUSPENSION RESPIRATORY (INHALATION) at 08:22

## 2021-01-01 RX ADMIN — IPRATROPIUM BROMIDE 0.5 MG: 0.5 SOLUTION RESPIRATORY (INHALATION) at 19:13

## 2021-01-01 RX ADMIN — BUDESONIDE 500 MCG: 0.5 SUSPENSION RESPIRATORY (INHALATION) at 19:42

## 2021-01-01 RX ADMIN — IPRATROPIUM BROMIDE 0.5 MG: 0.5 SOLUTION RESPIRATORY (INHALATION) at 12:32

## 2021-01-01 RX ADMIN — METOPROLOL TARTRATE 25 MG: 25 TABLET, FILM COATED ORAL at 20:36

## 2021-01-01 RX ADMIN — Medication 300 UNITS: at 13:58

## 2021-01-01 RX ADMIN — POTASSIUM CHLORIDE: 2 INJECTION, SOLUTION, CONCENTRATE INTRAVENOUS at 17:58

## 2021-01-01 RX ADMIN — BUDESONIDE 500 MCG: 0.5 SUSPENSION RESPIRATORY (INHALATION) at 08:07

## 2021-01-01 RX ADMIN — HEPARIN SODIUM 5000 UNITS: 10000 INJECTION, SOLUTION INTRAVENOUS; SUBCUTANEOUS at 22:47

## 2021-01-01 RX ADMIN — HEPARIN SODIUM (PORCINE) LOCK FLUSH IV SOLN 100 UNIT/ML 300 UNITS: 100 SOLUTION at 14:08

## 2021-01-01 RX ADMIN — SODIUM BICARBONATE 1300 MG: 650 TABLET ORAL at 10:06

## 2021-01-01 RX ADMIN — ARFORMOTEROL TARTRATE 15 MCG: 15 SOLUTION RESPIRATORY (INHALATION) at 08:19

## 2021-01-01 RX ADMIN — PANTOPRAZOLE SODIUM 40 MG: 40 TABLET, DELAYED RELEASE ORAL at 06:21

## 2021-01-01 RX ADMIN — HEPARIN SODIUM (PORCINE) LOCK FLUSH IV SOLN 100 UNIT/ML 300 UNITS: 100 SOLUTION at 13:29

## 2021-01-01 RX ADMIN — POTASSIUM CHLORIDE: 2 INJECTION, SOLUTION, CONCENTRATE INTRAVENOUS at 18:03

## 2021-01-01 RX ADMIN — IPRATROPIUM BROMIDE AND ALBUTEROL SULFATE 1 AMPULE: 2.5; .5 SOLUTION RESPIRATORY (INHALATION) at 20:12

## 2021-01-01 RX ADMIN — METOPROLOL TARTRATE 37.5 MG: 25 TABLET, FILM COATED ORAL at 21:19

## 2021-01-01 RX ADMIN — GUAIFENESIN 400 MG: 400 TABLET ORAL at 17:14

## 2021-01-01 RX ADMIN — IPRATROPIUM BROMIDE 0.5 MG: 0.5 SOLUTION RESPIRATORY (INHALATION) at 12:15

## 2021-01-01 RX ADMIN — Medication 300 UNITS: at 09:57

## 2021-01-01 RX ADMIN — SODIUM CHLORIDE, PRESERVATIVE FREE 10 ML: 5 INJECTION INTRAVENOUS at 08:35

## 2021-01-01 RX ADMIN — SODIUM CHLORIDE: 9 INJECTION, SOLUTION INTRAVENOUS at 17:23

## 2021-01-01 RX ADMIN — SODIUM CHLORIDE, PRESERVATIVE FREE 20 ML: 5 INJECTION INTRAVENOUS at 13:35

## 2021-01-01 RX ADMIN — VANCOMYCIN HYDROCHLORIDE 500 MG: 500 INJECTION, POWDER, LYOPHILIZED, FOR SOLUTION INTRAVENOUS at 12:52

## 2021-01-01 RX ADMIN — SODIUM CHLORIDE, POTASSIUM CHLORIDE, SODIUM LACTATE AND CALCIUM CHLORIDE: 600; 310; 30; 20 INJECTION, SOLUTION INTRAVENOUS at 13:24

## 2021-01-01 RX ADMIN — GUAIFENESIN 400 MG: 400 TABLET ORAL at 14:08

## 2021-01-01 RX ADMIN — WATER 1000 MG: 1 INJECTION INTRAMUSCULAR; INTRAVENOUS; SUBCUTANEOUS at 11:32

## 2021-01-01 RX ADMIN — SODIUM CHLORIDE, PRESERVATIVE FREE 10 ML: 5 INJECTION INTRAVENOUS at 14:00

## 2021-01-01 RX ADMIN — DRONABINOL 5 MG: 2.5 CAPSULE ORAL at 16:47

## 2021-01-01 RX ADMIN — METHYLPREDNISOLONE SODIUM SUCCINATE 40 MG: 40 INJECTION, POWDER, LYOPHILIZED, FOR SOLUTION INTRAMUSCULAR; INTRAVENOUS at 12:09

## 2021-01-01 RX ADMIN — SODIUM CHLORIDE, PRESERVATIVE FREE 10 ML: 5 INJECTION INTRAVENOUS at 14:37

## 2021-01-01 RX ADMIN — Medication 300 UNITS: at 13:46

## 2021-01-01 RX ADMIN — DRONABINOL 2.5 MG: 2.5 CAPSULE ORAL at 21:04

## 2021-01-01 RX ADMIN — METOPROLOL TARTRATE 2.5 MG: 5 INJECTION, SOLUTION INTRAVENOUS at 00:09

## 2021-01-01 RX ADMIN — POTASSIUM CHLORIDE, DEXTROSE MONOHYDRATE AND SODIUM CHLORIDE: 150; 5; 450 INJECTION, SOLUTION INTRAVENOUS at 04:20

## 2021-01-01 RX ADMIN — SODIUM CHLORIDE, PRESERVATIVE FREE 10 ML: 5 INJECTION INTRAVENOUS at 14:04

## 2021-01-01 RX ADMIN — FAMOTIDINE 20 MG: 20 TABLET ORAL at 20:36

## 2021-01-01 RX ADMIN — SODIUM CHLORIDE, PRESERVATIVE FREE 10 ML: 5 INJECTION INTRAVENOUS at 13:59

## 2021-01-01 RX ADMIN — DRONABINOL 2.5 MG: 2.5 CAPSULE ORAL at 08:55

## 2021-01-01 RX ADMIN — Medication 10 ML: at 09:57

## 2021-01-01 RX ADMIN — ONDANSETRON 4 MG: 2 INJECTION INTRAMUSCULAR; INTRAVENOUS at 06:50

## 2021-01-01 RX ADMIN — SODIUM CHLORIDE, PRESERVATIVE FREE 10 ML: 5 INJECTION INTRAVENOUS at 13:06

## 2021-01-01 RX ADMIN — HEPARIN SODIUM (PORCINE) LOCK FLUSH IV SOLN 100 UNIT/ML 300 UNITS: 100 SOLUTION at 14:16

## 2021-01-01 RX ADMIN — HEPARIN SODIUM (PORCINE) LOCK FLUSH IV SOLN 100 UNIT/ML 300 UNITS: 100 SOLUTION at 14:37

## 2021-01-01 RX ADMIN — SODIUM CHLORIDE, PRESERVATIVE FREE 10 ML: 5 INJECTION INTRAVENOUS at 13:42

## 2021-01-01 RX ADMIN — SODIUM CHLORIDE, PRESERVATIVE FREE 300 UNITS: 5 INJECTION INTRAVENOUS at 09:28

## 2021-01-01 RX ADMIN — VANCOMYCIN HYDROCHLORIDE 750 MG: 1 INJECTION, POWDER, LYOPHILIZED, FOR SOLUTION INTRAVENOUS at 14:28

## 2021-01-01 RX ADMIN — SODIUM CHLORIDE, PRESERVATIVE FREE 10 ML: 5 INJECTION INTRAVENOUS at 21:00

## 2021-01-01 RX ADMIN — SODIUM CHLORIDE, PRESERVATIVE FREE 10 ML: 5 INJECTION INTRAVENOUS at 13:54

## 2021-01-01 RX ADMIN — BUDESONIDE 500 MCG: 0.5 SUSPENSION RESPIRATORY (INHALATION) at 20:12

## 2021-01-01 RX ADMIN — SODIUM CHLORIDE, PRESERVATIVE FREE 10 ML: 5 INJECTION INTRAVENOUS at 20:37

## 2021-01-01 RX ADMIN — SODIUM CHLORIDE: 9 INJECTION, SOLUTION INTRAVENOUS at 15:32

## 2021-01-01 RX ADMIN — LEVALBUTEROL HYDROCHLORIDE 0.63 MG: 0.63 SOLUTION RESPIRATORY (INHALATION) at 12:51

## 2021-01-01 RX ADMIN — SODIUM CHLORIDE, PRESERVATIVE FREE 10 ML: 5 INJECTION INTRAVENOUS at 07:48

## 2021-01-01 RX ADMIN — METHYLPREDNISOLONE SODIUM SUCCINATE 40 MG: 40 INJECTION, POWDER, LYOPHILIZED, FOR SOLUTION INTRAMUSCULAR; INTRAVENOUS at 22:49

## 2021-01-01 RX ADMIN — IPRATROPIUM BROMIDE AND ALBUTEROL SULFATE 1 AMPULE: .5; 3 SOLUTION RESPIRATORY (INHALATION) at 18:00

## 2021-01-01 RX ADMIN — IPRATROPIUM BROMIDE 0.5 MG: 0.5 SOLUTION RESPIRATORY (INHALATION) at 15:33

## 2021-01-01 RX ADMIN — ASPIRIN 81 MG: 81 TABLET, CHEWABLE ORAL at 08:15

## 2021-01-01 RX ADMIN — ENOXAPARIN SODIUM 30 MG: 30 INJECTION SUBCUTANEOUS at 09:19

## 2021-01-01 RX ADMIN — SODIUM CHLORIDE, PRESERVATIVE FREE 10 ML: 5 INJECTION INTRAVENOUS at 20:36

## 2021-01-01 RX ADMIN — SODIUM CHLORIDE: 9 INJECTION, SOLUTION INTRAVENOUS at 22:49

## 2021-01-01 RX ADMIN — ASPIRIN 81 MG: 81 TABLET, COATED ORAL at 09:21

## 2021-01-01 RX ADMIN — HEPARIN SODIUM (PORCINE) LOCK FLUSH IV SOLN 100 UNIT/ML 300 UNITS: 100 SOLUTION at 13:30

## 2021-01-01 RX ADMIN — ARFORMOTEROL TARTRATE 15 MCG: 15 SOLUTION RESPIRATORY (INHALATION) at 20:36

## 2021-01-01 RX ADMIN — ATORVASTATIN CALCIUM 20 MG: 20 TABLET, FILM COATED ORAL at 20:14

## 2021-01-01 RX ADMIN — Medication 10 ML: at 10:07

## 2021-01-01 RX ADMIN — ALBUTEROL SULFATE 2.5 MG: 2.5 SOLUTION RESPIRATORY (INHALATION) at 21:12

## 2021-01-01 RX ADMIN — ENOXAPARIN SODIUM 30 MG: 30 INJECTION SUBCUTANEOUS at 10:06

## 2021-01-01 RX ADMIN — SODIUM BICARBONATE 1300 MG: 650 TABLET ORAL at 09:20

## 2021-01-01 RX ADMIN — SODIUM CHLORIDE, PRESERVATIVE FREE 10 ML: 5 INJECTION INTRAVENOUS at 11:33

## 2021-01-01 RX ADMIN — DEXTROSE MONOHYDRATE 12.5 G: 25 INJECTION, SOLUTION INTRAVENOUS at 06:07

## 2021-01-01 RX ADMIN — SODIUM CHLORIDE, PRESERVATIVE FREE 10 ML: 5 INJECTION INTRAVENOUS at 14:06

## 2021-01-01 RX ADMIN — IPRATROPIUM BROMIDE AND ALBUTEROL SULFATE 1 AMPULE: .5; 3 SOLUTION RESPIRATORY (INHALATION) at 14:24

## 2021-01-01 RX ADMIN — METOPROLOL TARTRATE 2.5 MG: 5 INJECTION, SOLUTION INTRAVENOUS at 00:11

## 2021-01-01 RX ADMIN — SODIUM CHLORIDE, PRESERVATIVE FREE 20 ML: 5 INJECTION INTRAVENOUS at 15:02

## 2021-01-01 RX ADMIN — ASPIRIN 81 MG: 81 TABLET, COATED ORAL at 08:55

## 2021-01-01 RX ADMIN — GUAIFENESIN 400 MG: 400 TABLET ORAL at 21:03

## 2021-01-01 RX ADMIN — METOPROLOL TARTRATE 25 MG: 25 TABLET, FILM COATED ORAL at 20:03

## 2021-01-01 RX ADMIN — BUDESONIDE 500 MCG: 0.5 SUSPENSION RESPIRATORY (INHALATION) at 10:22

## 2021-01-01 RX ADMIN — SODIUM CHLORIDE: 9 INJECTION, SOLUTION INTRAVENOUS at 17:06

## 2021-01-01 RX ADMIN — ENOXAPARIN SODIUM 40 MG: 40 INJECTION SUBCUTANEOUS at 10:01

## 2021-01-01 RX ADMIN — METOPROLOL TARTRATE 2.5 MG: 5 INJECTION, SOLUTION INTRAVENOUS at 06:46

## 2021-01-01 RX ADMIN — METOPROLOL TARTRATE 2.5 MG: 5 INJECTION, SOLUTION INTRAVENOUS at 21:51

## 2021-01-01 RX ADMIN — FAMOTIDINE 20 MG: 20 TABLET ORAL at 08:33

## 2021-01-01 RX ADMIN — SODIUM CHLORIDE, PRESERVATIVE FREE 10 ML: 5 INJECTION INTRAVENOUS at 08:56

## 2021-01-01 RX ADMIN — IPRATROPIUM BROMIDE 0.5 MG: 0.5 SOLUTION RESPIRATORY (INHALATION) at 08:19

## 2021-01-01 RX ADMIN — GUAIFENESIN 400 MG: 400 TABLET ORAL at 20:42

## 2021-01-01 RX ADMIN — IPRATROPIUM BROMIDE 0.5 MG: 0.5 SOLUTION RESPIRATORY (INHALATION) at 12:01

## 2021-01-01 RX ADMIN — IPRATROPIUM BROMIDE AND ALBUTEROL SULFATE 1 AMPULE: .5; 3 SOLUTION RESPIRATORY (INHALATION) at 13:52

## 2021-01-01 RX ADMIN — METOPROLOL TARTRATE 2.5 MG: 5 INJECTION, SOLUTION INTRAVENOUS at 05:57

## 2021-01-01 RX ADMIN — POTASSIUM CHLORIDE: 2 INJECTION, SOLUTION, CONCENTRATE INTRAVENOUS at 12:10

## 2021-01-01 RX ADMIN — ATORVASTATIN CALCIUM 20 MG: 20 TABLET, FILM COATED ORAL at 10:01

## 2021-01-01 RX ADMIN — IPRATROPIUM BROMIDE AND ALBUTEROL SULFATE 1 AMPULE: 2.5; .5 SOLUTION RESPIRATORY (INHALATION) at 07:54

## 2021-01-01 RX ADMIN — SODIUM CHLORIDE, PRESERVATIVE FREE 300 UNITS: 5 INJECTION INTRAVENOUS at 10:17

## 2021-01-01 RX ADMIN — PANTOPRAZOLE SODIUM 40 MG: 40 INJECTION, POWDER, FOR SOLUTION INTRAVENOUS at 20:03

## 2021-01-01 RX ADMIN — SODIUM CHLORIDE, PRESERVATIVE FREE 300 UNITS: 5 INJECTION INTRAVENOUS at 08:33

## 2021-01-01 RX ADMIN — METOPROLOL TARTRATE 2.5 MG: 5 INJECTION, SOLUTION INTRAVENOUS at 05:23

## 2021-01-01 RX ADMIN — ARFORMOTEROL TARTRATE 15 MCG: 15 SOLUTION RESPIRATORY (INHALATION) at 20:12

## 2021-01-01 RX ADMIN — METOPROLOL SUCCINATE 25 MG: 25 TABLET, EXTENDED RELEASE ORAL at 15:28

## 2021-01-01 RX ADMIN — IOPAMIDOL 90 ML: 755 INJECTION, SOLUTION INTRAVENOUS at 23:26

## 2021-01-01 RX ADMIN — POTASSIUM CHLORIDE 10 MEQ: 10 INJECTION, SOLUTION INTRAVENOUS at 15:40

## 2021-01-01 RX ADMIN — IPRATROPIUM BROMIDE AND ALBUTEROL SULFATE 1 AMPULE: .5; 3 SOLUTION RESPIRATORY (INHALATION) at 09:28

## 2021-01-01 RX ADMIN — VANCOMYCIN HYDROCHLORIDE 500 MG: 500 INJECTION, POWDER, LYOPHILIZED, FOR SOLUTION INTRAVENOUS at 12:08

## 2021-01-01 RX ADMIN — SODIUM CHLORIDE, PRESERVATIVE FREE 10 ML: 5 INJECTION INTRAVENOUS at 14:19

## 2021-01-01 RX ADMIN — WATER 1000 MG: 1 INJECTION INTRAMUSCULAR; INTRAVENOUS; SUBCUTANEOUS at 11:59

## 2021-01-01 RX ADMIN — MEROPENEM 1000 MG: 1 INJECTION, POWDER, FOR SOLUTION INTRAVENOUS at 02:11

## 2021-01-01 RX ADMIN — ONDANSETRON 4 MG: 2 INJECTION INTRAMUSCULAR; INTRAVENOUS at 10:17

## 2021-01-01 RX ADMIN — EPOETIN ALFA-EPBX 10000 UNITS: 10000 INJECTION, SOLUTION INTRAVENOUS; SUBCUTANEOUS at 15:10

## 2021-01-01 RX ADMIN — DRONABINOL 5 MG: 2.5 CAPSULE ORAL at 17:18

## 2021-01-01 RX ADMIN — ATORVASTATIN CALCIUM 20 MG: 20 TABLET, FILM COATED ORAL at 07:56

## 2021-01-01 RX ADMIN — Medication 5 ML: at 21:55

## 2021-01-01 RX ADMIN — FAMOTIDINE 20 MG: 20 TABLET ORAL at 09:28

## 2021-01-01 RX ADMIN — Medication 300 UNITS: at 13:49

## 2021-01-01 RX ADMIN — SODIUM CHLORIDE, PRESERVATIVE FREE 10 ML: 5 INJECTION INTRAVENOUS at 13:31

## 2021-01-01 RX ADMIN — ENOXAPARIN SODIUM 40 MG: 40 INJECTION SUBCUTANEOUS at 21:06

## 2021-01-01 RX ADMIN — ARFORMOTEROL TARTRATE 15 MCG: 15 SOLUTION RESPIRATORY (INHALATION) at 21:11

## 2021-01-01 RX ADMIN — SODIUM CHLORIDE, PRESERVATIVE FREE 10 ML: 5 INJECTION INTRAVENOUS at 14:20

## 2021-01-01 RX ADMIN — IPRATROPIUM BROMIDE 0.5 MG: 0.5 SOLUTION RESPIRATORY (INHALATION) at 20:36

## 2021-01-01 RX ADMIN — LEVALBUTEROL HYDROCHLORIDE 0.63 MG: 0.63 SOLUTION RESPIRATORY (INHALATION) at 08:19

## 2021-01-01 RX ADMIN — WATER 1000 MG: 1 INJECTION INTRAMUSCULAR; INTRAVENOUS; SUBCUTANEOUS at 12:27

## 2021-01-01 RX ADMIN — IPRATROPIUM BROMIDE 0.5 MG: 0.5 SOLUTION RESPIRATORY (INHALATION) at 15:38

## 2021-01-01 RX ADMIN — ATORVASTATIN CALCIUM 20 MG: 20 TABLET, FILM COATED ORAL at 20:03

## 2021-01-01 RX ADMIN — SODIUM CHLORIDE, POTASSIUM CHLORIDE, SODIUM LACTATE AND CALCIUM CHLORIDE: 600; 310; 30; 20 INJECTION, SOLUTION INTRAVENOUS at 13:38

## 2021-01-01 RX ADMIN — ARFORMOTEROL TARTRATE 15 MCG: 15 SOLUTION RESPIRATORY (INHALATION) at 19:42

## 2021-01-01 RX ADMIN — MEROPENEM 1000 MG: 1 INJECTION, POWDER, FOR SOLUTION INTRAVENOUS at 15:27

## 2021-01-01 RX ADMIN — METHYLPREDNISOLONE SODIUM SUCCINATE 40 MG: 40 INJECTION, POWDER, LYOPHILIZED, FOR SOLUTION INTRAMUSCULAR; INTRAVENOUS at 13:40

## 2021-01-01 RX ADMIN — SODIUM CHLORIDE, POTASSIUM CHLORIDE, SODIUM LACTATE AND CALCIUM CHLORIDE: 600; 310; 30; 20 INJECTION, SOLUTION INTRAVENOUS at 22:55

## 2021-01-01 RX ADMIN — ENOXAPARIN SODIUM 30 MG: 30 INJECTION SUBCUTANEOUS at 08:57

## 2021-01-01 RX ADMIN — ARFORMOTEROL TARTRATE 15 MCG: 15 SOLUTION RESPIRATORY (INHALATION) at 08:16

## 2021-01-01 RX ADMIN — EPOETIN ALFA-EPBX 10000 UNITS: 10000 INJECTION, SOLUTION INTRAVENOUS; SUBCUTANEOUS at 14:30

## 2021-01-01 RX ADMIN — DRONABINOL 5 MG: 2.5 CAPSULE ORAL at 15:28

## 2021-01-01 RX ADMIN — WATER 1000 MG: 1 INJECTION INTRAMUSCULAR; INTRAVENOUS; SUBCUTANEOUS at 12:12

## 2021-01-01 RX ADMIN — VANCOMYCIN HYDROCHLORIDE 500 MG: 500 INJECTION, POWDER, LYOPHILIZED, FOR SOLUTION INTRAVENOUS at 16:00

## 2021-01-01 RX ADMIN — SODIUM CHLORIDE, POTASSIUM CHLORIDE, SODIUM LACTATE AND CALCIUM CHLORIDE: 600; 310; 30; 20 INJECTION, SOLUTION INTRAVENOUS at 08:33

## 2021-01-01 RX ADMIN — LEVOFLOXACIN 500 MG: 500 TABLET, FILM COATED ORAL at 09:21

## 2021-01-01 RX ADMIN — SODIUM CHLORIDE, PRESERVATIVE FREE 10 ML: 5 INJECTION INTRAVENOUS at 21:54

## 2021-01-01 RX ADMIN — SODIUM CHLORIDE, PRESERVATIVE FREE 10 ML: 5 INJECTION INTRAVENOUS at 13:49

## 2021-01-01 RX ADMIN — SODIUM CHLORIDE, PRESERVATIVE FREE 10 ML: 5 INJECTION INTRAVENOUS at 05:11

## 2021-01-01 RX ADMIN — METOPROLOL TARTRATE 2.5 MG: 5 INJECTION, SOLUTION INTRAVENOUS at 12:11

## 2021-01-01 RX ADMIN — POTASSIUM CHLORIDE 10 MEQ: 10 INJECTION, SOLUTION INTRAVENOUS at 17:01

## 2021-01-01 RX ADMIN — IPRATROPIUM BROMIDE 0.5 MG: 0.5 SOLUTION RESPIRATORY (INHALATION) at 19:42

## 2021-01-01 RX ADMIN — MEROPENEM 1000 MG: 1 INJECTION, POWDER, FOR SOLUTION INTRAVENOUS at 02:35

## 2021-01-01 RX ADMIN — SODIUM CHLORIDE: 9 INJECTION, SOLUTION INTRAVENOUS at 06:08

## 2021-01-01 RX ADMIN — SODIUM CHLORIDE, PRESERVATIVE FREE 20 ML: 5 INJECTION INTRAVENOUS at 17:46

## 2021-01-01 RX ADMIN — ARFORMOTEROL TARTRATE 15 MCG: 15 SOLUTION RESPIRATORY (INHALATION) at 22:00

## 2021-01-01 RX ADMIN — CEFTRIAXONE 1000 MG: 1 INJECTION, POWDER, FOR SOLUTION INTRAMUSCULAR; INTRAVENOUS at 16:41

## 2021-01-01 RX ADMIN — ASPIRIN 81 MG: 81 TABLET, COATED ORAL at 10:01

## 2021-01-01 RX ADMIN — IPRATROPIUM BROMIDE AND ALBUTEROL SULFATE 1 AMPULE: 2.5; .5 SOLUTION RESPIRATORY (INHALATION) at 12:27

## 2021-01-01 RX ADMIN — SODIUM BICARBONATE: 84 INJECTION, SOLUTION INTRAVENOUS at 12:09

## 2021-01-01 RX ADMIN — MEROPENEM 1000 MG: 1 INJECTION, POWDER, FOR SOLUTION INTRAVENOUS at 14:14

## 2021-01-01 RX ADMIN — SODIUM CHLORIDE, PRESERVATIVE FREE 10 ML: 5 INJECTION INTRAVENOUS at 21:05

## 2021-01-01 RX ADMIN — POTASSIUM CHLORIDE, DEXTROSE MONOHYDRATE AND SODIUM CHLORIDE: 150; 5; 450 INJECTION, SOLUTION INTRAVENOUS at 00:25

## 2021-01-01 RX ADMIN — LEVALBUTEROL HYDROCHLORIDE 0.63 MG: 0.63 SOLUTION RESPIRATORY (INHALATION) at 15:33

## 2021-01-01 RX ADMIN — SODIUM CHLORIDE, PRESERVATIVE FREE 10 ML: 5 INJECTION INTRAVENOUS at 13:34

## 2021-01-01 RX ADMIN — METHYLPREDNISOLONE SODIUM SUCCINATE 40 MG: 40 INJECTION, POWDER, LYOPHILIZED, FOR SOLUTION INTRAMUSCULAR; INTRAVENOUS at 02:15

## 2021-01-01 RX ADMIN — ONDANSETRON 4 MG: 2 INJECTION INTRAMUSCULAR; INTRAVENOUS at 21:41

## 2021-01-01 RX ADMIN — ARFORMOTEROL TARTRATE 15 MCG: 15 SOLUTION RESPIRATORY (INHALATION) at 08:07

## 2021-01-01 RX ADMIN — METOPROLOL SUCCINATE 25 MG: 25 TABLET, EXTENDED RELEASE ORAL at 09:36

## 2021-01-01 RX ADMIN — FAMOTIDINE 20 MG: 20 TABLET ORAL at 08:15

## 2021-01-01 RX ADMIN — MEROPENEM 1000 MG: 1 INJECTION, POWDER, FOR SOLUTION INTRAVENOUS at 15:23

## 2021-01-01 RX ADMIN — LEVOFLOXACIN 500 MG: 500 TABLET, FILM COATED ORAL at 08:55

## 2021-01-01 RX ADMIN — SODIUM CHLORIDE, PRESERVATIVE FREE 10 ML: 5 INJECTION INTRAVENOUS at 14:13

## 2021-01-01 RX ADMIN — SODIUM CHLORIDE: 9 INJECTION, SOLUTION INTRAVENOUS at 04:56

## 2021-01-01 RX ADMIN — SODIUM CHLORIDE, PRESERVATIVE FREE 10 ML: 5 INJECTION INTRAVENOUS at 20:13

## 2021-01-01 RX ADMIN — SODIUM CHLORIDE, PRESERVATIVE FREE 10 ML: 5 INJECTION INTRAVENOUS at 08:50

## 2021-01-01 RX ADMIN — IPRATROPIUM BROMIDE 0.5 MG: 0.5 SOLUTION RESPIRATORY (INHALATION) at 08:22

## 2021-01-01 RX ADMIN — ATORVASTATIN CALCIUM 20 MG: 20 TABLET, FILM COATED ORAL at 20:42

## 2021-01-01 RX ADMIN — SODIUM CHLORIDE, PRESERVATIVE FREE 10 ML: 5 INJECTION INTRAVENOUS at 13:56

## 2021-01-01 RX ADMIN — ENOXAPARIN SODIUM 40 MG: 40 INJECTION SUBCUTANEOUS at 07:59

## 2021-01-01 RX ADMIN — Medication 10 ML: at 13:28

## 2021-01-01 RX ADMIN — BUDESONIDE 500 MCG: 0.5 SUSPENSION RESPIRATORY (INHALATION) at 08:16

## 2021-01-01 RX ADMIN — VANCOMYCIN HYDROCHLORIDE 750 MG: 1 INJECTION, POWDER, LYOPHILIZED, FOR SOLUTION INTRAVENOUS at 12:16

## 2021-01-01 RX ADMIN — Medication 2000 UNITS: at 10:07

## 2021-01-01 RX ADMIN — METOPROLOL TARTRATE 2.5 MG: 5 INJECTION, SOLUTION INTRAVENOUS at 16:46

## 2021-01-01 RX ADMIN — Medication 300 UNITS: at 15:06

## 2021-01-01 RX ADMIN — LEVALBUTEROL HYDROCHLORIDE 0.63 MG: 0.63 SOLUTION RESPIRATORY (INHALATION) at 07:34

## 2021-01-01 ASSESSMENT — ENCOUNTER SYMPTOMS
ABDOMINAL PAIN: 0
DIARRHEA: 0
SORE THROAT: 0
COUGH: 0
WHEEZING: 1
COUGH: 0
BACK PAIN: 0
ABDOMINAL PAIN: 0
SHORTNESS OF BREATH: 1
SHORTNESS OF BREATH: 0
BACK PAIN: 0
COUGH: 0
EYE PAIN: 0
NAUSEA: 0
BACK PAIN: 0
NAUSEA: 0
SHORTNESS OF BREATH: 0
SHORTNESS OF BREATH: 1
SORE THROAT: 0
VOMITING: 0
TROUBLE SWALLOWING: 0
ABDOMINAL PAIN: 0
ABDOMINAL PAIN: 0
NAUSEA: 1
SORE THROAT: 0
VOMITING: 0
COUGH: 0
DIARRHEA: 0
SHORTNESS OF BREATH: 0
DIARRHEA: 0
CONSTIPATION: 0
ALLERGIC/IMMUNOLOGIC NEGATIVE: 1
CONSTIPATION: 0
DIARRHEA: 0
NAUSEA: 0
COUGH: 1
EYE PAIN: 0
NAUSEA: 1
SHORTNESS OF BREATH: 0
SORE THROAT: 0
VOMITING: 0
BACK PAIN: 0
VOMITING: 0
CHEST TIGHTNESS: 0
BACK PAIN: 0
DIARRHEA: 0
COLOR CHANGE: 0
ABDOMINAL PAIN: 1
VOMITING: 1

## 2021-01-01 ASSESSMENT — PAIN SCALES - GENERAL
PAINLEVEL_OUTOF10: 0
PAINLEVEL_OUTOF10: 2
PAINLEVEL_OUTOF10: 0
PAINLEVEL_OUTOF10: 9
PAINLEVEL_OUTOF10: 0

## 2021-01-01 ASSESSMENT — PAIN DESCRIPTION - LOCATION: LOCATION: ABDOMEN

## 2021-01-01 ASSESSMENT — PAIN DESCRIPTION - DESCRIPTORS: DESCRIPTORS: TENDER

## 2021-01-06 ENCOUNTER — PREP FOR PROCEDURE (OUTPATIENT)
Dept: SURGERY | Age: 71
End: 2021-01-06

## 2021-01-06 RX ORDER — SODIUM CHLORIDE 0.9 % (FLUSH) 0.9 %
10 SYRINGE (ML) INJECTION EVERY 12 HOURS SCHEDULED
Status: CANCELLED | OUTPATIENT
Start: 2021-01-06

## 2021-01-06 RX ORDER — SODIUM CHLORIDE, SODIUM LACTATE, POTASSIUM CHLORIDE, CALCIUM CHLORIDE 600; 310; 30; 20 MG/100ML; MG/100ML; MG/100ML; MG/100ML
INJECTION, SOLUTION INTRAVENOUS CONTINUOUS
Status: CANCELLED | OUTPATIENT
Start: 2021-01-06

## 2021-01-28 NOTE — ED NOTES
FIRST PROVIDER CONTACT ASSESSMENT NOTE      Department of Emergency Medicine   ED  First Provider Note   1/28/21  6:06 PM EST    Chief Complaint: No chief complaint on file. History of Present Illness:    Shanel Park is a 69 y.o. male who presents to the ED by private car for syncope  abdominal pain nausea vomiting decreased appetite. Confusion  Focused Screening Exam:  Constitutional:  Alert, appears stated age and is in no distress. Heart regular rate and rhythm  Lungs clear  *ALLERGIES*     Patient has no known allergies.      ED Triage Vitals [01/28/21 1758]   BP Temp Temp src Pulse Resp SpO2 Height Weight   -- 97.5 °F (36.4 °C) -- 100 -- 97 % -- --        Initial Plan of Care:  Initiate Treatment-Testing, Proceed toTreatment Area When Bed Available for ED Attending/MLP to Continue Care    -----------------END OF FIRST PROVIDER CONTACT ASSESSMENT NOTE--------------  Electronically signed by ETHAN Lundberg   DD: 1/28/21     ETHAN Lundberg  01/28/21 [address]  01/28/21 7138

## 2021-01-29 NOTE — ED NOTES
Report given to ubaldo at [facility].  Bedside report given to transfer team.      Janes Berger RN  01/29/21 0645

## 2021-01-29 NOTE — ED NOTES
Call to CT to see why transport says cancelled.  CT to send for pt     Memo Varela RN  01/28/21 0512

## 2021-01-29 NOTE — CONSULTS
GENERAL SURGERY  CONSULT NOTE  1/29/2021    Physician Consulted: Dr. Alondra Best  Reason for Consult: Abdominal pain  Referring Physician: Dr. Mario Barahona    KEN Brown is a 69 y.o. male who presents for evaluation of abdominal pain. Upright x-ray showed questionable free air. Patient was hemodynamically stable afebrile with no elevation in lactate. INR was 1. Patient went for CT scan of abdomen. No intraperitoneal air on CT scan. 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 Patient states that his pain has been going on for the last couple of weeks. Its intermittent in nature however today, he states that it was just the worst it has been. Currently he states he is doing a little bit better. He says the pain is under control. His hemoglobin is 12.7 on exam here today, this is his baseline. Patient has been seen by Dr. Alondra Best recently for follow-up after an EGD and colonoscopy. There are plans to do rectal examination under anesthesia in the near future. Patient does state that he still has not been able to eat that much. Past Medical History:   Diagnosis Date    Abdominal aortic aneurysm without rupture ([facility] [address] 5/9/2017    Arthritis     AS (aortic stenosis)     Cerebral artery occlusion with cerebral infarction [facility]) 2009    TIA/CVA with aphasia that resolved    COPD (chronic obstructive pulmonary disease) (HCC)     Emphysema of lung ([address]     History of blood transfusion     Hyperlipidemia     Pneumonia     Tobacco dependence 5/9/2017       Past Surgical History:   Procedure Laterality Date    ABDOMINAL AORTIC ANEURYSM REPAIR, ENDOVASCULAR  10/12/2017    Zenith Fenestrated.   [place]    ABDOMINAL AORTIC ANEURYSM REPAIR, ENDOVASCULAR  10/12/2017    ANKLE SURGERY      AORTIC VALVE REPLACEMENT      CARDIAC CATHETERIZATION  07/14/2017    Dr. Fabby Javed- No blockages    CARDIAC SURGERY      COLONOSCOPY N/A 12/21/2020    COLONOSCOPY DIAGNOSTIC performed by K[facility] Oakes [facility] at [facility] ENDOSCOPY    EYE SURGERY Bilateral approx 2014    cataracts removal w/lens implants    HERNIA REPAIR      SKULL FRACTURE ELEVATION      TOOTH EXTRACTION      full mouth extraction    UPPER GASTROINTESTINAL ENDOSCOPY N/A 12/21/2020    EGD DIAGNOSTIC ONLY performed by Radha Melendez MD at [address]       Medications Prior to Admission:    Prior to Admission medications    Medication Sig Start Date End Date Taking?  Authorizing Provider   albuterol sulfate HFA (VENTOLIN HFA) 108 (90 Base) MCG/ACT inhaler Inhale 2 puffs into the lungs every 4 hours as needed for Wheezing or Shortness of Breath 12/22/20   Linnea Vargas MD   ipratropium-albuterol (DUONEB) 0.5-2.5 (3) MG/3ML SOLN nebulizer solution Inhale 3 mLs into the lungs every 4 hours as needed for Shortness of Breath 12/22/20   Linnea Vargas MD   Tiotropium Bromide-Olodaterol (STIOLTO RESPIMAT) 2.5-2.5 MCG/ACT AERS Inhale 2 puffs into the lungs daily Takes 1 puff in morning and after dinner  Use day of surgery 12/22/20   Prdhaval Vargas MD   metoprolol tartrate (LOPRESSOR) 25 MG tablet Take 25 mg by mouth 2 times daily    Historical Provider, MD   atorvastatin (LIPITOR) 20 MG tablet Take 20 mg by mouth daily  8/6/15   Historical Provider, MD   aspirin 81 MG tablet Take 81 mg by mouth every other day     Historical Provider, MD       No Known Allergies    Family History   Problem Relation Age of Onset    Heart Disease Mother     Stroke Father     Heart Disease Brother        Social History     Tobacco Use    Smoking status: Current Every Day Smoker     Packs/day: 1.00     Years: 50.00     Pack years: 50.00     Types: Cigarettes    Smokeless tobacco: Never Used    Tobacco comment: currently smokes 1.0 ppd   Substance Use Topics    Alcohol use: No     Alcohol/week: 0.0 standard drinks    Drug use: No         Review of Systems   General ROS: positive for  - fatigue  Hematological and Lymphatic ROS: negative  Respiratory ROS: negative  Cardiovascular ROS: negative  Gastrointestinal ROS: negative  Genito-Urinary ROS: negative  Musculoskeletal ROS: negative      PHYSICAL EXAM:    Vitals:    01/28/21 2036   BP: (!) 142/93   Pulse: 89   Resp: 16   Temp:    SpO2: 99%       General Appearance:  awake, alert, frail in appearance oriented, in no acute distress  Skin:  Skin color, texture, turgor normal. No rashes or lesions. Head/face:  NCAT  Eyes:  PERRL  Lungs:  No chest wall tenderness. Heart:  Heart regular rate and rhythm  Abdomen: Soft, mildly tender  Extremities: pulses present in all extremities      LABS:    CBC  Recent Labs     01/28/21 1827   WBC 6.6   HGB 12.7   HCT 37.9   *     BMP  Recent Labs     01/28/21 1827      K 3.9      CO2 24   BUN 14   CREATININE 0.9   CALCIUM 8.8     Liver Function  Recent Labs     01/28/21 1827   LIPASE 15   BILITOT 0.7   AST 17   ALT 14   ALKPHOS 91   PROT 7.1   LABALBU 3.3*     No results for input(s): LACTATE in the last 72 hours. Recent Labs     01/28/21 1827   INR 1.1       RADIOLOGY    Ct Head Wo Contrast    Result Date: 1/28/2021  EXAMINATION: CT OF THE HEAD WITHOUT CONTRAST  1/28/2021 11:17 pm TECHNIQUE: CT of the head was performed without the administration of intravenous contrast. Dose modulation, iterative reconstruction, and/or weight based adjustment of the mA/kV was utilized to reduce the radiation dose to as low as reasonably achievable. COMPARISON: CT head without contrast, 12/03/2009 HISTORY: ORDERING SYSTEM PROVIDED HISTORY: confusion TECHNOLOGIST PROVIDED HISTORY: Has a \"code stroke\" or \"stroke alert\" been called? ->No Reason for exam:->confusion What reading provider will be dictating this exam?->CRC FINDINGS: BRAIN/VENTRICLES: No positive mass effect, edema or hemorrhage is seen. Chronic infarction is seen in the left parietal lobe, extending to the left posterior insular cortex and subinsular region.   Remainder of the brain is notable for moderate volume loss and minimal chronic microvascular ischemic changes. No hydrocephalus or extra-axial fluid is seen. ORBITS: Prosthetic lenses are seen in both globes. Bilateral enophthalmos noted. SINUSES: Postoperative changes are seen in the partially visualized bilateral maxillary sinuses, both of which are atelectatic and opacified. Minimal inflammatory changes in the ethmoid sinuses. The mastoids are clear. SOFT TISSUES/SKULL:  No acute abnormality of the visualized skull or soft tissues. 1. No acute intracranial abnormality. 2. Chronic infarction in the left cerebral hemisphere. 3. Chronic posttraumatic changes in the bilateral maxillary sinuses, likely involving the orbital floors. Opacification of the partially visualized maxillary sinuses. Bilateral enophthalmos . Ct Abdomen Pelvis W Iv Contrast Additional Contrast? None    Result Date: 1/29/2021  EXAMINATION: CT OF THE ABDOMEN AND PELVIS WITH CONTRAST 1/28/2021 11:17 pm TECHNIQUE: CT of the abdomen and pelvis was performed with the administration of intravenous contrast. Multiplanar reformatted images are provided for review. Dose modulation, iterative reconstruction, and/or weight based adjustment of the mA/kV was utilized to reduce the radiation dose to as low as reasonably achievable. COMPARISON: CT of the abdomen and pelvis, 12/15/2020 HISTORY: ORDERING SYSTEM PROVIDED HISTORY: nausea, abdominal pain TECHNOLOGIST PROVIDED HISTORY: Reason for exam:->nausea, abdominal pain Additional Contrast?->None What reading provider will be dictating this exam?->CRC FINDINGS: Lower Chest: Prominent emphysematous changes are seen at the lung bases. The heart is normal in size. Organs: Liver: Unremarkable. Gallbladder: Unremarkable. Pancreas: Unremarkable. Spleen:  Unremarkable. Adrenals: Unremarkable. Kidneys: Unremarkable. GI/Bowel: Prominent gaseous distension of the colon. No gross bowel wall thickening or obstruction. Pelvis:  The urinary bladder and the prostate are grossly unremarkable. Peritoneum/Retroperitoneum: Compared to the recent CT from 12/15/2020, there is interval development of a large, 4.2 cm aneurysm in the distal SMA a, within the left lower quadrant (series 7, image 42). There is a prominent, partially ulcerated plaque in the aneurysm, which is at a higher risk for rupture. A bifurcated stent is seen within aorta, extending into the common iliac arteries. Hyperdensities within the bypassed lumen of the aneurysm likely represent endoleak. The aneurysm measures approximately 6.2 x 5.6 cm in the maximal axial plane and is grossly stable. Bones/Soft Tissues: The visualized bones are intact without fracture or focal lesion. 1. 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 It has essentially developed in the interim since 12/15/2020 and is at a high risk for rupture. 2. Grossly stable large infrarenal aortic aneurysm, approximately measuring 6.2 x 5.6 cm, traversed by a endovascular stent graft. Hyperdensities within the bypassed segment likely represent contrast related to an endoleak. 3. Prominent gaseous distension of the colon. No gross bowel wall thickening or obstruction. Critical results were called by Dr. Valentine Guzman to Dr. Chip Bolanos on 1/29/2021 at 00:10. RECOMMENDATIONS: Emergent vascular surgery consultation recommended. Xr Chest Portable    Result Date: 1/28/2021  EXAMINATION: ONE XRAY VIEW OF THE CHEST 1/28/2021 8:30 pm COMPARISON: 12/15/2020 HISTORY: ORDERING SYSTEM PROVIDED HISTORY: cp TECHNOLOGIST PROVIDED HISTORY: Reason for exam:->cp What reading provider will be dictating this exam?->CRC FINDINGS: The cardiomediastinal silhouette is unchanged in appearance. The lungs are hyperinflated with findings of advanced emphysema. There is no consolidation, pneumothorax, or evidence of edema. No effusion is appreciated. The osseous structures are unchanged in appearance.

## 2021-01-29 NOTE — ED NOTES
S/w transfer team at  [facility].  States she will have their ground team transport and will call back with an KILO Love  01/29/21 8573

## 2021-01-29 NOTE — CONSULTS
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 male who is admitted to the hospital for treatment of abdominal pain. Vascular surgery is consulted for evaluation and treatment of distal SMA aneurysm seen on CT abdomen/pelvis. He has had abdominal pain off and on for past month. Reports had EGD/colonoscopy in December due to weight loss in recent months. He had diarrhea a few weeks ago but last BM was normal a few days ago. He has had intermittent nausea, emesis and intermittent dark colored stools. Denies fevers or chills. Denies claudication symptoms. He has been taking Aspirin 81mg daily. He has hx of infrarenal AAA s/p endovascular fenestrated stent graft repair in 2017 by Dr. Kar Slaughter. During prior hospitalization and workup for weight loss a CT abd/pelv was obtained and showed a likely type II endovascular leak of a previous fenestrated EVAR (Román 2017) with increase in native aneurysm size from 5.7 x 4.0 cm to 5.8 by 5.4 cm. Today he had CT a/p done to evaluate for possible free air. He is noted to have a large 4.2cm aneurysm of the distal SMA. This is not noted on prior CT in December 2020. On retrospective review, appears to have an area of distal SMA that is dilated but measuring closer to 1cm and aneurysm not as apparent at time of that scan.       ROS : Negative if blank [], Positive if [x]  General Vascular   [] Fevers [] Claudication (Blocks)   [] Chills [] Rest Pain   [x] Weight Loss [] Tissue Loss   [] Chest Pain [] Clotting Disorder    [] SOB at rest [] Leg Swelling   [] SOB with exertion [] DVT/PE      [x] Nausea    [x] Vomitting [] Stroke/TIA   [x] Abdominal Pain [] Focal weakness   [] Melena [] Slurred Speech   [] Hematochezia [] Vision Changes   [] Hematuria    [] Dysuria [] Hx of Central Catheters   [] Wears Glasses/Contacts  [] Dialysis and If so date initiated   [] Blindness     []  Hand Dominant   [] Difficulty swallowing        Past Medical History:   Diagnosis Date    Abdominal aortic aneurysm without rupture ([facility] 5/9/2017    Arthritis     AS (aortic stenosis)     Cerebral artery occlusion with cerebral infarction [facility]) 2009    TIA/CVA with aphasia that resolved    COPD (chronic obstructive pulmonary disease) ([facility]     Emphysema of lung ([facility]     History of blood transfusion     Hyperlipidemia     Pneumonia     Tobacco dependence 5/9/2017        Past Surgical History:   Procedure Laterality Date    ABDOMINAL AORTIC ANEURYSM REPAIR, ENDOVASCULAR  10/12/2017    Zenith Fenestrated. Delatore    ABDOMINAL AORTIC ANEURYSM REPAIR, ENDOVASCULAR  10/12/2017    ANKLE SURGERY      AORTIC VALVE REPLACEMENT      CARDIAC CATHETERIZATION  07/14/2017    Dr. Karen Villalta- No blockages    CARDIAC SURGERY      COLONOSCOPY N/A 12/21/2020    COLONOSCOPY DIAGNOSTIC performed by Mary Last MD at [address] [place] [address] Bilateral approx 2014    cataracts removal w/lens implants    HERNIA REPAIR      SKULL FRACTURE ELEVATION      TOOTH EXTRACTION      full mouth extraction    UPPER GASTROINTESTINAL ENDOSCOPY N/A 12/21/2020    EGD DIAGNOSTIC ONLY performed by Mary Last MD at [facility]     Current Medications: Allergies:  Patient has no known allergies.     Social History     Socioeconomic History    Marital status:      Spouse name: Not on file    Number of children: Not on file    Years of education: Not on file    Highest education level: Not on file   Occupational History    Occupation: retired-   Social Needs    Financial resource strain: Not on file    Food insecurity     Worry: Not on file     Inability: Not on file   [employer] needs     Medical: Not on file     Non-medical: Not on file   Tobacco Use    Smoking status: Current Every Day Smoker     Packs/day: 1.00     Years: 50.00     Pack years: 50.00     Types: Cigarettes    Smokeless tobacco: Never Used    Tobacco comment: currently smokes 1.0 ppd   Substance and Sexual Activity    Alcohol use: No     Alcohol/week: 0.0 standard drinks    Drug use: No    Sexual activity: Not Currently   Lifestyle    Physical activity     Days per week: Not on file     Minutes per session: Not on file    Stress: Not on file   Relationships    Social connections     Talks on phone: Not on file     Gets together: Not on file     Attends Sikh service: Not on file     Active member of club or organization: Not on file     Attends meetings of clubs or organizations: Not on file     Relationship status: Not on file    Intimate partner violence     Fear of current or ex partner: Not on file     Emotionally abused: Not on file     Physically abused: Not on file     Forced sexual activity: Not on file   Other Topics Concern    Not on file   Social History Narrative    Not on file        Family History   Problem Relation Age of Onset    Heart Disease Mother     Stroke Father     Heart Disease Brother        PHYSICAL EXAM:    BP (!) 142/93   Pulse 89   Temp 97.5 °F (36.4 °C)   Resp 16   Ht 5' 10\" (1.778 m)   Wt 96 lb (43.5 kg)   SpO2 99%   BMI 13.77 kg/m²   CONSTITUTIONAL:  awake, alert, cooperative, no apparent distress, and appears stated age. Thin, frail appearing  EYES:  lids and lashes normal, sclera clear and conjunctiva normal  ENT:  normocepalic, without obvious abnormality, external ears without lesions  NECK:  supple, symmetrical, trachea midline,no jugular venous distension,   LUNGS:  no increased work of breathing, good air exchange  CARDIOVASCULAR:  regular rate and rhythm   ABDOMEN:  soft, non-distended, mild RLQ TTP without guarding or rebound.   Palpable pulsatile area in LLQ  SKIN:  no bruising or bleeding, normal skin color, texture, turgor and no rashes  EXTREMITIES:   R UE    Swelling absent Incisions absent                   5/5 Strength  L UE    Swelling absent Incisions absent 5/5 Strength  R LE    Edema absent  Incisions absent               Varicose veins absent               Wounds absent, normalcaprefill              5/5 Strength, neuropathy is absent  L LE     Edema absent  Incisions absent               Varicose veins absent               Wounds absent, normalcaprefill              5/5 Strength, neuropathy is absent  R brachial  L brachial    R radial 2 L radial 2   R femoral 2 L femoral 2   R popliteal  L popliteal    R posterior tibial 1 L posterior tibial 1   R dorsalis pedis 1 L dorsalis pedis 1     LABS:    Lab Results   Component Value Date    WBC 6.6 01/28/2021    HGB 12.7 01/28/2021    HCT 37.9 01/28/2021     (L) 01/28/2021    PROTIME 12.6 (H) 01/28/2021    INR 1.1 01/28/2021    K 3.9 01/28/2021    BUN 14 01/28/2021    CREATININE 0.9 01/28/2021       RADIOLOGY:    Assesment/Plan  76yo male with abdominal pain and distal 4.2cm SMA aneurysm; hx AAA s/p fenestrated EVAR 2017  Hemodynamically stable at present  No peritoneal signs  Presently without signs of acute bowel ischemia. Given appearance of aneurysm, concern for high risk of rupture. Unable to stent vessel in this location. Recommend transfer to [facility] for Vascular Surgery evaluation    D/w Dr. Heriberto Quiroz DO PGY2  Electronically signed by Kim Ball DO on 1/29/2021 at 1:03 AM    I had a long discussion with the resident about this case. The night that he was brought in he had intermittent abdominal pain that have been going on for several weeks. He has a history of a fenestrated stent graft. He had a prior CT scan done about a month and a half ago that demonstrated when I looked back at the images a small segment of the SMA that had appeared dilated but that was thrombosed and it appeared to be a branch.   Since that time he has had an interval enlargement of the distal SMA located in the left lower quadrant that is significantly changed from the prior examination. Based on of those findings. I recommended a tertiary care center for further evaluation and management. This is a complex situation. At this point certainly secondary to those findings. I be suspicious of any type of infectious etiology leading to rapid aneurysmal degeneration in that segment. Again he would be best handled at a tertiary care center. This was discussed at length with the emergency room physician as well as the resident.     Bernard Tay

## 2021-01-29 NOTE — ED PROVIDER NOTES
I, Dr. Brodie Hatchet, am the primary provider of record. ATTENDING PROVIDER ATTESTATION:     Analilia Soto presented to the emergency department for evaluation of Nausea (Nausea, vomiting, and no appetite for a month.) and Hallucinations (Started hallucinating and speaking to granddaughter who was not there, per wife. )   and was initially evaluated by the Medical Resident. See Original ED Note for H&P and ED course above. I have reviewed and discussed the case, including pertinent history (medical, surgical, family and social) and exam findings with the Medical Resident assigned to Analilia Soto. I have personally performed and/or participated in the history, exam, medical decision making, and procedures and agree with all pertinent clinical information. I have reviewed my findings and recommendations with the assigned Medical Resident, Analilia Soto and members of family present at the time of disposition. My findings/plan: The primary encounter diagnosis was Aneurysm of superior mesenteric artery ([address] A diagnosis of Elevated troponin was also pertinent to this visit. New Prescriptions    No medications on file     Nohemy Wolfe DO      Patient is a 67-year-old male with past medical history of hyperlipidemia, CVA, abdominal aneurysm status post repair, COPD presents emergency department with nausea, hallucinations and confusions. Symptoms moderate to severe in severity and constant since onset. Patient states he has had nausea and nonbloody, nonbilious emesis for approximately 1 month. He states he was seen in the hospital in December and approximately 1 to 2 weeks after that discharge the nausea and vomiting ensued. He states anytime he tries eat or drink anything he vomits it back up. He has not had a full meal in many weeks. He states he also has no appetite to eat. Denies any recent sick contacts. He also states he has had abdominal pain for the last month as well.   Thinks it nursing note reviewed. Constitutional:       General: He is not in acute distress. Appearance: He is well-developed. He is not ill-appearing. Comments: Cachectic   HENT:      Head: Normocephalic and atraumatic. Mouth/Throat:      Mouth: Mucous membranes are dry. Eyes:      Extraocular Movements: Extraocular movements intact. Pupils: Pupils are equal, round, and reactive to light. Neck:      Musculoskeletal: Normal range of motion and neck supple. Cardiovascular:      Rate and Rhythm: Normal rate and regular rhythm. Pulses: Normal pulses. Heart sounds: Normal heart sounds. No murmur. Pulmonary:      Effort: Pulmonary effort is normal. No respiratory distress. Breath sounds: Normal breath sounds. No wheezing or rales. Abdominal:      Palpations: Abdomen is soft. Tenderness: There is abdominal tenderness. There is guarding. There is no rebound. Comments: Right upper quadrant and right lower quadrant abdominal tenderness. Guarding in the right upper quadrant. Musculoskeletal: Normal range of motion. Skin:     General: Skin is warm and dry. Neurological:      General: No focal deficit present. Mental Status: He is alert and oriented to person, place, and time. Cranial Nerves: No cranial nerve deficit. Sensory: No sensory deficit. Coordination: Coordination normal.      Comments: No pronator drift, no ataxia, sensation intact          Procedures     MDM  Number of Diagnoses or Management Options  Patient presents emergency department for nausea, hallucinations. He is cachectic on exam, vital signs stable. He does have guarding in the right upper quadrant but the rest of his abdomen is soft. He has no focal neurological deficits and is neurovascularly intact. Work-up initiated. Patient has no active chest pain or shortness of breath. EKG with no acute ST segment elevation. Does have mild troponin bump to 0.05. CT head unremarkable. With patient's continuous nausea and abdominal pain CT of the abdomen was performed. CT demonstrating a large 4.2 cm aneurysm of the distal SMA. This is high risk for rupture. There is also a stable  infrarenal aortic aneurysm. After the CT results were found, emergent consultation to vascular surgery obtained. Vascular surgery recommends transfer to tertiary care center as this is a very large aneurysm that would be best managed there. [facility] who accepted patient for transfer and transfer pending bed assignment. Reevaluated patient multiple times and blood pressure remained stable, he is not tachycardic. Patient remained stable while here in the department. Extensive conversation had with patient patient's wife about work-up and imaging findings. Discussed the severity of the findings and the need for transfer to tertiary care. All of their questions were answered. They are agreeable with transfer. We will clinic requesting blood cultures as well as ESR and CRP for concern for infectious cause. These were ordered and Covid test pending. Patient is being transferred pending bed assignment. ED Course as of Jan 29 0151 Fri Jan 29, 2021   0014 Updated patient and wife about CT findings, all questions answered. Consult placed to vascular surgery. [KP]   5055 Spoke with vascular surgery about the case, they state that this aneurysm is very large and he would benefit from tertiary care and [facility] for management of this. Informed patient and wife about my discussion. They are agreeable with plan of [facility]. Consult placed through transfer line. [KP]   3781 Spoke with [facility], discussed case, they will accept for transfer. Will call back with bed assignment.     [KP]      ED Course User Index  [KP] Umm Mclean, DO        ----------------------------------------------- PAST HISTORY --------------------------------------------  Past Medical History:  has a past medical history of Abdominal [facility] Arthritis, AS (aortic stenosis), Cerebral artery occlusion with cerebral infarction ([facility] COPD (chronic obstructive pulmonary disease) ([facility] Emphysema of lung ([facility] History of blood transfusion, Hyperlipidemia, Pneumonia, and Tobacco dependence. Past Surgical History:  has a past surgical history that includes hernia repair; Ankle surgery; Skull fracture elevation; eye surgery (Bilateral, approx 2014); Cardiac catheterization (07/14/2017); Tooth Extraction; Aortic valve replacement; AAA repair, endovascular (10/12/2017); AAA repair, endovascular (10/12/2017); Cardiac surgery; Upper gastrointestinal endoscopy (N/A, 12/21/2020); and Colonoscopy (N/A, 12/21/2020). Social History:  reports that he has been smoking cigarettes. He has a 50.00 pack-year smoking history. He has never used smokeless tobacco. He reports that he does not drink alcohol or use drugs. Family History: family history includes Heart Disease in his brother and mother; Stroke in his father. The patients home medications have been reviewed. Allergies: Patient has no known allergies.     ------------------------------------------------ RESULTS ---------------------------------------------------    LABS:  Results for orders placed or performed during the hospital encounter of 01/28/21   CBC Auto Differential   Result Value Ref Range    WBC 6.6 4.5 - 11.5 E9/L    RBC 3.92 3.80 - 5.80 E12/L    Hemoglobin 12.7 12.5 - 16.5 g/dL    Hematocrit 37.9 37.0 - 54.0 %    MCV 96.7 80.0 - 99.9 fL    MCH 32.4 26.0 - 35.0 pg    MCHC 33.5 32.0 - 34.5 %    RDW 14.0 11.5 - 15.0 fL    Platelets 512 (L) 468 - 450 E9/L    MPV 9.7 7.0 - 12.0 fL    Neutrophils % 71.2 43.0 - 80.0 %    Immature Granulocytes % 0.5 0.0 - 5.0 %    Lymphocytes % 17.5 (L) 20.0 - 42.0 %    Monocytes % 10.2 2.0 - 12.0 %    Eosinophils % 0.0 0.0 - 6.0 %    Basophils % 0.6 0.0 - 2.0 %    Neutrophils Absolute 4.70 1.80 - 7.30 E9/L    Immature Granulocytes # 0.03 E9/L    Lymphocytes Absolute 1.15 (L) 1.50 - 4.00 E9/L    Monocytes Absolute 0.67 0.10 - 0.95 E9/L    Eosinophils Absolute 0.00 (L) 0.05 - 0.50 E9/L    Basophils Absolute 0.04 0.00 - 0.20 E9/L   Comprehensive Metabolic Panel w/ Reflex to MG   Result Value Ref Range    Sodium 137 132 - 146 mmol/L    Potassium reflex Magnesium 3.9 3.5 - 5.0 mmol/L    Chloride 102 98 - 107 mmol/L    CO2 24 22 - 29 mmol/L    Anion Gap 11 7 - 16 mmol/L    Glucose 98 74 - 99 mg/dL    BUN 14 8 - 23 mg/dL    CREATININE 0.9 0.7 - 1.2 mg/dL    GFR Non-African American >60 >=60 mL/min/1.73    GFR African American >60     Calcium 8.8 8.6 - 10.2 mg/dL    Total Protein 7.1 6.4 - 8.3 g/dL    Albumin 3.3 (L) 3.5 - 5.2 g/dL    Total Bilirubin 0.7 0.0 - 1.2 mg/dL    Alkaline Phosphatase 91 40 - 129 U/L    ALT 14 0 - 40 U/L    AST 17 0 - 39 U/L   Troponin   Result Value Ref Range    Troponin 0.05 (H) 0.00 - 0.03 ng/mL   Lipase   Result Value Ref Range    Lipase 15 13 - 60 U/L   Lactic Acid, Plasma   Result Value Ref Range    Lactic Acid 1.0 0.5 - 2.2 mmol/L   Urinalysis   Result Value Ref Range    Color, UA Yellow Straw/Yellow    Clarity, UA Clear Clear    Glucose, Ur Negative Negative mg/dL    Bilirubin Urine Negative Negative    Ketones, Urine Negative Negative mg/dL    Specific Gravity, UA 1.025 1.005 - 1.030    Blood, Urine LARGE (A) Negative    pH, UA 6.0 5.0 - 9.0    Protein, UA 30 (A) Negative mg/dL    Urobilinogen, Urine 1.0 <2.0 E.U./dL    Nitrite, Urine Negative Negative    Leukocyte Esterase, Urine Negative Negative   Brain Natriuretic Peptide   Result Value Ref Range    Pro-BNP 2,206 (H) 0 - 125 pg/mL   APTT   Result Value Ref Range    aPTT 29.6 24.5 - 35.1 sec   Protime-INR   Result Value Ref Range    Protime 12.6 (H) 9.3 - 12.4 sec    INR 1.1    Microscopic Urinalysis   Result Value Ref Range    WBC, UA 0-1 0 - 5 /HPF    RBC, UA 10-20 (A) 0 - 2 /HPF    Bacteria, UA FEW (A) None Seen /HPF   COVID-19   Result Value Ref Range    SARS-CoV-2, NAAT Not Detected Not Detected       RADIOLOGY:    All Radiology results interpreted by Radiologist unless otherwise noted. CT HEAD WO CONTRAST   Final Result   1. No acute intracranial abnormality. 2. Chronic infarction in the left cerebral hemisphere. 3. Chronic posttraumatic changes in the bilateral maxillary sinuses, likely   involving the orbital floors. Opacification of the partially visualized   maxillary sinuses. Bilateral enophthalmos . CT ABDOMEN PELVIS W IV CONTRAST Additional Contrast? None   Final Result   1. Large, 4.2 cm partially thrombosed aneurysm in the distal SMA, within the   left lower quadrant. It has essentially developed in the interim since   12/15/2020 and is at a high risk for rupture. 2. Grossly stable large infrarenal aortic aneurysm, approximately measuring   6.2 x 5.6 cm, traversed by a endovascular stent graft. Hyperdensities within   the bypassed segment likely represent contrast related to an endoleak. 3. Prominent gaseous distension of the colon. No gross bowel wall thickening   or obstruction. Critical results were called by Dr. Brittani Christie to Dr. Guy Salcedo on   1/29/2021 at 00:10.   RECOMMENDATIONS:   Emergent vascular surgery consultation recommended. XR CHEST PORTABLE   Final Result   1. Suspect free air in the upper abdomen. 2.  Chronic findings in the chest without acute airspace disease identified. Findings were discussed with Dr. Francois Moser at 9:05 pm on 1/28/2021. EKG: This EKG is signed and interpreted by ED Physician. Time:  1830   Rate: 98  Rhythm: Sinus. Interpretation: non-specific EKG. normal axis deviation. LVH. Nonspecific T wave changes in the lateral leads. QTc 472.   Comparison: stable as compared to patient's most recent EKG, changes compared to previous EKG.    ---------------------------- NURSING NOTES AND VITALS REVIEWED -------------------------   The nursing notes within the ED encounter and vital signs as below have been reviewed. BP (!) 142/93   Pulse 89   Temp 97.5 °F (36.4 °C)   Resp 16   Ht 5' 10\" (1.778 m)   Wt 96 lb (43.5 kg)   SpO2 99%   BMI 13.77 kg/m²   Oxygen Saturation Interpretation: Normal      ------------------------------------------PROGRESS NOTES -------------------------------------------    ED COURSE MEDICATIONS:                Medications   iopamidol (ISOVUE-370) 76 % injection 90 mL (90 mLs Intravenous Given 1/28/21 8868)       CONSULTATIONS:            Consultation:  I Spoke with Dr. Kimberley Radford ([facility]). Discussed case. They recommend transfer  Consultation:  I Spoke with [facility] surgery. Discussed case. They accept this patient for transfer. .  . PROCEDURES:            none. COUNSELING:   I have spoken with the spouse and patient and discussed todays results, in addition to providing specific details for the plan of care and counseling regarding the diagnosis and prognosis.     --------------------------------------- IMPRESSION & DISPOSITION --------------------------------     IMPRESSION(s):  1. Aneurysm of superior mesenteric artery (HCC)    2. Elevated troponin        This patient's ED course included: a personal history and physicial examination, re-evaluation prior to disposition, cardiac monitoring and continuous pulse oximetry    This patient has been closely monitored during their ED course. DISPOSITION:  Disposition: Transfer to [facility] to cardiac ICU. Patient condition is critical.    CRITICAL CARE:   75 MINUTES. Please note that the withdrawal or failure to initiate urgent interventions for this patient would likely result in a life threatening deterioration or permanent disability. Accordingly this patient received the above mentioned time, excluding separately billable procedures. END OF PROVIDER NOTE.           [address] [place],   Resident  01/29/21 0151       Merle Rivera, DO  01/29/21 0157

## 2021-03-17 NOTE — ED PROVIDER NOTES
Patient is a 71-year-old male with past medical history of AAA, CVA, aortic stenosis, COPD, hyperlipidemia, colectomy presenting to the emergency department for feeding tube problem. Symptoms mild in severity and constant since onset. Patient recently had a colectomy secondary to infection. His colostomy was placed on February 13. He states he also had his CorPak placed at that time as well. He has been discharged home and is on feeding tubes 24 hours. Wife flushes the tube at home. Patient woke up this morning and wife states the feeding tube has been clogged ever since. She noticed the clock at 6 AM.  She attempted to flush it once at home without resolution. She denies any complaints. No chest pain, shortness of breath, fevers, chills, nausea, vomiting, diarrhea or constipation. Review of Systems   Constitutional: Negative for chills and fever. HENT: Negative for congestion and sore throat. Respiratory: Negative for cough and shortness of breath. Cardiovascular: Negative for chest pain. Gastrointestinal: Negative for abdominal pain, diarrhea, nausea and vomiting. Genitourinary: Negative for dysuria and frequency. Musculoskeletal: Negative for arthralgias and back pain. Skin: Negative for rash and wound. Neurological: Negative for headaches. All other systems reviewed and are negative. Physical Exam  Vitals signs and nursing note reviewed. Constitutional:       Appearance: He is well-developed. Comments: Cachectic   HENT:      Head: Normocephalic and atraumatic. Nose:      Comments: CorPak in place, bridled in the nares  Eyes:      Extraocular Movements: Extraocular movements intact. Neck:      Musculoskeletal: Normal range of motion and neck supple. Cardiovascular:      Rate and Rhythm: Normal rate and regular rhythm. Heart sounds: Normal heart sounds. No murmur. Pulmonary:      Effort: Pulmonary effort is normal. No respiratory distress. Breath sounds: Normal breath sounds. No wheezing or rales. Abdominal:      Palpations: Abdomen is soft. Tenderness: There is no abdominal tenderness. There is no guarding or rebound. Comments: Ostomy in place. Prior abdominal scars to the anterior abdominal wall. Abdomen soft nontender. Skin:     General: Skin is warm and dry. Capillary Refill: Capillary refill takes less than 2 seconds. Neurological:      Mental Status: He is alert and oriented to person, place, and time. Sensory: No sensory deficit. Procedures     MDM  Number of Diagnoses or Management Options  Patient presented for clogging of his feeding tube. He is clinically stable, vital signs stable, nontoxic-appearing. Patient with no acute complaints other than his feeding tube is clogged. Clog zapper was applied to the feeding tube and a clogged tube had resolved. Tube was working appropriately. Sent to come to have patient and patient wife about symptoms, diagnosis and tube management at home. Nursing showed them how to perform flushing of the CorPak. They verbalized understanding are agreeable to plan. Strict return precautions were discussed. Patient was discharged. --------------------------------------------- PAST HISTORY ---------------------------------------------  Past Medical History:  has a past medical history of Abdominal aortic aneurysm without rupture ([facility] [address] Arthritis, AS (aortic stenosis), Cerebral artery occlusion with cerebral infarction ([facility] [address] COPD (chronic obstructive pulmonary disease) ([facility] [address] Emphysema of lung ([facility] [address] History of blood transfusion, Hyperlipidemia, Pneumonia, and Tobacco dependence. Past Surgical History:  has a past surgical history that includes hernia repair; Ankle surgery; Skull fracture elevation; eye surgery (Bilateral, approx 2014); Cardiac catheterization (07/14/2017); Tooth Extraction;  Aortic valve replacement; AAA repair, endovascular (10/12/2017); AAA repair, endovascular (10/12/2017); Cardiac surgery; Upper gastrointestinal endoscopy (N/A, 12/21/2020); and Colonoscopy (N/A, 12/21/2020). Social History:  reports that he has been smoking cigarettes. He has a 50.00 pack-year smoking history. He has never used smokeless tobacco. He reports that he does not drink alcohol or use drugs. Family History: family history includes Heart Disease in his brother and mother; Stroke in his father. The patients home medications have been reviewed. Allergies: Patient has no known allergies. -------------------------------------------------- RESULTS -------------------------------------------------  Labs:  No results found for this visit on 03/17/21. Radiology:  No orders to display         ------------------------- NURSING NOTES AND VITALS REVIEWED ---------------------------  Date / Time Roomed:  3/17/2021  8:52 AM  ED Bed Assignment:  11/11    The nursing notes within the ED encounter and vital signs as below have been reviewed. BP (!) 104/57   Pulse 78   Temp 97.8 °F (36.6 °C) (Oral)   Resp 14   SpO2 97%   Oxygen Saturation Interpretation: Normal      ------------------------------------------ PROGRESS NOTES ------------------------------------------  ED COURSE MEDICATIONS:                Medications   clog zapper kit 10 mL (10 mLs PEG Tube Given 3/17/21 1007)       I have spoken with the patient and discussed todays results, in addition to providing specific details for the plan of care and counseling regarding the diagnosis and prognosis. Their questions are answered at this time and they are agreeable with the plan. I discussed at length with them reasons for immediate return here for re evaluation. They will followup with primary care by calling their office tomorrow.       --------------------------------- ADDITIONAL PROVIDER NOTES ---------------------------------  At this time the patient is without objective evidence of an acute process requiring hospitalization or inpatient management. They have remained hemodynamically stable throughout their entire ED visit and are stable for discharge with outpatient follow-up. The plan has been discussed in detail and they are aware of the specific conditions for emergent return, as well as the importance of follow-up. Discharge Medication List as of 3/17/2021 11:32 AM          Diagnosis:  1. Feeding tube obstruction, initial encounter        Disposition:  Patient's disposition: Discharge to home  Patient's condition is stable.        Kerry Mclean DO  Resident  03/18/21 1055

## 2021-04-10 PROBLEM — N17.9 AKI (ACUTE KIDNEY INJURY) (HCC): Status: ACTIVE | Noted: 2021-01-01

## 2021-04-10 PROBLEM — J43.9 EMPHYSEMA OF LUNG (HCC): Status: ACTIVE | Noted: 2021-01-01

## 2021-04-10 PROBLEM — J96.01 ACUTE RESPIRATORY FAILURE WITH HYPOXIA (HCC): Status: ACTIVE | Noted: 2021-01-01

## 2021-04-10 PROBLEM — I10 ESSENTIAL HYPERTENSION: Status: ACTIVE | Noted: 2021-01-01

## 2021-04-10 PROBLEM — E87.20 LACTIC ACID ACIDOSIS: Status: ACTIVE | Noted: 2021-01-01

## 2021-04-10 PROBLEM — E87.29 HIGH ANION GAP METABOLIC ACIDOSIS: Status: ACTIVE | Noted: 2021-01-01

## 2021-04-10 PROBLEM — R00.0 TACHYCARDIA: Status: ACTIVE | Noted: 2021-01-01

## 2021-04-10 PROBLEM — E83.52 HYPERCALCEMIA: Status: ACTIVE | Noted: 2021-01-01

## 2021-04-10 PROBLEM — J44.1 COPD EXACERBATION (HCC): Status: ACTIVE | Noted: 2021-01-01

## 2021-04-10 NOTE — ED NOTES
Bed: 03  Expected date:   Expected time:   Means of arrival:   Comments:  LANES Bridgette Clifton, RN  04/10/21 6950

## 2021-04-10 NOTE — ED PROVIDER NOTES
This is a 69-year-old male with a past medical history of COPD and aneurysm who presents to the ED for evaluation of shortness of breath. Patient states that last night he developed gradual onset shortness of breath and wheezing. She states the shortness of breath was worsened with exertion and improved somewhat with rest.  Patient states he also felt slightly nauseous and notes that his nausea worsened today as well. She has no chest pain and according to EMS he was 79% on room air and was placed on nasal cannula as well as given multiple breathing treatments with improvement in his oxygenation status. Patient has no chest pain fevers chills and notes that his cough has had similar production to what it typically is. Patient has any black or bloody stools. The history is provided by the patient. No  was used. Review of Systems   Constitutional: Negative for fever. HENT: Negative for congestion. Eyes: Negative for visual disturbance. Respiratory: Positive for shortness of breath and wheezing. Cardiovascular: Negative for chest pain. Gastrointestinal: Positive for nausea. Endocrine: Negative for polyuria. Genitourinary: Negative for dysuria. Musculoskeletal: Negative for back pain. Skin: Negative for rash. Allergic/Immunologic: Negative for immunocompromised state. Neurological: Negative for headaches. Hematological: Does not bruise/bleed easily. Psychiatric/Behavioral: Negative for confusion. Physical Exam  Vitals signs and nursing note reviewed. Constitutional:       General: He is not in acute distress. Appearance: He is well-developed. Comments: Thin frail cachectic   HENT:      Head: Normocephalic and atraumatic. Eyes:      Extraocular Movements: Extraocular movements intact. Pupils: Pupils are equal, round, and reactive to light. Neck:      Musculoskeletal: Normal range of motion and neck supple. Vascular: No JVD. Cardiovascular:      Rate and Rhythm: Regular rhythm. Tachycardia present. Pulmonary:      Comments: Coarse breath sounds bilaterally  Abdominal:      General: There is no distension. Palpations: Abdomen is soft. Tenderness: There is no abdominal tenderness. There is no guarding or rebound. Hernia: No hernia is present. Comments: Well-healed surgical scar at midline of abdomen   Musculoskeletal:      Right lower leg: No edema. Left lower leg: No edema. Skin:     General: Skin is warm and dry. Capillary Refill: Capillary refill takes less than 2 seconds. Neurological:      General: No focal deficit present. Mental Status: He is alert and oriented to person, place, and time. Cranial Nerves: No cranial nerve deficit. Psychiatric:         Behavior: Behavior normal.          Procedures     MDM  Number of Diagnoses or Management Options  Acute respiratory failure with hypoxia (HCC)  ROBBIN (acute kidney injury) ([facility] [address]  Diagnosis management comments: Is a 78-year-old male with a complex past medical history including an aneurysm repair and severe COPD who presented to the ED for evaluation of wheezing shortness of breath and nausea. EMS had found the patient be 79% with increased work of breathing was given steroids as well as duo nebs with improvement. In the ED the patient was placed on nasal cannula as he was hypoxic and treated with additional duo nebs. He also appeared profoundly dehydrated was treated with IV fluids did provide a modest improvement in his heart rate although continued to be somewhat tachycardic at about 110. His abdomen was soft and denied any tenderness or pain but was treated for nausea. Chest x-ray showed no signs of pneumothorax or infiltrates and it was consistent with a COPD exacerbation. Given the patient's profound hypoxia I discussed the case with April who agreed admit the patient for further evaluation.        ED Course as of Apr 10 1656 Sat Apr 10, 2021   1655 Rechecked patient, resting comfortably    [BP]      ED Course User Index  [BP] Martha Alvarado DO      ED Course as of Apr 10 1656   Sat Apr 10, 2021   1655 Rechecked patient, resting comfortably    [BP]      ED Course User Index  [BP] Martha Alvarado DO       --------------------------------------------- PAST HISTORY ---------------------------------------------  Past Medical History:  has a past medical history of Abdominal aortic aneurysm without rupture ([facility] [address] Arthritis, AS (aortic stenosis), Cerebral artery occlusion with cerebral infarction [facility]), COPD (chronic obstructive pulmonary disease) ([facility] [address] [facility] History of blood transfusion, Hyperlipidemia, Pneumonia, and Tobacco dependence. Past Surgical History:  has a past surgical history that includes hernia repair; Ankle surgery; Skull fracture elevation; eye surgery (Bilateral, approx 2014); Cardiac catheterization (07/14/2017); Tooth Extraction; Aortic valve replacement; AAA repair, endovascular (10/12/2017); AAA repair, endovascular (10/12/2017); Cardiac surgery; Upper gastrointestinal endoscopy (N/A, 12/21/2020); and Colonoscopy (N/A, 12/21/2020). Social History:  reports that he has been smoking cigarettes. He has a 50.00 pack-year smoking history. He has never used smokeless tobacco. He reports that he does not drink alcohol or use drugs. Family History: family history includes Heart Disease in his brother and mother; Stroke in his father. The patients home medications have been reviewed. Allergies: Patient has no known allergies.     -------------------------------------------------- RESULTS -------------------------------------------------    LABS:  Results for orders placed or performed during the hospital encounter of 04/10/21   COVID-19, Rapid    Specimen: Nasopharyngeal Swab   Result Value Ref Range    SARS-CoV-2, NAAT Not Detected Not Detected   Comprehensive Metabolic Panel w/ Reflex to MG   Result Value Ref Range    Sodium 135 132 - 146 mmol/L    Potassium reflex Magnesium 6.0 (H) 3.5 - 5.0 mmol/L    Chloride 91 (L) 98 - 107 mmol/L    CO2 24 22 - 29 mmol/L    Anion Gap 20 (H) 7 - 16 mmol/L    Glucose 153 (H) 74 - 99 mg/dL    BUN 74 (H) 8 - 23 mg/dL    CREATININE 2.7 (H) 0.7 - 1.2 mg/dL    GFR Non-African American 23 >=60 mL/min/1.73    GFR African American 28     Calcium 10.6 (H) 8.6 - 10.2 mg/dL    Total Protein 9.2 (H) 6.4 - 8.3 g/dL    Albumin 3.8 3.5 - 5.2 g/dL    Total Bilirubin 0.4 0.0 - 1.2 mg/dL    Alkaline Phosphatase 155 (H) 40 - 129 U/L    ALT 41 (H) 0 - 40 U/L    AST 39 0 - 39 U/L   CBC Auto Differential   Result Value Ref Range    WBC 4.4 (L) 4.5 - 11.5 E9/L    RBC 4.40 3.80 - 5.80 E12/L    Hemoglobin 13.9 12.5 - 16.5 g/dL    Hematocrit 42.2 37.0 - 54.0 %    MCV 95.9 80.0 - 99.9 fL    MCH 31.6 26.0 - 35.0 pg    MCHC 32.9 32.0 - 34.5 %    RDW 16.6 (H) 11.5 - 15.0 fL    Platelets 640 738 - 106 E9/L    MPV 9.5 7.0 - 12.0 fL    Neutrophils % 67.5 43.0 - 80.0 %    Immature Granulocytes % 0.2 0.0 - 5.0 %    Lymphocytes % 20.4 20.0 - 42.0 %    Monocytes % 11.2 2.0 - 12.0 %    Eosinophils % 0.0 0.0 - 6.0 %    Basophils % 0.7 0.0 - 2.0 %    Neutrophils Absolute 2.94 1.80 - 7.30 E9/L    Immature Granulocytes # 0.01 E9/L    Lymphocytes Absolute 0.89 (L) 1.50 - 4.00 E9/L    Monocytes Absolute 0.49 0.10 - 0.95 E9/L    Eosinophils Absolute 0.00 (L) 0.05 - 0.50 E9/L    Basophils Absolute 0.03 0.00 - 0.20 E9/L   Troponin   Result Value Ref Range    Troponin 0.03 0.00 - 0.03 ng/mL   Brain Natriuretic Peptide   Result Value Ref Range    Pro-BNP 2,604 (H) 0 - 125 pg/mL   Lactic Acid, Plasma   Result Value Ref Range    Lactic Acid 2.3 (H) 0.5 - 2.2 mmol/L   Lipase   Result Value Ref Range    Lipase 91 (H) 13 - 60 U/L   Protime-INR   Result Value Ref Range    Protime 12.1 9.3 - 12.4 sec    INR 1.1    EKG 12 Lead   Result Value Ref Range    Ventricular Rate 122 BPM    Atrial Rate 122 BPM    P-R Interval 170 ms    QRS Duration 84 ms    Q-T Interval 304 ms    QTc Calculation (Bazett) 433 ms    P Axis 81 degrees    R Axis 5 degrees    T Axis 89 degrees       RADIOLOGY:  XR CHEST PORTABLE   Final Result   Lungs are hyperexpanded, compatible with COPD changes. No pulmonary   infiltrate is identified.                 ------------------------- NURSING NOTES AND VITALS REVIEWED ---------------------------  Date / Time Roomed:  4/10/2021  2:56 PM  ED Bed Assignment:  03/03    The nursing notes within the ED encounter and vital signs as below have been reviewed. Patient Vitals for the past 24 hrs:   BP Temp Temp src Pulse Resp SpO2 Height Weight   04/10/21 1508 -- 97.8 °F (36.6 °C) Oral 116 -- 96 % -- --   04/10/21 1504 (!) 133/99 -- -- 117 18 -- 5' 10\" (1.778 m) 96 lb (43.5 kg)       Oxygen Saturation Interpretation: Normal    ------------------------------------------ PROGRESS NOTES ------------------------------------------  Re-evaluation(s):  Time: 741  Patients symptoms are improving  Repeat physical examination is improved    Counseling:  I have spoken with the patient and discussed todays results, in addition to providing specific details for the plan of care and counseling regarding the diagnosis and prognosis. Their questions are answered at this time and they are agreeable with the plan of admission. Please note that the withdrawal or failure to initiate urgent interventions for this patient would likely result in a life threatening deterioration or permanent disability. Systems at risk for deterioration include: CV/Pulm    Accordingly this patient received 30 minutes of critical care time, excluding separately billable procedures. --------------------------------- ADDITIONAL PROVIDER NOTES ---------------------------------  Consultations:   Spoke with April  Discussed case. They will admit the patient.   This patient's ED course included: a personal history and physicial examination, re-evaluation

## 2021-04-11 NOTE — H&P
History & Physical      PCP: Antonio Ralph MD    Date of Admission: 4/10/2021    Date of Service: Pt seen/examined on 4/10/2021 and is admitted to Inpatient with expected LOS greater than two midnights due to medical therapy. Chief Complaint:  had concerns including Shortness of Breath (beginning last night. hx COPD. COVID vacinated) and Emesis (\"all night last night\"). History Of Present Illness:    Mr. Rock Le, a 69y.o. year old male  who  has a past medical history of Abdominal aortic aneurysm without rupture (HCC), Arthritis, AS (aortic stenosis), Cerebral artery occlusion with cerebral infarction [facility]), COPD (chronic obstructive pulmonary disease) ([facility] Emphysema of lung ([facility] [facility] History of blood transfusion, Hyperlipidemia, Pneumonia, and Tobacco dependence. 76-year-old male with a recent history of abdominal  aortic aneurysm repair and also a had repair of a mesenteric artery aneurysm. He had significant ischemia of the bowel requiring eventual total abdominal colectomy and has an end ileostomy. He has a feeding tube in place and this was dced on Thursday     Patient has been following with [place] for most of his care. He had a fall at home this fall was after you had been vomiting. He was brought into the emergency department. He had a laceration of his right eye. He had a significant increase in his creatinine any also subsequently had an episode of SVT and also appeared to be volume depleted and he had what appeared to be an emesis of bloody nature  Patient was given rapid IV fluids this morning  And also has been treated for lactic acidosis.       Past Medical History:        Diagnosis Date    Abdominal aortic aneurysm without rupture ([facility] 5/9/2017    Arthritis     AS (aortic stenosis)     Cerebral artery occlusion with cerebral infarction [facility]) 2009    TIA/CVA with aphasia that resolved    COPD (chronic obstructive pulmonary disease) ([facility] [address] [facility]     Emphysema [facility] lung ([facility] [address] [facility]     History of blood transfusion     Hyperlipidemia     Pneumonia     Tobacco dependence 5/9/2017       Past Surgical History:        Procedure Laterality Date    ABDOMINAL AORTIC ANEURYSM REPAIR, ENDOVASCULAR  10/12/2017    Zenith Fenestrated. Delatore    ABDOMINAL AORTIC ANEURYSM REPAIR, ENDOVASCULAR  10/12/2017    ANKLE SURGERY      AORTIC VALVE REPLACEMENT      CARDIAC CATHETERIZATION  07/14/2017    Dr. Michael Vázquez- No blockages    CARDIAC SURGERY      COLONOSCOPY N/A 12/21/2020    COLONOSCOPY DIAGNOSTIC performed by Chuy Servin MD at [address] [facility] [address] Bilateral approx 2014    cataracts removal w/lens implants    HERNIA REPAIR      SKULL FRACTURE ELEVATION      TOOTH EXTRACTION      full mouth extraction    UPPER GASTROINTESTINAL ENDOSCOPY N/A 12/21/2020    EGD DIAGNOSTIC ONLY performed by Chyu Servin MD at [facility]       Medications Prior to Admission:      Prior to Admission medications    Medication Sig Start Date End Date Taking?  Authorizing Provider   albuterol sulfate HFA (VENTOLIN HFA) 108 (90 Base) MCG/ACT inhaler Inhale 2 puffs into the lungs every 4 hours as needed for Wheezing or Shortness of Breath 12/22/20  Yes Maday Bosch MD   ipratropium-albuterol (DUONEB) 0.5-2.5 (3) MG/3ML SOLN nebulizer solution Inhale 3 mLs into the lungs every 4 hours as needed for Shortness of Breath 12/22/20  Yes Maday Bosch MD   Tiotropium Bromide-Olodaterol (STIOLTO RESPIMAT) 2.5-2.5 MCG/ACT AERS Inhale 2 puffs into the lungs daily Takes 1 puff in morning and after dinner  Use day of surgery 12/22/20  Yes Maday Bosch MD   metoprolol tartrate (LOPRESSOR) 25 MG tablet Take 25 mg by mouth 2 times daily   Yes Historical Provider, MD   atorvastatin (LIPITOR) 20 MG tablet Take 20 mg by mouth daily  8/6/15  Yes Historical Provider, MD   aspirin 81 MG tablet Take 81 mg by mouth every other day    Yes Historical Provider, MD       Allergies: Patient has no known allergies. Social History:    TOBACCO:   reports that he has been smoking cigarettes. He has a 50.00 pack-year smoking history. He has never used smokeless tobacco.  ETOH:   reports no history of alcohol use. Family History:    Reviewed in detail and negative for DM, CAD, Cancer, CVA. Positive as follows\"      Problem Relation Age of Onset    Heart Disease Mother     Stroke Father     Heart Disease Brother        REVIEW OF SYSTEMS:   Pertinent positives as noted in the HPI. All other systems reviewed and negative. Negative for fevers positive for fall at home   Positive for extreme weight loss  Positive for output from ostomy  Positive for some urinary incontinence  Negative for disorientation  Negative for fevers negative for chills  PHYSICAL EXAM:  /73   Pulse 62   Temp 98 °F (36.7 °C) (Oral)   Resp 18   Ht 5' 10\" (1.778 m)   Wt 95 lb 3.8 oz (43.2 kg)   SpO2 97%   BMI 13.67 kg/m²   General appearance: Extremely wasted looking man  HEENT: Normal cephalic, atraumatic without obvious deformity. Pupils equal, round, and reactive to light. Extra ocular muscles intact. Conjunctivae/corneas clear. However temporal wasting is clear    Respiratory: Distant  Cardiovascular: Heart regular rate rhythm  Abdomen: Ileostomy bag in place  Musculoskeletal: No clubbing, cyanosis, edema of bilateral lower extremities. Patient is extremely thin and only skin and bones  Skin: Normal skin color. No rashes or lesions. Neurologic:  Neurovascularly intact without any focal sensory/motor deficits.  Cranial nerves: II-XII intact, grossly   Does appear to be weak but intact  Psychiatric: Appears to be able to engage in conversation and he is oriented        CBC:   Recent Labs     04/10/21  1534 04/10/21  2200 04/11/21  0520   WBC 4.4* 3.5* 4.7   RBC 4.40 3.67* 3.82   HGB 13.9 11.6* 12.3*   HCT 42.2 36.2* 35.8*   MCV 95.9 98.6 93.7   RDW 16.6* 16.6* 16.6*    203 190     BMP:   Recent Labs 04/10/21  1534 04/10/21  1715 04/10/21  2200 04/11/21  0520     --  136 135   K 6.0* 4.3 4.3 4.4   CL 91*  --  96* 93*   CO2 24  --  18* 25   BUN 74*  --  74* 82*   CREATININE 2.7*  --  2.9* 3.3*   MG  --   --  2.6  --    PHOS  --   --  9.2* 7.7*     LFT:  Recent Labs     04/10/21  1534 04/10/21  2200   PROT 9.2* 7.6   ALKPHOS 155* 130*   ALT 41* 32   AST 39 22   BILITOT 0.4 0.3   LIPASE 91*  --      CE:  Recent Labs     04/10/21  1534 04/10/21  2200   CKTOTAL  --  36   TROPONINI 0.03 0.02     PT/INR:   Recent Labs     04/10/21  1534 04/10/21  2200   INR 1.1 1.1     BNP: No results for input(s): BNP in the last 72 hours. ESR:   Lab Results   Component Value Date    SEDRATE 47 (H) 01/29/2021     CRP:   Lab Results   Component Value Date    CRP 10.0 (H) 01/29/2021     D Dimer:   Lab Results   Component Value Date    DDIMER 3532 05/08/2017      Folate and B12: No results found for: [id number], No results found for: FOLATE  Lactic Acid:   Lab Results   Component Value Date    LACTA 1.8 04/11/2021     Thyroid Studies: No results found for: TSH, Valerie Deabarry    Oupatient labs:  Lab Results   Component Value Date    INR 1.1 04/10/2021    LABA1C 5.4 07/26/2017       Urinalysis:    Lab Results   Component Value Date    NITRU Negative 01/28/2021    WBCUA 0-1 01/28/2021    BACTERIA FEW 01/28/2021    RBCUA 10-20 01/28/2021    BLOODU LARGE 01/28/2021    SPECGRAV 1.025 01/28/2021    GLUCOSEU Negative 01/28/2021       Imaging:  Xr Acute Abd Series Chest 1 Vw    Result Date: 4/11/2021  EXAMINATION: TWO XRAY VIEWS OF THE ABDOMEN AND SINGLE  XRAY VIEW OF THE CHEST 4/11/2021 10:09 am COMPARISON: Previous CT scan of the abdomen pelvis 03/28/2021 HISTORY: ORDERING SYSTEM PROVIDED HISTORY: emesis, distention TECHNOLOGIST PROVIDED HISTORY: Reason for exam:->emesis, distention FINDINGS: Chest: There are advanced emphysematous changes noted bilaterally. There is no evidence of pneumonia.  Three views the abdomen demonstrate a nonobstructive bowel gas pattern. There are no abnormal air-fluid levels. There is a stent seen within the abdominal aorta and iliac arteries. There is no evidence of free air under the hemidiaphragms. There is a Suárez catheter within the urinary bladder. 1. Emphysematous changes. There is no evidence of pneumonia 2. There are no findings of bowel obstruction and there is no free air under the hemidiaphragms. 3. There is a paucity of gas within the bowel. If clinically warranted CT of the abdomen should be considered. Ct Head Wo Contrast    Result Date: 4/10/2021  EXAMINATION: CT OF THE HEAD WITHOUT CONTRAST  4/10/2021 10:10 pm TECHNIQUE: CT of the head was performed without the administration of intravenous contrast. Dose modulation, iterative reconstruction, and/or weight based adjustment of the mA/kV was utilized to reduce the radiation dose to as low as reasonably achievable. COMPARISON: 01/28/2021 HISTORY: ORDERING SYSTEM PROVIDED HISTORY: Fall TECHNOLOGIST PROVIDED HISTORY: Reason for exam:->Fall Has a \"code stroke\" or \"stroke alert\" been called? ->No FINDINGS: No intracranial hemorrhage, mass effect or midline shift. Basal cisterns are patent. Ventricles are not enlarged. Cortical volume loss. Areas of decreased attenuation in the bilateral white matter are nonspecific but likely due to chronic microvascular ischemia. Vascular calcifications. Some encephalomalacia in the posterior left frontal lobe is unchanged and consistent with old infarct. There are postoperative changes partially visualized in the bilateral maxillary sinuses with anterior plate and screw fusion hardware. The maxillary sinuses remain completely opacified. There does appear to be a small soft tissue laceration involving the right frontal scalp just above the orbit. No distinct acute orbital fracture identified. Old right orbital floor fracture.   Surgical hardware is present in the periphery of the right orbit. No acute intracranial findings. Consider MRI if symptoms persist. Volume loss and findings suggestive of chronic microvascular ischemia. Other chronic appearing findings. Xr Chest Portable    Result Date: 4/10/2021  EXAMINATION: ONE XRAY VIEW OF THE CHEST 4/10/2021 2:32 pm COMPARISON: 01/28/2021 HISTORY: ORDERING SYSTEM PROVIDED HISTORY: SOB TECHNOLOGIST PROVIDED HISTORY: Reason for exam:->SOB FINDINGS: Lungs are hyperexpanded. No infiltrate, effusion, or pneumothorax is seen. No acute osseous abnormality. There has been median sternotomy. Lungs are hyperexpanded, compatible with COPD changes. No pulmonary infiltrate is identified. ASSESSMENT:    Principal Problem:    Acute respiratory failure with hypoxia (HCC)  Active Problems:    Mixed hyperlipidemia    History of stroke    Tobacco dependence    COPD exacerbation (HCC)    Essential hypertension    Emphysema of lung (HCC)    ROBBIN (acute kidney injury) (HCC)    Lactic acid acidosis    Hypercalcemia    Tachycardia    High anion gap metabolic acidosis  Resolved Problems:    * No resolved hospital problems. *      PLAN:  1.  IV fluids with normalization of lactic acid which points towards volume depletion  2.  Emesis that looked bloody so IV PPI and surgery services were notified however no acute interventions planned  3. Hemoglobin has been watched and it appears to be stable  4. History of SVT per records from [facility] [email or web address]  5. Tachycardia appears to be more SVT and did respond to fluids. He is on metoprolol which was reinitiated  6. Methylprednisolone was added for shortness of breath however only emphysema on imaging by chest x-ray we can taper it downwards  7. Bump in BUN and creatinine.   Likely volume related however renal service is on board        Diet: DIET RENAL;  Code Status: Full Code  Surrogate decision maker confirmed with patient:   Extended Emergency Contact Information  Primary Emergency Contact: Roya Hoang  Address: [address] Phone: [phone]  Work Phone: [phone]  Mobile Phone: [phone]  Relation: Spouse      DVT Prophylaxis: []Lovenox []Heparin [x]PCD [] [address] []Encouraged ambulation  Disposition: []Med/Surg [x] Intermediate [] ICU/CCU  Admit status: [] Observation [x] Inpatient     +++++++++++++++++++++++++++++++++++++++++++++++++  [address]  +++++++++++++++++++++++++++++++++++++++++++++++++  NOTE: This report was transcribed using voice recognition software. Every effort was made to ensure accuracy; however, inadvertent computerized transcription errors may be present.

## 2021-04-11 NOTE — OP NOTE
Operative Note      Patient: Kurt Castorena  YOB: 1950  MRN: [id number]    Date of Procedure: 4/11/20    Pre-Op Diagnosis: Facial laceration    Post-Op Diagnosis: Same       Procedure: Simple repair 3 cm facial laceration    Estimated Blood Loss (mL): Minimal    Complications: None    Detailed Description of Procedure:   Area was cleansed with saline. Linear portion of laceration was repaired with a simple running 3-0 Monocryl suture. Laceration length 3 cm. the vertical portion of the laceration was repaired with a solitary simple 3-0 Monocryl suture.     Electronically signed by Torey Haley MD on 4/11/2021 at 8:52 AM

## 2021-04-11 NOTE — CARE COORDINATION
VM left w/ wife Georgie Common [phone] return call. Pt lives w/ wife in a 1 story home. Pt states his wife does most of the driving. Hx PAM and HHC- facility/agency name unknown. PCP is Dr. Ru Arshad and pharmacy is [facility]. S/p RRT last night d/t fall. RACHELL continued. On Bicarb drip. Awaiting PT/OT evals. Discharge plan unknown at present.  Will follow Amari Beth

## 2021-04-11 NOTE — CONSULTS
Patient seen and examined. Consult dictated. Patient is 78-year-old gentleman with history of abdominal aortic aneurysm. 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 Patient now admitted after having sustained a fall and laceration of the left eye. Patient has been hypotensive and oliguric. Patient given fluid bolus. Acute kidney injury in all probably secondary renal hypoperfusion. Urine indicis also suggest that. Patient also with increased anion gap metabolic acidosis with a delta anion gap to delta bicarbonate ratio of 1.7 suggesting pure anion gap metabolic acidosis. Lactic acidosis probably secondary to tissue hypoperfusion. Improved. Dr. Bernardo Alonso , Thank You for allowing me to participate in the care of this patient. Will follow the patient with you.     Nacho Carr MD  Nephrology    Electronically signed by Frank Mitchell MD on 4/11/2021 at 3:31 PM

## 2021-04-11 NOTE — CONSULTS
Surgery Consult    Reason for consult: Facial laceration, vomiting     HPI  69 y.o. male who was admitted through the ED yesterday afternoon with dyspnea. He recently had surgery at [facility]. He had repair of a mesenteric artery aneurysm, but ischemia required a total abdominal colectomy with end ileostomy. This was in February. Apparently last evening, the patient was vomiting and suffered a fall. He lacerated an area above his right eye. Surgery was consulted for all of the above. Past Medical History:   Diagnosis Date    Abdominal aortic aneurysm without rupture ([address] 5/9/2017    Arthritis     AS (aortic stenosis)     Cerebral artery occlusion with cerebral infarction [facility]) 2009    TIA/CVA with aphasia that resolved    COPD (chronic obstructive pulmonary disease) (HCC)     Em[facility] lung ([facility] [address]     History of blood transfusion     Hyperlipidemia     Pneumonia     Tobacco dependence 5/9/2017       Past Surgical History:   Procedure Laterality Date    ABDOMINAL AORTIC ANEURYSM REPAIR, ENDOVASCULAR  10/12/2017    Zenith Fenestrated. Delatore    ABDOMINAL AORTIC ANEURYSM REPAIR, ENDOVASCULAR  10/12/2017    ANKLE SURGERY      AORTIC VALVE REPLACEMENT      CARDIAC CATHETERIZATION  07/14/2017    Dr. Nba Chou- No blockages    CARDIAC SURGERY      COLONOSCOPY N/A 12/21/2020    COLONOSCOPY DIAGNOSTIC performed by Scott Lane MD at [facility] Bilateral approx 2014    cataracts removal w/lens implants    HERNIA REPAIR      SKULL FRACTURE ELEVATION      TOOTH EXTRACTION      full mouth extraction    UPPER GASTROINTESTINAL ENDOSCOPY N/A 12/21/2020    EGD DIAGNOSTIC ONLY performed by Scott Lane MD at [facility]       Medications Prior to Admission:    Prior to Admission medications    Medication Sig Start Date End Date Taking?  Authorizing Provider   albuterol sulfate HFA (VENTOLIN HFA) 108 (90 Base) MCG/ACT inhaler Inhale 2 puffs into the lungs CREATININE 3.3*   CALCIUM 9.1     Liver Function  Recent Labs     04/10/21  1534 04/10/21  2200   LIPASE 91*  --    BILITOT 0.4 0.3   AST 39 22   ALT 41* 32   ALKPHOS 155* 130*   PROT 9.2* 7.6   LABALBU 3.8 3.2*     No results for input(s): LACTATE in the last 72 hours. Recent Labs     04/10/21  2200   INR 1.1       RADIOLOGY    Ct Head Wo Contrast    Result Date: 4/10/2021  EXAMINATION: CT OF THE HEAD WITHOUT CONTRAST  4/10/2021 10:10 pm TECHNIQUE: CT of the head was performed without the administration of intravenous contrast. Dose modulation, iterative reconstruction, and/or weight based adjustment of the mA/kV was utilized to reduce the radiation dose to as low as reasonably achievable. COMPARISON: 01/28/2021 HISTORY: ORDERING SYSTEM PROVIDED HISTORY: Fall TECHNOLOGIST PROVIDED HISTORY: Reason for exam:->Fall Has a \"code stroke\" or \"stroke alert\" been called? ->No FINDINGS: No intracranial hemorrhage, mass effect or midline shift. Basal cisterns are patent. Ventricles are not enlarged. Cortical volume loss. Areas of decreased attenuation in the bilateral white matter are nonspecific but likely due to chronic microvascular ischemia. Vascular calcifications. Some encephalomalacia in the posterior left frontal lobe is unchanged and consistent with old infarct. There are postoperative changes partially visualized in the bilateral maxillary sinuses with anterior plate and screw fusion hardware. The maxillary sinuses remain completely opacified. There does appear to be a small soft tissue laceration involving the right frontal scalp just above the orbit. No distinct acute orbital fracture identified. Old right orbital floor fracture. Surgical hardware is present in the periphery of the right orbit. No acute intracranial findings. Consider MRI if symptoms persist. Volume loss and findings suggestive of chronic microvascular ischemia. Other chronic appearing findings.      Xr Chest Portable    Result Date: 4/10/2021  EXAMINATION: ONE XRAY VIEW OF THE CHEST 4/10/2021 2:32 pm COMPARISON: 01/28/2021 HISTORY: ORDERING SYSTEM PROVIDED HISTORY: SOB TECHNOLOGIST PROVIDED HISTORY: Reason for exam:->SOB FINDINGS: Lungs are hyperexpanded. No infiltrate, effusion, or pneumothorax is seen. No acute osseous abnormality. There has been median sternotomy. Lungs are hyperexpanded, compatible with COPD changes. No pulmonary infiltrate is identified. ASSESSMENT:  69 y.o. male with 3 cm facial laceration secondary to fall  Vomiting unclear etiology, probably due to ileus  Dyspnea  ROBBIN    PLAN:  Repair facial laceration  Abdominal films  Doubt significant GI bleed.   Empiric PPI  No plans for endoscopy at this time-additional work-up needed    Electronically signed by Ally Dinero MD on 4/11/21 at 8:45 AM EDT

## 2021-04-11 NOTE — PLAN OF CARE
Problem: Infection:  Goal: Will remain free from infection  Description: Will remain free from infection  Outcome: Met This Shift     Problem: Daily Care:  Goal: Daily care needs are met  Description: Daily care needs are met  Outcome: Met This Shift     Problem: Falls - Risk of:  Goal: Will remain free from falls  Description: Will remain free from falls  Outcome: Not Met This Shift     Problem: Falls - Risk of:  Goal: Absence of physical injury  Description: Absence of physical injury  Outcome: Not Met This Shift

## 2021-04-11 NOTE — CODE DOCUMENTATION
RRT called due to fall on the ground. He was throwing up  And had and injury over the right eye brow. His vitals and blood sugar found to be normal.Patient cleaned and place on the bed and his IV line secured, IV normal saline bolus started, Head CT, blood CMP, CBC, PT/INR, lactic acid ordered to review. Surgical consult need for suturing his cut injury over the right eye brow. I used 35 minutes of my time.

## 2021-04-12 PROBLEM — E43 SEVERE PROTEIN-CALORIE MALNUTRITION (HCC): Chronic | Status: ACTIVE | Noted: 2021-01-01

## 2021-04-12 NOTE — PROGRESS NOTES
Comprehensive Nutrition Assessment    Type and Reason for Visit:  Initial, Positive Nutrition Screen    Nutrition Recommendations/Plan: Continue diet and ONS to meet nutritional needs. Rec TPN d/t severe malnutrition. Nutrition Assessment:  Pt adm wih SOB and dyspnea, ARF and Hypoxia. Pt at risk d/t Vomiting and poor po. Severe malnutrition. Malnutrition Assessment:  Malnutrition Status:  Severe malnutrition    Context:  Chronic Illness     Findings of the 6 clinical characteristics of malnutrition:  Energy Intake:  7 - 75% or less estimated energy requirements for 1 month or longer  Weight Loss:  7 - 20% over 1 year     Body Fat Loss:  7 - Severe body fat loss Orbital, Triceps   Muscle Mass Loss:  7 - Severe muscle mass loss Temples (temporalis), Clavicles (pectoralis & deltoids), Hand (interosseous)  Fluid Accumulation:  No significant fluid accumulation     Strength:  Not Performed    Estimated Daily Nutrient Needs:  Energy (kcal):  3579-5320 (30-35kcal/kg); Weight Used for Energy Requirements:  Current     Protein (g):  65-80g/kg (1.5-1.8g/kg); Weight Used for Protein Requirements:  Current        Fluid (ml/day):  1681-4454; Method Used for Fluid Requirements:  1 ml/kcal      Nutrition Related Findings:  A&Ox4, Abd Soft & flat, +I/Os, No Edema, Ileostomy    Wounds:  Surgical Incision(upper eyelid)       Current Nutrition Therapies:    DIET RENAL;     Anthropometric Measures:  · Height: 5' 10\" (177.8 cm)  · Current Body Weight: 95 lb (43.1 kg)(4/12 bedscale)   · Admission Body Weight: 95 lb 3 oz (43.2 kg)(4/11 first measured weight)    · Usual Body Weight: 102 lb 4 oz (46.4 kg)(12/18/20 actual)     · Ideal Body Weight: 166 lbs; % Ideal Body Weight 57.2 %   · BMI: 13.6  · Adjusted Body Weight:  ; No Adjustment   · Adjusted BMI:      · BMI Categories: Underweight (BMI less than 22) age over 72       Nutrition Diagnosis:   · Severe malnutrition, In context of chronic illness related to renal dysfunction as evidenced by intake 0-25%, severe loss of subcutaneous fat, severe muscle loss, weight loss(wt loss of ~7% over a year)    Nutrition Interventions:   Food and/or Nutrient Delivery:  Continue Current Diet, Start Oral Nutrition Supplement(Ensure BID)  Nutrition Education/Counseling:  Education initiated(Discussed importance of ONS to meet nutritional needs)   Coordination of Nutrition Care:  Continue to monitor while inpatient    Goals:  >25% of meals and ONS consumed       Nutrition Monitoring and Evaluation:   Behavioral-Environmental Outcomes:  None Identified   Food/Nutrient Intake Outcomes:  Food and Nutrient Intake, Supplement Intake  Physical Signs/Symptoms Outcomes:  Biochemical Data, Chewing or Swallowing, GI Status, Fluid Status or Edema, Nutrition Focused Physical Findings, Skin, Weight     Discharge Planning:     Too soon to determine     Electronically signed by Nicolasa Harris RD, LD on 4/12/21 at 1:49 PM EDT    Contact: [phone]

## 2021-04-12 NOTE — PROGRESS NOTES
Internal Medicine Progress Note      Synopsis: Patient admitted on 4/10/2021     Subjective    More alert than yesterday   still feels weak though  Blood cultures turn positive    Still hasn't eaten anything and continues to have emesis that looks dark  Exam:  BP (!) 94/48   Pulse 90   Temp 98.2 °F (36.8 °C) (Oral)   Resp 16   Ht 5' 10\" (1.778 m)   Wt 95 lb 0.3 oz (43.1 kg)   SpO2 94%   BMI 13.63 kg/m²   Respiratory: Distant   Cardiovascular: Heart regular rate rhythm   Abdomen: Ileostomy bag in place   Musculoskeletal: No clubbing, cyanosis, edema of bilateral lower extremities. Patient is extremely thin and only skin and bones   Skin: Normal skin color. No rashes or lesions. Neurologic: Neurovascularly intact without any focal sensory/motor deficits.  Cranial nerves: II-XII intact, grossly   Does appear to be weak but intact   Psychiatric: Appears to be able to engage in conversation and he is oriented    Medications:  Reviewed    Infusion Medications    sodium chloride      sodium bicarbonate infusion 100 mL/hr at 04/12/21 1156     Scheduled Medications    pantoprazole  40 mg Intravenous BID    methylPREDNISolone  40 mg Intravenous Daily    sodium chloride flush  5-40 mL Intravenous 2 times per day    levalbuterol  0.63 mg Nebulization Q8H    budesonide  0.5 mg Nebulization BID    aspirin  81 mg Oral Every Other Day    atorvastatin  20 mg Oral Nightly    metoprolol tartrate  25 mg Oral BID    Arformoterol Tartrate  15 mcg Nebulization BID    And    ipratropium  0.5 mg Nebulization 4x daily     PRN Meds: sodium chloride flush, sodium chloride, promethazine **OR** ondansetron, polyethylene glycol, trimethobenzamide    I/O    Intake/Output Summary (Last 24 hours) at 4/12/2021 1306  Last data filed at 4/12/2021 1149  Gross per 24 hour   Intake 0 ml   Output 150 ml   Net -150 ml       Labs:   Recent Labs     04/10/21  1534 04/10/21  2200 04/11/21  0520   WBC 4.4* 3.5* 4.7   HGB 13.9 11.6* 12.3* HCT 42.2 36.2* 35.8*    203 190       Recent Labs     04/10/21  1534 04/10/21  1715 04/10/21  2200 04/11/21  0520     --  136 135   K 6.0* 4.3 4.3 4.4   CL 91*  --  96* 93*   CO2 24  --  18* 25   BUN 74*  --  74* 82*   CREATININE 2.7*  --  2.9* 3.3*   CALCIUM 10.6*  --  8.8 9.1   PHOS  --   --  9.2* 7.7*       Recent Labs     04/10/21  1534 04/10/21  2200   PROT 9.2* 7.6   ALKPHOS 155* 130*   ALT 41* 32   AST 39 22   BILITOT 0.4 0.3   LIPASE 91*  --        Recent Labs     04/10/21  1534 04/10/21  2200   INR 1.1 1.1       Recent Labs     04/10/21  1534 04/10/21  2200   CKTOTAL  --  36   TROPONINI 0.03 0.02       Chronic labs:  Lab Results   Component Value Date    INR 1.1 04/10/2021    LABA1C 5.4 07/26/2017       Radiology:  Xr Acute Abd Series Chest 1 Vw    Result Date: 4/11/2021  1. Emphysematous changes. There is no evidence of pneumonia 2. There are no findings of bowel obstruction and there is no free air under the hemidiaphragms. 3. There is a paucity of gas within the bowel. If clinically warranted CT of the abdomen should be considered. Ct Head Wo Contrast    Result Date: 4/10/2021  No acute intracranial findings. Consider MRI if symptoms persist. Volume loss and findings suggestive of chronic microvascular ischemia. Other chronic appearing findings. Xr Chest Portable    Result Date: 4/10/2021  Lungs are hyperexpanded, compatible with COPD changes. No pulmonary infiltrate is identified. ASSESSMENT:    Principal Problem:    Acute respiratory failure with hypoxia (HCC)  Active Problems:    Mixed hyperlipidemia    History of stroke    Tobacco dependence    COPD exacerbation (HCC)    Essential hypertension    Emphysema of lung (HCC)    ROBBIN (acute kidney injury) (HCC)    Lactic acid acidosis    Hypercalcemia    Tachycardia    High anion gap metabolic acidosis  Resolved Problems:    * No resolved hospital problems.  *       PLAN:    1.renal function appears slightly worsened despite IV

## 2021-04-12 NOTE — CONSULTS
[address] [place] [address] [facility]    Consultation Note     Admit Date: 4/10/2021  2:56 PM    Reason for Consult:   positive blood cultures    Attending Physician:  Shaun Bee MD     Chief Complaint: nausea and vomiting and syncope     HISTORY OF PRESENT ILLNESS:     The patient is a 69 y.o.  male not previously known to the Infectious Diseases service. He was originally admitted at [address] [place] [address] 1/29/2021 with a superior mesenteric artery mycotic aneurysm. He was found to have evidence kidney colitis and underwent total colectomy and end and ostomy and open repair of superior mesenteric artery aneurysm. PCR revealed ralstonia picketti v. insidiosa. He was initially treated with Unasyn but switched to Zosyn and discharged home with IV antibiotics through 3/26/2021. Also profound protein calorie malnutrition and had a nasogastric tube in place until last Thursday. Last weekend he developed nausea and vomiting and suffered a syncopal episode. Presented to the emergency room and chemistries revealed evidence of severe intravascular dehydration and he also had an episode of SVT. Patient also had an episode of nonbloody emesis and was noted to have lactic acidosis. Blood cultures were drawn in the emergency room and 1/4 + for gram-positive cocci in clusters. Past Medical History:          Diagnosis Date    Abdominal aortic aneurysm without rupture ([facility] 5/9/2017    Arthritis     AS (aortic stenosis)     Cerebral artery occlusion with cerebral infarction [facility]) 2009    TIA/CVA with aphasia that resolved    COPD (chronic obstructive pulmonary disease) (HCC)     Emphysema of lung ([facility] [address]     History of blood transfusion     Hyperlipidemia     Pneumonia     Tobacco dependence 5/9/2017     Past Surgical History:          Procedure Laterality Date    ABDOMINAL AORTIC ANEURYSM REPAIR, ENDOVASCULAR  10/12/2017    Zenith Fenestrated.   [place]    ABDOMINAL AORTIC ANEURYSM REPAIR, ENDOVASCULAR 10/12/2017    ANKLE SURGERY      AORTIC VALVE REPLACEMENT      CARDIAC CATHETERIZATION  07/14/2017    Dr. Shey Dumont- No blockages    CARDIAC SURGERY      COLONOSCOPY N/A 12/21/2020    COLONOSCOPY DIAGNOSTIC performed by Evelio Becerra MD at [address] Bilateral approx 2014    cataracts removal w/lens implants    HERNIA REPAIR      SKULL FRACTURE ELEVATION      TOOTH EXTRACTION      full mouth extraction    UPPER GASTROINTESTINAL ENDOSCOPY N/A 12/21/2020    EGD DIAGNOSTIC ONLY performed by Evelio Becerra MD at [address]     Current Medications:     Scheduled Meds:   pantoprazole  40 mg Intravenous BID    methylPREDNISolone  40 mg Intravenous Daily    sodium chloride flush  5-40 mL Intravenous 2 times per day    levalbuterol  0.63 mg Nebulization Q8H    budesonide  0.5 mg Nebulization BID    aspirin  81 mg Oral Every Other Day    atorvastatin  20 mg Oral Nightly    metoprolol tartrate  25 mg Oral BID    Arformoterol Tartrate  15 mcg Nebulization BID    And    ipratropium  0.5 mg Nebulization 4x daily     Continuous Infusions:   sodium chloride      sodium bicarbonate infusion 100 mL/hr at 04/12/21 1156     PRN Meds:sodium chloride flush, sodium chloride, promethazine **OR** ondansetron, polyethylene glycol, trimethobenzamide    Allergies:  Patient has no known allergies.     Social History:     Social History     Socioeconomic History    Marital status:      Spouse name: None    Number of children: None    Years of education: None    Highest education level: None   Occupational History    Occupation: retired-   Social Needs    Financial resource strain: None    Food insecurity     Worry: None     Inability: None    Transportation needs     Medical: None     Non-medical: None   Tobacco Use    Smoking status: Current Every Day Smoker     Packs/day: 1.00     Years: 50.00     Pack years: 50.00     Types: Cigarettes    Smokeless tobacco: Never Used  Tobacco comment: currently smokes 1.0 ppd   Substance and Sexual Activity    Alcohol use: No     Alcohol/week: 0.0 standard drinks    Drug use: No    Sexual activity: Not Currently   Lifestyle    Physical activity     Days per week: None     Minutes per session: None    Stress: None   Relationships    Social connections     Talks on phone: None     Gets together: None     Attends Oriental orthodox service: None     Active member of club or organization: None     Attends meetings of clubs or organizations: None     Relationship status: None    Intimate partner violence     Fear of current or ex partner: None     Emotionally abused: None     Physically abused: None     Forced sexual activity: None   Other Topics Concern    None   Social History Narrative    None         Family History:         Problem Relation Age of Onset    Heart Disease Mother     Stroke Father     Heart Disease Brother        REVIEW OF SYSTEMS:      Constitutional: positive for anorexia, fatigue, malaise and weight loss, negative for chills and fevers  Eyes: negative for icterus, redness and positive for right eye laceration  Ears, nose, mouth, throat, and face: negative for epistaxis, hearing loss, nasal congestion, sore mouth, sore throat and tinnitus  Respiratory: negative for cough and sputum  Cardiovascular: positive for irregular heart beat, negative for chest pain  Gastrointestinal: positive for vomiting, negative for diarrhea  Genitourinary:positive for urinary incontinence, negative for dysuria  Integument/breast: negative for pruritus and rash  Hematologic/lymphatic: negative for bleeding and easy bruising  Musculoskeletal:negative for back pain and neck pain  Neurological: negative for headaches and memory problems  Behavioral/Psych: negative for anxiety and depression  Endocrine: negative for temperature intolerance  Allergic/Immunologic: negative for anaphylaxis and angioedema    PHYSICAL EXAM:      Vitals:    BP (!) 94/48 Pulse 90   Temp 98.2 °F (36.8 °C) (Oral)   Resp 16   Ht 5' 10\" (1.778 m)   Wt 95 lb 0.3 oz (43.1 kg)   SpO2 94%   BMI 13.63 kg/m²   Constitutional: Awake relatively alert, extremely thin and contracted. Skin: Warm and dry. No rashes were noted. No jaundice. HEENT: Eyes show round, and reactive pupils. Moist mucous membranes, no ulcerations, no thrush. Neck: Supple to movements. No lymphadenopathy. Chest: No use of accessory muscles to breathe. Symmetrical expansion. Auscultation reveals no wheezing, crackles, or rhonchi. Cardiovascular: Regular rate and rhythm. No murmurs appreciated. Abdomen: Ileostomy in place, site clean and dry. Positive bowel sounds to auscultation. Benign to palpation. No masses felt. No hepatosplenomegaly. Extremities: No clubbing, no cyanosis, no edema.   Neurological: No focal neurologic deficits        Labs:    CBC with Differential:    Lab Results   Component Value Date    WBC 4.7 04/11/2021    RBC 3.82 04/11/2021    HGB 12.3 04/11/2021    HCT 35.8 04/11/2021     04/11/2021    MCV 93.7 04/11/2021    MCH 32.2 04/11/2021    MCHC 34.4 04/11/2021    RDW 16.6 04/11/2021    LYMPHOPCT 13.7 04/10/2021    MONOPCT 5.1 04/10/2021    BASOPCT 0.3 04/10/2021    MONOSABS 0.18 04/10/2021    LYMPHSABS 0.48 04/10/2021    EOSABS 0.00 04/10/2021    BASOSABS 0.01 04/10/2021     CMP:    Lab Results   Component Value Date     04/11/2021    K 4.4 04/11/2021    CL 93 04/11/2021    CO2 25 04/11/2021    BUN 82 04/11/2021    CREATININE 3.3 04/11/2021    GFRAA 22 04/11/2021    LABGLOM 19 04/11/2021    GLUCOSE 201 04/11/2021    PROT 7.6 04/10/2021    LABALBU 3.2 04/10/2021    CALCIUM 9.1 04/11/2021    BILITOT 0.3 04/10/2021    ALKPHOS 130 04/10/2021    AST 22 04/10/2021    ALT 32 04/10/2021     Hepatic Function Panel:    Lab Results   Component Value Date    ALKPHOS 130 04/10/2021    ALT 32 04/10/2021    AST 22 04/10/2021    PROT 7.6 04/10/2021    BILITOT 0.3 04/10/2021    BILIDIR <0.2 05/07/2017    IBILI 0.3 05/07/2017    LABALBU 3.2 04/10/2021       Recent Labs     04/10/21  1534 04/10/21  2200 04/11/21  0520   WBC 4.4* 3.5* 4.7   HGB 13.9 11.6* 12.3*   HCT 42.2 36.2* 35.8*   MCV 95.9 98.6 93.7    203 190   NEUTROABS 2.94 2.82  --      Lab Results   Component Value Date    CRP 10.0 (H) 01/29/2021    CRP 7.4 (H) 12/19/2020     No results found for: CRPHS  Lab Results   Component Value Date    SEDRATE 47 (H) 01/29/2021    SEDRATE 45 (H) 12/19/2020     Lab Results   Component Value Date    ALT 32 04/10/2021    AST 22 04/10/2021    ALKPHOS 130 (H) 04/10/2021    BILITOT 0.3 04/10/2021     Lab Results   Component Value Date     04/11/2021    K 4.4 04/11/2021    CL 93 04/11/2021    CO2 25 04/11/2021    BUN 82 04/11/2021    CREATININE 3.3 04/11/2021    GFRAA 22 04/11/2021    LABGLOM 19 04/11/2021    GLUCOSE 201 04/11/2021    PROT 7.6 04/10/2021    LABALBU 3.2 04/10/2021    CALCIUM 9.1 04/11/2021    BILITOT 0.3 04/10/2021    ALKPHOS 130 04/10/2021    AST 22 04/10/2021    ALT 32 04/10/2021       Lab Results   Component Value Date    PROTIME 12.6 04/10/2021    INR 1.1 04/10/2021       No results found for: TSH    Lab Results   Component Value Date    COLORU Yellow 01/28/2021    PHUR 6.0 01/28/2021    WBCUA 0-1 01/28/2021    RBCUA 10-20 01/28/2021    BACTERIA FEW 01/28/2021    CLARITYU Clear 01/28/2021    SPECGRAV 1.025 01/28/2021    LEUKOCYTESUR Negative 01/28/2021    UROBILINOGEN 1.0 01/28/2021    BILIRUBINUR Negative 01/28/2021    BLOODU LARGE 01/28/2021    GLUCOSEU Negative 01/28/2021       No results found for: KAV8QPS, BEART, O5ASELQF, PHART, THGBART, LRJ8FOA, PO2ART, LRJ2BAZ  Radiology:  XR ACUTE ABD SERIES CHEST 1 VW   Final Result   1. Emphysematous changes. There is no evidence of pneumonia   2. There are no findings of bowel obstruction and there is no free air under   the hemidiaphragms. 3. There is a paucity of gas within the bowel.   If clinically warranted CT of   the abdomen should be considered. CT HEAD WO CONTRAST   Final Result   No acute intracranial findings. Consider MRI if symptoms persist.      Volume loss and findings suggestive of chronic microvascular ischemia. Other chronic appearing findings. XR CHEST PORTABLE   Final Result   Lungs are hyperexpanded, compatible with COPD changes. No pulmonary   infiltrate is identified. Microbiology:    Recent Labs     04/10/21  1534   BC 24 Hours no growth     No results for input(s): ORG in the last 72 hours. Recent Labs     04/10/21  1534   BLOODCULT2 Gram stain performed from blood culture bottle media  Gram positive cocci in clusters  *     No results for input(s): STREPNEUMAGU in the last 72 hours. No results for input(s): LP1UAG in the last 72 hours. No results for input(s): ASO in the last 72 hours. No results for input(s): CULTRESP in the last 72 hours. Problem list:    Principal Problem:    Acute respiratory failure with hypoxia (HCC)  Active Problems:    Mixed hyperlipidemia    History of stroke    Tobacco dependence    COPD exacerbation (HCC)    Essential hypertension    Emphysema of lung (HCC)    ROBBIN (acute kidney injury) ([facility] [address]    Lactic acid acidosis    Hypercalcemia    Tachycardia    High anion gap metabolic acidosis  Resolved Problems:    * No resolved hospital problems. *      Assessment:    · Bacteremia, GPC in clusters, 1/4, likely contaminant  · Severe protein calorie malnutrition  · Acute kidney injury secondary to intravascular dehydration  · Lactic acidosis  · History of total colectomy with ileostomy and repair of mycotic SMA aneurysm  · COPD  · SVT  · Nausea vomiting likely from ileus per surgery    Plan:      · Continue to monitor off antibiotics  · Check final blood cultures  · Fluids as per nephrology  · Monitor labs  · Will follow with you    Thank you for having us see this patient in consultation.  I will be discussing this case with the treating physicians.       Electronically signed by Thea Mcmahan DO on 4/12/2021 at 12:42 PM

## 2021-04-12 NOTE — PLAN OF CARE
Problem: Daily Care:  Goal: Daily care needs are met  Description: Daily care needs are met  Outcome: Met This Shift     Problem: Falls - Risk of:  Goal: Will remain free from falls  Description: Will remain free from falls  Outcome: Met This Shift     Problem: Falls - Risk of:  Goal: Absence of physical injury  Description: Absence of physical injury  Outcome: Met This Shift

## 2021-04-12 NOTE — PROGRESS NOTES
Surgical resident notified of pt vomiting frequently and unable to tolerate anything by mouth at this time.

## 2021-04-12 NOTE — PROGRESS NOTES
Occupational Therapy  OCCUPATIONAL THERAPY INITIAL EVALUATION      Date:2021  Patient Name: Gordon Kincaid  MRN: [id number]  : 1950  Room: [address] [place] [address]    Referring Provider: COREEN Daniel CNP    Evaluating OT: Rafael Rodriguez OTR/L [id number]    AM-PAC Daily Activity Raw Score: 15/24    Recommended Adaptive Equipment: TBD    Diagnosis: acute respiratory failure. Pt presents to ED from home with SOB and wheezing. Pt with fall. Pertinent Medical History: arthritis, CVA, COPD, emphysema of lung   Precautions:  Falls, TSM, ostomy     Home Living: Pt lives with wife in a single story home. Bathroom setup: walk in shower with grab bar and seat, standard commode     Prior Level of Function: Mod I with ADLs except wife assist in ostomy bag management, wife assists with IADLs; completed functional mobility with no AD. Has [employer].    Pain Level: pain at L elbow, lacerations/bruising and dressings present    Cognition: A&O: 3-4/4. Pt is alert and oriented but easily confused   Problem solving:  fair   Judgement/safety:  fair     Functional Assessment:   Initial Eval Status  Date: 21 Treatment session:  Short Term Goals     Feeding Set up     Grooming Set up  Independent   UB Dressing Min A  [facility] gown  Independent   LB Dressing Mod A  Management of socks  Mod I    Bathing Mod A  Mod I   Toileting Mod A  Mod I   Bed Mobility  Supine to sit: Min A     Functional Transfers STS: Min A  Mod I   Functional Mobility Min A with [employer]  Short in room distance  Limited further d/t weakness/fatigue  Mod I during ADLs   Balance Sitting: fair plus    Standing: fair minus at [employer]     Activity Tolerance Poor plus  standing ayush x6-7 min with fair plus balance during self care tasks             Treatment: Patient educated on techniques for completion of ADL, safe functional transfers and functional mobility.   Patient required cues for follow through with proper hand/foot placement, pacing, safety, compensatory strategies, breathing, attention, sequencing and technique in bed mobility, functional transfers, functional mobility, UB dressing and LB dressing in preparation for maximum independence in all self care tasks.      Hand Dominance: Right []  Left []   Strength ROM Additional Info:    RUE  3+/5 WFL grossly, limited end ranges d/t pain from recent fall fair  and FMC/dexterity noted during ADL tasks     LUE 3+/5 WFL  fair  and FMC/dexterity noted during ADL tasks         Hearing: WFL   Vision: WFL   Sensation:  No c/o numbness or tingling   Tone: WFL   Edema: none                             Long Term Goal (1-3 wks): Pt will maximize functional performance in all self care tasks/functional transfers with good follow through of all trained techniques for safe transition to next level of care    Assessment of current deficits   Functional mobility [x]  ADLs [x] Strength [x]  Cognition []  Functional transfers  [x] IADLs [x] Safety Awareness [x]  Endurance [x]  Fine Motor Coordination [] Balance [x] Vision/perception [] Sensation []   Gross Motor Coordination [] ROM [] Delirium []                  Motor Control []    Plan of Care: 2-5 days/week for 1-2 weeks PRN   [x]ADL retraining/adaptive techniques and AE recommendations to increase functional independence within precautions                    [x]Energy conservation techniques to improve tolerance for ADL/IADLs  [x]Functional transfer/mobility training/DME recommendations for increased independence, safety and fall prevention         [x]Patient/family education to increase safety and functional independence during daily routine          [x]Environmental modifications for safe mobility and completion of ADLs                             []Cognitive retraining to improve problem solving skills & safe participation in ADLs/IADLs     []Sensory re-education techniques to improve extremity awareness, maintain skin integrity and improve hand function                             []Visual/Perceptual retraining to improve body awareness and safety during transfers and ADLs  []Splinting/positioning needs to maintain joint/skin integrity and contracture prevention  [x]Therapeutic activity to improve functional performance during ADLs                                        [x]Therapeutic exercise to improve tolerance and functional strength for ADLs   [x]Balance retraining/tolerance tasks for facilitation of postural control with dynamic challenges during ADLs  []Neuromuscular re-education to facilitate righting/equilibrium reactions, midline orientation, scapular stability/mobility, normalize muscle tone and facilitate active functional movement                        []Delirium prevention/treatment    [x]Positioning to improve functional independence and decrease risk of skin breakdown  []Other:     Rehab Potential: Good for established goals     Patient/Family Goal: To get home. Patient and/or family were instructed on functional diagnosis, prognosis/goals and OT plan of care. Pt and wife verbalized understanding. Upon arrival, patient supine in bed. At end of session, patient supine in bed with call light and phone within reach, all lines and tubes intact. Pt would benefit from continued skilled OT to increase safety and independence with completion of ADL/IADL tasks for functional independence and quality of life.  Bed/chair alarm: ON    Low Evaluation [id number]  Time In: 1413   Time Out: 1425      Evaluation time includes thorough review of current medical information, gathering information on past medical history/social history and prior level of function, completion of standardized testing/informal observation of tasks, assessment of data, and development of POC/Goals    Karyle Mano OTR/L  [id number]

## 2021-04-12 NOTE — PROGRESS NOTES
Progress Progress Note    Subjective:   Admit Date: 4/10/2021  PCP: Ayleen Marie MD  Interval History: Pt being seen for ROBBIN    4/12/21:Pt  Somnolent arouses but does not interact. Pt wife at the bedside and updated to renal status    Diet: DIET RENAL;    Data:   Scheduled Meds:   pantoprazole  40 mg Intravenous BID    [START ON 4/12/2021] methylPREDNISolone  40 mg Intravenous Daily    sodium chloride flush  5-40 mL Intravenous 2 times per day    levalbuterol  0.63 mg Nebulization Q8H    budesonide  0.5 mg Nebulization BID    aspirin  81 mg Oral Every Other Day    atorvastatin  20 mg Oral Nightly    metoprolol tartrate  25 mg Oral BID    Arformoterol Tartrate  15 mcg Nebulization BID    And    ipratropium  0.5 mg Nebulization 4x daily     Continuous Infusions:   sodium chloride      sodium bicarbonate infusion 100 mL/hr at 04/11/21 1209     PRN Meds:sodium chloride flush, sodium chloride, promethazine **OR** ondansetron, polyethylene glycol, trimethobenzamide  I/O last 3 completed shifts: In: 5258 [P. O.:60; I.V.:1432]  Out: 50 [Emesis/NG output:50]  No intake/output data recorded. Intake/Output Summary (Last 24 hours) at 4/11/2021 2043  Last data filed at 4/11/2021 1644  Gross per 24 hour   Intake 1492 ml   Output 50 ml   Net 1442 ml     CBC:   Recent Labs     04/10/21  1534 04/10/21  2200 04/11/21  0520   WBC 4.4* 3.5* 4.7   HGB 13.9 11.6* 12.3*    203 190     BMP:    Recent Labs     04/10/21  1534 04/10/21  1715 04/10/21  2200 04/11/21  0520     --  136 135   K 6.0* 4.3 4.3 4.4   CL 91*  --  96* 93*   CO2 24  --  18* 25   BUN 74*  --  74* 82*   CREATININE 2.7*  --  2.9* 3.3*   GLUCOSE 153*  --  211* 201*     Hepatic:   Recent Labs     04/10/21  1534 04/10/21  2200   AST 39 22   ALT 41* 32   BILITOT 0.4 0.3   ALKPHOS 155* 130*     Troponin:   Recent Labs     04/10/21  1534 04/10/21  2200   TROPONINI 0.03 0.02     BNP: No results for input(s): BNP in the last 72 hours.   Lipids: No results for input(s): CHOL, HDL in the last 72 hours. Invalid input(s): LDLCALCU  ABGs: No results found for: PHART, PO2ART, EGO4BHS  INR:   Recent Labs     04/10/21  1534 04/10/21  2200   INR 1.1 1.1     -----------------------------------------------------------------  RAD:   EXAMINATION:   TWO XRAY VIEWS OF THE ABDOMEN AND SINGLE  XRAY VIEW OF THE CHEST       4/11/2021 10:09 am       COMPARISON:   Previous CT scan of the abdomen pelvis 03/28/2021       HISTORY:   ORDERING SYSTEM PROVIDED HISTORY: emesis, distention   TECHNOLOGIST PROVIDED HISTORY:   Reason for exam:->emesis, distention       FINDINGS:   Chest: There are advanced emphysematous changes noted bilaterally.  There is   no evidence of pneumonia.       Three views the abdomen demonstrate a nonobstructive bowel gas pattern. There are no abnormal air-fluid levels.  There is a stent seen within the   abdominal aorta and iliac arteries.       There is no evidence of free air under the hemidiaphragms.       There is a Suárez catheter within the urinary bladder.           Impression   1. Emphysematous changes. Arneta Lita is no evidence of pneumonia   2. There are no findings of bowel obstruction and there is no free air under   the hemidiaphragms. 3. There is a paucity of gas within the bowel.  If clinically warranted CT of   the abdomen should be considered. Objective:   Vitals: /73   Pulse 62   Temp 98 °F (36.7 °C) (Oral)   Resp 18   Ht 5' 10\" (1.778 m)   Wt 95 lb 3.8 oz (43.2 kg)   SpO2 97%   BMI 13.67 kg/m²   GENERAL:  The patient is somnolent  Appearance: The patient is a rather cachectic looking,  emaciated gentleman, in no acute distress. HEENT:  Head is normocephalic and atraumatic. Eyes:  Sclerae are  nonicteric. Pupils are equal and reactive to light and accommodation. Fundus examination deferred. Mouth and throat:  Dry oral mucosa. NECK:  Supple. No distention. LUNGS:  Vesicular breath sounds.   Breath sounds are

## 2021-04-12 NOTE — PROGRESS NOTES
GENERAL SURGERY  DAILY PROGRESS NOTE  4/12/2021    Chief Complaint   Patient presents with    Shortness of Breath     beginning last night. hx COPD. COVID vacinated    Emesis     \"all night last night\"       Subjective:  Some nausea and vomiting overnight.   Dark emesis at bedside    Objective:  /69   Pulse 112   Temp 98 °F (36.7 °C) (Tympanic)   Resp 16   Ht 5' 10\" (1.778 m)   Wt 95 lb 0.3 oz (43.1 kg)   SpO2 95%   BMI 13.63 kg/m²     GENERAL:  Laying in bed, awake, alert, cooperative, no apparent distress, cachectic appearing  HEAD: Normocephalic, atraumatic  EYES: No sclera icterus, pupils equal  LUNGS:  No increased work of breathing  CARDIOVASCULAR: RR  ABDOMEN:  Soft, non-tender, non-distended  EXTREMITIES: No edema or swelling  SKIN: Warm and dry    Assessment/Plan:  69 y.o. male facial laceration status post repair, nausea and vomiting    Nausea and vomiting likely from ileus secondary to ROBBIN  Continue diet as tolerated  IV fluids per nephrology  Monitor H&H, no plans for endoscopy at this time    Electronically signed by Beatriz Granados MD on 4/12/2021 at 6:57 AM     Seen/examined  Continue supportive care only for now  PPI  Serena

## 2021-04-12 NOTE — PROGRESS NOTES
Physical Therapy    Facility/Department: [address] INTERMEDIATE 1  Initial Assessment    NAME: Kurt Castorena  : 1950  MRN: [id number]    Date of Service: 2021       REQUIRES PT FOLLOW UP: Yes       Patient Diagnosis(es): The primary encounter diagnosis was Acute respiratory failure with hypoxia ([facility] [address] A diagnosis of ROBBIN (acute kidney injury) ([facility] [address] was also pertinent to this visit. has a past medical history of Abdominal aortic aneurysm without rupture (HCC), Arthritis, AS (aortic stenosis), Cerebral artery occlusion with cerebral infarction ([facility] [address] COPD (chronic obstructive pulmonary disease) ([facility] [address] Emphysema of lung ([facility] [address] History of blood transfusion, Hyperlipidemia, Pneumonia, and Tobacco dependence. has a past surgical history that includes hernia repair; Ankle surgery; Skull fracture elevation; eye surgery (Bilateral, approx ); Cardiac catheterization (2017); Tooth Extraction; Aortic valve replacement; AAA repair, endovascular (10/12/2017); AAA repair, endovascular (10/12/2017); Cardiac surgery; Upper gastrointestinal endoscopy (N/A, 2020); and Colonoscopy (N/A, 2020). Evaluating Therapist: Jerad Arredondo PT     Referring Provider:   Dr. Vandana Dickson #:  [id number]  DIAGNOSIS: acute resp failure   Additional Pertinent History:2021 colectomy and ileostomy  PRECAUTIONS:  falls    Social:  Pt lives with wife  in a  1  floor plan    Prior to admission pt walked with  No AD      Initial Evaluation  Date:  2021  Treatment      Short Term/ Long Term   Goals   Was pt agreeable to Eval/treatment?  yes , with encouragement      Does pt have pain?  Nausea, fatigue      Bed Mobility  Rolling:  SBA    Supine to sit: min assist   Sit to supine:  Min assist   Scooting: min assist in sit   SBA    Transfers Sit to stand:  Min assist   Stand to sit: min assist  Stand pivot:  Min assist   SBA    Ambulation    12  feet with  ww  with  Min assist    50  feet with  ww  with  SBA Stair negotiation: ascended and descended NT   2  steps with  1  rail with CGA    LE ROM  WFL     LE strength  3+/ 5      AM- PAC RAW score  16/ 24            Pt is alert and Oriented      Balance:  Min assist, fall risk due to weakness and poor activity tolerance     Endurance: decreased   Bed/Chair alarm: Yes      ASSESSMENT  Pt displays functional ability as noted in the objective portion of this evaluation. Treatment/Education:     Mobility as above. Very limited activity tolerance. Required cues for hand placement for transfers and assist needed with ww maneuvering with turns. Pt educated on fall risk,  Safety with mobility        Patient response to education:   Pt verbalized understanding Pt demonstrated skill Pt requires further education in this area   x With cues/assist   x       Comments:  Pt left  In bed per pt request after session, with call light in reach. Wife present       Rehab potential is Good for reaching above PT goals. Pts/ family goals   1. None stated     Patient and or family understand(s) diagnosis, prognosis, and plan of care. -  Yes     PLAN  PT care will be provided in accordance with the objectives noted above. Whenever appropriate, clear delegation orders will be provided for nursing staff. Exercises and functional mobility practice will be used as well as appropriate assistive devices or modalities to obtain goals. Patient and family education will also be administered as needed. PLAN OF CARE:    Current Treatment Recommendations     [x] Strengthening     [] ROM   [x] Balance Training   [x] Endurance Training   [x] Transfer Training   [x] Gait Training   [x] Stair Training   [] Positioning   [x] Safety and Education Training   [x] Patient/Caregiver Education   [] HEP  [] Other       Frequency of treatments will be 2-5x/week x 7-14 days.       Time out: 3246       Evaluation Time includes thorough review of current medical information, gathering information on past medical history/social history and prior level of function, completion of standardized testing/informal observation of tasks, assessment of data and education on plan of care and goals.     CPT codes:  [x] Low Complexity PT evaluation 62286  [] Moderate Complexity PT evaluation 82294  [] High Complexity PT evaluation 87119  [] PT Re-evaluation 62535  [] Gait training 15626  minutes  [] Therapeutic activities 28247  minutes  [] Therapeutic exercises 86395  minutes  [] Neuromuscular reeducation 07519  minutes       Madelin 18 number:  PT 8538

## 2021-04-12 NOTE — PROGRESS NOTES
Speech Language Pathology      NAME:  Kacy Macdonald  :  1950  DATE: 2021  ROOM:  [address] [place] [address]         Chart reviewed. Order received for clinical bedside swallow evaluation. Pt unavailable at this time due to:  [x] HOLD per RN due to emesis with all PO intake    [] Off unit for testing/ procedure    [] With medical staff   [] Declined intervention  [] Sleeping/ Lethargic   [] Other:       Will re-attempt as able. Thank you. Acute respiratory failure with hypoxia ([facility] [address] [J96.01]      KERRI Ordonez. Speech-Language Pathology Student      Almira Felty A. CCC/SLP [id number]  Speech-Language Pathologist

## 2021-04-12 NOTE — CONSULTS
[address] [address]                                  CONSULTATION    PATIENT NAME: Mona Chappell                   :        1950  MED REC NO:   [id number]                            ROOM:       0425  ACCOUNT NO:   [de-identified]                           ADMIT DATE: 04/10/2021  PROVIDER:     Favian Valentine MD    CONSULT DATE:  2021    DATE OF ADMISSION:  04/10/2021    AGE OF PATIENT:  69years old. ADMITTING PROVIDER:  Esteban Araya MD.    REASON FOR CONSULTATION:  Acute kidney injury. The patient is being seen in consultation at the request of Dr. Santiago Castro. HISTORY OF PRESENT ILLNESS:  The patient is a 40-year-old   gentleman with a history of rather complicated past medical history. The patient was admitted after he presented to the emergency room for  shortness of breath. The patient was hypoxic. The patient was given  breathing treatments. Apparently, the patient also has had nausea,  vomiting, and poor oral intake. The patient subsequently had a fall and  hit his head. Rapid Response Team was called. The patient was  hypotensive. The patient was given fluid boluses. He had a significant  decrease in the lactic acid levels. When seen up on the floor, the  patient is being hydrated aggressively, as his serum creatinine upon  presentation was 2.7 mg/dL and it has gone up to 3.3 mg/dL this morning. His baseline serum creatinine is 0.9 mg/dL. It is of note that the  patient has had multiple medical problems lately and has had most of his  medical care in the last few months at [facility]. 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 The  patient also had a feeding tube placed, which was recently removed. The  patient has been emaciated.   His oral intake and appetite have been  poor. He has significant protein malnutrition. PAST MEDICAL HISTORY:  Significant for abdominal aortic aneurysm, status  post repair; history of aortic stenosis; history of cerebral artery  occlusion with cerebral infarction with TIA and CVAs. The patient had  aphasia that has resolved. History of COPD with history of tobacco use. Remote history of anemia. History of hyperlipidemia. History of  pneumonia. He has a history of recent findings of superficial  mesenteric artery with mycotic aneurysm and significant weight loss. The patient had aneurysm repair and required mobilization of the left  colon during the operation. There was concern for _____. The patient  underwent colectomy. PAST SURGICAL HISTORY:  Significant for a recent total colectomy, end  ileostomy, and repair of the superficial mesenteric artery aneurysm. He  had an aortic aneurysm repair. History of hernia repair surgery. History of upper and lower gastrointestinal tract endoscopies. History  of aortic valve replacement. ALLERGIES:  The patient has no known drug allergies. HOME MEDICATIONS:  Include DuoNeb inhaler, Ventolin inhaler, metoprolol,  atorvastatin, and Lopressor. The patient was on aspirin. CURRENT MEDICATIONS IN HOSPITAL:  The patient is on bicarbonate  infusion. The patient has normal saline infusion. Atrovent inhaler  four times a day. Pulmicort inhaler twice a day. Metoprolol 25 mg  twice a day. Phenergan p.r.n. Tigan p.r.n. Aspirin 81 mg every other  day. SOCIAL HISTORY:  The patient is . He is a smoker. He has a 50  pack-year history of smoking. No alcohol use. FAMILY HISTORY:  Significant for atherosclerotic heart disease in his  mother and stroke in his father. REVIEW OF SYSTEMS:  Negative for any fever or chills at home. He has  had significant weight loss with high output from the ostomy. He has  had urinary incontinence. He has had fatigue, weakness, and falls. No  chest pain or palpitations. No dysuria. Rest of the review of systems  is negative. The patient is a rather poor historian, so the review of  systems is limited. PHYSICAL EXAMINATION:  GENERAL:  The patient is oriented to place and person, in no acute  distress. Appearance: The patient is a rather cachectic looking,  emaciated gentleman, in no acute distress. VITAL SIGNS:  Blood pressure is 122/73, pulse 52, respiratory rate 18,  temperature 98 degrees Fahrenheit. HEENT:  Head is normocephalic and atraumatic. Eyes:  Sclerae are  nonicteric. Pupils are equal and reactive to light and accommodation. Fundus examination deferred. Mouth and throat:  Dry oral mucosa. NECK:  Supple. No distention. LUNGS:  Vesicular breath sounds. Breath sounds are decreased at the  bases. No rales or rhonchi. HEART:  Regular rate and rhythm without any pericardial rub or gallop. ABDOMEN:  Soft with ostomy in place with liquid stool. No tenderness or  rebound tenderness. EXTREMITIES:  The patient has no pedal edema. LABORATORY DATA:  From today, sodium 135 mmol/L, potassium 4.4 mmol/L,  chloride 93 mmol/L, CO2 25 mmol/L, BUN 82 mg/dL, creatinine 3.3 mg/dL,  phosphorus 7.7 mg/dL, calcium 9.1 mg/dL. Labs from last night showed an  anion gap of 22 with a lactic acid level of 5.2, which has come down to  1.8 mg/dL. Hemoglobin is 12.2 gm/dL. IMPRESSION:  1. Acute kidney injury with underlying chronic kidney disease stage  G3b. Recent serum creatinine at [facility] was 1.2 mg/dL. Acute kidney injury is secondary to renal hypoperfusion. We will  continue to hydrate the patient. Also, check urine for eosinophils. The patient's urine sodium was less than 20. Continue to hydrate  gently. 2.  Metabolic acidosis. The patient had increase in anion gap metabolic  acidosis with lactic acidosis.   The patient's delta anion gap to delta  bicarbonate ratio was 1.7 suggesting pure anion gap metabolic

## 2021-04-12 NOTE — PLAN OF CARE
Problem: Infection:  Goal: Will remain free from infection  Description: Will remain free from infection  Outcome: Met This Shift     Problem: Daily Care:  Goal: Daily care needs are met  Description: Daily care needs are met  Outcome: Met This Shift     Problem: Falls - Risk of:  Goal: Will remain free from falls  Description: Will remain free from falls  Outcome: Met This Shift  Goal: Absence of physical injury  Description: Absence of physical injury  Outcome: Met This Shift     Problem: Skin Integrity:  Goal: Will show no infection signs and symptoms  Description: Will show no infection signs and symptoms  Outcome: Met This Shift  Goal: Absence of new skin breakdown  Description: Absence of new skin breakdown  Outcome: Met This Shift     Problem: Increased nutrient needs (NI-5.1)  Goal: Food and/or Nutrient Delivery  Description: Individualized approach for food/nutrient provision.   4/12/2021 1349 by Nelly Viramontes RD, LD  Outcome: Met This Shift  Problem: Nausea/Vomiting:  Goal: Absence of nausea/vomiting  Description: Absence of nausea/vomiting  Outcome: Not Met This Shift  Goal: Able to drink  Description: Able to drink  Outcome: Not Met This Shift  Goal: Able to eat  Description: Able to eat  Outcome: Not Met This Shift  Goal: Ability to achieve adequate nutritional intake will improve  Description: Ability to achieve adequate nutritional intake will improve  Outcome: Not Met This Shift

## 2021-04-13 NOTE — CONSULTS
Palliative Care Department  Palliative Care Initial Consult  Provider: Soraida ANGELA-RACHEL  [address] Day: 4  Date of Initial Consult: 4/13/2021  Referring Provider: Dr. Sandy Friedman was consulted for assistance with: Code status Discussion, Assist with goals of care and Symptom Management    Chief Complaint: Jose Graves is a 69 y.o. male with chief complaint of nausea and vomiting    HPI:   Jose Graves is a 69 y.o. male with significant past medical history of COPD, recently had a superior mesenteric artery mycotic aneurysm treated at [facility], resulting in ischemic: Requiring total colectomy and end ileostomy placement, managed with IV antibiotics through 3/26/2021, as well as requiring nasogastric tube for supplemental nutrition, with a nasogastric tube being removed last Thursday. He presented with complaints of worsening nausea and vomiting, also having a syncopal episode resulting in a fall scalp laceration, was admitted on 4/10/2021 further management. Emergency department he was noted to be severely dehydrated, having episodes of SVT, as well as episodes of nonbloody emesis. He has been followed closely by general surgery, nephrology, as well as infectious disease. Palliative service has been consulted to discuss goals of care, CODE STATUS, as well as assess symptomatic needs.     ASSESSMENT/PLAN:     Pertinent Hospital Diagnoses:  Current medical issues leading to Palliative Medicine involvement include   Active Hospital Problems    Diagnosis Date Noted    Acute respiratory failure with hypoxia ([facility] [address] [J96.01] 04/10/2021    COPD exacerbation ([facility] [address] [J44.1] 04/10/2021    Essential hypertension [I10] 04/10/2021    Emphysema of lung ([facility] [address] [J43.9] 04/10/2021    ROBBIN (acute kidney injury) ([facility] [address] [N17.9] 04/10/2021    Lactic acid acidosis [E87.2] 04/10/2021    Hypercalcemia [E83.52] 04/10/2021    Tachycardia [R00.0] 04/10/2021    High anion gap metabolic acidosis regarding goals of care and resuscitation. States that he wishes to continue with full supportive care, and would remain a full code. He does state that he would not wish to be maintained long-term if he was in a terminal state, he would never agree to long-term tracheostomy or PEG tube placement. States that he has discussed his wishes with his wife, and she is very aware of these. He otherwise denies any needs from palliative service at this time, appreciates the support that was provided. Past Medical History:   Diagnosis Date    Abdominal aortic aneurysm without rupture ([facility] [address] 5/9/2017    Arthritis     AS (aortic stenosis)     Cerebral artery occlusion with cerebral infarction [facility]) 2009    TIA/CVA with aphasia that resolved    COPD (chronic obstructive pulmonary disease) (HCC)     Emphysema of lung ([facility] [address]     History of blood transfusion     Hyperlipidemia     Pneumonia     Tobacco dependence 5/9/2017       Past Surgical History:   Procedure Laterality Date    ABDOMINAL AORTIC ANEURYSM REPAIR, ENDOVASCULAR  10/12/2017    Zenith Fenestrated.   [place]    ABDOMINAL AORTIC ANEURYSM REPAIR, ENDOVASCULAR  10/12/2017    ANKLE SURGERY      AORTIC VALVE REPLACEMENT      CARDIAC CATHETERIZATION  07/14/2017    Dr. Galen Jordan- No blockages    CARDIAC SURGERY      COLONOSCOPY N/A 12/21/2020    COLONOSCOPY DIAGNOSTIC performed by Montana Tompkins MD at [address] Bilateral approx 2014    cataracts removal w/lens implants    HERNIA REPAIR      SKULL FRACTURE ELEVATION      TOOTH EXTRACTION      full mouth extraction    UPPER GASTROINTESTINAL ENDOSCOPY N/A 12/21/2020    EGD DIAGNOSTIC ONLY performed by Montana Tompkins MD at [facility] History   Problem Relation Age of Onset    Heart Disease Mother     Stroke Father     Heart Disease Brother        No Known Allergies    ROS: UNLESS STATED ABOVE PATIENT DENIES:  CONSTITUTIONAL:  fever, chill, rigors, nausea, Natalya. Note: This report was completed using [employer] voiced recognition software. Every effort has been made to ensure accuracy; however, inadvertent computerized transcription errors may be present.

## 2021-04-13 NOTE — PROGRESS NOTES
Internal Medicine Progress Note      Synopsis: Patient admitted on 4/10/2021     Subjective    More alert than yesterday   still feels weak though  Blood cultures turn positive    Still hasn't eaten anything and continues to have emesis that looks dark    April 13, 2021  Infectious disease did see and they have him on close watch however on antibiotics yet. Patient continues to complain of emesis. He still not able to eat. No plans for endoscopy yet    Exam:  BP (!) 90/58   Pulse 109   Temp 97.9 °F (36.6 °C) (Temporal)   Resp 18   Ht 5' 10\" (1.778 m)   Wt 95 lb (43.1 kg)   SpO2 92%   BMI 13.63 kg/m²   Respiratory: Distant   Cardiovascular: Heart regular rate rhythm   Abdomen: Ileostomy bag in place   Musculoskeletal: No clubbing, cyanosis, edema of bilateral lower extremities. Patient is extremely thin and only skin and bones   Skin: Normal skin color. No rashes or lesions. Neurologic: Neurovascularly intact without any focal sensory/motor deficits.  Cranial nerves: II-XII intact, grossly   Does appear to be weak but intact   Psychiatric: Appears to be able to engage in conversation and he is oriented    Medications:  Reviewed    Infusion Medications    lactated ringers 100 mL/hr at 04/13/21 0833    sodium chloride       Scheduled Medications    pantoprazole  40 mg Intravenous BID    sodium chloride flush  5-40 mL Intravenous 2 times per day    levalbuterol  0.63 mg Nebulization Q8H    budesonide  0.5 mg Nebulization BID    aspirin  81 mg Oral Every Other Day    atorvastatin  20 mg Oral Nightly    metoprolol tartrate  25 mg Oral BID    Arformoterol Tartrate  15 mcg Nebulization BID    And    ipratropium  0.5 mg Nebulization 4x daily     PRN Meds: sodium chloride flush, sodium chloride, promethazine **OR** ondansetron, polyethylene glycol, trimethobenzamide    I/O    Intake/Output Summary (Last 24 hours) at 4/13/2021 1143  Last data filed at 4/13/2021 1042  Gross per 24 hour   Intake 0 ml Output 2200 ml   Net -2200 ml       Labs:   Recent Labs     04/11/21  0520 04/12/21  1335 04/13/21  0412   WBC 4.7 4.9 4.5   HGB 12.3* 10.1* 9.8*   HCT 35.8* 29.7* 28.9*    166 154       Recent Labs     04/11/21  0520 04/12/21  1335 04/13/21  0412    132 136   K 4.4 3.7 3.5   CL 93* 90* 93*   CO2 25 29 29   BUN 82* 89* 96*   CREATININE 3.3* 3.0* 2.8*   CALCIUM 9.1 8.3* 8.3*   PHOS 7.7* 5.9* 5.8*       Recent Labs     04/10/21  1534 04/10/21  2200 04/12/21  1335   PROT 9.2* 7.6 6.8   ALKPHOS 155* 130* 106   ALT 41* 32 27   AST 39 22 28   BILITOT 0.4 0.3 0.3   LIPASE 91*  --   --        Recent Labs     04/10/21  1534 04/10/21  2200   INR 1.1 1.1       Recent Labs     04/10/21  1534 04/10/21  2200   CKTOTAL  --  36   TROPONINI 0.03 0.02       Chronic labs:  Lab Results   Component Value Date    INR 1.1 04/10/2021    LABA1C 5.4 07/26/2017       Radiology:  Xr Acute Abd Series Chest 1 Vw    Result Date: 4/11/2021  1. Emphysematous changes. There is no evidence of pneumonia 2. There are no findings of bowel obstruction and there is no free air under the hemidiaphragms. 3. There is a paucity of gas within the bowel. If clinically warranted CT of the abdomen should be considered. Ct Head Wo Contrast    Result Date: 4/10/2021  No acute intracranial findings. Consider MRI if symptoms persist. Volume loss and findings suggestive of chronic microvascular ischemia. Other chronic appearing findings. Xr Chest Portable    Result Date: 4/10/2021  Lungs are hyperexpanded, compatible with COPD changes. No pulmonary infiltrate is identified.      ASSESSMENT:    Principal Problem:    Acute respiratory failure with hypoxia (HCC)  Active Problems:    Mixed hyperlipidemia    History of stroke    Tobacco dependence    COPD exacerbation (HCC)    Essential hypertension    Emphysema of lung (HCC)    ROBBIN (acute kidney injury) (HCC)    Lactic acid acidosis    Hypercalcemia    Tachycardia    High anion gap metabolic

## 2021-04-13 NOTE — PROGRESS NOTES
Surgical resident notified pt not improving regarding nausea or vomiting. States they will look into further.

## 2021-04-13 NOTE — PROGRESS NOTES
Vascular:    Sequence of events noted    Patient was transferred to [facility] after the abnormal CT scan of the abdomen pelvis done on 28 January, seen by Dr. Kathryn Payne on 29 January for treatment of large superior mesenteric artery aneurysm, a complex intra-abdominal vascular problem, post endovascular stent graft repair with a fenestrated stent graft of the aneurysm done in 2017,        Patient underwent resection of superior mesenteric artery aneurysm repair with saphenous vein graft, subtotal colectomy and ileostomy at the [facility] on 12 February by Dr. Christina Guthrie    Patient did well subsequently, had nausea and vomiting since Saturday, underwent a non-CT scan, revealed gastric dilatation with potential obstruction of the duodenum jejunal junction and vascular service was consulted, because of radiologist report of compression of the duodenum by the aneurysm    The patient was examined, patient was resting comfortably, has a nasogastric tube  Abdomen: Soft ileostomy, scaphoid abdomen no pulsating masses  Looks reasonable distal pulses palpable, both feet are warm to touch    I discussed with the patient and his wife Justin Henson in the room, and feel, due to the complex nature of the situation, from a vascular perspective, that patient is best referred back to the department of vascular surgery at [facility], because of intra-abdominal saphenous vein graft for the superior missing artery aneurysm that was done, with potential possibility of intra-abdominal adhesions etc.    Patient's wife wants to know why his further management cannot be done here and handled here, as such I informed her that I will discuss with Dr. Ovi Sheridan known to the patient, and make additional recommendations after review of the CT scans    I will also discuss with Dr. Felicity Johnson, who saw the patient yesterday    Discussed with patient's wife Justin Henson along with the patient's nurse, Rachel Kenny in the room    All her questions were answered    Oskar Lala     Addendum:    I have personally discussed with Dr. Lourdes Kumar and Dr. Jillian Villasenor, who will see the patient, and discussed with the patient and his wife make recommendations regarding further work-up and management    Oskar Lala

## 2021-04-13 NOTE — PROGRESS NOTES
and rhythm without any pericardial rub or gallop. ABDOMEN:  Soft with ostomy in place with liquid stool. No tenderness or  rebound tenderness. EXTREMITIES:  The patient has no pedal edema. Assessment:   Patient Active Problem List:     Mixed hyperlipidemia     Acute hypoxemic respiratory failure (HCC)     Rhinovirus     History of stroke     Abdominal aneurysm (HCC)     Tobacco dependence     Aortic valve stenosis     Nonrheumatic aortic valve stenosis     Severe protein-calorie malnutrition (HCC)     COPD, moderate (HCC)     S/P AAA repair using bifurcation graft     Generalized abdominal pain     Failure to thrive in adult     Syncope and collapse     Acute respiratory failure with hypoxia (HCC)     COPD exacerbation (HCC)     Essential hypertension     Emphysema of lung (HCC)     ROBBIN (acute kidney injury) ([facility] [address]     Lactic acid acidosis     Hypercalcemia     Tachycardia     High anion gap metabolic acidosis    Plan:   Stage III ROBBIN ( Baseline serum cr 0.9mg/dl ) -sec to decreased effective renal perfusion as evidenced by the Gabriele+<20 and FeNa<1% in the setting of hypotension  Creatinine 2.9-->3.3-->2.8mg/dl  PLAN:1.Continue LR    2. Sec HPTH of Renal Origin with Hyperphosphatemia  Vit D 32   PO4 trending down 9.2-->7.7-->5.9-->5.8  PLAN:1. Continue to monitor    3. Anemia  Ferritin 1006, Iron Sat 31% Folate 10.7 B12 814  PLAN:1. Await  SPEP and UPEP    4. Anion Gap Met Acidosis in the setting of the lactic Acidosis and ROBBIN  Lactic Acid down to WNL  AG closing  PLAN:1.Continue to monitor    Thank you for allowing our service for participating in [facility] care    Suella Plants Padgitt

## 2021-04-13 NOTE — PLAN OF CARE
Problem: Infection:  Goal: Will remain free from infection  Description: Will remain free from infection  Outcome: Met This Shift     Problem: Daily Care:  Goal: Daily care needs are met  Description: Daily care needs are met  Outcome: Met This Shift     Problem: Discharge Planning:  Goal: Patients continuum of care needs are met  Description: Patients continuum of care needs are met  Outcome: Met This Shift     Problem: Falls - Risk of:  Goal: Will remain free from falls  Description: Will remain free from falls  Outcome: Met This Shift  Goal: Absence of physical injury  Description: Absence of physical injury  Outcome: Met This Shift     Problem: Skin Integrity:  Goal: Will show no infection signs and symptoms  Description: Will show no infection signs and symptoms  Outcome: Met This Shift  Goal: Absence of new skin breakdown  Description: Absence of new skin breakdown  Outcome: Met This Shift     Problem: Nausea/Vomiting:--continued n/v, unable to tolerate anything by mouth  Goal: Absence of nausea/vomiting  Description: Absence of nausea/vomiting  Outcome: Not Met This Shift  Goal: Able to drink  Description: Able to drink  Outcome: Not Met This Shift  Goal: Able to eat  Description: Able to eat  Outcome: Not Met This Shift  Goal: Ability to achieve adequate nutritional intake will improve  Description: Ability to achieve adequate nutritional intake will improve  Outcome: Not Met This Shift

## 2021-04-13 NOTE — PROGRESS NOTES
GENERAL SURGERY  DAILY PROGRESS NOTE    Date:2021       Room:[facility]-A  Patient Name:Dada Yepez [facility]     YOB: 1950     Age:70 y.o. Chief Complaint:  Chief Complaint   Patient presents with    Shortness of Breath     beginning last night. hx COPD. COVID vacinated    Emesis     \"all night last night\"        Subjective:  Still having nausea and a little bit of vomiting. Objective:  BP (!) 90/58   Pulse 91   Temp 97.7 °F (36.5 °C) (Oral)   Resp 18   Ht 5' 10\" (1.778 m)   Wt 95 lb (43.1 kg)   SpO2 92%   BMI 13.63 kg/m²   Temp (24hrs), Av °F (36.7 °C), Min:97.7 °F (36.5 °C), Max:98.2 °F (36.8 °C)      I/O (24Hr):  I/O last 3 completed shifts: In: 0   Out: 1850 [Urine:1100; Emesis/NG output:750]     GENERAL:  No acute distress. Alert and interactive. LUNGS:  No cough. Nonlabored breathing on room air. CARDIOVASC:  Normal rate, no cyanosis. ABDOMEN:  Soft, non-distended, non-tender. Ostomy with some runny stool. No guarding / rigidity / rebound. EXTREMITIES:  No edema, no deformities.   Cachectic  SKIN: Facial laceration clean dry and intact    Assessment:  69 y.o. male with facial laceration status post repair, nausea and vomiting secondary to likely an ileus from ROBBIN and electrolyte abnormalities    Plan:  -Continue PPI  -Continue diet as tolerated and prn antiemetics  -Rehydration and electrolyte management per nephrology  -Monitor hemoglobin which is stable, no endoscopy at this time    Electronically signed by Cynthia Mandel MD on 2021 at 11:00 AM    Seen/examined  Emesis persists ileostomy without recorded output  Place NG  Contrasted imaging  Diana Neal MD

## 2021-04-13 NOTE — PROGRESS NOTES
cells/LPF  Moderate Polymorphonuclear leukocytes  Few Epithelial cells  No organisms seen       No results found for: MPNEUMO, CLAMYDCU, LABLEGI, AFBCX, FUNGSM, LABFUNG  CULTURE, RESPIRATORY   Date Value Ref Range Status   08/05/2017 Oral Pharyngeal Manuel absent (A)  Final   08/05/2017 One colony  Final   08/05/2017 Light growth  Final     No results found for: CXCATHTIP  No results found for: BFCS  No results found for: CXSURG  Urine Culture, Routine   Date Value Ref Range Status   01/28/2021 Growth not present  Final   10/06/2020 Growth not present  Final   07/26/2017   Final    ,000 CFU/mL  Mixed manuel isolated. Further workup and sensitivity testing  is not routinely indicated and will not be performed.   Mixed manuel isolated includes:  Mixed gram positive organisms       MRSA Culture Only   Date Value Ref Range Status   09/29/2017 Methicillin resistant Staph aureus not isolated  Final   07/26/2017 Methicillin resistant Staph aureus not isolated  Final     Blood ID, Molecular [[id number]] (Abnormal)    Collected: 04/10/21 1534    Updated: 04/12/21 1314     Bottle Type Anaerobic    Source of Blood Culture Antecubital-Lef    Order Number [id number]    Enterobacter cloacae complex by PCR Not Detected    Escherichia coli by PCR Not Detected    Klebsiella oxytoca by PCR Not Detected    Klebsiella pneumoniae group by PCR Not Detected    Proteus species by PCR Not Detected    Streptococcus agalactiae by PCR Not Detected    Staphylococcus aureus by PCR Not Detected    Serratia marcescens by PCR Not Detected    Streptococcus pneumoniae by PCR Not Detected    Streptococcus pyogenes  by PCR Not Detected    Acinetobacter baumannii by PCR Not Detected    Candida albicans by PCR Not Detected    Candida glabrata by PCR Not Detected    Candida krusei by PCR Not Detected    Candida parapsilosis by PCR Not Detected    Candida tropicalis by PCR Not Detected     Enterobacteriaceae by PCR Not Detected    Enterococcus by PCR Not Detected    Haemophilus Influenzae by PCR Not Detected    Listeria monocytogenes by PCR Not Detected    Neisseria meningitidis by PCR Not Detected    Pseudomonas aeruginosa by PCR Not Detected    Staphylococcus species by PCR DETECTEDPanic      Streptococcus species by PCR Not Detected    Methicillin Resistance mecA/C  by PCR Not Detected       Problem list:    Principal Problem:    Acute respiratory failure with hypoxia ([facility] [address]  Active Problems:    Mixed hyperlipidemia    History of stroke    Tobacco dependence    COPD exacerbation (HCC)    Essential hypertension    Emphysema of lung (HCC)    ROBBIN (acute kidney injury) ([facility] [address]    Lactic acid acidosis    Hypercalcemia    Tachycardia    High anion gap metabolic acidosis  Resolved Problems:    * No resolved hospital problems.  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

## 2021-04-13 NOTE — PLAN OF CARE
Problem: Falls - Risk of:  Goal: Will remain free from falls  Description: Will remain free from falls  4/13/2021 0235 by Sherrie Duarte RN  Outcome: Met This Shift     Problem: Falls - Risk of:  Goal: Absence of physical injury  Description: Absence of physical injury  4/13/2021 0235 by Sherrie Duarte RN  Outcome: Met This Shift     Problem: Nausea/Vomiting:  Goal: Able to drink  Description: Able to drink  4/13/2021 0235 by Sherrie Duarte RN  Outcome: Met This Shift

## 2021-04-14 NOTE — PROGRESS NOTES
A  Stand Pivot; Min A using [employer] for support  SBA    Ambulation    12  feet with  ww  with  Min assist  NT due to tight NG suction hose. See comments  50  feet with  ww  with  SBA        Stair negotiation: ascended and descended NT  NT 2  steps with  1  rail with CGA    LE ROM  WFL     LE strength  3+/ 5      AM- PAC RAW score  16/ 24 16/24            Pt is alert and able to follow instruction  Balance: poor during pivot transfer using [employer] for support    Pt performed therapeutic exercise of the following: Standing marching performed x 20 using [employer] for support    Patient education  Pt was educated on UE usage to assist with transfers    Patient response to education:   Pt verbalized understanding Pt demonstrated skill Pt requires further education in this area   yes With instruction yes     ASSESSMENT:   Comments: Nurse ok with rx. Pt found in bed, agreed to rx. Pt assisted to EOB, sat EOB SBA. Pivot performed bed to chair using [employer] for support, no gait attempted due to excessive tightness of NG tube, was unable to safely disconnect. Marching in place performed, Pt fatigued after this. Treatment: Pt practiced and was instructed in the following treatment: transfer safety    Pt was left in a bedside chair with call light in reach, waffle cushion in place. Chair/bed alarm: chair alarm active    Time in 1058   Time out 1123   Total Treatment Time 25 minutes   CPT codes:     Therapeutic activities 57939 25 minutes   Therapeutic exercises 90913 0 minutes       Pt is making fair progress toward established Physical Therapy goals. Continue with physical therapy current plan of care.     Shu Shannon PTA   License Number: [id number]

## 2021-04-14 NOTE — PROGRESS NOTES
[address] [place] [address] [facility]  Progress Note      Chief Complaint   Patient presents with    Shortness of Breath     beginning last night. hx COPD. COVID vacinated    Emesis     \"all night last night\"       SUBJECTIVE:      More alert, complains of being hungry    No fever or chills      Review of systems:    As stated above in the chief complaint, otherwise negative. Medications:    Scheduled Meds:   pantoprazole  40 mg Intravenous BID    sodium chloride flush  5-40 mL Intravenous 2 times per day    levalbuterol  0.63 mg Nebulization Q8H    budesonide  0.5 mg Nebulization BID    aspirin  81 mg Oral Every Other Day    atorvastatin  20 mg Oral Nightly    metoprolol tartrate  25 mg Oral BID    Arformoterol Tartrate  15 mcg Nebulization BID    And    ipratropium  0.5 mg Nebulization 4x daily     Continuous Infusions:   lactated ringers 100 mL/hr at 04/14/21 0349    sodium chloride       PRN Meds:sodium chloride flush, sodium chloride, promethazine **OR** ondansetron, polyethylene glycol, trimethobenzamide  Prior to Admission medications    Medication Sig Start Date End Date Taking?  Authorizing Provider   albuterol sulfate HFA (VENTOLIN HFA) 108 (90 Base) MCG/ACT inhaler Inhale 2 puffs into the lungs every 4 hours as needed for Wheezing or Shortness of Breath 12/22/20  Yes Georges Monk MD   ipratropium-albuterol (DUONEB) 0.5-2.5 (3) MG/3ML SOLN nebulizer solution Inhale 3 mLs into the lungs every 4 hours as needed for Shortness of Breath 12/22/20  Yes Georges Monk MD   Tiotropium Bromide-Olodaterol (STIOLTO RESPIMAT) 2.5-2.5 MCG/ACT AERS Inhale 2 puffs into the lungs daily Takes 1 puff in morning and after dinner  Use day of surgery 12/22/20  Yes Georges Monk MD   metoprolol tartrate (LOPRESSOR) 25 MG tablet Take 25 mg by mouth 2 times daily   Yes Historical Provider, MD   atorvastatin (LIPITOR) 20 MG tablet Take 20 mg by mouth daily  8/6/15  Yes Historical Provider, MD   aspirin 81 MG tablet Take 81 mg by mouth every other day    Yes Historical Provider, MD       OBJECTIVE:  BP (!) 88/54   Pulse 92   Temp 97.8 °F (36.6 °C) (Oral)   Resp 14   Ht 5' 10\" (1.778 m)   Wt 97 lb (44 kg)   SpO2 100%   BMI 13.92 kg/m²   Temp  Av °F (36.7 °C)  Min: 97.5 °F (36.4 °C)  Max: 98.5 °F (36.9 °C)  Constitutional: Awake relatively alert, extremely thin and contracted. Skin: Warm and dry. No rashes were noted. No jaundice. HEENT: Eyes show round, and reactive pupils. Moist mucous membranes, no ulcerations, no thrush. Neck: Supple to movements. No lymphadenopathy. Chest: No use of accessory muscles to breathe. Symmetrical expansion. Auscultation reveals no wheezing, crackles, or rhonchi. Cardiovascular: Regular rate and rhythm. No murmurs appreciated. Abdomen: Ileostomy in place, site clean and dry. Positive bowel sounds to auscultation. Benign to palpation. No masses felt. No hepatosplenomegaly. Extremities: No clubbing, no cyanosis, no edema. Neurological: No focal neurologic deficits  ·      I/O last 3 completed shifts:   In: 4931 [I.V.:4841; NG/GT:90]  Out: 3100 [Urine:750; Emesis/NG output:1850; Stool:500]      Laboratory and Tests Review:      Lab Results   Component Value Date    WBC 5.8 2021    WBC 4.5 2021    WBC 4.9 2021    HGB 8.7 (L) 2021    HCT 26.7 (L) 2021    MCV 98.5 2021     (L) 2021     Lab Results   Component Value Date    NEUTROABS 2.82 04/10/2021    NEUTROABS 2.94 04/10/2021    NEUTROABS 4.70 2021     No results found for: [facility]  Lab Results   Component Value Date    ALT 27 2021    AST 28 2021    ALKPHOS 106 2021    BILITOT 0.3 2021     Lab Results   Component Value Date     2021    K 3.4 2021    K 4.4 2021    CL 95 2021    CO2 27 2021    BUN 91 2021    CREATININE 2.6 2021    CREATININE 2.8 2021    CREATININE 3.0 04/12/2021    GFRAA 30 04/14/2021    LABGLOM 24 04/14/2021    GLUCOSE 75 04/14/2021    PROT 6.8 04/12/2021    LABALBU 3.0 04/12/2021    CALCIUM 8.2 04/14/2021    BILITOT 0.3 04/12/2021    ALKPHOS 106 04/12/2021    AST 28 04/12/2021    ALT 27 04/12/2021     Lab Results   Component Value Date    CRP 10.0 (H) 01/29/2021    CRP 7.4 (H) 12/19/2020     Lab Results   Component Value Date    SEDRATE 47 (H) 01/29/2021    SEDRATE 45 (H) 12/19/2020       Radiology:    XR ABDOMEN (KUB) (SINGLE AP VIEW)   Final Result   Nonspecific abdomen. Density overlying the pelvis may represent contrast within the patient's   ostomy bag as detailed above. CT ABDOMEN PELVIS WO CONTRAST Additional Contrast? Oral (via NG tube)   Final Result   Limited study due to lack of intravenous contrast.  Suggestion of gastric   outlet obstruction at the gastro duodenal junction due to a large abdominal   aortic aneurysm with mass effect on the duodenum. Endograft is present with   persistent contrast in the excluded lumen which may be due to previous   endoleak. 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 Poor delineation of the bowel loops. There is suggestion of bowel surgery   with right lower quadrant ileostomy. XR ABDOMEN FOR NG/OG/NE TUBE PLACEMENT   Final Result   NG tube appears in satisfactory position         XR ACUTE ABD SERIES CHEST 1 VW   Final Result   1. Emphysematous changes. There is no evidence of pneumonia   2. There are no findings of bowel obstruction and there is no free air under   the hemidiaphragms. 3. There is a paucity of gas within the bowel. If clinically warranted CT of   the abdomen should be considered. CT HEAD WO CONTRAST   Final Result   No acute intracranial findings. Consider MRI if symptoms persist.      Volume loss and findings suggestive of chronic microvascular ischemia.       Other chronic appearing findings. XR CHEST PORTABLE   Final Result   Lungs are hyperexpanded, compatible with COPD changes. No pulmonary   infiltrate is identified. Microbiology:   Lab Results   Component Value Date    BC 24 Hours no growth 04/10/2021    BC 5 Days no growth 01/29/2021    ORG Staphylococcus coagulase-negative 04/10/2021    ORG Aspergillus species 08/05/2017    ORG Candida albicans 08/05/2017     Lab Results   Component Value Date    BLOODCULT2  04/10/2021     Gram stain performed from blood culture bottle media  Gram positive cocci in clusters  Gram stain performed from blood culture bottle media  Gram positive rods Diphtheroid-like      BLOODCULT2  04/10/2021     This organism was isolated in one set. Susceptibility testing is not routinely done as this  organism frequently represents skin contamination. Additional testing can be ordered by calling the  Microbiology Department. BLOODCULT2 5 Days no growth 01/29/2021    ORG Staphylococcus coagulase-negative 04/10/2021    ORG Aspergillus species 08/05/2017    ORG Candida albicans 08/05/2017     No results found for: WNDABS  Smear, Respiratory   Date Value Ref Range Status   08/05/2017   Final    Group 5: >25 PMN's/LPF and <10 Epithelial cells/LPF  Moderate Polymorphonuclear leukocytes  Few Epithelial cells  No organisms seen       No results found for: MPNEUMO, CLAMYDCU, LABLEGI, AFBCX, FUNGSM, LABFUNG  CULTURE, RESPIRATORY   Date Value Ref Range Status   08/05/2017 Oral Pharyngeal Manuel absent (A)  Final   08/05/2017 One colony  Final   08/05/2017 Light growth  Final     No results found for: CXCATHTIP  No results found for: BFCS  No results found for: CXSURG  Urine Culture, Routine   Date Value Ref Range Status   01/28/2021 Growth not present  Final   10/06/2020 Growth not present  Final   07/26/2017   Final    ,000 CFU/mL  Mixed manuel isolated.  Further workup and sensitivity testing  is not routinely indicated and will not be performed. Mixed manuel isolated includes:  Mixed gram positive organisms       MRSA Culture Only   Date Value Ref Range Status   09/29/2017 Methicillin resistant Staph aureus not isolated  Final   07/26/2017 Methicillin resistant Staph aureus not isolated  Final      Component Value Units   Culture, Blood 2 [[id number]] (Abnormal)    Collected: 04/10/21 1534    Updated: 04/14/21 0933    Specimen Source: Blood     Culture, Blood 2 --Abnormal     Gram stain performed from blood culture bottle media   Gram positive cocci in clusters   Gram stain performed from blood culture bottle media   Gram positive rods Diphtheroid-like   Abnormal     Organism Staphylococcus coagulase-negativeAbnormal     Culture, Blood 2 --    This organism was isolated in one set. Susceptibility testing is not routinely done as this   organism frequently represents skin contamination. Additional testing can be ordered by calling the   Microbiology Department. Narrative:         Problem list:    Principal Problem:    Acute respiratory failure with hypoxia (HCC)  Active Problems:    Mixed hyperlipidemia    History of stroke    Tobacco dependence    COPD exacerbation (HCC)    Essential hypertension    Emphysema of lung (HCC)    ROBBIN (acute kidney injury) ([facility]    Lactic acid acidosis    Hypercalcemia    Tachycardia    High anion gap metabolic acidosis  Resolved Problems:    * No resolved hospital problems.  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

## 2021-04-14 NOTE — CONSULTS
4/14/2021  2:33 PM      Comprehensive Nutrition Assessment    Type and Reason for Visit:  Reassess, Consult(Consult re: PN Recommendation (unable to order PN in [facility] region))    Nutrition Recommendations/Plan: Recommend low PN rate/volume to start with close monitoring of labs, as pt risk for refeeding syndrome. Recommended standard 15 aziza/kg/d for PN initiation in pts at high risk refeeding. Also recommend current TG level to ascertain if 3-in-1 PN appropriate. INITIAL TPN RECOMMENDATION: Standard 3-in-1 Central PN at 25 ml/hr (600 ml/d) for the first 2-5 days, as tolerated. To meet pt needs after 2-5 days, recommend:    TPN RECOMMENDATION (maintenance): Standard 3-in-1 Central PN at 60 ml/hr (1440 ml/d)  This will provide: 1490 aziza, 72 g pro  This regimen will meet 100% calorie and protein needs. Nutrition Assessment:  Pt continues to have N/V, NGT w/bilous drainage 2/2 compression of aneurysm on duodenum/obstruction. Will recommend PN to meet needs, as pt severely malnourished and unable to tolerate adequate PO or EN. Malnutrition Assessment:  Malnutrition Status:  Severe malnutrition    Context:  Chronic Illness     Findings of the 6 clinical characteristics of malnutrition:  Energy Intake:  7 - 75% or less estimated energy requirements for 1 month or longer  Weight Loss:  7 - 20% over 1 year     Body Fat Loss:  7 - Severe body fat loss Orbital, Triceps   Muscle Mass Loss:  7 - Severe muscle mass loss Temples (temporalis), Clavicles (pectoralis & deltoids), Hand (interosseous)  Fluid Accumulation:  No significant fluid accumulation     Strength:  Not Performed    Estimated Daily Nutrient Needs:  Energy (kcal):   (30-35 kcal/kg); Weight Used for Energy Requirements:  Admission     Protein (g):  65-80 (1.5-1.8 g/kg);  Weight Used for Protein Requirements:  Admission        Fluid (ml/day):  ; Method Used for Fluid Requirements:  1 ml/kcal      Nutrition Related Findings:  A&Ox4 but

## 2021-04-14 NOTE — PROGRESS NOTES
Palliative Care Department  Palliative Care Initial Consult  Provider: Govind Gamble MD  [address]: [address]  Date of Initial Consult: 4/13/2021  Referring Provider: Dr. Phillip Parmar was consulted for assistance with: Code status Discussion, Assist with goals of care and Symptom Management    Chief Complaint: Veronica Cornell is a 69 y.o. male with chief complaint of nausea and vomiting    HPI:   Veronica Cornell is a 69 y.o. 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 He presented with complaints of worsening nausea and vomiting, also having a syncopal episode resulting in a fall scalp laceration, was admitted on 4/10/2021 further management. Emergency department he was noted to be severely dehydrated, having episodes of SVT, as well as episodes of nonbloody emesis. He has been followed closely by general surgery, nephrology, as well as infectious disease. Palliative service has been consulted to discuss goals of care, CODE STATUS, as well as assess symptomatic needs.     ASSESSMENT/PLAN:     Pertinent Hospital Diagnoses:  Current medical issues leading to Palliative Medicine involvement include   Active Hospital Problems    Diagnosis Date Noted    [facility] [J96.01] 04/10/2021    COPD exacerbation ([facility] [J44.1] 04/10/2021    Essential hypertension [I10] 04/10/2021    Emphysema of lung ([facility] [J43.9] 04/10/2021    ROBBIN (acute kidney injury) ([facility] [N17.9] 04/10/2021    Lactic acid acidosis [E87.2] 04/10/2021    Hypercalcemia [E83.52] 04/10/2021    Tachycardia [R00.0] 04/10/2021    High anion gap metabolic acidosis [G13.2] 04/10/2021    Tobacco dependence [F17.200] 05/09/2017    History of stroke [Z86.73] 05/08/2017    Mixed hyperlipidemia [E78.2] 08/31/2015     Palliative Care Encounter / Counseling Regarding Goals of Care:  Please see detailed goals of care discussion as below.  At this time, Guadalupe Michael, Does have capacity for medical decision-making. Capacity is time limited and situation/question specific.  Outcome of goals of care meeting: improve or maintain function/quality of life, provide comfort care/support/palliation/relieve suffering and continue current management   Code status: Full Code  o I reviewed with the Patient that given multiple medical co-morbidities, in the event of cardiac arrest, the chances of surviving may likely be low. It was also reviewed that if they survived a cardiac arrest, a return to prior level of function also may be low.  Advanced Directives: None   Surrogate/Legal Deborah Mcgowan ([phone]) is the patient's wife.  Will follow and continued to discuss goals of care pending clinical progression. Nausea and vomiting:   -Continue NG tube as per surgery   -Continue current antiemetics    Referrals: none today    SUBJECTIVE:   Events/Discussions:  4/14/2021:  Mr. Latasha Saul was seen at the bedside, no family present. He is alert, oriented, able to voice his needs and concerns well, does not appear to be in any acute distress. Spoke to patient he is doing 90% better, he currently does not complain of any nausea or vomiting episodes. He does not want any additional medication at this point, NG is still putting out fluid. We will continue monitoring.        OBJECTIVE:   Prognosis: Guarded    Physical Exam:  BP (!) 90/55   Pulse 97   Temp 97.9 °F (36.6 °C) (Oral)   Resp 16   Ht 5' 10\" (1.778 m)   Wt 97 lb (44 kg)   SpO2 100%   BMI 13.92 kg/m²     Gen:  Alert, cachectic, frail, in no acute distress  HEENT:  Normocephalic, conjunctiva pink, no drainage, mucosa moist  Neck: Supple  Lungs:  CTA bilaterally, no audible rhonchi or wheezes noted  Heart: RRR, no murmur, rub, or gallop noted during exam  Abd:  Flat, some TTP, firm, NGT in place with dark drainage noted  M/S/Ext:  Moving all extremities, weak no edema, pulses present  Skin:  Warm and dry, scattered ecchymosis, right eyebrow laceration  Neuro:  PERRL, Alert, oriented x 3; following commands    Objective data reviewed: labs, images, records, medication use, vitals and chart    Time/Communication:  Greater than 50% of time spent, total 50 minutes in counseling and coordination of care at the bedside regarding goals of care, symptom management and see above. Annette Velasquez MD  Palliative Medicine    Patient and the plan of care discussed with the other IDT members of Palliative Care Team, and with patient, as appropriate and available. Thank you for allowing Palliative Medicine to participate in the care of Stefan Lane. Note: This report was completed using computerize voiced recognition software. Every effort has been made to ensure accuracy; however, inadvertent computerized transcription errors may be present.

## 2021-04-14 NOTE — PROGRESS NOTES
Internal Medicine Progress Note      Synopsis: Patient admitted on 4/10/2021     Subjective    More alert than yesterday   still feels weak though  Blood cultures turn positive    Still hasn't eaten anything and continues to have emesis that looks dark    April 13, 2021  Infectious disease did see and they have him on close watch however no antibiotics yet. Patient continues to complain of emesis. He still not able to eat. No plans for endoscopy yet  April 14, 2021  NG tube in place. He is looking a lot better. exam:  BP (!) 88/54   Pulse 92   Temp 97.8 °F (36.6 °C) (Oral)   Resp 14   Ht 5' 10\" (1.778 m)   Wt 97 lb (44 kg)   SpO2 100%   BMI 13.92 kg/m²   Respiratory: Distant   Cardiovascular: Heart regular rate rhythm   Abdomen: Ileostomy bag in place   Musculoskeletal: No clubbing, cyanosis, edema of bilateral lower extremities. Patient is extremely thin and only skin and bones   Skin: Normal skin color. No rashes or lesions. Neurologic: Neurovascularly intact without any focal sensory/motor deficits.  Cranial nerves: II-XII intact, grossly   Does appear to be weak but intact   Psychiatric: Appears to be able to engage in conversation and he is oriented    Medications:  Reviewed    Infusion Medications    lactated ringers 100 mL/hr at 04/14/21 0349    sodium chloride       Scheduled Medications    pantoprazole  40 mg Intravenous BID    sodium chloride flush  5-40 mL Intravenous 2 times per day    levalbuterol  0.63 mg Nebulization Q8H    budesonide  0.5 mg Nebulization BID    aspirin  81 mg Oral Every Other Day    atorvastatin  20 mg Oral Nightly    metoprolol tartrate  25 mg Oral BID    Arformoterol Tartrate  15 mcg Nebulization BID    And    ipratropium  0.5 mg Nebulization 4x daily     PRN Meds: sodium chloride flush, sodium chloride, promethazine **OR** ondansetron, polyethylene glycol, trimethobenzamide    I/O    Intake/Output Summary (Last 24 hours) at 4/14/2021 1113  Last data filed at 4/14/2021 0630  Gross per 24 hour   Intake 4931 ml   Output 2600 ml   Net 2331 ml       Labs:   Recent Labs     04/12/21  1335 04/13/21  0412 04/14/21  0343   WBC 4.9 4.5 5.8   HGB 10.1* 9.8* 8.7*   HCT 29.7* 28.9* 26.7*    154 127*       Recent Labs     04/12/21  1335 04/13/21  0412 04/14/21  0343    136 136   K 3.7 3.5 3.4*   CL 90* 93* 95*   CO2 29 29 27   BUN 89* 96* 91*   CREATININE 3.0* 2.8* 2.6*   CALCIUM 8.3* 8.3* 8.2*   PHOS 5.9* 5.8* 5.8*       Recent Labs     04/12/21  1335   PROT 6.8   ALKPHOS 106   ALT 27   AST 28   BILITOT 0.3       No results for input(s): INR in the last 72 hours. No results for input(s): Meldon Oakes in the last 72 hours. Chronic labs:  Lab Results   Component Value Date    INR 1.1 04/10/2021    LABA1C 5.4 07/26/2017       Radiology:  Xr Acute Abd Series Chest 1 Vw    Result Date: 4/11/2021  1. Emphysematous changes. There is no evidence of pneumonia 2. There are no findings of bowel obstruction and there is no free air under the hemidiaphragms. 3. There is a paucity of gas within the bowel. If clinically warranted CT of the abdomen should be considered. Ct Head Wo Contrast    Result Date: 4/10/2021  No acute intracranial findings. Consider MRI if symptoms persist. Volume loss and findings suggestive of chronic microvascular ischemia. Other chronic appearing findings. Xr Chest Portable    Result Date: 4/10/2021  Lungs are hyperexpanded, compatible with COPD changes. No pulmonary infiltrate is identified. ASSESSMENT:    Principal Problem:    Acute respiratory failure with hypoxia (HCC)  Active Problems:    Mixed hyperlipidemia    History of stroke    Tobacco dependence    COPD exacerbation (HCC)    Essential hypertension    Emphysema of lung (HCC)    ROBBIN (acute kidney injury) (HCC)    Lactic acid acidosis    Hypercalcemia    Tachycardia    High anion gap metabolic acidosis  Resolved Problems:    * No resolved hospital problems. *       PLAN:    1.renal function appears slightly worsened despite IV fluid support  2.still not on O2   3. Blood pressure is still borderline  4. Now with positive blood culturesand ID has been consulted  5. Blood pressure remains low and he seems to be mentating okay  6. Despite many episodes of emesis it appears that his hemoglobin is still not decreased  7. Appreciate  input of infectious disease and surgery. No plans for EGD given that he likely has ileus fromextreme volume depletion and papi  8. Acute kidney insufficiency does appear to be slightly worse  9. Taper off  steriods  After tomorrow  April 13, 2021  Note surgery and infectious disease  Discussed with nursing staff  Hemoglobin has trended downwards  He continues to have emesis that looks dark  He will be done with his IV steroids  Renal function does not return to normal yet  Patient is Definitely more alert  We are not close to discharge yet. Continue to watch hemoglobin and electrolytes. I did discuss the case with the family on Sunday and they were unsure of long-term guidelines. We may need to involve palliative care. Patient's intake of food is poor. He remains high risk for infections and deconditioningDecompensation  April 14, 2021  Still low with blood pressure however is mentating well. Now with NGT in place. Definitely more alert. Note and appreciate input of surgical services with possible partial obstruction of the duodenum by the excluded aneurysmal sac.   Patient would be a good candidate for PICC line and TPN per surgery      Diet: Diet NPO Effective Now Exceptions are: Sips of Water with Meds  Code Status: Full Code  PT/OT Eval Status:   Not yet  DVT Prophylaxis:   scds  Recommended disposition at discharge:  not sure yet    +++++++++++++++++++++++++++++++++++++++++++++++++  Luis Velasco MD   [facility].  +++++++++++++++++++++++++++++++++++++++++++++++++  NOTE: This report was transcribed using voice recognition software. Every effort was made to ensure accuracy; however, inadvertent computerized transcription errors may be present.

## 2021-04-14 NOTE — PROGRESS NOTES
Progress Progress Note    Subjective:   Admit Date: 4/10/2021  PCP: Yamini Mackey MD  Interval History: Pt being seen for ROBBIN    4/14/21:Pt  awake alert, he has NG placed he is sitting up in chair and states he feels better and is hungry    Diet: Diet NPO Effective Now Exceptions are: Sips of Water with Meds    Data:   Scheduled Meds:   potassium chloride  10 mEq Intravenous Q1H    pantoprazole  40 mg Intravenous BID    sodium chloride flush  5-40 mL Intravenous 2 times per day    levalbuterol  0.63 mg Nebulization Q8H    budesonide  0.5 mg Nebulization BID    aspirin  81 mg Oral Every Other Day    atorvastatin  20 mg Oral Nightly    metoprolol tartrate  25 mg Oral BID    Arformoterol Tartrate  15 mcg Nebulization BID    And    ipratropium  0.5 mg Nebulization 4x daily     Continuous Infusions:   lactated ringers 100 mL/hr at 04/14/21 0349    sodium chloride       PRN Meds:sodium chloride flush, sodium chloride, promethazine **OR** ondansetron, polyethylene glycol, trimethobenzamide  I/O last 3 completed shifts: In: 9220 [I.V.:4841; NG/GT:90]  Out: 3100 [Urine:750; Emesis/NG output:1850; Stool:500]  No intake/output data recorded. Intake/Output Summary (Last 24 hours) at 4/14/2021 1337  Last data filed at 4/14/2021 0630  Gross per 24 hour   Intake 4931 ml   Output 2000 ml   Net 2931 ml     CBC:   Recent Labs     04/12/21  1335 04/13/21  0412 04/14/21  0343   WBC 4.9 4.5 5.8   HGB 10.1* 9.8* 8.7*    154 127*     BMP:    Recent Labs     04/12/21  1335 04/13/21  0412 04/14/21  0343    136 136   K 3.7 3.5 3.4*   CL 90* 93* 95*   CO2 29 29 27   BUN 89* 96* 91*   CREATININE 3.0* 2.8* 2.6*   GLUCOSE 139* 102* 75     Hepatic:   Recent Labs     04/12/21  1335   AST 28   ALT 27   BILITOT 0.3   ALKPHOS 106     Troponin:   No results for input(s): TROPONINI in the last 72 hours. BNP: No results for input(s): BNP in the last 72 hours.   Lipids: No results for input(s): CHOL, HDL in the last 72 hours.    Invalid input(s): LDLCALCU  ABGs: No results found for: PHART, PO2ART, AHN1PGI  INR:   No results for input(s): INR in the last 72 hours. -----------------------------------------------------------------  RAD:   EXAMINATION:   TWO XRAY VIEWS OF THE ABDOMEN AND SINGLE  XRAY VIEW OF THE CHEST       4/11/2021 10:09 am       COMPARISON:   Previous CT scan of the abdomen pelvis 03/28/2021       HISTORY:   ORDERING SYSTEM PROVIDED HISTORY: emesis, distention   TECHNOLOGIST PROVIDED HISTORY:   Reason for exam:->emesis, distention       FINDINGS:   Chest: There are advanced emphysematous changes noted bilaterally.  There is   no evidence of pneumonia.       Three views the abdomen demonstrate a nonobstructive bowel gas pattern. There are no abnormal air-fluid levels.  There is a stent seen within the   abdominal aorta and iliac arteries.       There is no evidence of free air under the hemidiaphragms.       There is a Suárez catheter within the urinary bladder.           Impression   1. Emphysematous changes. Genevieve Lolling is no evidence of pneumonia   2. There are no findings of bowel obstruction and there is no free air under   the hemidiaphragms. 3. There is a paucity of gas within the bowel.  If clinically warranted CT of   the abdomen should be considered. Objective:   Vitals: BP (!) 88/54   Pulse 92   Temp 97.8 °F (36.6 °C) (Oral)   Resp 14   Ht 5' 10\" (1.778 m)   Wt 97 lb (44 kg)   SpO2 100%   BMI 13.92 kg/m²   GENERAL:  The patient is somnolent  Appearance: The patient is a rather cachectic looking,  emaciated gentleman, in no acute distress. HEENT:  Head is normocephalic and atraumatic. Eyes:  Sclerae are  nonicteric. Pupils are equal and reactive to light and accommodation. Fundus examination deferred. Mouth and throat:  Dry oral mucosa. NECK:  Supple. No distention. LUNGS:  Vesicular breath sounds. Breath sounds are decreased at the  bases. No rales or rhonchi.   HEART:  Regular rate and rhythm without any pericardial rub or gallop. ABDOMEN:  Soft with ostomy in place with liquid stool. No tenderness or  rebound tenderness. EXTREMITIES:  The patient has no pedal edema. Assessment:   Patient Active Problem List:     Mixed hyperlipidemia     Acute hypoxemic respiratory failure (HCC)     Rhinovirus     History of stroke     Abdominal aneurysm (HCC)     Tobacco dependence     Aortic valve stenosis     Nonrheumatic aortic valve stenosis     Severe protein-calorie malnutrition (HCC)     COPD, moderate (HCC)     S/P AAA repair using bifurcation graft     Generalized abdominal pain     Failure to thrive in adult     Syncope and collapse     Acute respiratory failure with hypoxia (HCC)     COPD exacerbation (HCC)     Essential hypertension     Emphysema of lung (HCC)     ROBBIN (acute kidney injury) ([facility] [address]     Lactic acid acidosis     Hypercalcemia     Tachycardia     High anion gap metabolic acidosis    Plan:   Stage III ROBBIN ( Baseline serum cr 0.9mg/dl ) -sec to decreased effective renal perfusion as evidenced by the Gabriele+<20 and FeNa<1% in the setting of hypotension  Creatinine 2.9-->3.3-->2.8-->2.6mg/dl  PLAN:1.Continue LR    2. Sec HPTH of Renal Origin with Hyperphosphatemia  Vit D 32   PO4 stable 5.8  PLAN:1. Continue to monitor    3. Anemia  Ferritin 1006, Iron Sat 31% Folate 10.7 B12 814  SPEP & UPEP (-) for Monoclonal Protein  PLAN:1. Await  SPEP and UPEP    4. Anion Gap Met Acidosis in the setting of the lactic Acidosis and ROBBIN  Lactic Acid down to WNL  AG closed  PLAN:1.Continue to monitor    5. N/V-CT Scan suggests gastric outlet obstruction at the gastro duodenal junction due to a large AAA    6. Staph bacteremia  PLAN:1.  As per ID    Thank you for allowing our service for participating in [facility]    Desi Owens

## 2021-04-14 NOTE — CARE COORDINATION
Social Work/Discharge Planning:  Met with patient and his wife Christa Brewer to confirm discharge plan. Patient has ng tube. Discussed possible need for skilled nursing facility or home health care at discharge. Patient and is wife prefers home at discharge. Therapy in room to evaluate patient. Provided Christa Brewer with choice list for hhc and snf. Christa Brewer states patient has a home health care history with [facility] and prefers to use [facility] again. Tentative referral made to Mary Pineda with [facility] and she will review patient information. Will continue to follow.   Electronically signed by TOMEKA Peralta on 4/14/2021 at 11:21 AM

## 2021-04-14 NOTE — PROGRESS NOTES
Seen/examined  CT reviewed  Discussed case with Dr. Allen Pedro  Patient feeling better this morning; more alert although still slightly confused  Denies abdominal pain  Ileostomy with good output today  NG still bilious  Abdomen non-distended, midline incision intact  ileostomy healthy and functioning  On CT scan, there does appear to be compression of the third and fourth portion of the duodenum by the excluded aneurysm sac. There is minimal change in the size of the aneurysm compared to the CT from January 29. What has changed, however, is the patient's nutritional status. There is evidence of significant weight loss. I think this is the more likely cause of the partial obstruction. This is not an operative obstruction. Patient needs nutritional support. It is unclear whether he will need an NG indefinitely; in this case a PEG can be placed for drainage. Since the contrast passes through the compressed duodenum, it is possible that he could still take some liquids and have the NG removed.   I doubt, he would be able to take adequate calories and would still recommend PICC line and Ramiro Desai MD

## 2021-04-15 NOTE — PROCEDURES
PICC  Catheter insertion date 4/15/2021     Product Number: [id number]   Lot No: [id number]   Gauge: 17 x2   Lumen: Double/ 5.5 Hebrew   LEFT BRACHIAL    Vein Diameter : 0.49CM   Upper arm circumference (MIDWAY BETWEEN AC AND AXILLA): 18CM   Catheter Length : 30CM   Internal Length: 30CM   External Catheter Length: 0CM   Ultrasound Used:YES  : Jessica Osuna RN    Stat Xray ordered to confirm placement. Awaiting results. Radiology report released @ 0598 1407. PICC line okay to use. Ruby BOATENG and [employer] RN aware.    Electronically signed by Carolyn Quinn RN on 4/15/2021 at 7:32 PM

## 2021-04-15 NOTE — PROGRESS NOTES
Internal Medicine Progress Note      Synopsis: Patient admitted on 4/10/2021     Subjective    More alert than yesterday   still feels weak though  Blood cultures turn positive    Still hasn't eaten anything and continues to have emesis that looks dark    April 13, 2021  Infectious disease did see and they have him on close watch however no antibiotics yet. Patient continues to complain of emesis. He still not able to eat. No plans for endoscopy yet  April 14, 2021  NG tube in place. He is looking a lot better. April 15, 2021  NGT still to suction. Patient is not a good candidate for surgery of his aneurysm which apparently is causing his GI issues and his lack of appetite and eating and obstructive type of symptoms. He remains dependent on nutrition that is nonoral.  He is extremely hungry. He is able to tolerate pills orally when the NG is clamped. PICC is being placed and patient will be on TPN after that       exam:  /74   Pulse 58   Temp 98.6 °F (37 °C) (Temporal)   Resp 16   Ht 5' 10\" (1.778 m)   Wt 97 lb 9.6 oz (44.3 kg)   SpO2 97%   BMI 14.00 kg/m²   Respiratory: Distant   Cardiovascular: Heart regular rate rhythm   Abdomen: Ileostomy bag in place   Musculoskeletal: No clubbing, cyanosis, edema of bilateral lower extremities. Patient is extremely thin and only skin and bones   Skin: Normal skin color. No rashes or lesions. Neurologic: Neurovascularly intact without any focal sensory/motor deficits.  Cranial nerves: II-XII intact, grossly   Does appear to be weak but intact   Psychiatric: Appears to be able to engage in conversation and he is oriented    Medications:  Reviewed    Infusion Medications    dextrose 5% in lactated ringers 100 mL/hr at 04/15/21 1539    PN-Adult  3 IN 1 Central Line (Standard)      sodium chloride      sodium chloride       Scheduled Medications    lidocaine PF  5 mL Intradermal Once    sodium chloride flush  5-40 mL Intravenous 2 times per day    heparin flush  3 mL Intravenous 2 times per day    famotidine  20 mg Oral BID    sodium chloride flush  5-40 mL Intravenous 2 times per day    levalbuterol  0.63 mg Nebulization Q8H    budesonide  0.5 mg Nebulization BID    aspirin  81 mg Oral Every Other Day    atorvastatin  20 mg Oral Nightly    metoprolol tartrate  25 mg Oral BID    Arformoterol Tartrate  15 mcg Nebulization BID    And    ipratropium  0.5 mg Nebulization 4x daily     PRN Meds: sodium chloride flush, sodium chloride, heparin flush, sodium chloride flush, sodium chloride, promethazine **OR** ondansetron, polyethylene glycol, trimethobenzamide    I/O    Intake/Output Summary (Last 24 hours) at 4/15/2021 1741  Last data filed at 4/15/2021 1730  Gross per 24 hour   Intake --   Output 1785 ml   Net -1785 ml       Labs:   Recent Labs     04/13/21 0412 04/14/21 0343 04/15/21  0450   WBC 4.5 5.8 5.4   HGB 9.8* 8.7* 7.9*   HCT 28.9* 26.7* 24.2*    127* 95*       Recent Labs     04/13/21  0412 04/14/21  0343 04/15/21  0450    136 137   K 3.5 3.4* 3.2*   CL 93* 95* 97*   CO2 29 27 26   BUN 96* 91* 70*   CREATININE 2.8* 2.6* 1.8*   CALCIUM 8.3* 8.2* 8.0*   PHOS 5.8* 5.8* 3.4       Recent Labs     04/13/21 0412   PROT 6.2*       No results for input(s): INR in the last 72 hours. No results for input(s): Mariam Anchors in the last 72 hours. Chronic labs:  Lab Results   Component Value Date    INR 1.1 04/10/2021    LABA1C 5.4 07/26/2017       Radiology:  Xr Acute Abd Series Chest 1 Vw    Result Date: 4/11/2021  1. Emphysematous changes. There is no evidence of pneumonia 2. There are no findings of bowel obstruction and there is no free air under the hemidiaphragms. 3. There is a paucity of gas within the bowel. If clinically warranted CT of the abdomen should be considered. Ct Head Wo Contrast    Result Date: 4/10/2021  No acute intracranial findings.   Consider MRI if symptoms persist. Volume loss and findings suggestive with NGT in place. Definitely more alert. Note and appreciate input of surgical services with possible partial obstruction of the duodenum by the excluded aneurysmal sac. Patient would be a good candidate for PICC line and TPN per surgery  April 15, 2021  Creatinine is better  Not a candidate for aneurysmal repair due to high risk surgery  His extreme wasting is going to be supported by TPN and possibly very small sips of oral fluid only. Likely with the obstruction in place from the aneurysm he will not be able to take solids effectively. We can change his Protonix to Pepcid given Thrombocytopenia and change the route to oral  Plan still remains for home  His prognosis is guarded  He remains a full code per his wishes  Hemoglobin is trending downwards    Diet: Diet NPO Effective Now Exceptions are: Sips of Water with Meds  PN-Adult  3-in-1 Central Line (Standard)  Code Status: Full Code  PT/OT Eval Status:   Not yet  DVT Prophylaxis:   scds  Recommended disposition at discharge:  not sure yet    +++++++++++++++++++++++++++++++++++++++++++++++++  Marna Sandhoff, [address] [place] [address] [address].  +++++++++++++++++++++++++++++++++++++++++++++++++  NOTE: This report was transcribed using voice recognition software. Every effort was made to ensure accuracy; however, inadvertent computerized transcription errors may be present.

## 2021-04-15 NOTE — PROGRESS NOTES
Progress Progress Note    Subjective:   Admit Date: 4/10/2021  PCP: Osorio Mcdaniels MD  Interval History: Pt being seen for ROBBIN     4/14/21:Pt  awake alert, he has NG in placed he is sitting up in bed and states he feels better and is hungry    Diet: Diet NPO Effective Now Exceptions are: Sips of Water with Meds  PN-Adult  3-in-1 Central Line (Standard)    Data:   Scheduled Meds:   potassium chloride  10 mEq Intravenous Q1H    lidocaine PF  5 mL Intradermal Once    sodium chloride flush  5-40 mL Intravenous 2 times per day    heparin flush  3 mL Intravenous 2 times per day    pantoprazole  40 mg Intravenous BID    sodium chloride flush  5-40 mL Intravenous 2 times per day    levalbuterol  0.63 mg Nebulization Q8H    budesonide  0.5 mg Nebulization BID    aspirin  81 mg Oral Every Other Day    atorvastatin  20 mg Oral Nightly    metoprolol tartrate  25 mg Oral BID    Arformoterol Tartrate  15 mcg Nebulization BID    And    ipratropium  0.5 mg Nebulization 4x daily     Continuous Infusions:   dextrose 5% in lactated ringers 100 mL/hr at 04/15/21 0607    PN-Adult  3 IN 1 Central Line (Standard)      sodium chloride      sodium chloride       PRN Meds:sodium chloride flush, sodium chloride, heparin flush, sodium chloride flush, sodium chloride, promethazine **OR** ondansetron, polyethylene glycol, trimethobenzamide  I/O last 3 completed shifts:  In: -   Out: 1850 [Urine:1750; Emesis/NG output:50; Stool:50]  I/O this shift:  In: -   Out: 200 [Stool:200]    Intake/Output Summary (Last 24 hours) at 4/15/2021 1330  Last data filed at 4/15/2021 0736  Gross per 24 hour   Intake --   Output 1650 ml   Net -1650 ml     CBC:   Recent Labs     04/13/21  0412 04/14/21  0343 04/15/21  0450   WBC 4.5 5.8 5.4   HGB 9.8* 8.7* 7.9*    127* 95*     BMP:    Recent Labs     04/13/21  0412 04/14/21  0343 04/15/21  0450    136 137   K 3.5 3.4* 3.2*   CL 93* 95* 97*   CO2 29 27 26   BUN 96* 91* 70* CREATININE 2.8* 2.6* 1.8*   GLUCOSE 102* 75 51*     Hepatic:   Recent Labs     04/12/21  1335   AST 28   ALT 27   BILITOT 0.3   ALKPHOS 106     Troponin:   No results for input(s): TROPONINI in the last 72 hours. BNP: No results for input(s): BNP in the last 72 hours. Lipids: No results for input(s): CHOL, HDL in the last 72 hours. Invalid input(s): LDLCALCU  ABGs: No results found for: PHART, PO2ART, GNJ4HBM  INR:   No results for input(s): INR in the last 72 hours. -----------------------------------------------------------------  RAD:   EXAMINATION:   TWO XRAY VIEWS OF THE ABDOMEN AND SINGLE  XRAY VIEW OF THE CHEST       4/11/2021 10:09 am       COMPARISON:   Previous CT scan of the abdomen pelvis 03/28/2021       HISTORY:   ORDERING SYSTEM PROVIDED HISTORY: emesis, distention   TECHNOLOGIST PROVIDED HISTORY:   Reason for exam:->emesis, distention       FINDINGS:   Chest: There are advanced emphysematous changes noted bilaterally.  There is   no evidence of pneumonia.       Three views the abdomen demonstrate a nonobstructive bowel gas pattern. There are no abnormal air-fluid levels.  There is a stent seen within the   abdominal aorta and iliac arteries.       There is no evidence of free air under the hemidiaphragms.       There is a Suárez catheter within the urinary bladder.           Impression   1. Emphysematous changes. Marinell Curd is no evidence of pneumonia   2. There are no findings of bowel obstruction and there is no free air under   the hemidiaphragms. 3. There is a paucity of gas within the bowel.  If clinically warranted CT of   the abdomen should be considered. Objective:   Vitals: /74   Pulse 58   Temp 98.6 °F (37 °C) (Temporal)   Resp 16   Ht 5' 10\" (1.778 m)   Wt 97 lb 9.6 oz (44.3 kg)   SpO2 97%   BMI 14.00 kg/m²   GENERAL:  The patient is somnolent  Appearance: The patient is a rather cachectic looking,  emaciated gentleman, in no acute distress.   HEENT:  Head is normocephalic and atraumatic. Eyes:  Sclerae are  nonicteric. Pupils are equal and reactive to light and accommodation. Fundus examination deferred. Mouth and throat:  Dry oral mucosa. NECK:  Supple. No distention. LUNGS:  Vesicular breath sounds. Breath sounds are decreased at the  bases. No rales or rhonchi. HEART:  Regular rate and rhythm without any pericardial rub or gallop. ABDOMEN:  Soft with ostomy in place with liquid stool. No tenderness or  rebound tenderness. EXTREMITIES:  The patient has no pedal edema. Assessment:   Patient Active Problem List:     Mixed hyperlipidemia     Acute hypoxemic respiratory failure (HCC)     Rhinovirus     History of stroke     Abdominal aneurysm (HCC)     Tobacco dependence     Aortic valve stenosis     Nonrheumatic aortic valve stenosis     Severe protein-calorie malnutrition (HCC)     COPD, moderate (HCC)     S/P AAA repair using bifurcation graft     Generalized abdominal pain     Failure to thrive in adult     Syncope and collapse     Acute respiratory failure with hypoxia (HCC)     COPD exacerbation (HCC)     Essential hypertension     Emphysema of lung (HCC)     ROBBIN (acute kidney injury) ([facility] [address]     Lactic acid acidosis     Hypercalcemia     Tachycardia     High anion gap metabolic acidosis    Plan:   Stage III ROBBIN ( Baseline serum cr 0.9mg/dl ) -sec to decreased effective renal perfusion as evidenced by the Gabriele+<20 and FeNa<1% in the setting of hypotension  Creatinine 2.9-->3.3-->2.8-->2.6mg/dl  PLAN:1.Continue LR    2. Sec HPTH of Renal Origin with Hyperphosphatemia  Vit D 32   PO4 stable 5.8  PLAN:1. Continue to monitor    3. Anemia  Ferritin 1006, Iron Sat 31% Folate 10.7 B12 814  SPEP & UPEP (-) for Monoclonal Protein  PLAN:1. Await  SPEP and UPEP    4. Anion Gap Met Acidosis in the setting of the lactic Acidosis and ROBBIN  Lactic Acid down to WNL  AG closed  PLAN:1.Continue to monitor    5.  N/V-CT Scan suggests gastric outlet obstruction at the gastro duodenal junction due to a large AAA    6. Staph bacteremia  PLAN:1.  As per ID    Thank you for allowing our service for participating in Dada's care    Shaye Owens

## 2021-04-15 NOTE — PROGRESS NOTES
Spoke with Dr. Tobin Sweet, orders received. To leave NG clamped for now. If pt develops N/V then place NG tube back to LIS. Okay to start TPN late if needed. If unable to place PICC, IVF to remain and will reassess tomorrow.

## 2021-04-15 NOTE — PROGRESS NOTES
Unable to check residual from NG at 1600 d/t sterile procedure of PICC line being placed. Residual checked now at only minimal noted, maybe 10mL. Will updated surgery.

## 2021-04-15 NOTE — PROGRESS NOTES
Surgical resident's messaged regarding plan of care for pt regarding nutrition. Will be addressed after 1200.

## 2021-04-15 NOTE — PROGRESS NOTES
GENERAL SURGERY  DAILY PROGRESS NOTE  4/15/2021    Subjective:  No acute events  Denied n/v. NGT. Gas/stool in ostomy    Objective:  /74   Pulse 58   Temp 98.6 °F (37 °C) (Temporal)   Resp 16   Ht 5' 10\" (1.778 m)   Wt 97 lb 9.6 oz (44.3 kg)   SpO2 97%   BMI 14.00 kg/m²     General: NAD, awake and alert. Head: Normocephalic, atraumatic  Eyes: PERRLA, EOMI. Lungs: No increased work of breathing. Cardiovascular: RRR. Abdomen: Soft, ND, NT. Ostomy w/ gas/stool. No rebound, guarding or rigidity. Extremities: Atraumatic, full range of motion  Skin: Warm, dry and intact    Assessment/Plan:  69 y.o. male with gastric outlet obstruction secondary to compression on 3rd/4th portions of duodenum by aortic aneurysm    NPO. PICC. TPN. Clamp trial NGT. If persistent nausea and vomiting, PEG for decompression.      Electronically signed by Rigo Lujan MD on 4/15/2021 at 1:24 PM     Seen/examined  Agree with above  [email or web address]

## 2021-04-15 NOTE — CARE COORDINATION
CASE MANAGEMENT. ... Chart reviewed. Met with patient at the bedside. He is sitting up in the chair. Tolerating room air. NGT in place to lis. States he is feeling hungry and would really would like some coffee. Await surgery input today regarding feeds/diet. Patient has history of NGT with TF and PICC with TPN. Is requesting feedings that will make him improve the quickest. Nursing messaged surgery regarding the above. Mr Delon Kussmaul is hopeful to discharge soon. Confirmed plan is home with [facility]. In the meantime, patient was started on ivf for low blood sugar this am. Continues on iv protonix bid. Will cont to follow along.

## 2021-04-15 NOTE — PROGRESS NOTES
Occupational Therapy  OT BEDSIDE TREATMENT NOTE      Date:4/15/2021  Patient Name: Zaid Garcia  MRN: [id number]  : 1950  Room: [address] [place] [address]     Referring Provider: COREEN Daniel CNP     Evaluating OT: Nithin Malik OTR/L [id number]     AM-PAC Daily Activity Raw Score: 16/24     Recommended Adaptive Equipment: TBD     Diagnosis: acute respiratory failure. Pt presents to ED from home with SOB and wheezing. Pt with fall. Pertinent Medical History: arthritis, CVA, COPD, emphysema of lung   Precautions:  Falls, TSM, ostomy     Home Living: Pt lives with wife in a single story home. Bathroom setup: walk in shower with grab bar and seat, standard commode      Prior Level of Function: Mod I with ADLs except wife assist in ostomy bag management, wife assists with IADLs; completed functional mobility with no AD. Has [employer].     Pain Level: No c/o pain     Cognition: A&O:   Functional Assessment:    Initial Eval Status  Date: 21 Treatment session: 4/15/21 Short Term Goals      Feeding Set up       Grooming Set up   Independent   UB Dressing Min A  [facility] gown   Independent   LB Dressing Mod A  Management of socks   Mod I    Bathing Mod A   Mod I   Toileting Mod A Pt declined toileting at this time  Mod I   Bed Mobility  Supine to sit: Min A Supine<> sit: Min A      Functional Transfers STS: Min A  STS: CGA from EOB Mod I   Functional Mobility Min A with [employer]  Short in room distance  Limited further d/t weakness/fatigue  CGA using ww to side step towards HOB Mod I during ADLs   Balance Sitting: fair plus     Standing: fair minus at [employer] Sitting: Independent  Standing: CGA      Activity Tolerance Poor plus Poor+  standing ayush x6-7 min with fair plus balance during self care tasks                 Treatment: Upon arrival pt was supine in bed with wife present. Instructed pt on 1x10 reps of B UE AROM exercises in all planes requiring min vc and demos for correct form.  Pt performed functional dynamic standing balance tasks at ww to facilitate weight shifting to simulate ADLs. Pt completed 1x5 reps of STS exercises from OB to improve strength. At end of session pt assisted back to bed with alarm on, all lines and tubes intact and call light within reach. Education: Transfer training, benefits of OOB activity, balance training and AD management. · Pt has made good progress towards set goals. Plan of Care: 2-5 days/week for 1-2 weeks PRN   [x]? ?ADL retraining/adaptive techniques and AE recommendations to increase functional independence within precautions                    [x]? ? Energy conservation techniques to improve tolerance for ADL/IADLs  [x]? ? Functional transfer/mobility training/DME recommendations for increased independence, safety and fall prevention         [x]? ?Patient/family education to increase safety and functional independence during daily routine          [x]? ? Environmental modifications for safe mobility and completion of ADLs                             []? ? Cognitive retraining to improve problem solving skills & safe participation in ADLs/IADLs     []? ?Sensory re-education techniques to improve extremity awareness, maintain skin integrity and improve hand function                             []? ? Visual/Perceptual retraining to improve body awareness and safety during transfers and ADLs  []? ? Splinting/positioning needs to maintain joint/skin integrity and contracture prevention  [x]? ? Therapeutic activity to improve functional performance during ADLs                                        [x]? ? Therapeutic exercise to improve tolerance and functional strength for ADLs   [x]? ? Balance retraining/tolerance tasks for facilitation of postural control with dynamic challenges during ADLs  []? ?Neuromuscular re-education to facilitate righting/equilibrium reactions, midline orientation, scapular stability/mobility, normalize muscle tone and facilitate active functional movement                        []? ? Delirium prevention/treatment    [x]? Positioning to improve functional independence and decrease risk of skin breakdown  []? ?Other:        Treatment Charges: Mins Units   Ther Ex  75736 10 1   Manual Therapy 07274     Thera Activities 58606 6 0   ADL/Home Mgt [id number]     Neuro Re-ed [id number]     Group Therapy      Orthotic manage/training  [id number]     Non-Billable Time       Time In: 2:30  Time Out: 2:46  Total Time: Josue Hubbard 66 FNA/L [id number]

## 2021-04-16 NOTE — PROGRESS NOTES
Palliative Care Department  Palliative Care Progress Note  Provider: Maynor Medellin MD  [address] Day: 7  Date of Initial Consult: 4/13/2021  Referring Provider: Dr. Sandy Friedman was consulted for assistance with: Code status Discussion, Assist with goals of care and Symptom Management    Chief Complaint: Jose Graves is a 69 y.o. male with chief complaint of nausea and vomiting    HPI:   Jose Graves is a 69 y.o. 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 He presented with complaints of worsening nausea and vomiting, also having a syncopal episode resulting in a fall scalp laceration, was admitted on 4/10/2021 further management. Emergency department he was noted to be severely dehydrated, having episodes of SVT, as well as episodes of nonbloody emesis. He has been followed closely by general surgery, nephrology, as well as infectious disease. Palliative service has been consulted to discuss goals of care, CODE STATUS, as well as assess symptomatic needs.     ASSESSMENT/PLAN:     Pertinent Hospital Diagnoses:  Current medical issues leading to Palliative Medicine involvement include   Active Hospital Problems    Diagnosis Date Noted    Acute respiratory failure with hypoxia ([facility] [J96.01] 04/10/2021    COPD exacerbation ([facility] [J44.1] 04/10/2021    Essential hypertension [I10] 04/10/2021    Emphysema of lung ([facility] [J43.9] 04/10/2021    ROBBIN (acute kidney injury) ([facility] [N17.9] 04/10/2021    Lactic acid acidosis [E87.2] 04/10/2021    Hypercalcemia [E83.52] 04/10/2021    Tachycardia [R00.0] 04/10/2021    High anion gap metabolic acidosis [F00.7] 04/10/2021    Tobacco dependence [F17.200] 05/09/2017    History of stroke [Z86.73] 05/08/2017    Mixed hyperlipidemia [E78.2] 08/31/2015     Palliative Care Encounter / Counseling Regarding Goals of Care:  Please see detailed goals of care discussion as below.  At this time, Sienna Ayoub, Does have capacity for medical decision-making. Capacity is time limited and situation/question specific.  Outcome of goals of care meeting: improve or maintain function/quality of life, provide comfort care/support/palliation/relieve suffering and continue current management   Code status: Full Code  o I reviewed with the Patient that given multiple medical co-morbidities, in the event of cardiac arrest, the chances of surviving may likely be low. It was also reviewed that if they survived a cardiac arrest, a return to prior level of function also may be low.  Advanced Directives: None   Surrogate/Legal NOKSherlazaro Morales ([phone]) is the patient's wife.  Will follow and continued to discuss goals of care pending clinical progression. Referrals: none today    SUBJECTIVE:   Events/Discussions:  4/16/2021:  Met patient at bedside, spoke to him about his goals and advanced directives. He has spoken with his wife, in the event that his heart were to stop or he has difficulty in breathing she would not place him on a ventilator. We spoke about how he is taking care of his granddaughter, she is 15years old. He wants to have enough strength to just go back home and take care of her. His spouse also has health conditions. They are both trying to remain alive to take care of their granddaughter. At this time there is no further palliative needs we will sign off.      OBJECTIVE:   Prognosis: Guarded    Physical Exam:  BP 99/60   Pulse 74   Temp 98.2 °F (36.8 °C) (Temporal)   Resp 16   Ht 5' 10\" (1.778 m)   Wt 99 lb 6.8 oz (45.1 kg)   SpO2 98%   BMI 14.27 kg/m²     Gen:  Alert, cachectic, frail, in no

## 2021-04-16 NOTE — PROGRESS NOTES
Physical Therapy    Facility/Department: [address] INTERMEDIATE 1  Treatment note    NAME: Sienna Ayoub  : 1950  MRN: [id number]    Date of Service: 2021               Patient Diagnosis(es): The primary encounter diagnosis was Acute respiratory failure with hypoxia ([facility] A diagnosis of ROBBIN (acute kidney injury) ([facility] was also pertinent to this visit. has a past medical history of Abdominal aortic aneurysm without rupture (HCC), Arthritis, AS (aortic stenosis), Cerebral artery occlusion with cerebral infarction ([facility] [address] COPD (chronic obstructive pulmonary disease) ([facility] Emphysema of lung ([facility] History of blood transfusion, Hyperlipidemia, Pneumonia, and Tobacco dependence. has a past surgical history that includes hernia repair; Ankle surgery; Skull fracture elevation; eye surgery (Bilateral, approx ); Cardiac catheterization (2017); Tooth Extraction; Aortic valve replacement; AAA repair, endovascular (10/12/2017); AAA repair, endovascular (10/12/2017); Cardiac surgery; Upper gastrointestinal endoscopy (N/A, 2020); Colonoscopy (N/A, 2020); and   picc powerpicc double (4/15/2021). Evaluating Therapist: Kimberly Fierro PT     Referring Provider:   Dr. Cody Oliver #:  [id number]  DIAGNOSIS: acute resp failure   Additional Pertinent History:2021 colectomy and ileostomy  PRECAUTIONS:  falls    Social:  Pt lives with wife  in a  1  floor plan    Prior to admission pt walked with  No AD      Initial Evaluation  Date:  2021  Treatment  21    Short Term/ Long Term   Goals   Was pt agreeable to Eval/treatment?  yes , with encouragement  yes    Does pt have pain?  Nausea, fatigue  No complaints    Bed Mobility  Rolling:  SBA    Supine to sit: min assist   Sit to supine:  Min assist   Scooting: min assist in sit  Rolling: SBA  Supine to sit: NT  Sit to supine: SBA  Scooting: SBA supine in bed SBA    Transfers Sit to stand:  Min assist   Stand to sit: min assist  Stand pivot: Min assist Sit to stand: SBA  Stand to sit: SBA  Stand Pivot; NT  SBA    Ambulation    12  feet with  ww  with  Min assist  140 feet x 1 using WW for support CG/SBA for balance  50  feet with  ww  with  SBA        Stair negotiation: ascended and descended NT  NT 2  steps with  1  rail with CGA    LE ROM  WFL     LE strength  3+/ 5      AM- PAC RAW score  16/ 24 17/24            Pt is alert and able to follow instruction  Balance: fair/fair minus during gait using [address] for support    Pt performed therapeutic exercise of the following: NT    Patient education  Pt was educated on UE usage to assist with transfers, gait promoting posture and staying within [address] base of support    Patient response to education:   Pt verbalized understanding Pt demonstrated skill Pt requires further education in this area   yes With instruction yes     ASSESSMENT:   Comments: Nurse ok with rx. Pt found in a bedside chair, agreed to rx. Gait to the hallway, jack slow and consistent. Pt at times unsteady, required intermittent light hands on assist for balance and safety. Pt fatigued after activity, requested back to bed    Treatment: Pt practiced and was instructed in the following treatment: transfer safety, gait, [address] safety    Pt was left in bed with call light in reach    Time in 1448   Time out 1503  Total Treatment Time 15 minutes   CPT codes:     Therapeutic activities 33875 15 minutes   Therapeutic exercises 13004 0 minutes       Pt is making good progress toward established Physical Therapy goals as per increased functional mobility performed. Continue with physical therapy current plan of care.     Froilan Aguilar PTA   License Number: [id number]

## 2021-04-16 NOTE — PROGRESS NOTES
Seen/examined  No events reported  Tolerated NG clamping  Ileostomy with decent output  Abdomen remains soft  We will start clears, remove NG if tolerates  Doubt I would advance his diet beyond clears at this time  Continue TPN indefinitely-needs to gain significant weight before problem will resolve spontaneously  Considering the significant limitations that TPN places on his placement, would start thinking about this problem now  Shlomo Schwab MD

## 2021-04-16 NOTE — CONSULTS
4/16/2021  10:14 AM      Comprehensive Nutrition Assessment    Type and Reason for Visit:  Reassess, Consult(Consult for PN Recommendation)    Nutrition Recommendations/Plan:     Recommend decrease or D/C dextrose in other IVF, as pt at high risk of refeeding syndrome and additional CHO in other IVF may further promote refeeding syndrome. Recommend continue current TPN order, which meets 100% calorie and protein needs. Also recommend current TG lab level to evaluate 3-in-1 PN appropriate    Nutrition Assessment:  Pt s/p PICC 4/15 and started TPN. NGT clamp trials and may need PEG for venting, if pt continues to have N/V. Note hypophosphatemia and pt at high risk refeeding. Recommend consider IVF change from D5LR at 100 ml/hr, as additional CHO (additional 120 g Dextrose/d) with TPN may further promote refeeding syndrome. Malnutrition Assessment:  Malnutrition Status:  Severe malnutrition    Context:  Chronic Illness     Findings of the 6 clinical characteristics of malnutrition:  Energy Intake:  7 - 75% or less estimated energy requirements for 1 month or longer  Weight Loss:  7 - 20% over 1 year     Body Fat Loss:  7 - Severe body fat loss Orbital, Triceps   Muscle Mass Loss:  7 - Severe muscle mass loss Temples (temporalis), Clavicles (pectoralis & deltoids), Hand (interosseous)  Fluid Accumulation:  No significant fluid accumulation     Strength:  Not Performed    Estimated Daily Nutrient Needs:  Energy (kcal):   (30-35 kcal/kg); Weight Used for Energy Requirements:  Admission     Protein (g):  65-80 (1.5-1.8 g/kg);  Weight Used for Protein Requirements:  Admission        Fluid (ml/day):  ; Method Used for Fluid Requirements:  1 ml/kcal      Nutrition Related Findings:  A&Ox4, soft abd w/+BS, ileostomy +stool, -I/O 3L, no edema, NGT      Wounds:  Surgical Incision(upper eyelid)       Current Nutrition Therapies:    PN-Adult  3-in-1 Central Line (Standard)  DIET CLEAR LIQUID;  PN-Adult 3-in-1 Central Line (Standard)  Current Parenteral Nutrition Orders:  · Type and Formula: 3-in-1 Standard   · Duration: Continuous  · Rate/Volume: 62.5 ml/hr = 1500 ml/d  · Current PN Order Provides:    · Goal PN Orders Provides: 1553 aziza, 75 g protein (TPN + I1BS=103 g total CHO)      Anthropometric Measures:  · Height: 5' 10\" (177.8 cm)  · Current Body Weight: 99 lb (44.9 kg)(4/16)   · Admission Body Weight: 95 lb 3 oz (43.2 kg)(4/11 first measured weight)    · Usual Body Weight: 102 lb 4 oz (46.4 kg)(12/18/20 actual)     · Ideal Body Weight: 166 lbs; % Ideal Body Weight 59.6 %   · BMI: 14.2  · BMI Categories: Underweight (BMI less than 22) age over 72       Nutrition Diagnosis:   · Severe malnutrition, In context of chronic illness related to renal dysfunction as evidenced by intake 0-25%, severe loss of subcutaneous fat, severe muscle loss, weight loss(wt loss of ~7% over a year)      Nutrition Interventions:   Food and/or Nutrient Delivery:  Continue Current Parenteral Nutrition(Recommend current TG level to evaluate if 3-in-1 PN appropriate)  Nutrition Education/Counseling:  Education initiated(Discussed importance of ONS to meet nutritional needs)   Coordination of Nutrition Care:  Continue to monitor while inpatient    Goals:  Pt ayush PN       Nutrition Monitoring and Evaluation:   Behavioral-Environmental Outcomes:  None Identified   Food/Nutrient Intake Outcomes:  Parenteral Nutrition Intake/Tolerance, Food and Nutrient Intake  Physical Signs/Symptoms Outcomes:  GI Status, Biochemical Data, Fluid Status or Edema, Nutrition Focused Physical Findings, Skin, Weight     Discharge Planning:    Parenteral Nutrition     Electronically signed by Simin Avalos RD, CNSC, LD on 4/16/21 at 10:14 AM EDT    Contact: [phone]

## 2021-04-16 NOTE — PROGRESS NOTES
Internal Medicine Progress Note      Synopsis: Patient admitted on 4/10/2021     Subjective    More alert than yesterday   still feels weak though  Blood cultures turn positive    Still hasn't eaten anything and continues to have emesis that looks dark    April 13, 2021  Infectious disease did see and they have him on close watch however no antibiotics yet. Patient continues to complain of emesis. He still not able to eat. No plans for endoscopy yet  April 14, 2021  NG tube in place. He is looking a lot better. April 15, 2021  NGT still to suction. Patient is not a good candidate for surgery of his aneurysm which apparently is causing his GI issues and his lack of appetite and eating and obstructive type of symptoms. He remains dependent on nutrition that is nonoral.  He is extremely hungry. He is able to tolerate pills orally when the NG is clamped. PICC is being placed and patient will be on TPN after that  April 16, 2021  I saw the patient much earlier today. He had his NGT removed and PICC line was inserted in his left arm. TPN has been started and he is tolerating it well. He is only allowed clear liquids. He is asking for discharge     exam:  BP (!) 99/59   Pulse 70   Temp 97.7 °F (36.5 °C) (Oral)   Resp 16   Ht 5' 10\" (1.778 m)   Wt 99 lb 6.8 oz (45.1 kg)   SpO2 98%   BMI 14.27 kg/m² \  HEENT positive for right eyelid with sutures otherwise face is symmetric  Neck no JVD no thyromegaly  Respiratory: Distant   Cardiovascular: Heart regular rate rhythm   Abdomen: Ileostomy bag in place   Musculoskeletal: No clubbing, cyanosis, edema of bilateral lower extremities. Patient is extremely thin and only skin and bones   Skin: Normal skin color. No rashes or lesions. Neurologic: Neurovascularly intact without any focal sensory/motor deficits.  Cranial nerves: II-XII intact, grossly     Psychiatric: Normal mentation    Medications:  Reviewed    Infusion Medications    PN-Adult  3 IN 1 Central Line (Standard) 62.5 mL/hr at 04/16/21 1803    dextrose 5% in lactated ringers 100 mL/hr at 04/15/21 1539    sodium chloride      sodium chloride       Scheduled Medications    sodium chloride flush  5-40 mL Intravenous 2 times per day    heparin flush  3 mL Intravenous 2 times per day    famotidine  20 mg Oral BID    sodium chloride flush  5-40 mL Intravenous 2 times per day    levalbuterol  0.63 mg Nebulization Q8H    budesonide  0.5 mg Nebulization BID    aspirin  81 mg Oral Every Other Day    atorvastatin  20 mg Oral Nightly    metoprolol tartrate  25 mg Oral BID    Arformoterol Tartrate  15 mcg Nebulization BID    And    ipratropium  0.5 mg Nebulization 4x daily     PRN Meds: sodium chloride flush, sodium chloride, heparin flush, sodium chloride flush, sodium chloride, promethazine **OR** ondansetron, polyethylene glycol, trimethobenzamide    I/O    Intake/Output Summary (Last 24 hours) at 4/16/2021 1809  Last data filed at 4/16/2021 1804  Gross per 24 hour   Intake 1520 ml   Output 2585 ml   Net -1065 ml       Labs:   Recent Labs     04/14/21  0343 04/15/21  0450 04/16/21  0405   WBC 5.8 5.4 5.9   HGB 8.7* 7.9* 8.1*   HCT 26.7* 24.2* 25.1*   * 95* 114*       Recent Labs     04/14/21  0343 04/15/21  0450 04/16/21  0405    137 140   K 3.4* 3.2* 3.5   CL 95* 97* 103   CO2 27 26 32*   BUN 91* 70* 44*   CREATININE 2.6* 1.8* 1.3*   CALCIUM 8.2* 8.0* 8.3*   PHOS 5.8* 3.4 1.9*       No results for input(s): PROT, ALB, ALKPHOS, ALT, AST, BILITOT, AMYLASE, LIPASE in the last 72 hours. No results for input(s): INR in the last 72 hours. No results for input(s): Nba Cobble in the last 72 hours. Chronic labs:  Lab Results   Component Value Date    INR 1.1 04/10/2021    LABA1C 5.4 07/26/2017       Radiology:  Xr Acute Abd Series Chest 1 Vw    Result Date: 4/11/2021  1. Emphysematous changes. There is no evidence of pneumonia 2.  There are no findings of bowel obstruction and there the case with the family on Sunday and they were unsure of long-term guidelines. We may need to involve palliative care. Patient's intake of food is poor. He remains high risk for infections and deconditioningDecompensation  April 14, 2021  Still low with blood pressure however is mentating well. Now with NGT in place. Definitely more alert. Note and appreciate input of surgical services with possible partial obstruction of the duodenum by the excluded aneurysmal sac. Patient would be a good candidate for PICC line and TPN per surgery  April 15, 2021  Creatinine is better  Not a candidate for aneurysmal repair due to high risk surgery  His extreme wasting is going to be supported by TPN and possibly very small sips of oral fluid only. Likely with the obstruction in place from the aneurysm he will not be able to take solids effectively. We can change his Protonix to Pepcid given Thrombocytopenia and change the route to oral  Plan still remains for home  His prognosis is guarded  He remains a full code per his wishes  Hemoglobin is trending downwards  April 16, 2021  Overall looking a lot better. Blood pressure noted. TPN has been started. Can the patient be discharged with home TPN? Palliative care has signed off. Patient remains a full code. Note and appreciate input of renal services. Creatinine is getting better. note input from surgery    Diet: DIET CLEAR LIQUID;  PN-Adult  3-in-1 Central Line (Standard)  Code Status: Full Code  PT/OT Eval Status:   Not yet  DVT Prophylaxis:   scds  Recommended disposition at discharge:  not sure yet    +++++++++++++++++++++++++++++++++++++++++++++++++  Corrinne Ing, MD   [facility].  +++++++++++++++++++++++++++++++++++++++++++++++++  NOTE: This report was transcribed using voice recognition software. Every effort was made to ensure accuracy; however, inadvertent computerized transcription errors may be present.

## 2021-04-16 NOTE — CARE COORDINATION
Social Work/Discharge Planning:  Chart reviewed. Patient has PICC and TPN. Plan is for patient to return home with his wife at discharge. Called liaison Rubia with [facility] and she will confirm if they can accept patient. Patient will need a detailed home health care order. Will continue to follow.   Electronically signed by TOMEKA Welsh on 4/16/2021 at 8:29 AM

## 2021-04-16 NOTE — PROGRESS NOTES
Hepatic:   No results for input(s): AST, ALT, ALB, BILITOT, ALKPHOS in the last 72 hours. Troponin:   No results for input(s): TROPONINI in the last 72 hours. BNP: No results for input(s): BNP in the last 72 hours. Lipids: No results for input(s): CHOL, HDL in the last 72 hours. Invalid input(s): LDLCALCU  ABGs: No results found for: PHART, PO2ART, KWB4BFP  INR:   No results for input(s): INR in the last 72 hours. -----------------------------------------------------------------  RAD:   EXAMINATION:   TWO XRAY VIEWS OF THE ABDOMEN AND SINGLE  XRAY VIEW OF THE CHEST       4/11/2021 10:09 am       COMPARISON:   Previous CT scan of the abdomen pelvis 03/28/2021       HISTORY:   ORDERING SYSTEM PROVIDED HISTORY: emesis, distention   TECHNOLOGIST PROVIDED HISTORY:   Reason for exam:->emesis, distention       FINDINGS:   Chest: There are advanced emphysematous changes noted bilaterally.  There is   no evidence of pneumonia.       Three views the abdomen demonstrate a nonobstructive bowel gas pattern. There are no abnormal air-fluid levels.  There is a stent seen within the   abdominal aorta and iliac arteries.       There is no evidence of free air under the hemidiaphragms.       There is a Suárez catheter within the urinary bladder.           Impression   1. Emphysematous changes. Lodema Kobuk is no evidence of pneumonia   2. There are no findings of bowel obstruction and there is no free air under   the hemidiaphragms. 3. There is a paucity of gas within the bowel.  If clinically warranted CT of   the abdomen should be considered. Objective:   Vitals: /64   Pulse 71   Temp 98 °F (36.7 °C)   Resp 16   Ht 5' 10\" (1.778 m)   Wt 99 lb 6.8 oz (45.1 kg)   SpO2 98%   BMI 14.27 kg/m²     GENERAL:  NAD  The patient is a rather cachectic looking,  HEENT:  Head is normocephalic and atraumatic. NECK:  Supple. No distention. LUNGS:  Breath sounds are decreased at the bases.   No rales or rhonchi. HEART:  Regular rate and rhythm without any pericardial rub or gallop. ABDOMEN:  Soft with ostomy in place with liquid stool. No tenderness or  rebound tenderness. EXTREMITIES:  The patient has no pedal edema. Assessment:   Patient Active Problem List:     Mixed hyperlipidemia     Acute hypoxemic respiratory failure (HCC)     Rhinovirus     History of stroke     Abdominal aneurysm (HCC)     Tobacco dependence     Aortic valve stenosis     Nonrheumatic aortic valve stenosis     Severe protein-calorie malnutrition (HCC)     COPD, moderate (HCC)     S/P AAA repair using bifurcation graft     Generalized abdominal pain     Failure to thrive in adult     Syncope and collapse     Acute respiratory failure with hypoxia (HCC)     COPD exacerbation (HCC)     Essential hypertension     Emphysema of lung (HCC)     ROBBIN (acute kidney injury) ([facility] [address]     Lactic acid acidosis     Hypercalcemia     Tachycardia     High anion gap metabolic acidosis    Plan:   Stage III ROBBIN ( Baseline serum cr 0.9mg/dl ) -sec to decreased effective renal perfusion as evidenced by the Gabriele+<20 and FeNa<1% in the setting of hypotension    Renal function improving  On TPN    2. 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 Receiving phosphorous with TPN    3. Anemia  Ferritin 1006, Iron Sat 31% Folate 10.7 B12 814  SPEP & UPEP (-) for Monoclonal Protein  Follow hemoglobin and transfuse as needed    4. Anion Gap Met Acidosis in the setting of the lactic Acidosis and ROBBIN  Improved    5. Hypokalemia  With poor intake  On replacement via TPN  Follow    6. N/V-CT Scan suggests gastric outlet obstruction at the gastro duodenal junction due to a large AAA    7.  Staph bacteremia  As per ID        Ezequiel Key MD

## 2021-04-16 NOTE — CARE COORDINATION
Social Work/Discharge Planning:  Rubia with [facility] they can accept patient and will need to know when patient will discharge to ensure start of care date. Informed Swathi Bob of Dr. Luis Garcia note. Swathi Rojas states she will contact [facility] to arrange TPN. Swathi Rojas states they can start home health care services tomorrow, IF patient is discharged before 2:00pm.  Otherwise, [address] [place] [address] can not start hhc services until Monday. Patient will need a home health care order. Will continue to follow.   Electronically signed by TOMEKA Molina on 4/16/2021 at 1:35 PM

## 2021-04-16 NOTE — PROGRESS NOTES
Vascular Surgery Progress Note    CC: f/u FEVAR. HISTORY:  The patient is awake, alert, and oriented. Wife present at bedside. IMPRESSION: Partial gastric outlet obstruction due to duodenum tenting over large residual aortic aneurysm sac. I agree with the current treatment with TPN and diet as able. I do not feel that the aortic sac is enlarging due to an endovascular leak. Hopefully the aneurysm sac can shrink over time. PLAN: No plans for vascular intervention at this time. Please call with changes.     Patient Active Problem List   Diagnosis Code    Mixed hyperlipidemia E78.2    Acute hypoxemic respiratory failure (HCC) J96.01    Rhinovirus B34.8    History of stroke Z86.73    Abdominal aneurysm ([facility]) I71.4    Tobacco dependence F17.200    Aortic valve stenosis I35.0    Nonrheumatic aortic valve stenosis I35.0    Severe protein-calorie malnutrition ([facility] [address] E43    COPD, moderate ([facility]) J44.9    S/P AAA repair using bifurcation graft Z95.828, Z86.79    Generalized abdominal pain R10.84    Failure to thrive in adult R62.7    Syncope and collapse R55    Acute respiratory failure with hypoxia ([facility]) J96.01    COPD exacerbation ([facility]) J44.1    Essential hypertension I10    Emphysema of lung ([facility]) J43.9    ROBBIN (acute kidney injury) ([facility] [address] N17.9    Lactic acid acidosis E87.2    Hypercalcemia E83.52    Tachycardia R00.0    High anion gap metabolic acidosis I76.0       Current Medications:    PN-Adult  3 IN 1 Central Line (Standard)      dextrose 5% in lactated ringers 100 mL/hr at 04/15/21 1539    PN-Adult  3 IN 1 Central Line (Standard) 54.4 mL/hr at 04/16/21 1402    sodium chloride      sodium chloride        sodium chloride flush, sodium chloride, heparin flush, sodium chloride flush, sodium chloride, promethazine **OR** ondansetron, polyethylene glycol, trimethobenzamide    sodium chloride flush  5-40 mL Intravenous 2 times per day    heparin flush  3 mL Intravenous 2 times per

## 2021-04-17 NOTE — PROGRESS NOTES
Internal Medicine Progress Note      Synopsis: Patient admitted on 4/10/2021     Subjective    More alert than yesterday   still feels weak though  Blood cultures turn positive    Still hasn't eaten anything and continues to have emesis that looks dark    April 13, 2021  Infectious disease did see and they have him on close watch however no antibiotics yet. Patient continues to complain of emesis. He still not able to eat. No plans for endoscopy yet  April 14, 2021  NG tube in place. He is looking a lot better. April 15, 2021  NGT still to suction. Patient is not a good candidate for surgery of his aneurysm which apparently is causing his GI issues and his lack of appetite and eating and obstructive type of symptoms. He remains dependent on nutrition that is nonoral.  He is extremely hungry. He is able to tolerate pills orally when the NG is clamped. PICC is being placed and patient will be on TPN after that  April 16, 2021  I saw the patient much earlier today. He had his NGT removed and PICC line was inserted in his left arm. TPN has been started and he is tolerating it well. He is only allowed clear liquids. He is asking for discharge  April 17, 2021  He is on TPN and unfortunately TPN cannot be resumed at home till Monday till can be arranged for. He is anxious for discharge but understands. He is tolerated clear liquids and is grateful for being able to taste coffee again     exam:  BP (!) 85/49   Pulse 76   Temp 98.9 °F (37.2 °C) (Temporal)   Resp 16   Ht 5' 10\" (1.778 m)   Wt 99 lb 13.9 oz (45.3 kg)   SpO2 97%   BMI 14.33 kg/m² \  HEENT positive for right eyelid with sutures otherwise face is symmetric  Neck no JVD no thyromegaly  Respiratory: Distant   Cardiovascular: Heart regular rate rhythm   Abdomen: Ileostomy bag in place   Musculoskeletal: No clubbing, cyanosis, edema of bilateral lower extremities. Patient is extremely thin and only skin and bones   Skin: Normal skin color. No rashes or lesions. Neurologic: Neurovascularly intact without any focal sensory/motor deficits. Cranial nerves: II-XII intact, grossly     Psychiatric: Normal mentation    Medications:  Reviewed    Infusion Medications    PN-Adult  3 IN 1 Central Line (Standard)      PN-Adult  3 IN 1 Central Line (Standard) 61.3 mL/hr at 04/17/21 1430    dextrose 5% in lactated ringers 100 mL/hr at 04/15/21 1539    sodium chloride      sodium chloride       Scheduled Medications    sodium chloride flush  5-40 mL Intravenous 2 times per day    heparin flush  3 mL Intravenous 2 times per day    famotidine  20 mg Oral BID    sodium chloride flush  5-40 mL Intravenous 2 times per day    levalbuterol  0.63 mg Nebulization Q8H    budesonide  0.5 mg Nebulization BID    aspirin  81 mg Oral Every Other Day    atorvastatin  20 mg Oral Nightly    metoprolol tartrate  25 mg Oral BID    Arformoterol Tartrate  15 mcg Nebulization BID    And    ipratropium  0.5 mg Nebulization 4x daily     PRN Meds: sodium chloride flush, sodium chloride, heparin flush, sodium chloride flush, sodium chloride, promethazine **OR** ondansetron, polyethylene glycol, trimethobenzamide    I/O    Intake/Output Summary (Last 24 hours) at 4/17/2021 1622  Last data filed at 4/17/2021 1507  Gross per 24 hour   Intake 2913 ml   Output 2075 ml   Net 838 ml       Labs:   Recent Labs     04/15/21  0450 04/16/21  0405 04/17/21  0350   WBC 5.4 5.9 5.7   HGB 7.9* 8.1* 8.3*   HCT 24.2* 25.1* 25.7*   PLT 95* 114* 106*       Recent Labs     04/15/21  0450 04/16/21  0405 04/17/21  0350    140 138   K 3.2* 3.5 4.5   CL 97* 103 102   CO2 26 32* 28   BUN 70* 44* 35*   CREATININE 1.8* 1.3* 1.2   CALCIUM 8.0* 8.3* 8.4*   PHOS 3.4 1.9* 3.3       Recent Labs     04/17/21  0350   PROT 5.2*   ALKPHOS 75   ALT 15   AST 19   BILITOT 0.3       No results for input(s): INR in the last 72 hours.     No results for input(s): Janet Georges in the last 72 (Standard)  PN-Adult  3 IN 1 Central Line (Standard)  DIET FULL LIQUID;  Code Status: Full Code  PT/OT Eval Status:   Order  DVT Prophylaxis:   scds  Recommended disposition at discharge:  not sure yet    +++++++++++++++++++++++++++++++++++++++++++++++++  Michelle Ibarra MD   [facility].  +++++++++++++++++++++++++++++++++++++++++++++++++  NOTE: This report was transcribed using voice recognition software. Every effort was made to ensure accuracy; however, inadvertent computerized transcription errors may be present.

## 2021-04-17 NOTE — PROGRESS NOTES
Nephrology Progress Note    Subjective:   Admit Date: 4/10/2021  PCP: Georges Monk MD  Interval History: Pt being seen for ROBBIN     4/17/21: seen and examined. Feeling better. Tolerated some food today. No chest pain, sob, abd pain, nausea    Diet: PN-Adult  3 IN 1 Central Line (Standard)  DIET FULL LIQUID;    Data:   Scheduled Meds:   sodium chloride flush  5-40 mL Intravenous 2 times per day    heparin flush  3 mL Intravenous 2 times per day    famotidine  20 mg Oral BID    sodium chloride flush  5-40 mL Intravenous 2 times per day    levalbuterol  0.63 mg Nebulization Q8H    budesonide  0.5 mg Nebulization BID    aspirin  81 mg Oral Every Other Day    atorvastatin  20 mg Oral Nightly    metoprolol tartrate  25 mg Oral BID    Arformoterol Tartrate  15 mcg Nebulization BID    And    ipratropium  0.5 mg Nebulization 4x daily     Continuous Infusions:   PN-Adult  3 IN 1 Central Line (Standard) 62.5 mL/hr at 04/17/21 1758    dextrose 5% in lactated ringers 100 mL/hr at 04/15/21 1539    sodium chloride      sodium chloride       PRN Meds:sodium chloride flush, sodium chloride, heparin flush, sodium chloride flush, sodium chloride, promethazine **OR** ondansetron, polyethylene glycol, trimethobenzamide  I/O last 3 completed shifts: In: 2913 [P.O.:660]  Out: 1910 [CCXRO:1761; Stool:260]  I/O this shift:   In: 743.8   Out: 400 [Urine:400]    Intake/Output Summary (Last 24 hours) at 4/17/2021 1956  Last data filed at 4/17/2021 1755  Gross per 24 hour   Intake 1976.8 ml   Output 2275 ml   Net -298.2 ml     CBC:   Recent Labs     04/15/21  0450 04/16/21  0405 04/17/21  0350   WBC 5.4 5.9 5.7   HGB 7.9* 8.1* 8.3*   PLT 95* 114* 106*     BMP:    Recent Labs     04/15/21  0450 04/16/21  0405 04/17/21  0350    140 138   K 3.2* 3.5 4.5   CL 97* 103 102   CO2 26 32* 28   BUN 70* 44* 35*   CREATININE 1.8* 1.3* 1.2   GLUCOSE 51* 126* 120*     Hepatic:   Recent Labs     04/17/21  0350   AST 19   ALT 15 pericardial rub or gallop. ABDOMEN:  Soft with ostomy in place with liquid stool. No tenderness or  rebound tenderness. EXTREMITIES:  The patient has no pedal edema. Assessment:   Patient Active Problem List:     Mixed hyperlipidemia     Acute hypoxemic respiratory failure (HCC)     Rhinovirus     History of stroke     Abdominal aneurysm (HCC)     Tobacco dependence     Aortic valve stenosis     Nonrheumatic aortic valve stenosis     Severe protein-calorie malnutrition (HCC)     COPD, moderate (HCC)     S/P AAA repair using bifurcation graft     Generalized abdominal pain     Failure to thrive in adult     Syncope and collapse     Acute respiratory failure with hypoxia (HCC)     COPD exacerbation (HCC)     Essential hypertension     Emphysema of lung (HCC)     ROBBIN (acute kidney injury) ([facility] [address]     Lactic acid acidosis     Hypercalcemia     Tachycardia     High anion gap metabolic acidosis    Plan:   Stage III ROBBIN ( Baseline serum cr 0.9mg/dl ) -sec to decreased effective renal perfusion as evidenced by the Gabriele+<20 and FeNa<1% in the setting of hypotension    Renal function improving  On TPN    2. 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 Anemia  Ferritin 1006, Iron Sat 31% Folate 10.7 B12 814  SPEP & UPEP (-) for Monoclonal Protein  Follow hemoglobin and transfuse as needed    4. Anion Gap Met Acidosis in the setting of the lactic Acidosis and ROBBIN  Improved    5. Hypokalemia  With poor intake  On replacement via TPN  Follow  Acceptable on last check     6. N/V-CT Scan suggests gastric outlet obstruction at the gastro duodenal junction due to a large AAA    7.  Staph bacteremia  As per ID        Tunde Leary MD

## 2021-04-17 NOTE — PROGRESS NOTES
[facility] [employer] with [employer] regarding TPN formula which will not be available until Monday. Dr Hoffman Rule notified.

## 2021-04-18 NOTE — PROGRESS NOTES
Nephrology Progress Note    Subjective:   Admit Date: 4/10/2021  PCP: Shakir Romo MD  Interval History: Pt being seen for ROBBIN     4/18/21: seen and examined. Feeling better. Tolerated more food. No chest pain, sob, abd pain, nausea    Diet: PN-Adult  3 IN 1 Central Line (Standard)  DIET FULL LIQUID;  PN-Adult  3 IN 1 Central Line (Standard)    Data:   Scheduled Meds:   sodium chloride flush  5-40 mL Intravenous 2 times per day    heparin flush  3 mL Intravenous 2 times per day    famotidine  20 mg Oral BID    sodium chloride flush  5-40 mL Intravenous 2 times per day    levalbuterol  0.63 mg Nebulization Q8H    budesonide  0.5 mg Nebulization BID    aspirin  81 mg Oral Every Other Day    atorvastatin  20 mg Oral Nightly    metoprolol tartrate  25 mg Oral BID    Arformoterol Tartrate  15 mcg Nebulization BID    And    ipratropium  0.5 mg Nebulization 4x daily     Continuous Infusions:   PN-Adult  3 IN 1 Central Line (Standard)      PN-Adult  3 IN 1 Central Line (Standard) 52.1 mL/hr at 04/18/21 1446    dextrose 5% in lactated ringers 100 mL/hr at 04/15/21 1539    sodium chloride      sodium chloride       PRN Meds:sodium chloride flush, sodium chloride, heparin flush, sodium chloride flush, sodium chloride, promethazine **OR** ondansetron, polyethylene glycol, trimethobenzamide  I/O last 3 completed shifts: In: 2083.8 [P.O.:580; I.V.:760]  Out: 2100 [Urine:1400; Stool:700]  No intake/output data recorded.     Intake/Output Summary (Last 24 hours) at 4/18/2021 1735  Last data filed at 4/18/2021 1500  Gross per 24 hour   Intake 2083.8 ml   Output 1900 ml   Net 183.8 ml     CBC:   Recent Labs     04/16/21  0405 04/17/21  0350 04/18/21  0337   WBC 5.9 5.7 5.9   HGB 8.1* 8.3* 8.3*   * 106* 108*     BMP:    Recent Labs     04/16/21  0405 04/17/21  0350 04/18/21  0337    138 136   K 3.5 4.5 4.6    102 103   CO2 32* 28 28   BUN 44* 35* 35*   CREATININE 1.3* 1.2 1.2   GLUCOSE 126* 120* 124*     Hepatic:   Recent Labs     04/17/21  0350   AST 19   ALT 15   BILITOT 0.3   ALKPHOS 75     Troponin:   No results for input(s): TROPONINI in the last 72 hours. BNP: No results for input(s): BNP in the last 72 hours. Lipids: No results for input(s): CHOL, HDL in the last 72 hours. Invalid input(s): LDLCALCU  ABGs: No results found for: PHART, PO2ART, VDI4QGK  INR:   No results for input(s): INR in the last 72 hours. -----------------------------------------------------------------  RAD:   EXAMINATION:   TWO XRAY VIEWS OF THE ABDOMEN AND SINGLE  XRAY VIEW OF THE CHEST       4/11/2021 10:09 am       COMPARISON:   Previous CT scan of the abdomen pelvis 03/28/2021       HISTORY:   ORDERING SYSTEM PROVIDED HISTORY: emesis, distention   TECHNOLOGIST PROVIDED HISTORY:   Reason for exam:->emesis, distention       FINDINGS:   Chest: There are advanced emphysematous changes noted bilaterally.  There is   no evidence of pneumonia.       Three views the abdomen demonstrate a nonobstructive bowel gas pattern. There are no abnormal air-fluid levels.  There is a stent seen within the   abdominal aorta and iliac arteries.       There is no evidence of free air under the hemidiaphragms.       There is a Suárez catheter within the urinary bladder.           Impression   1. Emphysematous changes. Bibiana Neal is no evidence of pneumonia   2. There are no findings of bowel obstruction and there is no free air under   the hemidiaphragms. 3. There is a paucity of gas within the bowel.  If clinically warranted CT of   the abdomen should be considered. Objective:   Vitals: BP (!) 101/57   Pulse 83   Temp 98 °F (36.7 °C) (Oral)   Resp 16   Ht 5' 10\" (1.778 m)   Wt 98 lb 11.2 oz (44.8 kg)   SpO2 99%   BMI 14.16 kg/m²     GENERAL:  NAD  The patient is a rather cachectic looking,  HEENT:  Head is normocephalic and atraumatic. NECK:  Supple. No distention. LUNGS:  Breath sounds are decreased at the bases.   No rales or rhonchi. HEART:  Regular rate and rhythm without any pericardial rub or gallop. ABDOMEN:  Soft with ostomy in place with liquid stool. No tenderness or  rebound tenderness. EXTREMITIES:  The patient has no pedal edema. Assessment:   Patient Active Problem List:     Mixed hyperlipidemia     Acute hypoxemic respiratory failure (HCC)     Rhinovirus     History of stroke     Abdominal aneurysm (HCC)     Tobacco dependence     Aortic valve stenosis     Nonrheumatic aortic valve stenosis     Severe protein-calorie malnutrition (HCC)     COPD, moderate (HCC)     S/P AAA repair using bifurcation graft     Generalized abdominal pain     Failure to thrive in adult     Syncope and collapse     Acute respiratory failure with hypoxia (HCC)     COPD exacerbation (HCC)     Essential hypertension     Emphysema of lung (HCC)     ROBBIN (acute kidney injury) ([facility] [address]     Lactic acid acidosis     Hypercalcemia     Tachycardia     High anion gap metabolic acidosis    Plan:   Stage III ROBBIN ( Baseline serum cr 0.9mg/dl ) -sec to decreased effective renal perfusion as evidenced by the Gabriele+<20 and FeNa<1% in the setting of hypotension    Renal function improving  On TPN    2. 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 Anemia  Ferritin 1006, Iron Sat 31% Folate 10.7 B12 814  SPEP & UPEP (-) for Monoclonal Protein  Follow hemoglobin and transfuse as needed    4. Anion Gap Met Acidosis in the setting of the lactic Acidosis and ROBBIN  Improved    5. Hypokalemia  With poor intake  On replacement via TPN  Follow  Acceptable on last check     6. N/V-CT Scan suggests gastric outlet obstruction at the gastro duodenal junction due to a large AAA    7. Staph bacteremia  As per ID    ======================    Renal function remains stable. Electrolytes including potassium and phosphorus are well controlled with supplementation via TPN.   Continue to follow renal function and electrolytes closely  Discussed with wife at bedside  Discharge planning in process        Zahida Man MD

## 2021-04-18 NOTE — PROGRESS NOTES
Internal Medicine Progress Note      Synopsis: Patient admitted on 4/10/2021     Subjective    More alert than yesterday   still feels weak though  Blood cultures turn positive    Still hasn't eaten anything and continues to have emesis that looks dark    April 13, 2021  Infectious disease did see and they have him on close watch however no antibiotics yet. Patient continues to complain of emesis. He still not able to eat. No plans for endoscopy yet  April 14, 2021  NG tube in place. He is looking a lot better. April 15, 2021  NGT still to suction. Patient is not a good candidate for surgery of his aneurysm which apparently is causing his GI issues and his lack of appetite and eating and obstructive type of symptoms. He remains dependent on nutrition that is nonoral.  He is extremely hungry. He is able to tolerate pills orally when the NG is clamped. PICC is being placed and patient will be on TPN after that  April 16, 2021  I saw the patient much earlier today. He had his NGT removed and PICC line was inserted in his left arm. TPN has been started and he is tolerating it well. He is only allowed clear liquids. He is asking for discharge  April 17, 2021  He is on TPN and unfortunately TPN cannot be resumed at home till Monday till can be arranged for. He is anxious for discharge but understands. He is tolerated clear liquids and is grateful for being able to taste coffee again  April 18, 2021  Looking much better. Wife at bedside. Feels almost near normal.     exam:  BP (!) 101/57   Pulse 83   Temp 98 °F (36.7 °C) (Oral)   Resp 16   Ht 5' 10\" (1.778 m)   Wt 98 lb 11.2 oz (44.8 kg)   SpO2 99%   BMI 14.16 kg/m² \  HEENT positive for right eyelid with sutures otherwise face is symmetric  Neck no JVD no thyromegaly  Respiratory: Distant   Cardiovascular: Heart regular rate rhythm   Abdomen: Ileostomy bag in place   Musculoskeletal: No clubbing, cyanosis, edema of bilateral lower extremities. Patient is extremely thin and only skin and bones   Skin: Normal skin color. No rashes or lesions. Neurologic: Neurovascularly intact without any focal sensory/motor deficits.  Cranial nerves: II-XII intact, grossly     Psychiatric: Normal mentation    Medications:  Reviewed    Infusion Medications    PN-Adult  3 IN 1 Central Line (Standard)      PN-Adult  3 IN 1 Central Line (Standard) 52.1 mL/hr at 04/18/21 1446    dextrose 5% in lactated ringers 100 mL/hr at 04/15/21 1539    sodium chloride      sodium chloride       Scheduled Medications    sodium chloride flush  5-40 mL Intravenous 2 times per day    heparin flush  3 mL Intravenous 2 times per day    famotidine  20 mg Oral BID    sodium chloride flush  5-40 mL Intravenous 2 times per day    levalbuterol  0.63 mg Nebulization Q8H    budesonide  0.5 mg Nebulization BID    aspirin  81 mg Oral Every Other Day    atorvastatin  20 mg Oral Nightly    metoprolol tartrate  25 mg Oral BID    Arformoterol Tartrate  15 mcg Nebulization BID    And    ipratropium  0.5 mg Nebulization 4x daily     PRN Meds: sodium chloride flush, sodium chloride, heparin flush, sodium chloride flush, sodium chloride, promethazine **OR** ondansetron, polyethylene glycol, trimethobenzamide    I/O    Intake/Output Summary (Last 24 hours) at 4/18/2021 1604  Last data filed at 4/18/2021 1500  Gross per 24 hour   Intake 2083.8 ml   Output 1900 ml   Net 183.8 ml       Labs:   Recent Labs     04/16/21  0405 04/17/21  0350 04/18/21  0337   WBC 5.9 5.7 5.9   HGB 8.1* 8.3* 8.3*   HCT 25.1* 25.7* 25.8*   * 106* 108*       Recent Labs     04/16/21  0405 04/17/21  0350 04/18/21  0337    138 136   K 3.5 4.5 4.6    102 103   CO2 32* 28 28   BUN 44* 35* 35*   CREATININE 1.3* 1.2 1.2   CALCIUM 8.3* 8.4* 8.2*   PHOS 1.9* 3.3 3.2       Recent Labs     04/17/21  0350   PROT 5.2*   ALKPHOS 75   ALT 15   AST 19   BILITOT 0.3       No results for input(s): INR in the last 72 hours. No results for input(s): [place] in the last 72 hours. Chronic labs:  Lab Results   Component Value Date    TRIG 66 04/17/2021    INR 1.1 04/10/2021    LABA1C 5.4 07/26/2017       Radiology:  Xr Acute Abd Series Chest 1 Vw    Result Date: 4/11/2021  1. Emphysematous changes. There is no evidence of pneumonia 2. There are no findings of bowel obstruction and there is no free air under the hemidiaphragms. 3. There is a paucity of gas within the bowel. If clinically warranted CT of the abdomen should be considered. Ct Head Wo Contrast    Result Date: 4/10/2021  No acute intracranial findings. Consider MRI if symptoms persist. Volume loss and findings suggestive of chronic microvascular ischemia. Other chronic appearing findings. Xr Chest Portable    Result Date: 4/10/2021  Lungs are hyperexpanded, compatible with COPD changes. No pulmonary infiltrate is identified. ASSESSMENT:    Principal Problem:    Acute respiratory failure with hypoxia (HCC)  Active Problems:    Mixed hyperlipidemia    History of stroke    Tobacco dependence    COPD exacerbation (HCC)    Essential hypertension    Emphysema of lung (HCC)    ROBBIN (acute kidney injury) (HCC)    Lactic acid acidosis    Hypercalcemia    Tachycardia    High anion gap metabolic acidosis  Resolved Problems:    * No resolved hospital problems. *       PLAN:    1.renal function appears slightly worsened despite IV fluid support  2.still not on O2   3. Blood pressure is still borderline  4. Now with positive blood culturesand ID has been consulted  5. Blood pressure remains low and he seems to be mentating okay  6. Despite many episodes of emesis it appears that his hemoglobin is still not decreased  7. Appreciate  input of infectious disease and surgery. No plans for EGD given that he likely has ileus fromextreme volume depletion and robbin  8. Acute kidney insufficiency does appear to be slightly worse  9.  Taper off  steriods After tomorrow  April 13, 2021  Note surgery and infectious disease  Discussed with nursing staff  Hemoglobin has trended downwards  He continues to have emesis that looks dark  He will be done with his IV steroids  Renal function does not return to normal yet  Patient is Definitely more alert  We are not close to discharge yet. Continue to watch hemoglobin and electrolytes. I did discuss the case with the family on Sunday and they were unsure of long-term guidelines. We may need to involve palliative care. Patient's intake of food is poor. He remains high risk for infections and deconditioningDecompensation  April 14, 2021  Still low with blood pressure however is mentating well. Now with NGT in place. Definitely more alert. Note and appreciate input of surgical services with possible partial obstruction of the duodenum by the excluded aneurysmal sac. Patient would be a good candidate for PICC line and TPN per surgery  April 15, 2021  Creatinine is better  Not a candidate for aneurysmal repair due to high risk surgery  His extreme wasting is going to be supported by TPN and possibly very small sips of oral fluid only. Likely with the obstruction in place from the aneurysm he will not be able to take solids effectively. We can change his Protonix to Pepcid given Thrombocytopenia and change the route to oral  Plan still remains for home  His prognosis is guarded  He remains a full code per his wishes  Hemoglobin is trending downwards  April 16, 2021  Overall looking a lot better. Blood pressure noted. TPN has been started. Can the patient be discharged with home TPN? Palliative care has signed off. Patient remains a full code. Note and appreciate input of renal services. Creatinine is getting better. note input from surgery  April 17, 2021  Discharge now for Monday. PICC line is in place and working  Number emesis.   Creatinine is returned to normal  Mild hyperglycemia only  Can we DC his [facility] catheter? April 18, 2021  With discharge tomorrow. PICC is in place. TPN is in place   Suárez's catheter has been taken out. Renal function is returned to normal.  WBCs are appropriate and hemoglobin is stable. Patient will be going home tomorrow and follow-up with his own PCP. Dr. Bryn Wills and Dr. Jason Bull will  the patient for tomorrow  Diet: PN-Adult  3 IN 1 Central Line (Standard)  DIET FULL LIQUID;  PN-Adult  3 IN 1 Central Line (Standard)  Code Status: Full Code  PT/OT Eval Status:   Order  DVT Prophylaxis:   scds  Recommended disposition at discharge:  not sure yet    +++++++++++++++++++++++++++++++++++++++++++++++++  Alyce Burgess MD   [facility].  +++++++++++++++++++++++++++++++++++++++++++++++++  NOTE: This report was transcribed using voice recognition software. Every effort was made to ensure accuracy; however, inadvertent computerized transcription errors may be present.

## 2021-04-19 NOTE — CARE COORDINATION
Social Work/Discharge Planning:  Notified Keyonna Parr with [employer] of TPN order and patient to discharge home this evening after his TPN. Keyonna Parr states they will deliver TPN this evening for start of TPN tomorrow morning. [facility] order noted. Notified liaalisa Barkley with [facility] order and patient to discharge home tonight after his TPN. Informed Rubia that patient will need to have home health care start TPN tomorrow morning at [address] [place] [address] acknowledged and to arrange.   Electronically signed by TOMEKA Plummer on 4/19/2021 at 12:50 PM

## 2021-04-19 NOTE — PROGRESS NOTES
4/19/2021  10:49 AM      Nutrition Education    · Verbally reviewed information with Patient and Family  · Educated on Full Liquid diet and ONS  · Written educational materials provided. · Contact name and number provided. SUMMARY: Pt and his wife had questions about Full Liquid PO diet, as pt prepares to go home with TPN and PO Full Liquid diet. Provided written information, RD contact information and verbally reviewed rationale for Full Liquid consistency to avoid obstruction. Reviewed labs and K+ and phos WNL (no signs Refeeding syndrome at this time)      Electronically signed by Jennie Murrieta RD, CNSC, LD on 4/19/21 at 10:50 AM EDT    Contact: [phone]

## 2021-04-19 NOTE — PROGRESS NOTES
Nephrology Progress Note    Subjective:   Admit Date: 4/10/2021  PCP: Charles Payne MD  Interval History: Pt being seen for ROBBIN     4/19/21: seen and examined. Feeling better. Tolerating liquid diet. No chest pain, sob, abd pain, nausea for D/C today    Diet: DIET FULL LIQUID;  PN-Adult  3 IN 1 Central Line (Standard)  PN-Adult  3 IN 1 Central Line (Standard)    Data:   Scheduled Meds:   sodium chloride flush  5-40 mL Intravenous 2 times per day    heparin flush  3 mL Intravenous 2 times per day    famotidine  20 mg Oral BID    sodium chloride flush  5-40 mL Intravenous 2 times per day    levalbuterol  0.63 mg Nebulization Q8H    budesonide  0.5 mg Nebulization BID    aspirin  81 mg Oral Every Other Day    atorvastatin  20 mg Oral Nightly    metoprolol tartrate  25 mg Oral BID    Arformoterol Tartrate  15 mcg Nebulization BID    And    ipratropium  0.5 mg Nebulization 4x daily     Continuous Infusions:   [START ON 4/20/2021] PN-Adult  3 IN 1 Central Line (Standard)      PN-Adult  3 IN 1 Central Line (Standard) 64.2 mL/hr at 04/19/21 1010    dextrose 5% in lactated ringers 100 mL/hr at 04/15/21 1539    sodium chloride      sodium chloride       PRN Meds:sodium chloride flush, sodium chloride, heparin flush, sodium chloride flush, sodium chloride, promethazine **OR** ondansetron, polyethylene glycol, trimethobenzamide  I/O last 3 completed shifts: In: 2894 [P.O.:1500; I.V.:694]  Out: 1835 [Urine:935; Stool:900]  I/O this shift:   In: 480 [P.O.:480]  Out: 425 [Urine:325; Stool:100]    Intake/Output Summary (Last 24 hours) at 4/19/2021 1453  Last data filed at 4/19/2021 1237  Gross per 24 hour   Intake 2894.04 ml   Output 1585 ml   Net 1309.04 ml     CBC:   Recent Labs     04/17/21  0350 04/18/21  0337 04/19/21  0328   WBC 5.7 5.9 6.5   HGB 8.3* 8.3* 8.4*   * 108* 117*     BMP:    Recent Labs     04/17/21  0350 04/18/21  0337 04/19/21  0328    136 138   K 4.5 4.6 4.9    103 105   CO2 28 28 27   BUN 35* 35* 38*   CREATININE 1.2 1.2 1.3*   GLUCOSE 120* 124* 117*     Hepatic:   Recent Labs     04/17/21  0350   AST 19   ALT 15   BILITOT 0.3   ALKPHOS 75     Troponin:   No results for input(s): TROPONINI in the last 72 hours. BNP: No results for input(s): BNP in the last 72 hours. Lipids: No results for input(s): CHOL, HDL in the last 72 hours. Invalid input(s): LDLCALCU  ABGs: No results found for: PHART, PO2ART, [id number]  INR:   No results for input(s): INR in the last 72 hours. -----------------------------------------------------------------  RAD:   EXAMINATION:   TWO XRAY VIEWS OF THE ABDOMEN AND SINGLE  XRAY VIEW OF THE CHEST       4/11/2021 10:09 am       COMPARISON:   Previous CT scan of the abdomen pelvis 03/28/2021       HISTORY:   ORDERING SYSTEM PROVIDED HISTORY: emesis, distention   TECHNOLOGIST PROVIDED HISTORY:   Reason for exam:->emesis, distention       FINDINGS:   Chest: There are advanced emphysematous changes noted bilaterally.  There is   no evidence of pneumonia.       Three views the abdomen demonstrate a nonobstructive bowel gas pattern. There are no abnormal air-fluid levels.  There is a stent seen within the   abdominal aorta and iliac arteries.       There is no evidence of free air under the hemidiaphragms.       There is a Suárez catheter within the urinary bladder.           Impression   1. Emphysematous changes. Marinell Curd is no evidence of pneumonia   2. There are no findings of bowel obstruction and there is no free air under   the hemidiaphragms. 3. There is a paucity of gas within the bowel.  If clinically warranted CT of   the abdomen should be considered. Objective:   Vitals: /65   Pulse 84   Temp 97.6 °F (36.4 °C) (Oral)   Resp 16   Ht 5' 10\" (1.778 m)   Wt 97 lb (44 kg)   SpO2 100%   BMI 13.92 kg/m²     GENERAL:  NAD  The patient is a rather cachectic looking,  HEENT:  Head is normocephalic and atraumatic. NECK:  Supple.   No distention. LUNGS:  Breath sounds are decreased at the bases. No rales or rhonchi. HEART:  Regular rate and rhythm without any pericardial rub or gallop. ABDOMEN:  Soft with ostomy in place with liquid stool. No tenderness or  rebound tenderness. EXTREMITIES:  The patient has no pedal edema. Assessment:   Patient Active Problem List:     Mixed hyperlipidemia     Acute hypoxemic respiratory failure (HCC)     Rhinovirus     History of stroke     Abdominal aneurysm (HCC)     Tobacco dependence     Aortic valve stenosis     Nonrheumatic aortic valve stenosis     Severe protein-calorie malnutrition (HCC)     COPD, moderate (HCC)     S/P AAA repair using bifurcation graft     Generalized abdominal pain     Failure to thrive in adult     Syncope and collapse     Acute respiratory failure with hypoxia (HCC)     COPD exacerbation (HCC)     Essential hypertension     Emphysema of lung (HCC)     ROBBIN (acute kidney injury) ([facility] [address]     Lactic acid acidosis     Hypercalcemia     Tachycardia     High anion gap metabolic acidosis    Plan:   Stage III ROBBIN ( Baseline serum cr 0.9mg/dl ) -sec to decreased effective renal perfusion as evidenced by the Gabriele+<20 and FeNa<1% in the setting of hypotension    Renal function improving  On TPN  PLAN:1. OK for D/C-will follow labs as an out pt    2. Sec HPTH of Renal Origin with Hyperphosphatemia  Vit D 32   hypophosphatemia that improved with phosphorous with TPN  PLAN:1. Continue to follow    3. Anemia  Ferritin 1006, Iron Sat 31% Folate 10.7 B12 814  SPEP & UPEP (-) for Monoclonal Protein  Follow hemoglobin and transfuse as needed    4. Anion Gap Met Acidosis in the setting of the lactic Acidosis and ROBBIN  Improved    5. Hypokalemia  With poor intake  On replacement via TPN  PLAN:1. Follow    6. N/V-CT Scan suggests gastric outlet obstruction at the gastro duodenal junction due to a large AAA    7.  Staph bacteremia  As per ID    ======================    Desiree Dominguez MD

## 2021-04-19 NOTE — PROGRESS NOTES
Subjective:  Feeling better   No CP or SOB  No fever or chills   No uncontrolled pain  No vomiting or diarrhea     Objective:    BP (!) 96/56   Pulse 90   Temp 97.6 °F (36.4 °C) (Oral)   Resp 16   Ht 5' 10\" (1.778 m)   Wt 97 lb (44 kg)   SpO2 97%   BMI 13.92 kg/m²     24HR INTAKE/OUTPUT:      Intake/Output Summary (Last 24 hours) at 4/19/2021 1343  Last data filed at 4/19/2021 1237  Gross per 24 hour   Intake 2414.04 ml   Output 1585 ml   Net 829.04 ml     Cachectic  General appearance: NAD, conversant  Neck: FROM, supple   Lungs: Clear bilaterally no wheezes, no rhonchi, no crackles  CV: RRR, no MRGs; normal carotid upstroke and amplitude without Bruits  Abdomen: Soft, non-tender; no masses or HSM  Extremities: No edema, no cyanosis, no clubbing  Skin: Intact no rash, no lesions, no ulcers    Psych: Alert and oriented normal affect  Neuro: Nonfocal  Most Recent Labs  Lab Results   Component Value Date    WBC 6.5 04/19/2021    HGB 8.4 (L) 04/19/2021    HCT 26.3 (L) 04/19/2021     (L) 04/19/2021     04/19/2021    K 4.9 04/19/2021     04/19/2021    CREATININE 1.3 (H) 04/19/2021    BUN 38 (H) 04/19/2021    CO2 27 04/19/2021    GLUCOSE 117 (H) 04/19/2021    ALT 15 04/17/2021    AST 19 04/17/2021    INR 1.1 04/10/2021    LABA1C 5.4 07/26/2017     Recent Labs     04/19/21  0328   MG 2.0     Lab Results   Component Value Date    CALCIUM 8.4 (L) 04/19/2021    PHOS 3.8 04/19/2021        XR CHEST PORTABLE   Final Result   Left-sided PICC line distal tip in the superior vena cava approximately 6 cm   above the expected junction of the superior vena cava and the right atrium. XR ABDOMEN (KUB) (SINGLE AP VIEW)   Final Result   Nonspecific abdomen. Density overlying the pelvis may represent contrast within the patient's   ostomy bag as detailed above.          CT ABDOMEN PELVIS WO CONTRAST Additional Contrast? Oral (via NG tube)   Final Result   Limited study due to lack of intravenous contrast.  Suggestion of gastric   outlet obstruction at the gastro duodenal junction due to a large abdominal   aortic aneurysm with mass effect on the duodenum. Endograft is present with   persistent contrast in the excluded lumen which may be due to previous   endoleak. 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 Poor delineation of the bowel loops. There is suggestion of bowel surgery   with right lower quadrant ileostomy. XR ABDOMEN FOR NG/OG/NE TUBE PLACEMENT   Final Result   NG tube appears in satisfactory position         XR ACUTE ABD SERIES CHEST 1 VW   Final Result   1. Emphysematous changes. There is no evidence of pneumonia   2. There are no findings of bowel obstruction and there is no free air under   the hemidiaphragms. 3. There is a paucity of gas within the bowel. If clinically warranted CT of   the abdomen should be considered. CT HEAD WO CONTRAST   Final Result   No acute intracranial findings. Consider MRI if symptoms persist.      Volume loss and findings suggestive of chronic microvascular ischemia. Other chronic appearing findings. XR CHEST PORTABLE   Final Result   Lungs are hyperexpanded, compatible with COPD changes. No pulmonary   infiltrate is identified. Assessment    Principal Problem:    Acute respiratory failure with hypoxia (HCC)  Active Problems:    Mixed hyperlipidemia    History of stroke    Tobacco dependence    COPD exacerbation (HCC)    Essential hypertension    Emphysema of lung (HCC)    ROBBIN (acute kidney injury) (HCC)    Lactic acid acidosis    Hypercalcemia    Tachycardia    High anion gap metabolic acidosis  Resolved Problems:    * No resolved hospital problems.  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 Hopefully the aneurysm sac can shrink over time. \"\"    Plan for discharge home with home health care and TPN  Electronically signed by Curtis Basilio MD on 4/19/2021 at 1:43 PM

## 2021-04-19 NOTE — DISCHARGE SUMMARY
Physician Discharge Summary     Patient ID:  Michelle Marinelli  [id number]  69 y.o.  1950    Admit date: 4/10/2021    Discharge date and time:  4/19/2021    Discharge Diagnoses: Principal Problem:    Acute respiratory failure with hypoxia ([facility] [address]  Active Problems:    Mixed hyperlipidemia    History of stroke    Tobacco dependence    Severe protein-calorie malnutrition (HCC)    COPD exacerbation (HCC)    Essential hypertension    Emphysema of lung (HCC)    ROBBIN (acute kidney injury) ([facility] [address]    Lactic acid acidosis    Hypercalcemia    Tachycardia    High anion gap metabolic acidosis  Resolved Problems:    * No resolved hospital problems. 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 Hopefully the aneurysm sac can shrink over time. \"\"    Plan for discharge home with home health care and TPN    Discharge Exam:  See progress note from today    Condition:  Stable    Disposition: home    Patient Instructions:   Current Discharge Medication List      CONTINUE these medications which have NOT CHANGED    Details   albuterol sulfate HFA (VENTOLIN HFA) 108 (90 Base) MCG/ACT inhaler Inhale 2 puffs into the lungs every 4 hours as needed for Wheezing or Shortness of Breath  Qty: 1 Inhaler, Refills: 6    Associated Diagnoses: Simple chronic bronchitis (HCC)      ipratropium-albuterol (DUONEB) 0.5-2.5 (3) MG/3ML SOLN nebulizer solution Inhale 3 mLs into the lungs every 4 hours as needed for Shortness of Breath  Qty: 360 mL, Refills: 0      Tiotropium Bromide-Olodaterol (STIOLTO RESPIMAT) 2.5-2.5 MCG/ACT AERS Inhale 2 puffs into the lungs daily Takes 1 puff in morning and after dinner  Use day of surgery  Qty: 1 Inhaler, Refills: 0      metoprolol tartrate (LOPRESSOR) 25 MG tablet Take 25 mg by mouth 2 times daily      atorvastatin (LIPITOR) 20 MG tablet Take 20 mg by mouth daily   Refills: 0      aspirin 81 MG tablet Take 81 mg by mouth every other day            Activity: activity as tolerated  Diet: TPN    Follow-up with 1 week PCP    Note that over 30 minutes was spent in preparing discharge papers, discussing discharge with patient, staff, consultants, medication review, arranging follow up, etc.    Signed:  Issa Westfall MD  4/19/2021  3:14 PM

## 2021-04-19 NOTE — PROGRESS NOTES
GENERAL SURGERY  DAILY PROGRESS NOTE  4/19/2021    Chief Complaint   Patient presents with    Shortness of Breath     beginning last night. hx COPD. COVID vacinated    Emesis     \"all night last night\"       Subjective:  Pain is controlled, denies any nausea or vomiting, tolerating full liquid diet, on TPN. Objective:  BP (!) 97/54   Pulse 85   Temp 98.5 °F (36.9 °C) (Oral)   Resp 16   Ht 5' 10\" (1.778 m)   Wt 97 lb (44 kg)   SpO2 97%   BMI 13.92 kg/m²     General: NAD, awake and alert. Head: Normocephalic, atraumatic  Eyes: PERRLA, EOMI. Lungs: No increased work of breathing. Cardiovascular: RRR. Abdomen: Soft, ND, NT. Ostomy w/ gas/stool. No rebound, guarding or rigidity.   Extremities: Atraumatic, full range of motion  Skin: Warm, dry and     Assessment/Plan:  69 y.o. male with gastric outlet obstruction secondary to compression on 3rd/4th portions of duodenum by aortic aneurysm    TPN  Okay for fulls  Okay for d/c from general surgery    Electronically signed by Jeff Pope MD on 4/19/2021 at 9:59 AM

## 2021-04-19 NOTE — PROGRESS NOTES
Physical Therapy    Pt kindly refused Rx this AM, states going home later today. Wishes respected.   Lilly Kenny PTA [id number]

## 2021-04-19 NOTE — PROGRESS NOTES
Wound / ostomy dept follow-up for ostomy care. His appliance is intact. It was changed yesterday. Wife is at the bedside.

## 2021-04-19 NOTE — CARE COORDINATION
Social Work/Discharge Planning:  Received call from Reggie Sharif with [address] [facility] [address] stating he will inform patient and his wife of cost for TPN. Reggie Sharif states he will need a detailed TPN and HHC order. Reggie Shraif states Dr. Elsa Vogel office will follow and sign for tube feed orders. Notified Rubia with [address] of possible discharge for today. Patient will need a home health care order. Will continue to follow.   Electronically signed by TOMEKA Villanueva on 4/19/2021 at 9:56 AM

## 2021-05-11 NOTE — PROGRESS NOTES
Vascular Surgery Outpatient Progress Note      Chief Complaint   Patient presents with    Post-Op Check     AAA       HISTORY OF PRESENT ILLNESS:      Pt seen and plan reviewed with Dr. Jake Ortiz. The patient is a 69 y.o. male who returns for follow-up evaluation of endovascular repair of abdominal aortic aneurysm with fenestrated graft. The patient has been experiencing gastric outlet syndrome and is currently receiving TPN. He is accompanied by his wife. Past Medical History:        Diagnosis Date    Abdominal aortic aneurysm without rupture ([facility] [address] 05/09/2017    Arthritis     AS (aortic stenosis)     Cerebral artery occlusion with cerebral infarction [facility]) 2009    TIA/CVA with aphasia that resolved    COPD (chronic obstructive pulmonary disease) ([facility] [address]     History of blood transfusion     Hyperlipidemia     Pneumonia     Tobacco dependence 05/09/2017     Past Surgical History:        Procedure Laterality Date    ABDOMINAL AORTIC ANEURYSM REPAIR, ENDOVASCULAR  10/12/2017    Zenith Fenestrated. Delatore    ABDOMINAL AORTIC ANEURYSM REPAIR, ENDOVASCULAR  10/12/2017    ANKLE SURGERY      AORTIC VALVE REPLACEMENT      CARDIAC CATHETERIZATION  07/14/2017    Dr. Tika Baez- No blockages    CARDIAC SURGERY      COLONOSCOPY N/A 12/21/2020    COLONOSCOPY DIAGNOSTIC performed by Jazmine Rob MD at [address] Bilateral approx 2014    cataracts removal w/lens implants    [facility]  PICC [address] DOUBLE  4/15/2021         HERNIA REPAIR      SKULL FRACTURE ELEVATION      TOOTH EXTRACTION      full mouth extraction    UPPER GASTROINTESTINAL ENDOSCOPY N/A 12/21/2020    EGD DIAGNOSTIC ONLY performed by Jazmine Rob MD at [address]     Current Medications:   Prior to Admission medications    Medication Sig Start Date End Date Taking?  Authorizing Provider   albuterol sulfate HFA (VENTOLIN HFA) 108 (90 Base) MCG/ACT inhaler Inhale 2 puffs into the lungs every 4 hours as needed for Wheezing or Shortness of Breath 12/22/20  Yes Robles Montenegro MD   ipratropium-albuterol (DUONEB) 0.5-2.5 (3) MG/3ML SOLN nebulizer solution Inhale 3 mLs into the lungs every 4 hours as needed for Shortness of Breath 12/22/20  Yes Robles Montenegro MD   Tiotropium Bromide-Olodaterol (STIOLTO RESPIMAT) 2.5-2.5 MCG/ACT AERS Inhale 2 puffs into the lungs daily Takes 1 puff in morning and after dinner  Use day of surgery 12/22/20  Yes Robles Montenegro MD   metoprolol tartrate (LOPRESSOR) 25 MG tablet Take 25 mg by mouth 2 times daily   Yes Historical Provider, MD   atorvastatin (LIPITOR) 20 MG tablet Take 20 mg by mouth daily  8/6/15  Yes Historical Provider, MD   aspirin 81 MG tablet Take 81 mg by mouth every other day    Yes Historical Provider, MD     Allergies:  Patient has no known allergies.     Social History     Socioeconomic History    Marital status:      Spouse name: Not on file    Number of children: Not on file    Years of education: Not on file    Highest education level: Not on file   Occupational History    Occupation: retired-   Social Needs    Financial resource strain: Not on file    Food insecurity     Worry: Not on file     Inability: Not on file   [employer] needs     Medical: Not on file     Non-medical: Not on file   Tobacco Use    Smoking status: Current Every Day Smoker     Packs/day: 1.00     Years: 50.00     Pack years: 50.00     Types: Cigarettes    Smokeless tobacco: Never Used    Tobacco comment: currently smokes 1.0 ppd   Substance and Sexual Activity    Alcohol use: No     Alcohol/week: 0.0 standard drinks    Drug use: No    Sexual activity: Not Currently   Lifestyle    Physical activity     Days per week: Not on file     Minutes per session: Not on file    Stress: Not on file   Relationships    Social connections     Talks on phone: Not on file     Gets together: Not on file     Attends [employer] service: Not on file     Active at this time. I will see him again in 6 months with a repeat ultrasound of the aorta to measure the size of the aneurysm. I asked him to call sooner with any changes.

## 2021-05-20 NOTE — ED PROVIDER NOTES
Negative for rash. Allergic/Immunologic: Negative. Neurological: Negative. Negative for syncope and headaches. Physical Exam  Vitals and nursing note reviewed. Constitutional:       General: He is not in acute distress. Appearance: He is well-developed. He is ill-appearing. HENT:      Head: Normocephalic. Right Ear: External ear normal.      Left Ear: External ear normal.      Nose: No congestion or rhinorrhea. Mouth/Throat:      Mouth: Mucous membranes are moist.      Pharynx: Oropharynx is clear. Eyes:      Pupils: Pupils are equal, round, and reactive to light. Cardiovascular:      Rate and Rhythm: Normal rate and regular rhythm. Heart sounds: Normal heart sounds. No murmur heard. Pulmonary:      Effort: Pulmonary effort is normal. No respiratory distress. Breath sounds: Normal breath sounds. Abdominal:      General: Bowel sounds are normal.      Palpations: Abdomen is soft. Tenderness: There is no abdominal tenderness. Musculoskeletal:      Cervical back: Neck supple. No muscular tenderness. Skin:     General: Skin is warm and dry. Neurological:      Mental Status: He is alert. Cranial Nerves: No cranial nerve deficit. Procedures         MDM  Number of Diagnoses or Management Options  Acute cystitis without hematuria  Diagnosis management comments: Patient is a 58-year-old male cachectic appearing on TPN in order to help him gain weight presenting for hematuria. Found to have a urinary tract infection. His kidney function was at baseline. He had no abdominal pain whatsoever. CT abdomen pelvis was conducted to assess if the aneurysm was partially sponsored for his hematuria. It was was found to have no evidence of this being the case. Did find a stable aneurysm as previously imaged. Patient was otherwise stable in the emergency department.   He was given first dose of Rocephin in the emergency department for treatment of his urine tract infection and will be discharged with GERMAN LEUNG for further treatment of his urine tract infection. He was told to continue to rehydrate himself and to take his TPN and oral calories as much as possible in order to improve his weight. He was told to follow-up with his PCP for further treatment evaluation.            --------------------------------------------- PAST HISTORY ---------------------------------------------  Past Medical History:  has a past medical history of Abdominal aortic aneurysm without rupture ([facility] [address] Arthritis, AS (aortic stenosis), Cerebral artery occlusion with cerebral infarction ([facility] [address] COPD (chronic obstructive pulmonary disease) ([facility] [address] History of blood transfusion, Hyperlipidemia, Pneumonia, and Tobacco dependence. Past Surgical History:  has a past surgical history that includes hernia repair; Ankle surgery; Skull fracture elevation; eye surgery (Bilateral, approx 2014); Cardiac catheterization (07/14/2017); Tooth Extraction; Aortic valve replacement; AAA repair, endovascular (10/12/2017); AAA repair, endovascular (10/12/2017); Cardiac surgery; Upper gastrointestinal endoscopy (N/A, 12/21/2020); Colonoscopy (N/A, 12/21/2020); and   picc powerpicc double (4/15/2021). Social History:  reports that he has been smoking cigarettes. He has a 50.00 pack-year smoking history. He has never used smokeless tobacco. He reports that he does not drink alcohol and does not use drugs. Family History: family history includes Heart Disease in his brother and mother; Stroke in his father. The patients home medications have been reviewed. Allergies: Patient has no known allergies.     -------------------------------------------------- RESULTS -------------------------------------------------  Labs:  Results for orders placed or performed during the hospital encounter of 05/20/21   CBC Auto Differential   Result Value Ref Range    WBC 5.4 4.5 - 11.5 E9/L    RBC 2.82 (L) 3.80 - 5.80 E12/L Hemoglobin 9.2 (L) 12.5 - 16.5 g/dL    Hematocrit 29.8 (L) 37.0 - 54.0 %    .7 (H) 80.0 - 99.9 fL    MCH 32.6 26.0 - 35.0 pg    MCHC 30.9 (L) 32.0 - 34.5 %    RDW 15.9 (H) 11.5 - 15.0 fL    Platelets 85 (L) 848 - 450 E9/L    MPV 13.6 (H) 7.0 - 12.0 fL    Neutrophils % 56.0 43.0 - 80.0 %    Immature Granulocytes % 0.4 0.0 - 5.0 %    Lymphocytes % 22.5 20.0 - 42.0 %    Monocytes % 20.2 (H) 2.0 - 12.0 %    Eosinophils % 0.5 0.0 - 6.0 %    Basophils % 0.4 0.0 - 2.0 %    Neutrophils Absolute 3.08 1.80 - 7.30 E9/L    Immature Granulocytes # 0.02 E9/L    Lymphocytes Absolute 1.24 (L) 1.50 - 4.00 E9/L    Monocytes Absolute 1.11 (H) 0.10 - 0.95 E9/L    Eosinophils Absolute 0.03 (L) 0.05 - 0.50 E9/L    Basophils Absolute 0.02 0.00 - 0.20 E9/L    Anisocytosis 1+     Polychromasia 1+     Hypochromia 1+     Poikilocytes 1+     Ovalocytes 1+     Stomatocytes 1+    Urine electrolytes   Result Value Ref Range    Sodium, Ur <20 Not Established mmol/L    Potassium, Ur 85.3 Not Established mmol/L    Chloride <20 Not Established mmol/L   Creatinine, Random Urine   Result Value Ref Range    Creatinine, Ur 80 40 - 278 mg/dL   Protein, urine, random   Result Value Ref Range    Protein, Ur 23 (H) 0 - 12 mg/dL   Urinalysis   Result Value Ref Range    Color, UA Yellow Straw/Yellow    Clarity, UA SL CLOUDY Clear    Glucose, Ur Negative Negative mg/dL    Bilirubin Urine SMALL (A) Negative    Ketones, Urine Negative Negative mg/dL    Specific Gravity, UA 1.015 1.005 - 1.030    Blood, Urine LARGE (A) Negative    pH, UA 6.5 5.0 - 9.0    Protein, UA 30 (A) Negative mg/dL    Urobilinogen, Urine 0.2 <2.0 E.U./dL    Nitrite, Urine POSITIVE (A) Negative    Leukocyte Esterase, Urine MODERATE (A) Negative   Comprehensive Metabolic Panel w/ Reflex to MG   Result Value Ref Range    Sodium 136 132 - 146 mmol/L    Potassium reflex Magnesium 4.7 3.5 - 5.0 mmol/L    Chloride 99 98 - 107 mmol/L    CO2 27 22 - 29 mmol/L    Anion Gap 10 7 - 16 mmol/L Glucose 88 74 - 99 mg/dL    BUN 64 (H) 6 - 23 mg/dL    CREATININE 1.4 (H) 0.7 - 1.2 mg/dL    GFR Non-African American 50 >=60 mL/min/1.73    GFR African American >60     Calcium 9.3 8.6 - 10.2 mg/dL    Total Protein 6.7 6.4 - 8.3 g/dL    Albumin 2.7 (L) 3.5 - 5.2 g/dL    Total Bilirubin 1.4 (H) 0.0 - 1.2 mg/dL    Alkaline Phosphatase 870 (H) 40 - 129 U/L     (H) 0 - 40 U/L    AST 83 (H) 0 - 39 U/L   Platelet Confirmation   Result Value Ref Range    Platelet Confirmation CONFIRMED    Microscopic Urinalysis   Result Value Ref Range    WBC, UA 10-20 (A) 0 - 5 /HPF    RBC, UA 10-20 (A) 0 - 2 /HPF    Bacteria, UA MANY (A) None Seen /HPF    Yeast, UA Present (A) None Seen /HPF       Radiology:  CT ABDOMEN PELVIS WO CONTRAST Additional Contrast? None   Final Result   1. Stable size of fusiform infrarenal abdominal aortic aneurysm measures up   to 5.9 cm with endograft repair. 2.  No free fluid collections to suggest aneurysm leak. 3.  Multiple mildly distended loops of bowel notable in the with thickened   wall and hazy appearance of mesenteric fat. Findings could suggest   nonspecific infectious or inflammatory enteritis. 4.  No bowel obstruction, free air, or free fluid. 5.  Emphysematous changes seen in the lung bases. RECOMMENDATIONS:   For management of fusiform AAA:      Note: Recommend Vascular consultation if a fusiform AAA enlarges by >0.5 cm   in 6 months or >1 cm in 1 year or for a saccular AAA of any size. References: [employer] [place] Radiol 2013; 24(70):516-945; J Vasc Surg. 2018; 67:2-77             ------------------------- NURSING NOTES AND VITALS REVIEWED ---------------------------  Date / Time Roomed:  5/20/2021  5:42 PM  ED Bed Assignment:  PAULA/PAULA    The nursing notes within the ED encounter and vital signs as below have been reviewed.    /72   Pulse 76   Temp 97.8 °F (36.6 °C)   Resp 16   Ht 5' 11\" (1.803 m)   Wt 93 lb (42.2 kg)   SpO2 96%   BMI 12.97 kg/m²   Oxygen Saturation Interpretation: Normal      ------------------------------------------ PROGRESS NOTES ------------------------------------------  8:40 PM EDT  I have spoken with the Patient and/or Family and discussed todays results, in addition to providing specific details for the plan of care and counseling regarding the diagnosis and prognosis. Labs and Imaging discussed with patient including appropriate follow- up and re-evaluation. Their questions are answered at this time and they are agreeable with the plan. I discussed at length with them reasons for immediate return here for re evaluation. They will followup with PCP by calling their office Next Business Day      --------------------------------- ADDITIONAL PROVIDER NOTES ---------------------------------  At this time the patient is without objective evidence of an acute process requiring hospitalization or inpatient management. They have remained hemodynamically stable throughout their entire ED visit and are stable for discharge with outpatient follow-up. The plan has been discussed in detail and they are aware of the specific conditions for emergent return, as well as the importance of follow-up. Discharge Medication List as of 5/20/2021  8:22 PM      START taking these medications    Details   cefdinir (OMNICEF) 300 MG capsule Take 1 capsule by mouth 2 times daily for 7 days, Disp-14 capsule, R-0Print             Diagnosis:  1. Acute cystitis without hematuria        Disposition:  Patient's disposition: Discharge to home  Patient's condition is stable.                   Christal Cox DO  Resident  05/20/21 2040

## 2021-05-20 NOTE — ED NOTES
Assumed care of patient at this time, pt resting comfortably with no visible signs of distress, pt denies any needs at this time     Alexis Mao RN  05/20/21 [id number]

## 2021-05-21 NOTE — ED NOTES
Reviewed discharge instructions with patient, patient verbalized understanding of follow up care and denies any further questions, no acute signs/symptoms of distress upon discharge       Lisa Conteh RN  05/20/21 [id number]

## 2021-05-27 PROBLEM — Z95.3 HISTORY OF AORTIC VALVE REPLACEMENT WITH BIOPROSTHETIC VALVE: Chronic | Status: ACTIVE | Noted: 2021-01-01

## 2021-05-27 PROBLEM — R31.9 HEMATURIA: Status: ACTIVE | Noted: 2021-01-01

## 2021-05-27 PROBLEM — R78.81 BACTEREMIA: Status: ACTIVE | Noted: 2021-01-01

## 2021-05-27 NOTE — PROGRESS NOTES
Consults called to Infectious Disease, Dr. Marine Pratt on call & urology, Dr. Mylene Gaffnye     Electronically signed by Romina Estrada RN on 5/27/2021 at 6:50 PM

## 2021-05-27 NOTE — ED NOTES
RN faxed SBAR to floor. Confirmation received and spoke with [facility]. Pt ready for transport to room.        Rhina Landers, KILO  05/27/21 0585

## 2021-05-27 NOTE — PROGRESS NOTES
Database complete. Medications reconciled by med tech. Care plans and education initiated. Has ileostomy. PICC to LUE with TPN nightly at 1900. Pt states he is allowed liquids orally but does not consume solid foods currently. Pt states he currently has [address] through the [facility]note 3] coming into the home. 1ppd smoker requesting a nicotine patch. Cardiologist is Dr. Yaritza Rodriguez.

## 2021-05-27 NOTE — ED PROVIDER NOTES
ED Attending  CC: Vivian          Department of Emergency Medicine   ED  Provider Note  Admit Date/RoomTime: 5/27/2021 12:25 PM  ED Room: [facility]/[place]-11  HPI:  5/27/21, Time: 3:51 PM EDT      The patient is a 39-year-old male with a history of aortic aneurysm, CVA, COPD, hyperlipidemia and colon resection with ostomy presenting to the emergency department due to abnormal labs. Patient states he was contacted by his primary care physician and told to come the emergency department. He is unsure of what he was sent in for. Patient has no complaints at this time. He was here 1 week ago and was diagnosed with a urinary tract infection. He states that he has been taking Omnicef as directed but is still having blood in the urine. He is not passing blood clots but states it is dark red. Patient does not take any blood thinners. Patient is on TPN due to having malnutrition secondary to his abdominal aortic repair. The [facility]. Patient denies any recent fevers or chills, abdominal pain, dysuria, chest pain, shortness of breath, blood in his ostomy bag, weakness or fatigue. The history is provided by the patient. No  was used. REVIEW OF SYSTEMS:  Review of Systems   Constitutional: Negative for activity change, chills, fatigue and fever. Respiratory: Negative for cough, chest tightness and shortness of breath. Cardiovascular: Negative for chest pain, palpitations and leg swelling. Gastrointestinal: Negative for abdominal pain, constipation, diarrhea, nausea and vomiting. Genitourinary: Positive for hematuria. Negative for dysuria, flank pain and frequency. Musculoskeletal: Negative for back pain, neck pain and neck stiffness. Skin: Negative for color change, pallor and rash. Neurological: Negative for dizziness, light-headedness and headaches. Psychiatric/Behavioral: Negative for agitation, behavioral problems and confusion. Pertinent positives and negatives are stated within HPI, all other systems reviewed and are negative.      --------------------------------------------- PAST HISTORY ---------------------------------------------  Past Medical History:  has a past medical history of Abdominal aortic aneurysm without rupture ([address] Arthritis, AS (aortic stenosis), Cerebral artery occlusion with [facility] infarction [facility] COPD (chronic obstructive pulmonary disease) ([facility] History of blood transfusion, Hyperlipidemia, Pneumonia, and Tobacco dependence. Past Surgical History:  has a past surgical history that includes hernia repair; Ankle surgery; Skull fracture elevation; eye surgery (Bilateral, approx 2014); Cardiac catheterization (07/14/2017); Tooth Extraction; Aortic valve replacement; AAA repair, endovascular (10/12/2017); AAA repair, endovascular (10/12/2017); Cardiac surgery; Upper gastrointestinal endoscopy (N/A, 12/21/2020); Colonoscopy (N/A, 12/21/2020); and   picc powerpicc double (4/15/2021). Social History:  reports that he has been smoking cigarettes. He has a 50.00 pack-year smoking history. He has never used smokeless tobacco. He reports that he does not drink alcohol and does not use drugs. Family History: family history includes Heart Disease in his brother and mother; Stroke in his father. The patients home medications have been reviewed. Allergies: Patient has no known allergies.     -------------------------------------------------- RESULTS -------------------------------------------------  All laboratory and radiology results have been personally reviewed by myself   LABS:  Results for orders placed or performed during the hospital encounter of 05/27/21   CBC Auto Differential   Result Value Ref Range    WBC 7.2 4.5 - 11.5 E9/L    RBC 2.34 (L) 3.80 - 5.80 E12/L    Hemoglobin 8.1 (L) 12.5 - 16.5 g/dL    Hematocrit 25.0 (L) 37.0 - 54.0 %    .8 (H) 80.0 - 99.9 fL    MCH 34.6 26.0 - 35.0 pg    MCHC 32.4 32.0 - 34.5 %    RDW 15.5 (H) 11.5 - 15.0 fL    Platelets 391 (L) 582 - 450 E9/L    MPV 13.0 (H) 7.0 - 12.0 fL    Neutrophils % 74.5 43.0 - 80.0 %    Immature Granulocytes % 0.7 0.0 - 5.0 %    Lymphocytes % 14.5 (L) 20.0 - 42.0 %    Monocytes % 9.5 2.0 - 12.0 %    Eosinophils % 0.4 0.0 - 6.0 %    Basophils % 0.4 0.0 - 2.0 %    Neutrophils Absolute 5.36 1.80 - 7.30 E9/L    Immature Granulocytes # 0.05 E9/L    Lymphocytes Absolute 1.04 (L) 1.50 - 4.00 E9/L    Monocytes Absolute 0.68 0.10 - 0.95 E9/L    Eosinophils Absolute 0.03 (L) 0.05 - 0.50 E9/L    Basophils Absolute 0.03 0.00 - 0.20 E9/L   Comprehensive Metabolic Panel w/ Reflex to MG   Result Value Ref Range    Sodium 136 132 - 146 mmol/L    Potassium reflex Magnesium 4.6 3.5 - 5.0 mmol/L    Chloride 102 98 - 107 mmol/L    CO2 26 22 - 29 mmol/L    Anion Gap 8 7 - 16 mmol/L    Glucose 105 (H) 74 - 99 mg/dL    BUN 46 (H) 6 - 23 mg/dL    CREATININE 1.3 (H) 0.7 - 1.2 mg/dL    GFR Non-African American 54 >=60 mL/min/1.73    GFR African American >60     Calcium 8.9 8.6 - 10.2 mg/dL    Total Protein 6.3 (L) 6.4 - 8.3 g/dL    Albumin 2.6 (L) 3.5 - 5.2 g/dL    Total Bilirubin 1.0 0.0 - 1.2 mg/dL    Alkaline Phosphatase 616 (H) 40 - 129 U/L    ALT 49 (H) 0 - 40 U/L    AST 45 (H) 0 - 39 U/L   Lactate, Sepsis   Result Value Ref Range    Lactic Acid, Sepsis 0.9 0.5 - 1.9 mmol/L   Urinalysis, reflex to microscopic   Result Value Ref Range    Color, UA Yellow Straw/Yellow    Clarity, UA SL CLOUDY Clear    Glucose, Ur Negative Negative mg/dL    Bilirubin Urine Negative Negative    Ketones, Urine Negative Negative mg/dL    Specific Gravity, UA 1.015 1.005 - 1.030    Blood, Urine LARGE (A) Negative    pH, UA 7.5 5.0 - 9.0    Protein, UA Negative Negative mg/dL    Urobilinogen, Urine 0.2 <2.0 E.U./dL    Nitrite, Urine Negative Negative    Leukocyte Esterase, Urine Negative Negative   Protime-INR   Result Value Ref Range    Protime 11.3 9.3 - 12.4 sec    INR 1.0 Refill: Capillary refill takes less than 2 seconds. Findings: No erythema. Neurological:      General: No focal deficit present. Mental Status: He is alert and oriented to person, place, and time. Mental status is at baseline. Coordination: Coordination normal.   Psychiatric:         Mood and Affect: Mood normal.         Behavior: Behavior normal.         Thought Content: Thought content normal.            ------------------------------ ED COURSE/MEDICAL DECISION MAKING----------------------  Medications   vancomycin 1000 mg IVPB in 250 mL D5W addavial (has no administration in time range)   perflutren lipid microspheres (DEFINITY) injection 1.65 mg (has no administration in time range)   meropenem (MERREM) 1,000 mg in sodium chloride 0.9 % 100 mL IVPB (mini-bag) (0 mg Intravenous Stopped 5/27/21 1619)         ED COURSE:      No orders to display         Procedures:  Procedures     Medical Decision Making:   MDM   34-year-old male sent to emergency department by PCP due to positive blood cultures. The patient's primary care physician states these cultures were drawn by an outside home health care team.  He states he was faxed the results which were positive for staph epidermidis. Patient was treated for UTI with Omnicef 1 week ago. He states he been taking it as directed but is still having blood in the urine. Patient has no symptoms at all other than the blood in the urine. He is afebrile and hemodynamically stable upon arrival.  He has lost about a unit of blood in the last week. This is of unclear etiology and likely from urinary source. He has no blood in the ostomy bag. He also still has an elevated creatinine at 1.3. Patient's liver enzymes and alk phos are elevated which were elevated 1 week ago as well. He had a CT of the abdomen a week ago that did not show any acute abnormality. His primary care physician does state that this is a new finding.   Patient started on meropenem and vancomycin after consulting clinical pharmacist.  He will be admitted for further treatment and management. Blood cultures were repeated today. Counseling: The emergency provider has spoken with the patient and discussed todays results, in addition to providing specific details for the plan of care and counseling regarding the diagnosis and prognosis. Questions are answered at this time and they are agreeable with the plan.      --------------------------------- IMPRESSION AND DISPOSITION ---------------------------------    IMPRESSION  1. Urinary tract infection with hematuria, site unspecified    2. Anemia, unspecified type        DISPOSITION  Disposition: Admit to med/surg floor  Patient condition is good      Electronically signed by Miguel Dick PA-C   DD: 5/27/21  **This report was transcribed using voice recognition software. Every effort was made to ensure accuracy; however, inadvertent computerized transcription errors may be present.   END OF ED PROVIDER NOTE          Miguel Dick PA-C  05/27/21 9928

## 2021-05-27 NOTE — PROGRESS NOTES
-Consulted to dose vancomycin for bloodstream infection.   -Vancomycin 1000 mg IV x 1 loading dose ordered in ED.  -Will place order for vancomycin 750 mg IV q24h to be started on 5/28 (1200). -Projected AUC/JEN on this regimen is 473.  -Will monitor renal function and steady state vancomycin level when appropriate.

## 2021-05-28 PROBLEM — E43 SEVERE PROTEIN-CALORIE MALNUTRITION (HCC): Chronic | Status: ACTIVE | Noted: 2021-01-01

## 2021-05-28 PROBLEM — D64.9 CHRONIC ANEMIA: Status: ACTIVE | Noted: 2021-01-01

## 2021-05-28 PROBLEM — R31.0 GROSS HEMATURIA: Status: ACTIVE | Noted: 2021-01-01

## 2021-05-28 NOTE — PLAN OF CARE
Problem: Increased nutrient needs (NI-5.1)  Goal: Food and/or Nutrient Delivery  Description: Individualized approach for food/nutrient provision.   Outcome: Not Met This Shift

## 2021-05-28 NOTE — PROGRESS NOTES
Pharmacy Consultation Note  (Antibiotic Dosing and Monitoring)    Initial consult date: 5/27  Consulting physician: Julia Roe  Drug: Vancomycin  Indication: Bloodstream infection    Age/  Gender Height Weight IBW  Allergy Information   70 y.o./male 5' 11\" (180.3 cm) 95 lb (43.1 kg)     Male patients must weigh at least 50 kg to calculate ideal body weight   Patient has no known allergies. Renal Function:  Recent Labs     05/27/21  1315 05/28/21  0630   BUN 46* 36*   CREATININE 1.3* 1.2       Intake/Output Summary (Last 24 hours) at 5/28/2021 1230  Last data filed at 5/28/2021 0631  Gross per 24 hour   Intake --   Output 1300 ml   Net -1300 ml       Vancomycin Monitoring:      Vancomycin Administration Times:  Recent vancomycin administrations                   vancomycin (VANCOCIN) 750 mg in dextrose 5 % 250 mL IVPB (mg) 750 mg New Bag 05/28/21 1221    vancomycin 1000 mg IVPB in 250 mL D5W addavial (mg) 1,000 mg New Bag 05/27/21 1731                Assessment:  · Patient is a 69 y.o. male who has been initiated on vancomycin  · CrCl cannot be calculated (Unknown ideal weight. ).   · Goal AUC/JEN = 400-600; goal trough level = 10-20 mcg/mL    Plan:  · Will continue vancomycin 750 IV every 24 hours  · Will check vancomycin levels when appropriate  · Will continue to monitor renal function   · Clinical pharmacy to follow      Betzaida Galindo, [address] [place] [address] 5/28/2021 12:30 PM

## 2021-05-28 NOTE — PROGRESS NOTES
Comprehensive Nutrition Assessment    Type and Reason for Visit:  Initial, Positive Nutrition Screen    Nutrition Recommendations/Plan: Rec to modify current diet to full liquid. 2/2 pt noted to be on full liquid diet prior to adm. TPN Rec as follows: Adult PN 3-IN-1  Central Standard @ 65ml/h continuous x24hr/day  To provide 1560ml TV- Total Calories 1615 and 78g AA    Nutrition Assessment:  Pt adm for UTI. Pt was recently adm 5/20 for UTI. He was admitted to [facility] on 1/29/2021 with superior mesenteric artery mycotic aneurysm. He was also found to have evidence of ischemic colitis and underwent total colectomy and end ileostomy with open repair of the SMA aneurysm. Pt is sever malnutrition d/t poor PO intake. Noted PN prior to adm nightly and full liquid diet. . Will make PN recommendations. Significant PMHx of  aortic aneurysm, CVA, COPD, hyperlipidemia and colon resection with ostomy presenting to the emergency department due to abnormal labs. His PCP recommended he adm to ED. Malnutrition Assessment:  Malnutrition Status:  Severe malnutrition    Context:  Chronic Illness     Findings of the 6 clinical characteristics of malnutrition:  Energy Intake:  Mild decrease in energy intake (Comment) (poor po and was on TPN at home)  Weight Loss:  1 - Mild weight loss (specify amount and time period) (2% over months)     Body Fat Loss:  7 - Severe body fat loss Orbital, Triceps   Muscle Mass Loss:  7 - Severe muscle mass loss Temples (temporalis), Clavicles (pectoralis & deltoids), Hand (interosseous)  Fluid Accumulation:  No significant fluid accumulation     Strength:  Not Performed    Estimated Daily Nutrient Needs:  Energy (kcal):  8422-4066 (MSJx1. 3SF); Weight Used for Energy Requirements:  Current     Protein (g):  70-90g (1.5-1.8g/kg);  Weight Used for Protein Requirements:  Current        Fluid (ml/day):  7505-7609; Method Used for Fluid Requirements:  1 ml/kcal      Nutrition Related Findings:  Pt disoriented & confused, RLQ ileostomy, BS active, -I/Os,  no Edema      Wounds:  None       Current Nutrition Therapies:    DIET GENERAL; Anthropometric Measures:  · Height: 5' 11\" (180.3 cm)  · Current Body Weight: 105 lb 6.4 oz (47.8 kg) (5/28)   · Admission Body Weight: 98 lb (44.5 kg) (5/27 bedscale)    · Usual Body Weight: 102 lb 4 oz (46.4 kg) (12/15/20 standing scale)     · Ideal Body Weight: 172 lbs; % Ideal Body Weight 61.3 %   · BMI: 14.7      · BMI Categories: Underweight (BMI less than 22) age over 72       Nutrition Diagnosis:   · Severe malnutrition, In context of chronic illness related to impaired respiratory function (COPD) as evidenced by poor intake prior to admission, severe muscle loss, severe loss of subcutaneous fat, weight loss      Nutrition Interventions:   Food and/or Nutrient Delivery:  Modify Current Diet (Rec modifying diet to Full Liquid. Pt noted to be on full liquid diet only prior to adm)  Nutrition Education/Counseling:  No recommendation at this time   Coordination of Nutrition Care:  Continue to monitor while inpatient    Goals:  PO intake >50% of meals: pt to tolerate PN at goal rate       Nutrition Monitoring and Evaluation:   Behavioral-Environmental Outcomes:  None Identified   Food/Nutrient Intake Outcomes:  Food and Nutrient Intake  Physical Signs/Symptoms Outcomes:  Biochemical Data, GI Status, Fluid Status or Edema, Nutrition Focused Physical Findings, Skin, Weight     Discharge Planning:     Too soon to determine     Electronically signed by Scott Cabrera RD, LD on 5/28/21 at 12:22 PM EDT    Contact: [phone]

## 2021-05-28 NOTE — H&P
[id number]  [address]  History and Physical      CHIEF COMPLAINT: Positive blood cultures      HISTORY OF PRESENT ILLNESS:      The patient is a 69 y.o. male patient of Dr. Diandra Muhammad history of ALS status post AVR, COPD, AAA status post repair, tobacco abuse with continued smoking who presents with positive blood cultures. Patient had blood cultures drawn as outpatient is returned positive for CNS. Patient was recently seen in ED diagnosed with the UTI treated with PORTER MADHU. Patient was continued blood in urine. He denies fevers or chills. No vomiting diarrhea. No blood in stool. Past Medical History:    Past Medical History:   Diagnosis Date    Abdominal aortic aneurysm without rupture ([facility] 05/09/2017    Arthritis     AS (aortic stenosis)     Cerebral artery occlusion with cerebral infarction [facility]) 2009    TIA/CVA with aphasia that resolved    COPD (chronic obstructive pulmonary disease) ([facility]     History of blood transfusion     Hyperlipidemia     Pneumonia     Tobacco dependence 05/09/2017       Past Surgical History:    Past Surgical History:   Procedure Laterality Date    ABDOMINAL AORTIC ANEURYSM REPAIR, ENDOVASCULAR  10/12/2017    Zenith Fenestrated.   [place]    ABDOMINAL AORTIC ANEURYSM REPAIR, ENDOVASCULAR  10/12/2017    ANKLE SURGERY      AORTIC VALVE REPLACEMENT      CARDIAC CATHETERIZATION  07/14/2017    Dr. Nori Celeste- No blockages    CARDIAC SURGERY      COLONOSCOPY N/A 12/21/2020    COLONOSCOPY DIAGNOSTIC performed by Emely Lainez MD at [address] Bilateral approx 2014    cataracts removal w/lens implants    [facility].  PICC [address] DOUBLE  4/15/2021         HERNIA REPAIR      SKULL FRACTURE ELEVATION      TOOTH EXTRACTION      full mouth extraction    UPPER GASTROINTESTINAL ENDOSCOPY N/A 12/21/2020    EGD DIAGNOSTIC ONLY performed by Emely Lainez MD at [address]       Medications Prior to Admission:    Medications Prior to Admission: dronabinol (MARINOL) 5 MG capsule, Take 5 mg by mouth 2 times daily (before meals). ondansetron (ZOFRAN-ODT) 4 MG disintegrating tablet, Take 4 mg by mouth every 8 hours as needed for Nausea or Vomiting  [] cefdinir (OMNICEF) 300 MG capsule, Take 1 capsule by mouth 2 times daily for 7 days  albuterol sulfate HFA (VENTOLIN HFA) 108 (90 Base) MCG/ACT inhaler, Inhale 2 puffs into the lungs every 4 hours as needed for Wheezing or Shortness of Breath  atorvastatin (LIPITOR) 20 MG tablet, Take 20 mg by mouth daily   aspirin 81 MG tablet, Take 81 mg by mouth every other day   ipratropium-albuterol (DUONEB) 0.5-2.5 (3) MG/3ML SOLN nebulizer solution, Inhale 3 mLs into the lungs every 4 hours as needed for Shortness of Breath    Allergies:    Patient has no known allergies. Social History:    reports that he has been smoking cigarettes. He started smoking about 51 years ago. He has a 50.00 pack-year smoking history. He has never used smokeless tobacco. He reports that he does not drink alcohol and does not use drugs. Family History:   family history includes Heart Disease in his brother and mother; Stroke in his father. REVIEW OF SYSTEMS:  As above in the HPI, otherwise negative    PHYSICAL EXAM:    Vitals:  BP (!) 97/55   Pulse 82   Temp 97.9 °F (36.6 °C) (Oral)   Resp 16   Ht 5' 11\" (1.803 m)   Wt 105 lb 6.4 oz (47.8 kg)   SpO2 96%   BMI 14.70 kg/m²     General:  Awake, alert, oriented X 3. Well developed, well nourished, well groomed. No apparent distress. HEENT:  Normocephalic, atraumatic. Pupils equal, round, reactive to light. No scleral icterus. No conjunctival injection. Normal lips, teeth, and gums. No nasal discharge. Neck:  Supple  Heart:  RRR, no murmurs, gallops, rubs  Lungs:  CTA bilaterally, bilat symmetrical expansion, no wheeze, rales, or rhonchi  Abdomen:   Bowel sounds present, soft, nontender, no masses, no organomegaly, no peritoneal signs  Extremities:  No clubbing, cyanosis, or edema  Skin:  Warm and dry, no open lesions or rash  Neuro:  Cranial nerves 2-12 intact, no focal deficits  Breast: deferred  Rectal: deferred  Genitalia:  deferred    LABS:    CBC with Differential:    Lab Results   Component Value Date    WBC 7.0 05/28/2021    RBC 2.13 05/28/2021    HGB 7.3 05/28/2021    HCT 22.7 05/28/2021     05/28/2021    .6 05/28/2021    MCH 34.3 05/28/2021    MCHC 32.2 05/28/2021    RDW 15.6 05/28/2021    LYMPHOPCT 17.9 05/28/2021    MONOPCT 9.1 05/28/2021    BASOPCT 0.4 05/28/2021    MONOSABS 0.64 05/28/2021    LYMPHSABS 1.26 05/28/2021    EOSABS 0.04 05/28/2021    BASOSABS 0.03 05/28/2021     CMP:    Lab Results   Component Value Date     05/28/2021    K 4.0 05/28/2021     05/28/2021    CO2 24 05/28/2021    BUN 36 05/28/2021    CREATININE 1.2 05/28/2021    GFRAA >60 05/28/2021    LABGLOM 60 05/28/2021    GLUCOSE 72 05/28/2021    PROT 6.3 05/27/2021    LABALBU 2.6 05/27/2021    CALCIUM 7.9 05/28/2021    BILITOT 1.0 05/27/2021    ALKPHOS 616 05/27/2021    AST 45 05/27/2021    ALT 49 05/27/2021     Magnesium:    Lab Results   Component Value Date    MG 1.8 05/28/2021     Phosphorus:    Lab Results   Component Value Date    PHOS 3.5 05/28/2021     PT/INR:    Lab Results   Component Value Date    PROTIME 11.3 05/27/2021    INR 1.0 05/27/2021     Last 3 Troponin:    Lab Results   Component Value Date    TROPONINI 0.02 04/10/2021    TROPONINI 0.03 04/10/2021    TROPONINI 0.05 01/28/2021     U/A:    Lab Results   Component Value Date    COLORU Yellow 05/27/2021    PROTEINU Negative 05/27/2021    PHUR 7.5 05/27/2021    WBCUA NONE 05/27/2021    RBCUA 10-20 05/27/2021    YEAST Present 05/20/2021    BACTERIA RARE 05/27/2021    CLARITYU SL CLOUDY 05/27/2021    SPECGRAV 1.015 05/27/2021    LEUKOCYTESUR Negative 05/27/2021    UROBILINOGEN 0.2 05/27/2021    BILIRUBINUR Negative 05/27/2021    BLOODU LARGE 05/27/2021    GLUCOSEU Negative 05/27/2021     ABG: Lab Results   Component Value Date    PH 7.412 04/11/2021    PCO2 32.9 04/11/2021    PO2 137.6 04/11/2021    HCO3 20.5 04/11/2021    BE -3.3 04/11/2021    O2SAT 98.6 04/11/2021     HgBA1c:    Lab Results   Component Value Date    LABA1C 5.4 07/26/2017     FLP:    Lab Results   Component Value Date    TRIG 66 04/17/2021     TSH:  No results found for: TSH    No orders to display       ASSESSMENT:      Principal Problem:    Bacteremia  Active Problems:    Tobacco dependence    Severe protein-calorie malnutrition (HCC)    S/P AAA repair using bifurcation graft    History of aortic valve replacement with bioprosthetic valve    Gross hematuria    Chronic anemia  Resolved Problems:    * No resolved hospital problems.  *      PLAN:    65-year-old male history of AF status post AVR admitted with positive blood cultures    Vancomycin  Repeat blood cultures  Echocardiogram  Procalcitonin 8.99  ID consult  Dietitian  Gross hematuria  Urology consult  Iron studies reviewed, monitor hemoglobin  Medications for other co morbidities cont as appropriate w dosage adjustments as necessary  PT/OT  DVT PPx  DC planning      Electronically signed by Marilyn Vo MD on 5/28/2021 at 9:03 AM

## 2021-05-28 NOTE — PLAN OF CARE
5/28/2021 3:47 PM  Service: Urology  Group: [facility] (Sd/Elsa)    Dianne Pedroza  [id number]     Chief Complaint:    Nancy Rides hematuria    History of Present Illness: The patient is a 69 y.o. male patient who presents with patient presented to the emergency room on 5-27 with gross hematuria he had been on Omnicef prescribed by ER doctors. He is not on anticoagulants except baby aspirin the patient is on TPN due to malnutrition secondary to a colostomy done in [facility] in February  . His initial creatinine was 1.3 with hydration is 1.2 his hemoglobin is 8.1 diluted to 7.3 with hydration. He is afebrile at this time but he did have blood cultures and urine culture obtained  CAT scan on 5-20 demonstrated normal kidneys there was a fusiform aneurysm with endoscopic graft repair. Some thickening of some small bowel structures without obvious obstruction. He has emphysematous changes in both lungs the base  He has been smoking a pack a day most of his adult life including the present time. He has nocturia x1-2. No dysuria associated with the gross hematuria. No urgency no sensation of residual  He had been a heavy coffee drinker till recently. No diuretic usage. Not aware of PSA history    Past Medical History:   Diagnosis Date    Abdominal aortic aneurysm without rupture ([facility] [address] 05/09/2017    Arthritis     AS (aortic stenosis)     Cerebral artery occlusion with cerebral infarction [facility]) 2009    TIA/CVA with aphasia that resolved    COPD (chronic obstructive pulmonary disease) ([facility] [address]     History of blood transfusion     Hyperlipidemia     Pneumonia     Tobacco dependence 05/09/2017         Past Surgical History:   Procedure Laterality Date    ABDOMINAL AORTIC ANEURYSM REPAIR, ENDOVASCULAR  10/12/2017    Zenith Fenestrated.   Román    ABDOMINAL AORTIC ANEURYSM REPAIR, ENDOVASCULAR  10/12/2017    ANKLE SURGERY      AORTIC VALVE REPLACEMENT      CARDIAC CATHETERIZATION  07/14/2017     Hunt- No blockages    CARDIAC SURGERY      COLONOSCOPY N/A 2020    COLONOSCOPY DIAGNOSTIC performed by Amrita Ruiz MD at [address] Bilateral approx 2014    cataracts removal w/lens implants    [facility].  PICC [address] DOUBLE  4/15/2021         HERNIA REPAIR      SKULL FRACTURE ELEVATION      TOOTH EXTRACTION      full mouth extraction    UPPER GASTROINTESTINAL ENDOSCOPY N/A 2020    EGD DIAGNOSTIC ONLY performed by Amrita Ruiz MD at [address] [facility]       Medications Prior to Admission:    Medications Prior to Admission: dronabinol (MARINOL) 5 MG capsule, Take 5 mg by mouth 2 times daily (before meals). ondansetron (ZOFRAN-ODT) 4 MG disintegrating tablet, Take 4 mg by mouth every 8 hours as needed for Nausea or Vomiting  [] cefdinir (OMNICEF) 300 MG capsule, Take 1 capsule by mouth 2 times daily for 7 days  albuterol sulfate HFA (VENTOLIN HFA) 108 (90 Base) MCG/ACT inhaler, Inhale 2 puffs into the lungs every 4 hours as needed for Wheezing or Shortness of Breath  atorvastatin (LIPITOR) 20 MG tablet, Take 20 mg by mouth daily   aspirin 81 MG tablet, Take 81 mg by mouth every other day   ipratropium-albuterol (DUONEB) 0.5-2.5 (3) MG/3ML SOLN nebulizer solution, Inhale 3 mLs into the lungs every 4 hours as needed for Shortness of Breath    Allergies:    Patient has no known allergies. Social History:    reports that he has been smoking cigarettes. He started smoking about 51 years ago. He has a 50.00 pack-year smoking history. He has never used smokeless tobacco. He reports that he does not drink alcohol and does not use drugs. Family History:   Non-contributory to this Urological problem  family history includes Heart Disease in his brother and mother; Stroke in his father. Review of Systems:  Respiratory: negative for cough and hemoptysis. He has COPD  Cardiovascular: negative for chest pain and dyspnea. Nonrheumatic aortic valve stenosis.   He has had tachycardia and he is also had syncope in the past  Gastrointestinal: negative for abdominal pain, diarrhea, nausea and vomiting he has severe protein malnutrition he had a colostomy in February in [place]: negative for rash and skin lesion(s)  Neurological: negative for seizures and tremors. He has a history of a stroke I am not sure what the residual effects of that were and what time Endocrine: negative for diabetic symptoms including polydipsia and polyuria. Has been diagnosed with hypercalcemia he has hyperlipidemia and hypertension being treated with medications  : As above in the HPI, otherwise negative    Hematological: He has longstanding anemia    Physical Exam:     Vitals:  BP (!) 97/55   Pulse 82   Temp 97.9 °F (36.6 °C) (Oral)   Resp 16   Ht 5' 11\" (1.803 m)   Wt 105 lb 6.4 oz (47.8 kg)   SpO2 96%   BMI 14.70 kg/m²     General:  Awake, alert, oriented X 3. He is thin with emaciated appearance  HEENT:  Normocephalic, atraumatic. Pupils equal, round. No scleral icterus. No conjunctival injection. Normal lips, teeth, and gums. No nasal discharge. Neck:  Supple, no masses. Heart:  RRR  Lungs:  No audible wheezing. Respirations symmetric and non-labored. Increased AP diameter of his chest  Abdomen:  soft, nontender, no masses, no organomegaly, no peritoneal signs  Extremities:  No clubbing, cyanosis, or edema  Skin:  Warm and dry, no open lesions or rashes  Neuro:  There are no motor or sensory deficits in the 4 quadrant extremities   Rectal: deferred  Genitalia: Circumcised testes epididymis and cord normal  Labs:     Recent Labs     05/27/21  1315 05/28/21  0630   WBC 7.2 7.0   RBC 2.34* 2.13*   HGB 8.1* 7.3*   HCT 25.0* 22.7*   .8* 106.6*   MCH 34.6 34.3   MCHC 32.4 32.2   RDW 15.5* 15.6*   * 113*   MPV 13.0* 12.8*         Recent Labs     05/27/21  1315 05/28/21  0630   CREATININE 1.3* 1.2         Assessment:  Jose Graves 69 y.o. male     Gross hematuria has to be assessed for the possibility of bladder carcinoma at some point    Plan:    Once the infection has been treated in the acute phase can be discharged and will scope him as an outpatient.   If he still here early next week we could consider cystoscopy possible bladder biopsy on Tuesday or Wednesday  We will get some baseline cytology and PSA    Electronically signed by Yaima Perez MD on 5/28/2021 at 3:47 PM  Problem: Pain:  Goal: Pain level will decrease  Description: Pain level will decrease  Outcome: Met This Shift     Problem: Falls - Risk of:  Goal: Will remain free from falls  Description: Will remain free from falls  Outcome: Met This Shift

## 2021-05-28 NOTE — PLAN OF CARE
Discussed patient with Dr. Jerman Trujillo he had subjective fevers and chills. Visiting nurses came and olaf blood cultures. Reportedly within about 1 day, 3/3 cultures came back for staph epi (sent to outside lab). He has PICC, endovascular graft, and bioprosthetic aortic valve. Dr. Alverto Choudhury had him come into the ED. Will start treatment for potential bloodstream infection with vancomycin. TTE. ID consult. Culture drawn peripherally and from PICC. The ED note does not capture the situation adequately. I am not worried about his 1 g/dL \"drop\" in hemoglobin and I am not worried about his \"UTI\". He is being admitted for suspected bacteremia.

## 2021-05-28 NOTE — CONSULTS
[address] [place] [address] [facility]    Consultation Note     Admit Date: 5/27/2021 12:25 PM    Reason for Consult:   reportedly 3/3 blood cultures positive for staph epi within ~1 day, has PICC and AVR    Attending Physician:  Justin Ko MD     Chief Complaint: \"TB doctor sent me\"    HISTORY OF PRESENT ILLNESS:     The patient is a 69 y.o.  male known to the Infectious Diseases service. He has a history of aortic stenosis and prosthetic AVR. He was admitted to [facility] on 1/29/2021 with superior mesenteric artery mycotic aneurysm. He was also found to have evidence of ischemic colitis and underwent total colectomy and end ileostomy with open repair of the SMA aneurysm. PCR revealed ralstonia picketti v. insidiosa. He was initially treated with Unasyn but switched to Zosyn and discharged home with IV antibiotics through 3/26/2021. He was discharged home with nasogastric tube in place. He subsequently developed nausea and vomiting and syncope and was brought to the hospital.  1/4 blood cultures at that time were positive for staph epidermidis and diphtheroids. This was felt to be contaminant. He was seen by general surgery as well as vascular surgery and ultimately decision was made to place a PICC line on 4/15 for TPN. He was discharged home and has been following with home health care. On 5/20 he presented emergency room because of hematuria. At that time he was found to have evidence of urinary tract infection and was discharged home with a 7-day course of cefdinir. Subsequent urine culture was positive for Citrobacter. Last week he had a fever and chills blood cultures were obtained. His PCP was contacted because blood cultures were positive for gram-positive cocci. Cultures were repeated and continued to be repeatedly positive for gram-positive cocci and the patient was sent to the emergency room for evaluation.     Patient denies any nausea vomiting fever chills abdominal pain shortness of breath cough or dysuria. Past Medical History:          Diagnosis Date    Abdominal aortic aneurysm without rupture ([facility] [address] 05/09/2017    Arthritis     AS (aortic stenosis)     Cerebral artery occlusion with cerebral infarction [facility]) 2009    TIA/CVA with aphasia that resolved    COPD (chronic obstructive pulmonary disease) ([facility] [address]     History of blood transfusion     Hyperlipidemia     Pneumonia     Tobacco dependence 05/09/2017     Past Surgical History:          Procedure Laterality Date    ABDOMINAL AORTIC ANEURYSM REPAIR, ENDOVASCULAR  10/12/2017    Zenith Fenestrated.   [place]    ABDOMINAL AORTIC ANEURYSM REPAIR, ENDOVASCULAR  10/12/2017    ANKLE SURGERY      AORTIC VALVE REPLACEMENT      CARDIAC CATHETERIZATION  07/14/2017    Dr. Cassidy Nicholson- No blockages    CARDIAC SURGERY      COLONOSCOPY N/A 12/21/2020    COLONOSCOPY DIAGNOSTIC performed by Ryan Quijano MD at [address] [facility] Bilateral approx 2014    cataracts removal w/lens implants    [facility]  PICC [facility] DOUBLE  4/15/2021         HERNIA REPAIR      SKULL FRACTURE ELEVATION      TOOTH EXTRACTION      full mouth extraction    UPPER GASTROINTESTINAL ENDOSCOPY N/A 12/21/2020    EGD DIAGNOSTIC ONLY performed by Ryan Quijano MD at [facility]     Current Medications:     Scheduled Meds:   aspirin  81 mg Oral Every Other Day    atorvastatin  20 mg Oral Daily    dronabinol  5 mg Oral BID AC    sodium chloride flush  5-40 mL Intravenous 2 times per day    enoxaparin  40 mg Subcutaneous Daily    vancomycin  750 mg Intravenous Q24H    heparin flush  300 Units Intracatheter BID     Continuous Infusions:   sodium chloride      sodium chloride 50 mL/hr at 05/27/21 2225     PRN Meds:perflutren lipid microspheres, ipratropium-albuterol, sodium chloride flush, sodium chloride, promethazine **OR** ondansetron, polyethylene glycol, acetaminophen **OR** acetaminophen, heparin flush    Allergies:  Patient has no known allergies. Social History:     Social History     Socioeconomic History    Marital status:      Spouse name: None    Number of children: None    Years of education: None    Highest education level: None   Occupational History    Occupation: retired-   Tobacco Use    Smoking status: Current Every Day Smoker     Packs/day: 1.00     Years: 50.00     Pack years: 50.00     Types: Cigarettes     Start date: 5/27/1970    Smokeless tobacco: Never Used    Tobacco comment: currently smokes 1.0 ppd   Vaping Use    Vaping Use: Never used   Substance and Sexual Activity    Alcohol use: No     Alcohol/week: 0.0 standard drinks    Drug use: Never    Sexual activity: Never   Other Topics Concern    None   Social History Narrative    None     Social Determinants of Health     Financial Resource Strain:     Difficulty of Paying Living Expenses:    Food Insecurity:     Worried About Running Out of Food in the Last Year:     Ran Out of Food in the Last Year:    Transportation Needs:     Lack of Transportation (Medical):      Lack of Transportation (Non-Medical):    Physical Activity:     Days of Exercise per Week:     Minutes of Exercise per Session:    Stress:     Feeling of Stress :    Social Connections:     Frequency of Communication with Friends and Family:     Frequency of Social Gatherings with Friends and Family:     Attends Oriental orthodox Services:     Active Member of Clubs or Organizations:     Attends Club or Organization Meetings:     Marital Status:    Intimate Partner Violence:     Fear of Current or Ex-Partner:     Emotionally Abused:     Physically Abused:     Sexually Abused:          Family History:         Problem Relation Age of Onset    Heart Disease Mother     Stroke Father     Heart Disease Brother      REVIEW OF SYSTEMS:      Constitutional: positive for fatigue, malaise and weight loss, negative for sweats  Eyes: negative for icterus and redness  Ears, nose, mouth, throat, and face: negative for epistaxis, hearing loss, nasal congestion, sore mouth, sore throat and tinnitus  Respiratory: negative for cough and shortness of breath  Cardiovascular: negative for chest pain and palpitations  Gastrointestinal: negative for abdominal pain and vomiting  Genitourinary:negative for dysuria and frequency  Integument/breast: negative for pruritus and rash  Hematologic/lymphatic: negative for bleeding and easy bruising  Musculoskeletal:negative for arthralgias and back pain  Neurological: negative for headaches and memory problems  Behavioral/Psych: negative for anxiety and depression  Endocrine: negative for temperature intolerance  Allergic/Immunologic: negative for anaphylaxis and angioedema    PHYSICAL EXAM:      Vitals:    BP (!) 97/55   Pulse 82   Temp 97.9 °F (36.6 °C) (Oral)   Resp 16   Ht 5' 11\" (1.803 m)   Wt 105 lb 6.4 oz (47.8 kg)   SpO2 96%   BMI 14.70 kg/m²   Constitutional: Severely cachectic, weak, confused but in no distress  Skin: Warm and dry. No rashes were noted. No jaundice. Poor turgor. HEENT: Eyes show round, and reactive pupils. Moist mucous membranes, no ulcerations, no thrush. Neck: Supple to movements. No lymphadenopathy. Chest: No use of accessory muscles to breathe. Symmetrical expansion. Auscultation reveals no wheezing, crackles, or rhonchi. Cardiovascular: Regular rate and rhythm. No murmurs appreciated. Abdomen: Positive bowel sounds to auscultation. Benign to palpation. No masses felt. No hepatosplenomegaly. Ostomy intact, site clean and dry. Extremities: No clubbing, no cyanosis, no edema.   Neurological: PICC line left upper arm-site with no erythema or tenderness        Labs:    CBC with Differential:    Lab Results   Component Value Date    WBC 7.0 05/28/2021    RBC 2.13 05/28/2021    HGB 7.3 05/28/2021    HCT 22.7 05/28/2021     05/28/2021    .6 05/28/2021    MCH 34.3 05/28/2021    MCHC 32.2 05/28/2021    RDW 15.6 05/28/2021    LYMPHOPCT 17.9 05/28/2021    MONOPCT 9.1 05/28/2021    BASOPCT 0.4 05/28/2021    MONOSABS 0.64 05/28/2021    LYMPHSABS 1.26 05/28/2021    EOSABS 0.04 05/28/2021    BASOSABS 0.03 05/28/2021     CMP:    Lab Results   Component Value Date     05/28/2021    K 4.0 05/28/2021     05/28/2021    CO2 24 05/28/2021    BUN 36 05/28/2021    CREATININE 1.2 05/28/2021    GFRAA >60 05/28/2021    LABGLOM 60 05/28/2021    GLUCOSE 72 05/28/2021    PROT 6.3 05/27/2021    LABALBU 2.6 05/27/2021    CALCIUM 7.9 05/28/2021    BILITOT 1.0 05/27/2021    ALKPHOS 616 05/27/2021    AST 45 05/27/2021    ALT 49 05/27/2021     Hepatic Function Panel:    Lab Results   Component Value Date    ALKPHOS 616 05/27/2021    ALT 49 05/27/2021    AST 45 05/27/2021    PROT 6.3 05/27/2021    BILITOT 1.0 05/27/2021    BILIDIR <0.2 05/07/2017    IBILI 0.3 05/07/2017    LABALBU 2.6 05/27/2021     U/A:    Lab Results   Component Value Date    COLORU Yellow 05/27/2021    PROTEINU Negative 05/27/2021    PHUR 7.5 05/27/2021    WBCUA NONE 05/27/2021    RBCUA 10-20 05/27/2021    YEAST Present 05/20/2021    BACTERIA RARE 05/27/2021    CLARITYU SL CLOUDY 05/27/2021    SPECGRAV 1.015 05/27/2021    LEUKOCYTESUR Negative 05/27/2021    UROBILINOGEN 0.2 05/27/2021    BILIRUBINUR Negative 05/27/2021    BLOODU LARGE 05/27/2021    GLUCOSEU Negative 05/27/2021       Recent Labs     05/27/21  1315 05/28/21  0630   WBC 7.2 7.0   HGB 8.1* 7.3*   HCT 25.0* 22.7*   .8* 106.6*   * 113*   NEUTROABS 5.36 5.03     Lab Results   Component Value Date    CRP 10.0 (H) 01/29/2021    CRP 7.4 (H) 12/19/2020     No results found for: CRPHS  Lab Results   Component Value Date    SEDRATE 72 (H) 05/27/2021    SEDRATE 47 (H) 01/29/2021    SEDRATE 45 (H) 12/19/2020     Lab Results   Component Value Date    ALT 49 (H) 05/27/2021    AST 45 (H) 05/27/2021    ALKPHOS 616 (H) 05/27/2021    BILITOT 1.0 05/27/2021     Lab Results   Component Value Date     05/28/2021    K 4.0 05/28/2021     05/28/2021    CO2 24 05/28/2021    BUN 36 05/28/2021    CREATININE 1.2 05/28/2021    GFRAA >60 05/28/2021    LABGLOM 60 05/28/2021    GLUCOSE 72 05/28/2021    PROT 6.3 05/27/2021    LABALBU 2.6 05/27/2021    CALCIUM 7.9 05/28/2021    BILITOT 1.0 05/27/2021    ALKPHOS 616 05/27/2021    AST 45 05/27/2021    ALT 49 05/27/2021       Lab Results   Component Value Date    PROTIME 11.3 05/27/2021    INR 1.0 05/27/2021       No results found for: TSH    Lab Results   Component Value Date    COLORU Yellow 05/27/2021    PHUR 7.5 05/27/2021    WBCUA NONE 05/27/2021    RBCUA 10-20 05/27/2021    YEAST Present 05/20/2021    BACTERIA RARE 05/27/2021    CLARITYU SL CLOUDY 05/27/2021    SPECGRAV 1.015 05/27/2021    LEUKOCYTESUR Negative 05/27/2021    UROBILINOGEN 0.2 05/27/2021    BILIRUBINUR Negative 05/27/2021    BLOODU LARGE 05/27/2021    GLUCOSEU Negative 05/27/2021       No results found for: Diane Kevin, PHART, THGBART, CYD3HNH, PO2ART, CIU8JAQ  Radiology:  No orders to display       Microbiology:    Blood cultures from [facility] +6/6 staph epidermidis, methicillin-resistant    1/6 blood culture positive for gram-negative rods, identification in process    Blood cultures drawn both peripheral and via PICC    Blood cultures drawn in the emergency room here in process      Blood Culture, Routine 04/10/2021  3:34 PM [address] [place] [address] Lab   5 Days no growth      Specimen Information: Blood        Component Collected Lab   Organism Abnormal  04/10/2021  3:34 PM [address] [place] [address] Lab   Staphylococcus coagulase-negative    Culture, Blood 2 04/10/2021  3:34 PM [address] [place] [address] Lab   This organism was isolated in one set. Susceptibility testing is not routinely done as this   organism frequently represents skin contamination. Additional testing can be ordered by calling the   Microbiology Department.       Citrobacter randali    Urine Culture, Routine 05/20/2021  6:26 PM [address] [facility] Lab   >100,000 CFU/ml    Testing Performed By    Lab - [address] Name Director Address Valid Date Range   [address] [place] [address]. [address] [facility] Amanda Moser  [facility]. [address] [place] [address] 12/03/19 1502-Present   Narrative  Performed by: [address] [place] [address] Lab  Source: URINE       Site: Urine Clean Catch             Susceptibility    Citrobacter randali (1)    Antibiotic Interpretation JEN Status    ceFAZolin Resistant >=^64 mcg/mL     cefepime Sensitive <=^0.12 mcg/mL     cefTRIAXone Sensitive <=^0.25 mcg/mL     ertapenem Sensitive <=^0.12 mcg/mL     gentamicin Sensitive <=^1 mcg/mL     levofloxacin Sensitive <=^0.12 mcg/mL     nitrofurantoin Sensitive <=^16 mcg/mL     piperacillin-tazobactam Sensitive <=^4 mcg/mL     trimethoprim-sulfamethoxazole Sensitive <=^20 mcg/mL         Culture, Blood 2 --Abnormal      Gram stain performed from blood culture bottle media   Gram positive cocci in clusters   Gram stain performed from blood culture bottle media   Gram positive rods Diphtheroid-like   Abnormal      Organism Staphylococcus coagulase-negativeAbnormal      Culture, Blood 2 --     This organism was isolated in one set. Susceptibility testing is not routinely done as this   organism frequently represents skin contamination. Additional testing can be ordered by calling the   Microbiology Department. Problem list:    Principal Problem:    Bacteremia  Active Problems:    Tobacco dependence    Severe protein-calorie malnutrition (HCC)    S/P AAA repair using bifurcation graft    History of aortic valve replacement with bioprosthetic valve    Gross hematuria    Chronic anemia  Resolved Problems:    * No resolved hospital problems.  *      Assessment:    · High-grade bacteremia, staph epidermidis, likely associated with PICC line  · History of AVR, need to rule out late prosthetic valve endocarditis  · Catheter associated UTI-Citrobacter, patient completed 5/7 days of therapy  · Gram-negative fidelina bacteremia, identification and process 1/6  · Need to rule out late prosthetic valve endocarditis  · Gastric outlet obstruction secondary to compression of duodenum by aortic aneurysm  · History of previous endovascular stent graft repair with a fenestrated stent graft of the AAA done in 2017    Plan:      · Continue vancomycin and meropenem pending final blood culture result  · Remove PICC line and culture tip  · Check Noble blood cultures  · Await transthoracic echocardiogram result  · Will need YONG if surface echo negative  · Discussed with Dr. Betty Hernandez  · Baseline ESR, CRP, procalcitonin  · Monitor labs  · Will follow with you    Thank you for having us see this patient in consultation. I will be discussing this case with the treating physicians.       Electronically signed by Pérez Lafleur DO on 5/28/2021 at 10:07 AM

## 2021-05-28 NOTE — PROGRESS NOTES
Initial ET Nurse  Admit Date: 5/27/2021 12:25 PM    Reason for consult: ileostomy  Significant history: TPN, aortic aneurysm, CVA, COPD, colon resection with ileostomy,  Wife present, she changes pouch. States he does get some irritation  Stoma assessment: stoma pink. liquid green/bilious effluent - will supply 2 piece high output convex system that can be attached to saldana. Sacral coccyx area intact.   Plan: lo air loss bed in place  Sacral mepilex 1100 COREEN Ornelas - CNS 5/28/2021 4:28 PM

## 2021-05-28 NOTE — PLAN OF CARE
Problem: Falls - Risk of:  Goal: Will remain free from falls  Description: Will remain free from falls  5/27/2021 2329 by Kanu Sandhu RN  Outcome: Met This Shift     Problem: Skin Integrity:  Goal: Will show no infection signs and symptoms  Description: Will show no infection signs and symptoms  Outcome: Met This Shift

## 2021-05-28 NOTE — PROGRESS NOTES
Call placed to clinical specialties answering service at [phone] to verify TPN order that pt is on at home. 9:17 PM    Spoke with clinical pharmacist - they are unable to provide TPN orders now, as no one is in the office now. They can fax TPN orders tomorrow morning. Fax number for our floor given to clinical pharmacist on call.  Someone will be in the office starting at 8 am tomorrow 9:20 PM     Spoke with Judy Lawson, APRN - fluids ordered while TPN is unavailable 9:38 PM

## 2021-05-29 NOTE — PLAN OF CARE
Problem: Pain:  Goal: Pain level will decrease  Description: Pain level will decrease  5/40/8446 2145 by Leoncio Najera RN  Outcome: Ongoing  5/28/2021 2116 by Amari Hogue RN  Outcome: Ongoing  5/28/2021 1434 by Alverto Richardson RN  Outcome: Met This Shift  Goal: Control of acute pain  Description: Control of acute pain  7/18/8543 8025 by Leoncio Najera RN  Outcome: Ongoing  5/28/2021 2116 by Amari Hogue RN  Outcome: Ongoing  Goal: Control of chronic pain  Description: Control of chronic pain  8/48/4247 8492 by Leoncio Najera RN  Outcome: Ongoing  5/28/2021 2116 by Amari Hogue RN  Outcome: Ongoing     Problem: Falls - Risk of:  Goal: Will remain free from falls  Description: Will remain free from falls  6/76/9716 8351 by Leoncio Najera RN  Outcome: Ongoing  5/28/2021 2116 by Amari Hogue RN  Outcome: Ongoing  5/28/2021 1434 by Alverto Richardson RN  Outcome: Met This Shift  Goal: Absence of physical injury  Description: Absence of physical injury  2/57/7690 3538 by Leoncio Najera RN  Outcome: Ongoing  5/28/2021 2116 by Amari Hogue RN  Outcome: Ongoing     Problem: INFECTION  Goal: Absence of urinary tract infection signs and symptoms  Description: Absence of urinary tract infection signs and symptoms  7/93/5252 4325 by Leoncio Najera RN  Outcome: Ongoing  5/28/2021 2116 by Amari Hogue RN  Outcome: Ongoing     Problem: HEMODYNAMIC STATUS  Goal: Hemoglobin within specified parameters  5/45/6692 6835 by Leoncio Najera RN  Outcome: Ongoing  5/28/2021 2116 by Amari Hogue RN  Outcome: Ongoing     Problem: Nutrition Deficit:  Goal: Nutritional status is improving  0/56/3914 6478 by Leoncio Najera RN  Outcome: Ongoing  5/28/2021 2116 by Amari Hogue RN  Outcome: Ongoing     Problem: Skin Integrity:  Goal: Will show no infection signs and symptoms  Description: Will show no infection signs and symptoms  0/89/0357 5903 by Leoncio Najera RN  Outcome:

## 2021-05-29 NOTE — PROGRESS NOTES
Assessment and Plan       ASSESMENT:  70 y/o M gross hematuria    PLAN:  Pt has had a negative work up in the past.   Negative cystoscopy and retrograde with me. Negative cytology in October. Will save urine to eval.   Will follow.        Demar Mantilla MD  [facility]  5/29/2021  12:45 PM

## 2021-05-29 NOTE — PROGRESS NOTES
sounds to auscultation. Benign to palpation. Soft, nondistended. Ostomy appliance with liquid stool   Extremities: No edema. Muscle wasting. Lines: peripheral    Laboratory and Tests Review:  Lab Results   Component Value Date    WBC 5.5 05/29/2021    WBC 7.0 05/28/2021    WBC 7.2 05/27/2021    HGB 7.5 (L) 05/29/2021    HCT 24.4 (L) 05/29/2021    .5 (H) 05/29/2021     05/29/2021     Lab Results   Component Value Date    NEUTROABS 3.61 05/29/2021    NEUTROABS 5.03 05/28/2021    NEUTROABS 5.36 05/27/2021     No results found for: [facility]  Lab Results   Component Value Date    ALT 49 (H) 05/27/2021    AST 45 (H) 05/27/2021    ALKPHOS 616 (H) 05/27/2021    BILITOT 1.0 05/27/2021     Lab Results   Component Value Date     05/28/2021    K 4.0 05/28/2021     05/28/2021    CO2 24 05/28/2021    BUN 36 05/28/2021    CREATININE 1.2 05/28/2021    CREATININE 1.3 05/27/2021    CREATININE 1.4 05/20/2021    GFRAA >60 05/28/2021    LABGLOM 60 05/28/2021    GLUCOSE 72 05/28/2021    PROT 6.3 05/27/2021    LABALBU 2.6 05/27/2021    CALCIUM 7.9 05/28/2021    BILITOT 1.0 05/27/2021    ALKPHOS 616 05/27/2021    AST 45 05/27/2021    ALT 49 05/27/2021     Lab Results   Component Value Date    CRP 4.6 (H) 05/28/2021    CRP 10.0 (H) 01/29/2021    CRP 7.4 (H) 12/19/2020     Lab Results   Component Value Date    SEDRATE 72 (H) 05/27/2021    SEDRATE 47 (H) 01/29/2021    SEDRATE 45 (H) 12/19/2020     Radiology:  2D echo  Left ventricular internal dimensions were normal in diastole and systole. No regional wall motion abnormalities seen. Normal left ventricular ejection fraction. The left atrium is mildly dilated. Mildly enlarged right atrium size. Mild mitral annular calcification. Mild centrally directed mitral regurgitation. Mild aortic stenosis. Mild tricuspid regurgitation. Pulmonary hypertension is mild .     Microbiology:   5/28/21 Blood cx no growth  PICC tip cx GNR, GPOs  Blood cx from [facility]: 5/26/21 blood cx Citrobacter, MR staph epidermidis - + one set from line, one peripheral           ASSESSMENT:  · High-grade bacteremia, staph epidermidis, likely associated with PICC line  · History of AVR, need to rule out late prosthetic valve endocarditis  · Catheter associated UTI-Citrobacter, patient completed 5/7 days of therapy  · Gram-negative fidelina bacteremia, identification and process 1/6  · Need to rule out late prosthetic valve endocarditis  · Gastric outlet obstruction secondary to compression of duodenum by aortic aneurysm  · History of previous endovascular stent graft repair with a fenestrated stent graft of the AAA done in 2017     Plan:       · Continue vancomycin and meropenem pending final blood culture result  · F/u cx  · vanco trough 5/30  · Will need YONG to evaluate AVR  · Monitor labs  · Will follow with you    COREEN Walker - CNP  11:15 AM  5/29/2021     Patient seen and examined. I had a face to face encounter with the patient. Agree with exam.  Assessment and plan as outlined above and directed by me. Addition and corrections were done as deemed appropriate. My exam and plan include: The patient is tolerating antibiotics. Continue Vancomycin and Meropenem while awaiting for final sensitivities on Citrobacter. Once we have cleared the blood infection he will need another central line for TPN. A tunneled catheter would be preferable. Discussed with Dr. Ba Stanley.     Ashley Faust MD  5/29/2021  12:13 PM

## 2021-05-29 NOTE — PROGRESS NOTES
Pharmacy Consultation Note  (Antibiotic Dosing and Monitoring)    Initial consult date: 5/27  Consulting physician: Sharif Schwab  Drug: Vancomycin  Indication: Bloodstream infection    Age/  Gender Height Weight IBW  Allergy Information   70 y.o./male 5' 11\" (180.3 cm) 95 lb (43.1 kg)     Male patients must weigh at least 50 kg to calculate ideal body weight   Patient has no known allergies. Renal Function:  Recent Labs     05/27/21  1315 05/28/21  0630   BUN 46* 36*   CREATININE 1.3* 1.2       Intake/Output Summary (Last 24 hours) at 5/29/2021 1107  Last data filed at 5/29/2021 1005  Gross per 24 hour   Intake --   Output 1245 ml   Net -1245 ml       Vancomycin Monitoring:      Vancomycin Administration Times:  Recent vancomycin administrations                   vancomycin (VANCOCIN) 750 mg in dextrose 5 % 250 mL IVPB (mg) 750 mg New Bag 05/28/21 1221    vancomycin 1000 mg IVPB in 250 mL D5W addavial (mg) 1,000 mg New Bag 05/27/21 1731                Assessment:  · Patient is a 69 y.o. male who has been initiated on vancomycin  CrCl cannot be calculated (Unknown ideal weight. ).   · Goal AUC/JEN = 400-600; goal trough level = 10-20 mcg/mL    Plan:  · Will continue vancomycin 750 IV every 24 hours  · Will check vancomycin levels when appropriate  · Will continue to monitor renal function   · Clinical pharmacy to follow      Tomasa Vasques [facility] [place] 5/29/2021 11:07 AM

## 2021-05-29 NOTE — PROGRESS NOTES
Subjective:  Feeling better   No CP or SOB  No fever or chills   No uncontrolled pain  No vomiting or diarrhea     Objective:    BP (!) 99/58   Pulse 76   Temp 98.3 °F (36.8 °C) (Oral)   Resp 18   Ht 5' 11\" (1.803 m)   Wt 103 lb 14.4 oz (47.1 kg)   SpO2 99%   BMI 14.49 kg/m²     24HR INTAKE/OUTPUT:      Intake/Output Summary (Last 24 hours) at 5/29/2021 0854  Last data filed at 5/29/2021 0522  Gross per 24 hour   Intake --   Output 945 ml   Net -945 ml       General appearance: Cachectic NAD, conversant  Neck: FROM, supple   Lungs: Clear bilaterally no wheezes, no rhonchi, no crackles  CV: RRR, no MRGs; normal carotid upstroke and amplitude without Bruits  Abdomen: Soft, non-tender; no masses or HSM  Extremities: No edema, no cyanosis, no clubbing  Skin: Intact no rash, no lesions, no ulcers    Psych: Alert and oriented normal affect  Neuro: Nonfocal  Most Recent Labs  Lab Results   Component Value Date    WBC 5.5 05/29/2021    HGB 7.5 (L) 05/29/2021    HCT 24.4 (L) 05/29/2021     05/29/2021     05/28/2021    K 4.0 05/28/2021     05/28/2021    CREATININE 1.2 05/28/2021    BUN 36 (H) 05/28/2021    CO2 24 05/28/2021    GLUCOSE 72 (L) 05/28/2021    ALT 49 (H) 05/27/2021    AST 45 (H) 05/27/2021    INR 1.0 05/27/2021    LABA1C 5.4 07/26/2017     Recent Labs     05/28/21  0630   MG 1.8     Lab Results   Component Value Date    CALCIUM 7.9 (L) 05/28/2021    PHOS 3.5 05/28/2021        No orders to display       Assessment    Principal Problem:    Bacteremia  Active Problems:    Tobacco dependence    Severe protein-calorie malnutrition (HCC)    S/P AAA repair using bifurcation graft    History of aortic valve replacement with bioprosthetic valve    Gross hematuria    Chronic anemia  Resolved Problems:    * No resolved hospital problems.  *      Plan:     67-year-old male history of AS status post AVR AAA status post endovascular repair admitted with positive blood cultures    Bacteremia/CLABSI  Vancomycin/meropenem  Repeat blood cultures  PICC line discontinued  -culture PICC tip +  Echocardiogram normal EF, no vegetations  Plan for YONG  Procalcitonin 5.77<8.99  ID Consult appreciated-discussed case    Severe protein calorie malnutrition   Dietitian consult  Hold TPN pending central access  Start PPN  For liquid diet  Nutrition supplements    Gastric outlet obstruction  Secondary to aortic aneurysm causing compression of the duodenum  Plan to resume TPN    Gross hematuria  Urology Consult appreciated    Anemia  Macrocytic  Iron studies reviewed, monitor hemoglobin  B12 folate adequate   monitor hemoglobin  Medications for other co morbidities cont as appropriate w dosage adjustments as necessary  PT/OT  DVT PPx  DC planning    Electronically signed by Ashley Garduno MD on 5/29/2021 at 8:54 AM

## 2021-05-30 NOTE — PROGRESS NOTES
Pharmacy Consultation Note  (Antibiotic Dosing and Monitoring)    Initial consult date:   Consulting physician: Piotr Valle  Drug: Vancomycin  Indication: Bloodstream infection    Age/  Gender Height Weight IBW  Allergy Information   70 y.o./male 5' 11\" (180.3 cm) 95 lb (43.1 kg)     Male patients must weigh at least 50 kg to calculate ideal body weight   Patient has no known allergies. Renal Function:  Recent Labs     21  1315 21  0630   BUN 46* 36*   CREATININE 1.3* 1.2       Intake/Output Summary (Last 24 hours) at 2021 1248  Last data filed at 2021 0759  Gross per 24 hour   Intake 1464 ml   Output 1995 ml   Net -531 ml       Vancomycin Monitorin/30 @ 1137 Trough: 16.9 mcg/mL     Vancomycin Administration Times:  Recent vancomycin administrations                   vancomycin (VANCOCIN) 750 mg in dextrose 5 % 250 mL IVPB (mg) 750 mg New Bag 21 1226     750 mg New Bag 21 1216     750 mg New Bag 21 1221    vancomycin 1000 mg IVPB in 250 mL D5W addavial (mg) 1,000 mg New Bag 21 1731              Assessment:  · Patient is a 69 y.o. male who has been initiated on vancomycin  CrCl cannot be calculated (Unknown ideal weight. ).   · Goal AUC/JEN = 400-600; goal trough level = 10-20 mcg/mL    Plan:  · Will continue vancomycin 500 IV every 24 hours (AUC/)  · Will check vancomycin levels when appropriate  · Will continue to monitor renal function   · Clinical pharmacy to follow      Alben Current, CANCHOLA [facility] 2021 12:48 PM

## 2021-05-30 NOTE — PROGRESS NOTES
cultures    Bacteremia/CLABSI  Vancomycin/meropenem  Repeat blood cultures  PICC line discontinued  -culture PICC tip +  Echocardiogram normal EF, no vegetations  Plan for YONG  Procalcitonin 5.77<8.99  ID Consult appreciated-discussed case    Severe protein calorie malnutrition   Dietitian consult  Hold TPN pending central access  IVF      Gastric outlet obstruction-- vomiting and abd pain today  NPO  Secondary to aortic aneurysm causing compression of the duodenum  Plan to resume TPN, will need tunneled catheter when OK ID  Gen Surg cs    Gross hematuria  Urology Consult appreciated    Anemia  monitor hemoglobin  Macrocytic  Iron studies reviewed,   B12 folate adequate   monitor hemoglobin    Medications for other co morbidities cont as appropriate w dosage adjustments as necessary  PT/OT  DVT PPx  DC planning    Electronically signed by Hernandez Villalba MD on 5/30/2021 at 7:25 AM

## 2021-05-30 NOTE — PLAN OF CARE
Problem: Pain:  Goal: Pain level will decrease  Description: Pain level will decrease  Outcome: Met This Shift  Goal: Control of acute pain  Description: Control of acute pain  Outcome: Met This Shift  Goal: Control of chronic pain  Description: Control of chronic pain  Outcome: Met This Shift     Problem: Falls - Risk of:  Goal: Will remain free from falls  Description: Will remain free from falls  Outcome: Met This Shift  Goal: Absence of physical injury  Description: Absence of physical injury  Outcome: Met This Shift     Problem: INFECTION  Goal: Absence of urinary tract infection signs and symptoms  Description: Absence of urinary tract infection signs and symptoms  Outcome: Met This Shift     Problem: HEMODYNAMIC STATUS  Goal: Hemoglobin within specified parameters  Outcome: Met This Shift     Problem: Nutrition Deficit:  Goal: Nutritional status is improving  Outcome: Met This Shift     Problem: Skin Integrity:  Goal: Will show no infection signs and symptoms  Description: Will show no infection signs and symptoms  Outcome: Met This Shift  Goal: Absence of new skin breakdown  Description: Absence of new skin breakdown  Outcome: Met This Shift

## 2021-05-30 NOTE — PLAN OF CARE
Problem: Pain:  Description: Pain management should include both nonpharmacologic and pharmacologic interventions.   Goal: Pain level will decrease  Description: Pain level will decrease  5/29/2021 2202 by Uzma Garcia RN  Outcome: Ongoing  5/29/2021 2201 by Uzma Garcia RN  Outcome: Ongoing  5/29/2021 1703 by Eamon Sena RN  Outcome: Met This Shift  Goal: Control of acute pain  Description: Control of acute pain  5/29/2021 2202 by Uzma Garcia RN  Outcome: Ongoing  5/29/2021 2201 by Uzma Garcia RN  Outcome: Ongoing  5/29/2021 1703 by Eamon Sena RN  Outcome: Met This Shift  Goal: Control of chronic pain  Description: Control of chronic pain  5/29/2021 2202 by Uzma Garcia RN  Outcome: Ongoing  5/29/2021 2201 by Uzma Garcia RN  Outcome: Ongoing

## 2021-05-30 NOTE — PROGRESS NOTES
[facility]  (Sd/ Elsa[facility])      PROGRESS NOTE         PATIENT NAME: Kasia Rhodes   YOB: 1950  ADMISSION DATE: 5/27/2021 12:25 PM   TODAY'S DATE: 5/30/2021        Subjective         No issues urine yellow    Objective     VS:   /66   Pulse 103   Temp 98.6 °F (37 °C) (Oral)   Resp 18   Ht 5' 11\" (1.803 m)   Wt 103 lb 14.4 oz (47.1 kg)   SpO2 96%   BMI 14.49 kg/m²     I & O - 24hr:    Intake/Output Summary (Last 24 hours) at 5/30/2021 1143  Last data filed at 5/30/2021 0759  Gross per 24 hour   Intake 1584 ml   Output 1995 ml   Net -411 ml         Physical Exam:  General:  Neck:  Resp:  Abdomen:   No acute distress  Supple  Normal effort  Soft, non-tender, nondistended   :  defer   Skin: Skin color, texture, turgor normal, no rashes or lesions       Labs and Imaging Studies   Labs:    CBC:   Recent Labs     05/28/21  0630 05/29/21  0505 05/30/21  0425   WBC 7.0 5.5 5.1   HGB 7.3* 7.5* 8.2*   HCT 22.7* 24.4* 25.7*   .6* 107.5* 105.3*   * 136 158     BMP:  Recent Labs     05/27/21  1315 05/28/21  0630    136   K 4.6 4.0    104   CO2 26 24   PHOS  --  3.5   BUN 46* 36*   CREATININE 1.3* 1.2       Magnesium:   Lab Results   Component Value Date    MG 1.8 05/28/2021      Phosphate:   Lab Results   Component Value Date    PHOS 3.5 05/28/2021     PT/INR:   Recent Labs     05/27/21  1315   PROTIME 11.3   INR 1.0       U/A:   Lab Results   Component Value Date    LEUKOCYTESUR Negative 05/27/2021    PHUR 7.5 05/27/2021    WBCUA NONE 05/27/2021    RBCUA 10-20 05/27/2021    BACTERIA RARE 05/27/2021    SPECGRAV 1.015 05/27/2021    BLOODU LARGE 05/27/2021    GLUCOSEU Negative 05/27/2021       Urine Culture:       Component Value Date/Time    LABURIN >100,000 CFU/ml 05/20/2021 1826        Blood Culture:     Imaging Studies:          Assessment and Plan       ASSESMENT:  70 y/o M gross hematuria     PLAN:  Pt has had a negative work up in the past.   Negative cystoscopy and retrograde with me. Negative cytology in October. Will save urine to eval.   Follow up as outpatient.        Merlyn Shin MD  [facility]  5/30/2021  11:43 AM

## 2021-05-30 NOTE — PROGRESS NOTES
Call placed to Dr. Lillie Ceron regarding patient throwing up CT contrast, ok to obtain CT without oral contrast. CT notified.      Electronically signed by Kami Joy RN on 5/30/2021 at 1:56 PM Is This A New Presentation, Or A Follow-Up?: Skin Lesions

## 2021-05-30 NOTE — PROGRESS NOTES
Spoke with Dr. Padmaja Whitt regarding consult, recommends getting TPN restarted, but will see patient today. Electronically signed by Chi Tavarez RN on 5/30/2021 at 11:40 AM    Spoke with April at nurses station regarding PPI, message left with Dr. Jessica Benavides regarding PPI and recommendation for TPN restart, will await response.      Electronically signed by Chi Tavarez RN on 5/30/2021 at 11:47 AM

## 2021-05-30 NOTE — PROGRESS NOTES
Vancomycin/meropenem  Repeat blood cultures  PICC line discontinued  culture PICC tip +  Echocardiogram normal EF, no vegetations  Plan for YONG  Procalcitonin 5.77<8.99  ID Consult appreciateddiscussed case    Severe protein calorie malnutrition   Dietitian consult  Hold TPN pending central access  IVF    Gastric outlet obstruction-- vomiting and abd pain today  NPO  Secondary to aortic aneurysm causing compression of the duodenum  Plan to resume TPN, will need tunneled catheter when OK ID  Gen Surg cs    Gross hematuria  clearing monitor  Urology Consult appreciated    Anemia  monitor hemoglobin  Macrocytic  Iron studies reviewed,   B12 folate adequate   monitor hemoglobin    Medications for other co morbidities cont as appropriate w dosage adjustments as necessary  PT/OT  DVT PPx  DC planning    Electronically signed by Werner Bermudez MD on 5/30/2021 at 10:45 AM

## 2021-05-30 NOTE — PROGRESS NOTES
[address] [place] [address] [facility]  Progress Note    SUBJECTIVE:  Chief Complaint   Patient presents with    Abnormal Lab     Pt states was called by PCP to notify of abnormal labs and sent to ED. Unsure of which was affected. Pt denies symptoms. Patient is tolerating medications. No reported adverse drug reactions. C/o heartburn, n/v. Denies pain. Stool per ostomy. Review of systems:  As stated above in the chief complaint, otherwise negative. Medications:  Scheduled Meds:   IV infusion builder   Intravenous Daily    And    dextrose 5% and 0.45% NaCl with KCl 20 mEq   Intravenous Daily    meropenem  1,000 mg Intravenous Q12H    aspirin  81 mg Oral Every Other Day    atorvastatin  20 mg Oral Daily    dronabinol  5 mg Oral BID AC    sodium chloride flush  5-40 mL Intravenous 2 times per day    enoxaparin  40 mg Subcutaneous Daily    vancomycin  750 mg Intravenous Q24H    heparin flush  300 Units Intracatheter BID     Continuous Infusions:   sodium chloride Stopped (21 0625)    sodium chloride       PRN Meds:prochlorperazine, perflutren lipid microspheres, ipratropium-albuterol, sodium chloride flush, sodium chloride, promethazine **OR** [DISCONTINUED] ondansetron, polyethylene glycol, acetaminophen **OR** acetaminophen, heparin flush    OBJECTIVE:  /66   Pulse 103   Temp 98.6 °F (37 °C) (Oral)   Resp 18   Ht 5' 11\" (1.803 m)   Wt 103 lb 14.4 oz (47.1 kg)   SpO2 96%   BMI 14.49 kg/m²   Temp  Av.2 °F (36.8 °C)  Min: 97.8 °F (36.6 °C)  Max: 98.6 °F (37 °C)  Constitutional: The patient is awake, alert, and oriented. Lying in bed in NAD. cachexic   Skin: Warm and dry. No rashes were noted. HEENT: Round and reactive pupils. Moist mucous membranes. No ulcerations or thrush. Neck: Supple to movements. Chest: No use of accessory muscles to breathe. Symmetrical expansion. No wheezing, crackles or rhonchi.   Cardiovascular: S1 and S2 are rhythmic and regular. No murmurs appreciated. Abdomen: Positive bowel sounds to auscultation. Benign to palpation. Soft, nondistended. Ostomy appliance with liquid stool   Extremities: No edema. Muscle wasting. Lines: peripheral    Laboratory and Tests Review:  Lab Results   Component Value Date    WBC 5.1 05/30/2021    WBC 5.5 05/29/2021    WBC 7.0 05/28/2021    HGB 8.2 (L) 05/30/2021    HCT 25.7 (L) 05/30/2021    .3 (H) 05/30/2021     05/30/2021     Lab Results   Component Value Date    NEUTROABS 3.01 05/30/2021    NEUTROABS 3.61 05/29/2021    NEUTROABS 5.03 05/28/2021     No results found for: [facility]  Lab Results   Component Value Date    ALT 49 (H) 05/27/2021    AST 45 (H) 05/27/2021    ALKPHOS 616 (H) 05/27/2021    BILITOT 1.0 05/27/2021     Lab Results   Component Value Date     05/28/2021    K 4.0 05/28/2021     05/28/2021    CO2 24 05/28/2021    BUN 36 05/28/2021    CREATININE 1.2 05/28/2021    CREATININE 1.3 05/27/2021    CREATININE 1.4 05/20/2021    GFRAA >60 05/28/2021    LABGLOM 60 05/28/2021    GLUCOSE 72 05/28/2021    PROT 6.3 05/27/2021    LABALBU 2.6 05/27/2021    CALCIUM 7.9 05/28/2021    BILITOT 1.0 05/27/2021    ALKPHOS 616 05/27/2021    AST 45 05/27/2021    ALT 49 05/27/2021     Lab Results   Component Value Date    CRP 4.6 (H) 05/28/2021    CRP 10.0 (H) 01/29/2021    CRP 7.4 (H) 12/19/2020     Lab Results   Component Value Date    SEDRATE 72 (H) 05/27/2021    SEDRATE 47 (H) 01/29/2021    SEDRATE 45 (H) 12/19/2020     Radiology:  2D echo  Left ventricular internal dimensions were normal in diastole and systole. No regional wall motion abnormalities seen. Normal left ventricular ejection fraction. The left atrium is mildly dilated. Mildly enlarged right atrium size. Mild mitral annular calcification. Mild centrally directed mitral regurgitation. Mild aortic stenosis. Mild tricuspid regurgitation. Pulmonary hypertension is mild .     Microbiology:   5/28/21 Blood cx no growth  PICC tip cx GNR, GPOs  Blood cx from [facility]: 5/26/21 blood cx Citrobacter, MR staph epidermidis - + one set from line, one peripheral. To be faxed           ASSESSMENT:  · High-grade bacteremia, staph epidermidis, likely associated with PICC line  · History of AVR, need to rule out late prosthetic valve endocarditis  · Catheter associated UTI-Citrobacter, patient completed 5/7 days of therapy  · Gram-negative fidelina bacteremia, identification and process 1/6  · Need to rule out late prosthetic valve endocarditis  · Gastric outlet obstruction secondary to compression of duodenum by aortic aneurysm  · History of previous endovascular stent graft repair with a fenestrated stent graft of the AAA done in 2017     Plan:       · Continue vancomycin and meropenem pending final blood culture result - called kwame ortiz final cx results  · F/u cx  · vanco trough 5/30 - pharmacy dosing  · Will need YONG to evaluate AVR   · Attending to be contacted for ? PPI  · Monitor labs  · Recommend tunneled cath for continued TPN   · Will follow with you    Mimi Petersen, COREEN - CNP  11:15 AM  5/30/2021       Patient seen and examined. I had a face to face encounter with the patient. Agree with exam.  Assessment and plan as outlined above and directed by me. Addition and corrections were done as deemed appropriate. My exam and plan include: The patient is tolerating antibiotics. Repeat cultures here are negative. He can have a tunneled catheter anytime this coming week.     Renato Martines MD  5/30/2021  12:02 PM

## 2021-05-30 NOTE — PROGRESS NOTES
Message sent to Dr. Rylie Kirby regarding dressing changes for home, will await response. No need for HHC.  Follow up in 1 week, NWB to foot

## 2021-05-30 NOTE — CONSULTS
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 The patient underwent repair of the aneurysm. The patient also was found to have ischemic bowel. The patient ended up with a total colectomy with end ileostomy. The patient has had issues with poor nutrition. The patient had a PICC line placed and is receiving TPN. Now, the patient complains of some lower abdominal pain. The patient states that this corresponds to an area for which he received an injection. Past Medical History:   Diagnosis Date    Abdominal aortic aneurysm without rupture ([facility] 05/09/2017    Arthritis     AS (aortic stenosis)     Cerebral artery occlusion with cerebral infarction [facility]) 2009    TIA/CVA with aphasia that resolved    COPD (chronic obstructive pulmonary disease) ([facility]     History of blood transfusion     Hyperlipidemia     Pneumonia     Tobacco dependence 05/09/2017      Past Surgical History:   Procedure Laterality Date    ABDOMINAL AORTIC ANEURYSM REPAIR, ENDOVASCULAR  10/12/2017    Zenith Fenestrated.   [place]    ABDOMINAL AORTIC ANEURYSM REPAIR, ENDOVASCULAR  10/12/2017    ANKLE SURGERY      AORTIC VALVE REPLACEMENT      CARDIAC CATHETERIZATION  07/14/2017    Dr. Joshua Yen- No blockages    CARDIAC SURGERY      COLONOSCOPY N/A 12/21/2020    COLONOSCOPY DIAGNOSTIC performed by Isaiah Sneed MD at [place] [address]. Bilateral approx 2014    cataracts removal w/lens implants    [facility].  PICC [address] DOUBLE  4/15/2021         HERNIA REPAIR      SKULL FRACTURE ELEVATION      TOOTH EXTRACTION      full mouth extraction    UPPER GASTROINTESTINAL ENDOSCOPY N/A 12/21/2020    EGD DIAGNOSTIC ONLY performed by Isaiah Sneed MD at [address] [place] [address] Allergies: Patient has no known allergies.      Current Facility-Administered Medications   Medication Dose Route Frequency Provider Last Rate Last Admin    adult multi-vitamin with vitamin k 10 mL, potassium chloride 20 mEq in dextrose 5 % and 0.45 % NaCl 1,000 mL infusion   Intravenous Daily Ryder Bush MD 83.3 mL/hr at 05/30/21 0903 New Bag at 05/30/21 0903    And    dextrose 5 % and 0.45 % NaCl with KCl 20 mEq infusion   Intravenous Daily Ryder Bush MD        prochlorperazine (COMPAZINE) injection 5 mg  5 mg Intravenous Q6H PRN Ryder Bush MD        [START ON 5/31/2021] vancomycin (VANCOCIN) 500 mg in sodium chloride 0.9 % 100 mL IVPB  500 mg Intravenous Q24H Lucila Juarez MD        meropenem (MERREM) 1,000 mg in sodium chloride 0.9 % 100 mL IVPB (mini-bag)  1,000 mg Intravenous Q12H Rockya DO Kristina   Stopped at 05/30/21 0540    makeda-----post meropenem flush   Intravenous Q12H Cheko Acevedo DO   Stopped at 05/30/21 1586    perflutren lipid microspheres (DEFINITY) injection 1.65 mg  1.5 mL Intravenous ONCE PRN Lucila Juarez MD        aspirin EC tablet 81 mg  81 mg Oral Every Other Day Lucila Juarez MD   81 mg at 05/29/21 1001    atorvastatin (LIPITOR) tablet 20 mg  20 mg Oral Daily Lucila Juarez MD   20 mg at 05/30/21 0756    dronabinol (MARINOL) capsule 5 mg  5 mg Oral BID AC Lucila Juarez MD   5 mg at 05/30/21 1389    ipratropium-albuterol (DUONEB) nebulizer solution 3 mL  1 vial Inhalation Q4H PRN Lucila Juarez MD        sodium chloride flush 0.9 % injection 5-40 mL  5-40 mL Intravenous 2 times per day Lucila Juarez MD   10 mL at 05/30/21 0756    sodium chloride flush 0.9 % injection 5-40 mL  5-40 mL Intravenous PRN Lucila Juarez MD        0.9 % sodium chloride infusion  25 mL Intravenous PRN Lucila Juarez MD        enoxaparin (LOVENOX) injection 40 mg  40 mg Subcutaneous Daily Lucila Juarez MD   40 mg at 05/30/21 0759    promethazine (PHENERGAN) tablet 12.5 mg 12.5 mg Oral Q6H PRN Ramila Mcmullen MD        polyethylene glycol [facility]) packet 17 g  17 g Oral Daily PRN Ramila Mcmullen MD        acetaminophen (TYLENOL) tablet 650 mg  650 mg Oral Q6H PRN Ramila Mcmullen MD        Or   [employer] acetaminophen (TYLENOL) suppository 650 mg  650 mg Rectal Q6H PRN Ramila Mcmullen MD        heparin flush 100 UNIT/ML injection 300 Units  300 Units Intracatheter PRN Ramila Mcmullen MD        heparin flush 100 UNIT/ML injection 300 Units  300 Units Intracatheter BID Ramila Mcmullen MD   300 Units at 05/30/21 8762       Social History     Tobacco Use    Smoking status: Current Every Day Smoker     Packs/day: 1.00     Years: 50.00     Pack years: 50.00     Types: Cigarettes     Start date: 5/27/1970    Smokeless tobacco: Never Used    Tobacco comment: currently smokes 1.0 ppd   Substance Use Topics    Alcohol use: No     Alcohol/week: 0.0 standard drinks        Labs:  Lab Results   Component Value Date    WBC 5.1 05/30/2021    HCT 25.7 (L) 05/30/2021    HGB 8.2 (L) 05/30/2021     05/30/2021      Lab Results   Component Value Date     05/28/2021    K 4.0 05/28/2021     05/28/2021    CO2 24 05/28/2021    BUN 36 (H) 05/28/2021    CREATININE 1.2 05/28/2021    GLUCOSE 72 (L) 05/28/2021     Lab Results   Component Value Date    BILIDIR <0.2 05/07/2017    AST 45 (H) 05/27/2021    ALT 49 (H) 05/27/2021    ALKPHOS 616 (H) 05/27/2021    PROT 6.3 (L) 05/27/2021    LIPASE 91 (H) 04/10/2021        Review of Systems:    Other than stated above in the HPI is negative      Physical exam: /66   Pulse 103   Temp 98.6 °F (37 °C) (Oral)   Resp 18   Ht 5' 11\" (1.803 m)   Wt 103 lb 14.4 oz (47.1 kg)   SpO2 96%   BMI 14.49 kg/m²     General appearance: no acute distress  Head: NCAT, PERRLA, EOMI  Neck: supple, no masses  Lungs: CTABL  Heart: RRR  Abdomen: soft, nondistended, tender in the right lower quadrant. Again, the ileostomy is present. , normotympanic, no guarding, no peritoneal signs.  Extremities: no rash cyanosis edema or jaundice    Assessment:    Lower quadrant abdominal tenderness, nausea with vomiting    Plan:    Repeat the CT of the abdomen and pelvis to rule out a abdominal wall hematoma.     Kimberly Diaz MD,MD 5/30/2021 at 12:49 PM

## 2021-05-31 NOTE — PLAN OF CARE
Problem: Pain:  Goal: Pain level will decrease  Description: Pain level will decrease  5/31/2021 0651 by Enrique Shook RN  Outcome: Met This Shift  5/30/2021 1834 by Johanne Mehta RN  Outcome: Met This Shift  Goal: Control of acute pain  Description: Control of acute pain  5/31/2021 0651 by Enrique Shook RN  Outcome: Met This Shift  5/30/2021 1834 by Johanne Mehta RN  Outcome: Met This Shift  Goal: Control of chronic pain  Description: Control of chronic pain  5/31/2021 0651 by Enrique Shook RN  Outcome: Met This Shift  5/30/2021 1834 by Johanne Mehta RN  Outcome: Met This Shift     Problem: Falls - Risk of:  Goal: Will remain free from falls  Description: Will remain free from falls  5/31/2021 0651 by Enrique Shook RN  Outcome: Met This Shift  5/30/2021 1834 by Jhoanne Mehta RN  Outcome: Met This Shift  Goal: Absence of physical injury  Description: Absence of physical injury  5/31/2021 0651 by Enrique Shook RN  Outcome: Met This Shift  5/30/2021 1834 by Johanne Mehta RN  Outcome: Met This Shift     Problem: INFECTION  Goal: Absence of urinary tract infection signs and symptoms  Description: Absence of urinary tract infection signs and symptoms  5/31/2021 0651 by Enrique Shook RN  Outcome: Met This Shift  5/30/2021 1834 by Johanne Mehta RN  Outcome: Met This Shift     Problem: HEMODYNAMIC STATUS  Goal: Hemoglobin within specified parameters  5/31/2021 0651 by Enrique Shook RN  Outcome: Met This Shift  5/30/2021 1834 by Johanne Mehta RN  Outcome: Met This Shift     Problem: Nutrition Deficit:  Goal: Nutritional status is improving  5/31/2021 0651 by Enrique Shook RN  Outcome: Met This Shift  5/30/2021 1834 by Johanne Mehta RN  Outcome: Met This Shift     Problem: Skin Integrity:  Goal: Will show no infection signs and symptoms  Description: Will show no infection signs and symptoms  5/31/2021 0651 by Enrique Shook RN  Outcome: Met This Shift  5/30/2021 1834 by Julio Rodriguez RN  Outcome: Met This Shift  Goal: Absence of new skin breakdown  Description: Absence of new skin breakdown  5/31/2021 0651 by Shekhar Sheehan RN  Outcome: Met This Shift  5/30/2021 1834 by Julio Rodriguez RN  Outcome: Met This Shift

## 2021-05-31 NOTE — PROGRESS NOTES
Dr Kylie Kim called for orders on patient. Strictly NPO order given. Heparin ok but do not put it in his abdominal wall.

## 2021-05-31 NOTE — PROGRESS NOTES
Progress note: The patient is having coffee-ground emesis. The patient is afebrile. The patient remains slightly tachycardic with a pulse rate of 107. The patient has a blood pressure 107/72. Examination:  The abdomen is softly distended and minimally tender with no guarding or rebound. The patient has associated tympany. The ileostomy has minimal function. Assessment:    Possible small bowel obstruction    Plan:    Placement of nasogastric tube for decompression  Monitor the patient's clinical status with serial physical examinations and likely, a contrasted CT when appropriately decompressed  Possible transfer back to [facility] where the patient had most of his recent surgical procedures performed.

## 2021-05-31 NOTE — PROGRESS NOTES
Pharmacy Consultation Note  (Antibiotic Dosing and Monitoring)    Initial consult date:   Consulting physician: Sue Gage  Drug: Vancomycin  Indication: Bloodstream infection    Age/  Gender Height Weight IBW  Allergy Information   70 y.o./male 5' 11\" (180.3 cm) 95 lb (43.1 kg)     Male patients must weigh at least 50 kg to calculate ideal body weight   Patient has no known allergies. Renal Function:  Recent Labs     21  0354   BUN 38*   CREATININE 3.1*       Intake/Output Summary (Last 24 hours) at 2021 0946  Last data filed at 2021 0534  Gross per 24 hour   Intake --   Output 0 ml   Net 0 ml       Vancomycin Monitorin/30 @ 1137 Trough: 16.9 mcg/mL     Vancomycin Administration Times:  Recent vancomycin administrations                   vancomycin (VANCOCIN) 750 mg in dextrose 5 % 250 mL IVPB (mg) 750 mg New Bag 21 1226     750 mg New Bag 21 1216     750 mg New Bag 21 1221              Assessment:  · Patient is a 69 y.o. male who has been initiated on vancomycin  CrCl cannot be calculated (Unknown ideal weight. ).   · Goal AUC/JEN = 400-600; goal trough level = 10-20 mcg/mL    Plan:  · Will continue vancomycin 500 IV every 24 hours (AUC/)  · Will check vancomycin levels when appropriate  · Will continue to monitor renal function   · Clinical pharmacy to follow      JESIKA Olson [facility] 2021 9:46 AM

## 2021-05-31 NOTE — PROGRESS NOTES
Subjective:  Feeling worse-- abd pain and vom  No CP or SOB  No fever or chills   No uncontrolled pain  No  diarrhea     Objective:    /72   Pulse 107   Temp 97.8 °F (36.6 °C) (Oral)   Resp 18   Ht 5' 11\" (1.803 m)   Wt 103 lb 14.4 oz (47.1 kg)   SpO2 92%   BMI 14.49 kg/m²     24HR INTAKE/OUTPUT:      Intake/Output Summary (Last 24 hours) at 5/31/2021 0900  Last data filed at 5/31/2021 0534  Gross per 24 hour   Intake --   Output 0 ml   Net 0 ml       General appearance: Cachectic NAD, conversant  Neck: FROM, supple   Lungs: Clear bilaterally no wheezes, no rhonchi, no crackles  CV: RRR, no MRGs; normal carotid upstroke and amplitude without Bruits  Abdomen: Soft, +tender; no masses or HSM  Extremities: No edema, no cyanosis, no clubbing  Skin: Intact no rash, no lesions, no ulcers    Psych: Alert and oriented normal affect  Neuro: Nonfocal  Most Recent Labs  Lab Results   Component Value Date    WBC 13.4 (H) 05/31/2021    HGB 9.2 (L) 05/31/2021    HCT 28.8 (L) 05/31/2021     05/31/2021     05/31/2021    K 4.6 05/31/2021    CL 98 05/31/2021    CREATININE 3.1 (H) 05/31/2021    BUN 38 (H) 05/31/2021    CO2 21 (L) 05/31/2021    GLUCOSE 230 (H) 05/31/2021    ALT 43 (H) 05/31/2021    AST 37 05/31/2021    INR 1.0 05/27/2021    LABA1C 5.4 07/26/2017     Recent Labs     05/31/21  0354   MG 1.9     Lab Results   Component Value Date    CALCIUM 8.0 (L) 05/31/2021    PHOS 5.5 (H) 05/31/2021        CT ABDOMEN PELVIS WO CONTRAST Additional Contrast? None   Final Result   Markedly dilated stomach with dilated fluid-filled small bowel loops,   increased compared to prior, compatible with developing small bowel   obstruction. Transition point appears to be in the right lower quadrant near   the ostomy site      Stable caliber of aorta status post aorto iliac stent graft.       Emphysema             Assessment    Principal Problem:    Bacteremia  Active Problems:    Tobacco dependence    Severe protein-calorie malnutrition ([facility]    S/P AAA repair using bifurcation graft    History of aortic valve replacement with bioprosthetic valve    Gross hematuria    Chronic anemia  Resolved Problems:    * No resolved hospital problems.  *      Plan:     69-year-old male history of AS status post AVR AAA status post endovascular repair admitted with positive blood cultures    Bacteremia/CLABSI  Vancomycin/meropenem  Repeat blood cultures  PICC line discontinued  -culture PICC tip +  Echocardiogram normal EF, no vegetations  Plan for YONG  Procalcitonin 5.77<8.99  ID Consult appreciated-discussed case    Severe protein calorie malnutrition   Dietitian consult  Hold TPN pending central access  IVF    Gastric outlet obstruction-- vomiting and abd pain today  NPO  Secondary to aortic aneurysm causing compression of the duodenum  Plan to resume TPN, will need tunneled catheter when OK ID  Gen Surg Consult appreciated     Gross hematuria  Urology Consult appreciated    Anemia  monitor hemoglobin  Macrocytic  Iron studies reviewed,   B12 folate adequate   monitor hemoglobin    Medications for other co morbidities cont as appropriate w dosage adjustments as necessary  PT/OT  DVT PPx  DC planning    Electronically signed by Yaneth Stephens MD on 5/31/2021 at 9:00 AM

## 2021-05-31 NOTE — PROGRESS NOTES
[address] [place] [address] [facility]  NHANIDA  Progress Note    SUBJECTIVE:  Chief Complaint   Patient presents with    Abnormal Lab     Pt states was called by PCP to notify of abnormal labs and sent to ED. Unsure of which was affected. Pt denies symptoms. The patient had an episode of nausea and vomiting today. NG tube was inserted. He seems to be tolerating antibiotics well, otherwise. Review of systems:  As stated above in the chief complaint, otherwise negative. Medications:  Scheduled Meds:   heparin (porcine)  5,000 Units Subcutaneous Q12H    IV infusion builder   Intravenous Daily    And    dextrose 5% and 0.45% NaCl with KCl 20 mEq   Intravenous Daily    vancomycin  500 mg Intravenous Q24H    metoprolol succinate  25 mg Oral Daily    pantoprazole  40 mg Oral BID AC    meropenem  1,000 mg Intravenous Q12H    aspirin  81 mg Oral Every Other Day    atorvastatin  20 mg Oral Daily    dronabinol  5 mg Oral BID AC    sodium chloride flush  5-40 mL Intravenous 2 times per day    heparin flush  300 Units Intracatheter BID     Continuous Infusions:   sodium chloride Stopped (21 0651)    sodium chloride       PRN Meds:prochlorperazine, perflutren lipid microspheres, ipratropium-albuterol, sodium chloride flush, sodium chloride, promethazine **OR** [DISCONTINUED] ondansetron, polyethylene glycol, acetaminophen **OR** acetaminophen, heparin flush    OBJECTIVE:  /72   Pulse 107   Temp 97.8 °F (36.6 °C) (Oral)   Resp 18   Ht 5' 11\" (1.803 m)   Wt 103 lb 14.4 oz (47.1 kg)   SpO2 92%   BMI 14.49 kg/m²   Temp  Av.2 °F (36.8 °C)  Min: 97.8 °F (36.6 °C)  Max: 98.7 °F (37.1 °C)  Constitutional: The patient is sitting up in bed. Awake and alert. No distress. Emaciated. Skin: Warm and dry. No rashes were noted. HEENT: Round and reactive pupils. Moist mucous membranes. No ulcerations or thrush. NG tube. Neck: Supple to movements.    Chest: No use of accessory line  · History of AVR, need to rule out late prosthetic valve endocarditis  · Catheter associated UTI-Citrobacter, patient completed 5/7 days of therapy  · Gram-negative fidelina bacteremia, identification and process 1/6  · Need to rule out late prosthetic valve endocarditis  · Gastric outlet obstruction secondary to compression of duodenum by aortic aneurysm  · History of previous endovascular stent graft repair with a fenestrated stent graft of the AAA done in 2017     Plan:       · Continue Vancomycin  · Change Meropenem to  · Will need YONG to evaluate AVR   · Recommend tunneled cath for continued TPN   · Will follow with you    Endy Palm MD  10:28 AM  5/31/2021

## 2021-05-31 NOTE — PLAN OF CARE
Problem: Pain:  Goal: Pain level will decrease  Description: Pain level will decrease  3/47/6464 8792 by Zachary White RN  Outcome: Ongoing  5/31/2021 0651 by Jarred Mckeon RN  Outcome: Met This Shift  Goal: Control of acute pain  Description: Control of acute pain  0/20/0897 1149 by Zachary White RN  Outcome: Ongoing  5/31/2021 0651 by Jarred Mckeon RN  Outcome: Met This Shift  Goal: Control of chronic pain  Description: Control of chronic pain  2/45/6752 8841 by Zachary White RN  Outcome: Ongoing  5/31/2021 0651 by Jarred Mckeon RN  Outcome: Met This Shift     Problem: Falls - Risk of:  Goal: Will remain free from falls  Description: Will remain free from falls  6/55/0320 8327 by Zachary White RN  Outcome: Ongoing  5/31/2021 0651 by Jarred Mckeon RN  Outcome: Met This Shift  Goal: Absence of physical injury  Description: Absence of physical injury  1/45/0026 7844 by Zachary White RN  Outcome: Ongoing  5/31/2021 0651 by Jarred Mckeon RN  Outcome: Met This Shift     Problem: INFECTION  Goal: Absence of urinary tract infection signs and symptoms  Description: Absence of urinary tract infection signs and symptoms  1/87/5680 0618 by Zachary White RN  Outcome: Ongoing  5/31/2021 0651 by Jarred Mckeon RN  Outcome: Met This Shift     Problem: HEMODYNAMIC STATUS  Goal: Hemoglobin within specified parameters  5/23/8012 4283 by Zachary White RN  Outcome: Ongoing  5/31/2021 0651 by Jarred Mckeon RN  Outcome: Met This Shift     Problem: Nutrition Deficit:  Goal: Nutritional status is improving  2/06/9785 8951 by Zachary White RN  Outcome: Ongoing  5/31/2021 0651 by Jarred Mckeon RN  Outcome: Met This Shift     Problem: Skin Integrity:  Goal: Will show no infection signs and symptoms  Description: Will show no infection signs and symptoms  0/04/7589 6705 by Zachary White RN  Outcome: Ongoing  5/31/2021 0651 by Jarred Mckeon RN  Outcome: Met This Shift  Goal: Absence of new skin breakdown  Description: Absence of new skin breakdown  2/26/5886 1657 by Opal Casiano RN  Outcome: Ongoing  5/31/2021 0651 by Radha Mchugh RN  Outcome: Met This Shift

## 2021-06-01 NOTE — PROGRESS NOTES
Subjective:  Feeling better but NG irritates  No CP or SOB  No fever or chills   No uncontrolled pain  No  diarrhea     Objective:    /80   Pulse 111   Temp 97.7 °F (36.5 °C) (Oral)   Resp 18   Ht 5' 11\" (1.803 m)   Wt 96 lb (43.5 kg)   SpO2 95%   BMI 13.39 kg/m²     24HR INTAKE/OUTPUT:      Intake/Output Summary (Last 24 hours) at 6/1/2021 0846  Last data filed at 6/1/2021 0804  Gross per 24 hour   Intake --   Output 2300 ml   Net -2300 ml       General appearance: Cachectic NAD, conversant  Neck: FROM, supple   Lungs: Clear bilaterally no wheezes, no rhonchi, no crackles  CV: RRR, no MRGs; normal carotid upstroke and amplitude without Bruits  Abdomen: Soft, +tender; no masses or HSM  Extremities: No edema, no cyanosis, no clubbing  Skin: Intact no rash, no lesions, no ulcers    Psych: Alert and oriented normal affect  Neuro: Nonfocal  Most Recent Labs  Lab Results   Component Value Date    WBC 13.1 (H) 06/01/2021    HGB 7.8 (L) 06/01/2021    HGB 7.8 (L) 06/01/2021    HCT 23.9 (L) 06/01/2021    HCT 23.6 (L) 06/01/2021     06/01/2021     06/01/2021    K 4.7 06/01/2021     06/01/2021    CREATININE 4.1 (H) 06/01/2021    BUN 54 (H) 06/01/2021    CO2 26 06/01/2021    GLUCOSE 137 (H) 06/01/2021    ALT 43 (H) 05/31/2021    AST 37 05/31/2021    INR 1.0 05/27/2021    LABA1C 5.4 07/26/2017     Recent Labs     06/01/21  0440   MG 1.9     Lab Results   Component Value Date    CALCIUM 7.7 (L) 06/01/2021    PHOS 5.5 (H) 05/31/2021        XR ABDOMEN FOR NG/OG/NE TUBE PLACEMENT   Final Result   NG tube tip is well within the gastric lumen. Dilated small bowel loops as   noted. See above. CT ABDOMEN PELVIS WO CONTRAST Additional Contrast? None   Final Result   Markedly dilated stomach with dilated fluid-filled small bowel loops,   increased compared to prior, compatible with developing small bowel   obstruction.   Transition point appears to be in the right lower quadrant near   the ostomy

## 2021-06-01 NOTE — PROGRESS NOTES
Message sent to Dr. Jhonny Abdullahi to verify IV fluid orders, wants Banana Bag alternating with D5.45&20 KCL as well as LR @ 100. Also verified ok to place PICC line with recommendations for tunneled cath. OK to place PICC.     Electronically signed by John Alfred RN on 6/1/2021 at 4:32 PM

## 2021-06-01 NOTE — PROGRESS NOTES
GENERAL SURGERY  DAILY PROGRESS NOTE  6/1/2021  Chief Complaint   Patient presents with    Abnormal Lab     Pt states was called by PCP to notify of abnormal labs and sent to ED. Unsure of which was affected. Pt denies symptoms. Subjective:  Pain stable, NG in place  Ostomy with a little output    Objective:  /60   Pulse 106   Temp 97.6 °F (36.4 °C) (Oral)   Resp 18   Ht 5' 11\" (1.803 m)   Wt 96 lb (43.5 kg)   SpO2 93%   BMI 13.39 kg/m²     GENERAL:  Laying in bed, awake, alert, cooperative, no apparent distress  HEAD: Normocephalic, atraumatic  EYES: No sclera icterus, pupils equal  LUNGS:  No increased work of breathing  CARDIOVASCULAR:  Regular rate  ABDOMEN:  Soft, mild diffuse tenderness, non-distended. Ostomy pink, liquid stool.   EXTREMITIES: No edema or swelling  SKIN: Warm and dry    Assessment/Plan:  69 y.o. male with coffee ground emesis, SBO    Continue NPO  NGT to LIWS  Monitor abdominal exam  IVFs  Recommend PICC when ok with ID  Recommend starting TPN, awaiting central access    Will be d/w Dr. Dontae Alejo    Electronically signed by Zeinab Martinez MD on 6/1/2021 at 5:14 AM   Electronically signed by Sylvia Webb DO on 6/1/2021 at 7:40 AM

## 2021-06-01 NOTE — PROGRESS NOTES
Comprehensive Nutrition Assessment    Type and Reason for Visit:  Reassess    Nutrition Recommendations/Plan:  Nutrition progression. TPN rec below once central access is approved. TPN Rec as follows: Adult PN 3-IN-1  Central Standard @ 65ml/h continuous x24hr/day  To provide 1560ml TV- Total Calories 1615 and 78g AA    Nutrition Assessment:  Pt declining. Per General surgery Doctor recommend starting TPN, awaiting central access. Noted wt loss of 7#s in two days. Per EMR hold TPN pending central access start PPN. Pt adm for UTI. Pt was recently adm 5/20 for UTI. He was admitted to [facility] on 1/29/2021 with superior mesenteric artery mycotic aneurysm. He was also found to have evidence of ischemic colitis and underwent total colectomy and end ileostomy with open repair of the SMA aneurysm. Pt is sever malnutrition d/t poor PO intake. Noted PN prior to adm nightly and full liquid diet. . Will make PN recommendations. Significant PMHx of  aortic aneurysm, CVA, COPD, hyperlipidemia and colon resection with ostomy presenting to the emergency department due to abnormal labs. His PCP recommended he adm to ED. Malnutrition Assessment:  Malnutrition Status:  Severe malnutrition    Context:  Chronic Illness     Findings of the 6 clinical characteristics of malnutrition:  Energy Intake:  Mild decrease in energy intake (Comment) (poor po and was on TPN at home)  Weight Loss:  1 - Mild weight loss (specify amount and time period) (2% over months)     Body Fat Loss:  7 - Severe body fat loss Orbital, Triceps   Muscle Mass Loss:  7 - Severe muscle mass loss Temples (temporalis), Clavicles (pectoralis & deltoids), Hand (interosseous)  Fluid Accumulation:  No significant fluid accumulation     Strength:  Not Performed    Estimated Daily Nutrient Needs:  Energy (kcal):  7974-2360 (MSJx1. 3SF); Weight Used for Energy Requirements:  Current     Protein (g):  70-90g (1.5-1.8/kg);  Weight Used for Protein Requirements:  Current Fluid (ml/day):  6281-9114; Method Used for Fluid Requirements:  1 ml/kcal      Nutrition Related Findings:  A&Ox4, RLQ ileostomy, NG tube, BS hypoactive, -I/Os, no Edema      Wounds:  None       Current Nutrition Therapies:    Diet NPO Effective Now    Anthropometric Measures:  · Height: 5' 11\" (180.3 cm)  · Current Body Weight: 96 lb (43.5 kg) (5/31)   · Admission Body Weight: 98 lb (44.5 kg) (5/27 bedscale)    · Usual Body Weight: 102 lb 4 oz (46.4 kg) (12/15/20 standing scale)     · Ideal Body Weight: 172 lbs; % Ideal Body Weight 55.8 %   · BMI: 13.4      · BMI Categories: Underweight (BMI less than 22) age over 72       Nutrition Diagnosis:   · Severe malnutrition, In context of chronic illness related to impaired respiratory function (COPD) as evidenced by poor intake prior to admission, weight loss, severe muscle loss, severe loss of subcutaneous fat    Nutrition Interventions:   Food and/or Nutrient Delivery:  Continue NPO  Nutrition Education/Counseling:  No recommendation at this time   Coordination of Nutrition Care:  Continue to monitor while inpatient    Goals:  nutrition progression       Nutrition Monitoring and Evaluation:   Behavioral-Environmental Outcomes:  None Identified   Food/Nutrient Intake Outcomes:  Diet Advancement/Tolerance, Food and Nutrient Intake, Supplement Intake  Physical Signs/Symptoms Outcomes:  GI Status, Fluid Status or Edema, Nutrition Focused Physical Findings, Biochemical Data, Skin, Weight     Discharge Planning:     Too soon to determine     Electronically signed by Aj Ortiz RD, LD on 6/1/21 at 3:12 PM EDT    Contact: [phone]

## 2021-06-01 NOTE — PROGRESS NOTES
[address] [place] [address] [facility]  SERGIO  Progress Note    SUBJECTIVE:  Chief Complaint   Patient presents with    Abnormal Lab     Pt states was called by PCP to notify of abnormal labs and sent to ED. Unsure of which was affected. Pt denies symptoms. Tolerating antibiotics. Has NG tube to suction. +cough with some sputum this AM  No fevers    Review of systems:  As stated above in the chief complaint, otherwise negative. Medications:  Scheduled Meds:   vancomycin (VANCOCIN) intermittent dosing (placeholder)   Other RX Placeholder    heparin (porcine)  5,000 Units Subcutaneous Q12H    cefTRIAXone (ROCEPHIN) IV  1,000 mg Intravenous Q24H    IV infusion builder   Intravenous Daily    And    dextrose 5% and 0.45% NaCl with KCl 20 mEq   Intravenous Daily    metoprolol succinate  25 mg Oral Daily    pantoprazole  40 mg Oral BID AC    aspirin  81 mg Oral Every Other Day    atorvastatin  20 mg Oral Daily    dronabinol  5 mg Oral BID AC    sodium chloride flush  5-40 mL Intravenous 2 times per day    heparin flush  300 Units Intracatheter BID     Continuous Infusions:   sodium chloride       PRN Meds:prochlorperazine, perflutren lipid microspheres, ipratropium-albuterol, sodium chloride flush, sodium chloride, promethazine **OR** [DISCONTINUED] ondansetron, polyethylene glycol, acetaminophen **OR** acetaminophen, heparin flush    OBJECTIVE:  /77   Pulse 96   Temp 97.6 °F (36.4 °C) (Oral)   Resp 18   Ht 5' 11\" (1.803 m)   Wt 96 lb (43.5 kg)   SpO2 95%   BMI 13.39 kg/m²   Temp  Av.6 °F (36.4 °C)  Min: 97.3 °F (36.3 °C)  Max: 97.7 °F (36.5 °C)  Constitutional: The patient is lying in bed, curled up on left side. Awake and alert. No distress. Emaciated. Skin: Warm and dry. No rashes were noted. HEENT: Round and reactive pupils. Moist mucous membranes. No ulcerations or thrush. NG tube to suction. Neck: Supple to movements. Chest: No respiratory distres. Symmetrical expansion. No wheezing, crackles or rhonchi. Cardiovascular: Heart sounds rhythmic and regular. Abdomen: Positive bowel sounds to auscultation. Benign to palpation. Soft, nondistended. Ostomy appliance with liquid stool   Extremities: No edema. Muscle wasting.   Lines: peripheral    Laboratory and Tests Review:  Lab Results   Component Value Date    WBC 13.1 (H) 06/01/2021    WBC 13.4 (H) 05/31/2021    WBC 5.1 05/30/2021    HGB 7.7 (L) 06/01/2021    HCT 24.1 (L) 06/01/2021    .5 (H) 06/01/2021     06/01/2021     Lab Results   Component Value Date    NEUTROABS 9.84 (H) 06/01/2021    NEUTROABS 11.19 (H) 05/31/2021    NEUTROABS 3.01 05/30/2021     No results found for: [facility]  Lab Results   Component Value Date    ALT 43 (H) 05/31/2021    AST 37 05/31/2021    ALKPHOS 444 (H) 05/31/2021    BILITOT 0.9 05/31/2021     Lab Results   Component Value Date     06/01/2021    K 4.7 06/01/2021    K 4.0 05/28/2021     06/01/2021    CO2 26 06/01/2021    BUN 54 06/01/2021    CREATININE 4.1 06/01/2021    CREATININE 3.8 05/31/2021    CREATININE 3.1 05/31/2021    GFRAA 17 06/01/2021    LABGLOM 14 06/01/2021    GLUCOSE 137 06/01/2021    PROT 6.6 05/31/2021    LABALBU 2.7 05/31/2021    CALCIUM 7.7 06/01/2021    BILITOT 0.9 05/31/2021    ALKPHOS 444 05/31/2021    AST 37 05/31/2021    ALT 43 05/31/2021     Lab Results   Component Value Date    CRP 4.6 (H) 05/28/2021    CRP 10.0 (H) 01/29/2021    CRP 7.4 (H) 12/19/2020     Lab Results   Component Value Date    SEDRATE 72 (H) 05/27/2021    SEDRATE 47 (H) 01/29/2021    SEDRATE 45 (H) 12/19/2020     Radiology:  noted    Microbiology:   Blood cultures from [facility] 5/26/2021: Citrobacter, Staphylococcus epidermidis   Blood cultures 5/27/2021: Negative so far  PICC tip culture 5/28/2021: >15 colonies Ampicillin resistant Citrobacter braakii, CoNS    ASSESSMENT:  · High-grade bacteremia, staph epidermidis, likely associated with PICC line  · History of AVR, need to rule out late prosthetic valve endocarditis  · Catheter associated UTI-Citrobacter, patient completed 5/7 days of therapy  · Gram-negative fidelina bacteremia, identification and process 1/6  · Need to rule out late prosthetic valve endocarditis  · Gastric outlet obstruction secondary to compression of duodenum by aortic aneurysm  · History of previous endovascular stent graft repair with a fenestrated stent graft of the AAA done in 2017     Plan:       · Continue Vancomycin- pharmacy dosing, random level 25.9 today   · Continue Ceftriaxone   · Will need YONG to evaluate AVR   · Recommend tunneled cath for continued TPN   · Will follow with you    David Nunez, APRN - CNP  12:57 PM  6/1/2021     Patient seen and examined. I had a face to face encounter with the patient. Agree with exam.  Assessment and plan as outlined above and directed by me. Addition and corrections were done as deemed appropriate. My exam and plan include: Patient is doing well. Tolerating antibiotics. No more nausea and vomiting. NG tube to suction.   YONG for endocarditis    Tucker Dallas MD  6/1/2021  3:01 PM

## 2021-06-01 NOTE — PROGRESS NOTES
Ostomy pouch intact. Small amount dark liquid. Sacral coccyx intact. Sacral mepilex in place  Lo air loss bed in place. Moreno Harding note reviewed. Daxa Alexandra.  Araceli Cruz, CNS, Wound Care

## 2021-06-01 NOTE — PROGRESS NOTES
Pharmacy Consultation Note  (Antibiotic Dosing and Monitoring)    Initial consult date:   Consulting physician: Astrid Schmidt  Drug: Vancomycin  Indication: Bloodstream infection    Age/  Gender Height Weight IBW  Allergy Information   70 y.o./male 5' 11\" (180.3 cm) 95 lb (43.1 kg)     Male patients must weigh at least 50 kg to calculate ideal body weight   Patient has no known allergies. Renal Function:  Recent Labs     21  0354 21  1400 21  0440   BUN 38* 49* 54*   CREATININE 3.1* 3.8* 4.1*       Intake/Output Summary (Last 24 hours) at 2021 1029  Last data filed at 2021 0804  Gross per 24 hour   Intake --   Output 2300 ml   Net -2300 ml       Vancomycin Monitorin/30 @ 1137 Trough: 16.9 mcg/mL    @ 1130 Rm: 25.9   @ 0352 Rm: 23.1    Vancomycin Administration Times:  Recent vancomycin administrations                   vancomycin (VANCOCIN) 500 mg in sodium chloride 0.9 % 100 mL IVPB (mg) 500 mg New Bag 21 1252    vancomycin (VANCOCIN) 750 mg in dextrose 5 % 250 mL IVPB (mg) 750 mg New Bag 21 1226     750 mg New Bag 21 1216                Assessment:  · Patient is a 69 y.o. male who has been initiated on vancomycin  CrCl cannot be calculated (Unknown ideal weight. ).   · Goal AUC/JEN = 400-600; goal trough level = 10-20 mcg/mL    Plan:  · Will hold vancomyci jose luis this time in the setting of ROBBIN  · Will check vancomycin levels when appropriate- vancomycin random tomorrow AM  · Will continue to monitor renal function   · Clinical pharmacy to follow      Connie Torres, [address] [place] [address] 2021 10:29 AM

## 2021-06-01 NOTE — PROGRESS NOTES
Evaluated with Dr. Dmitry Harris  Having some ostomy output  Plan for CT w/ PO tomorrow  Needs to restart TPN- would like PICC prior to Vila due to recurrent infections   Will place order for PICC    Electronically signed by Rox Ricardo DO on 6/1/2021 at 3:45 PM     The patient was seen and examined the chart was reviewed. I agree with the assessment and plan. Unfortunately, the patient's overall status is poor. The patient has not received oral nutrition for approximately 2 months. The patient is receiving parenteral nutrition. Now, the patient has a findings consistent with a possible small bowel obstruction. Again, I think that this patient is best served by returning to the [facility] for continuation of his care.

## 2021-06-01 NOTE — PLAN OF CARE
Problem: Pain:  Goal: Pain level will decrease  Description: Pain level will decrease  6/1/2021 0223 by Neo Arroyo RN  Outcome: Ongoing  2/67/3767 4266 by Chucho Jiang RN  Outcome: Ongoing  Goal: Control of acute pain  Description: Control of acute pain  6/1/2021 0223 by Neo Arroyo RN  Outcome: Ongoing  3/52/2763 3215 by Chucho Jiang RN  Outcome: Ongoing  Goal: Control of chronic pain  Description: Control of chronic pain  6/1/2021 0223 by Neo Arroyo RN  Outcome: Ongoing  5/54/4805 0090 by Chucho Jiang RN  Outcome: Ongoing     Problem: Falls - Risk of:  Goal: Will remain free from falls  Description: Will remain free from falls  6/1/2021 0223 by Neo Arroyo RN  Outcome: Ongoing  0/22/7192 6183 by Chucho Jiang RN  Outcome: Ongoing  Goal: Absence of physical injury  Description: Absence of physical injury  6/1/2021 0223 by Neo Arroyo RN  Outcome: Ongoing  4/27/6803 0895 by Chucho Jiang RN  Outcome: Ongoing     Problem: INFECTION  Goal: Absence of urinary tract infection signs and symptoms  Description: Absence of urinary tract infection signs and symptoms  6/1/2021 0223 by Neo Arroyo RN  Outcome: Ongoing  2/26/6089 1365 by Chucho Jiang RN  Outcome: Ongoing     Problem: HEMODYNAMIC STATUS  Goal: Hemoglobin within specified parameters  6/1/2021 0223 by Neo Arroyo RN  Outcome: Ongoing  5/63/4262 9712 by Chucho Jiang RN  Outcome: Ongoing     Problem: Nutrition Deficit:  Goal: Nutritional status is improving  6/1/2021 0223 by Neo Arroyo RN  Outcome: Ongoing  1/25/5209 5061 by Chucho Jiang RN  Outcome: Ongoing     Problem: Skin Integrity:  Goal: Will show no infection signs and symptoms  Description: Will show no infection signs and symptoms  6/1/2021 0223 by Neo Arroyo RN  Outcome: Ongoing  4/21/6353 7410 by Chucho Jiang RN  Outcome: Ongoing  Goal: Absence of new skin breakdown  Description: Absence of new skin breakdown  6/1/2021 0223 by Kady Vo RN  Outcome: Ongoing  4/70/7063 3583 by Prasad Butler RN  Outcome: Ongoing

## 2021-06-02 NOTE — PROGRESS NOTES
status post aorto iliac stent graft. Emphysema         CT ABDOMEN PELVIS WO CONTRAST Additional Contrast? Oral (gastrograffin contrast through NG)    (Results Pending)       Assessment    Principal Problem:    Bacteremia  Active Problems:    Tobacco dependence    Severe protein-calorie malnutrition (HCC)    S/P AAA repair using bifurcation graft    History of aortic valve replacement with bioprosthetic valve    Gross hematuria    Chronic anemia  Resolved Problems:    * No resolved hospital problems. *      Plan:     39-year-old male history of AS status post AVR AAA status post endovascular repair admitted with positive blood cultures    Bacteremia/CLABSI  Vancomycin/meropenem  Repeat blood cultures 5/27 NGTD  PICC line discontinued  -culture PICC tip +  Echocardiogram normal EF, no vegetations  Plan for YONG  Procalcitonin 5.77<8.99  ID Consult appreciated-discussed case    Severe protein calorie malnutrition   Dietitian consult  Hold TPN pending central access  IVF    Gastric outlet obstruction--   NPO  NG  Previously reported secondary to aortic aneurysm causing compression of the duodenum  Plan to resume TPN,   -Plan for PICC   Recommend tunneled catheter for long-term TPN  Gen Surg Consult appreciated     Gross hematuria  Urology Consult appreciated    Anemia  monitor hemoglobin  Macrocytic  Iron studies reviewed,   B12 folate adequate   monitor hemoglobin    Medications for other co morbidities cont as appropriate w dosage adjustments as necessary  PT/OT  DVT PPx  DC planning-discussed recommendation for transfer to [facility]. Patient states he has discussed this with his wife and does not want to be transferred.     Electronically signed by Saurav Shearer MD on 6/2/2021 at 10:59 AM

## 2021-06-02 NOTE — PROGRESS NOTES
GENERAL SURGERY  DAILY PROGRESS NOTE  6/2/2021  Chief Complaint   Patient presents with    Abnormal Lab     Pt states was called by PCP to notify of abnormal labs and sent to ED. Unsure of which was affected. Pt denies symptoms. Subjective:  Pain stable, NG in place  Ostomy with a little output    Objective:  BP (!) 96/58   Pulse 88   Temp 98.3 °F (36.8 °C) (Oral)   Resp 18   Ht 5' 11\" (1.803 m)   Wt 96 lb (43.5 kg)   SpO2 95%   BMI 13.39 kg/m²     GENERAL:  Laying in bed, awake, alert, cooperative, no apparent distress  HEAD: Normocephalic, atraumatic  EYES: No sclera icterus, pupils equal  LUNGS:  No increased work of breathing  CARDIOVASCULAR:  Regular rate  ABDOMEN:  Soft, mild diffuse tenderness, non-distended. Ostomy pink, liquid stool. EXTREMITIES: No edema or swelling  SKIN: Warm and dry    Assessment/Plan:  69 y.o. male with coffee ground emesis, SBO    Continue NPO  NGT to LIWS  Monitor abdominal exam  IVFs  Recommend PICC  Recommend starting TPN, awaiting central access    CT with IV and PO contrast     Electronically signed by Nola Larson MD on 6/2/2021 at 7:24 AM     The patient was seen and examined and the chart was reviewed. The patient has CT findings consistent with a small bowel obstruction. Again, the patient has undergone his entire evaluation and treatment at [facility] the patient has not had oral intake for more than 2 months. The patient is receiving parenteral nutrition. Plan:    Likely, the patient will require surgical intervention for his small bowel obstruction. I believe the patient would be best served by being transferred back to the [facility].

## 2021-06-02 NOTE — PLAN OF CARE
Problem: Pain:  Goal: Pain level will decrease  Description: Pain level will decrease  Outcome: Ongoing  Goal: Control of acute pain  Description: Control of acute pain  Outcome: Ongoing  Goal: Control of chronic pain  Description: Control of chronic pain  Outcome: Ongoing     Problem: Falls - Risk of:  Goal: Will remain free from falls  Description: Will remain free from falls  Outcome: Ongoing  Goal: Absence of physical injury  Description: Absence of physical injury  Outcome: Ongoing     Problem: INFECTION  Goal: Absence of urinary tract infection signs and symptoms  Description: Absence of urinary tract infection signs and symptoms  Outcome: Ongoing     Problem: HEMODYNAMIC STATUS  Goal: Hemoglobin within specified parameters  Outcome: Ongoing     Problem: Nutrition Deficit:  Goal: Nutritional status is improving  Outcome: Ongoing     Problem: Skin Integrity:  Goal: Will show no infection signs and symptoms  Description: Will show no infection signs and symptoms  Outcome: Ongoing  Goal: Absence of new skin breakdown  Description: Absence of new skin breakdown  Outcome: Ongoing

## 2021-06-02 NOTE — PROGRESS NOTES
[address] [place] [address] [facility]  SERGIO  Progress Note    SUBJECTIVE:  Chief Complaint   Patient presents with    Abnormal Lab     Pt states was called by PCP to notify of abnormal labs and sent to ED. Unsure of which was affected. Pt denies symptoms. Tolerating antibiotics. Still with NG to suction   No fevers. Minimal cough. Review of systems:  As stated above in the chief complaint, otherwise negative. Medications:  Scheduled Meds:   vancomycin (VANCOCIN) intermittent dosing (placeholder)   Other RX Placeholder    metoprolol  2.5 mg Intravenous Q6H    lidocaine PF  5 mL Intradermal Once    sodium chloride flush  5-40 mL Intravenous 2 times per day    heparin flush  3 mL Intravenous 2 times per day    heparin (porcine)  5,000 Units Subcutaneous Q12H    cefTRIAXone (ROCEPHIN) IV  1,000 mg Intravenous Q24H    IV infusion builder   Intravenous Daily    And    dextrose 5% and 0.45% NaCl with KCl 20 mEq   Intravenous Daily    [Held by provider] metoprolol succinate  25 mg Oral Daily    pantoprazole  40 mg Oral BID AC    aspirin  81 mg Oral Every Other Day    atorvastatin  20 mg Oral Daily    dronabinol  5 mg Oral BID AC     Continuous Infusions:   sodium chloride      lactated ringers      sodium chloride       PRN Meds:sodium chloride, diatrizoate meglumine-sodium, sodium chloride flush, sodium chloride, heparin flush, prochlorperazine, perflutren lipid microspheres, ipratropium-albuterol, promethazine **OR** [DISCONTINUED] ondansetron, polyethylene glycol, acetaminophen **OR** acetaminophen    OBJECTIVE:  /64   Pulse 87   Temp 97.5 °F (36.4 °C) (Oral)   Resp 16   Ht 5' 11\" (1.803 m)   Wt 96 lb (43.5 kg)   SpO2 96%   BMI 13.39 kg/m²   Temp  Av.7 °F (36.5 °C)  Min: 97.4 °F (36.3 °C)  Max: 98.3 °F (36.8 °C)  Constitutional: The patient is lying in bed, talking on phone. Awake and alert. No distress. Emaciated. Skin: Warm and dry. No rashes were noted. HEENT: Round and reactive pupils. Moist mucous membranes. No ulcerations or thrush. NG tube to suction. Neck: Supple to movements. Chest: No respiratory distres. Symmetrical expansion. No wheezing, crackles or rhonchi. Cardiovascular: Heart sounds rhythmic and regular. Abdomen: Positive bowel sounds to auscultation. Benign to palpation. Soft, nondistended. Ostomy appliance with liquid stool   Extremities: No edema. Muscle wasting.   Lines: peripheral    Laboratory and Tests Review:  Lab Results   Component Value Date    WBC 8.4 06/02/2021    WBC 13.1 (H) 06/01/2021    WBC 13.4 (H) 05/31/2021    HGB 6.9 (LL) 06/02/2021    HCT 21.5 (L) 06/02/2021    .4 (H) 06/02/2021     06/02/2021     Lab Results   Component Value Date    NEUTROABS 5.72 06/02/2021    NEUTROABS 9.84 (H) 06/01/2021    NEUTROABS 11.19 (H) 05/31/2021     No results found for: [facility]  Lab Results   Component Value Date    ALT 43 (H) 05/31/2021    AST 37 05/31/2021    ALKPHOS 444 (H) 05/31/2021    BILITOT 0.9 05/31/2021     Lab Results   Component Value Date     06/02/2021    K 3.9 06/02/2021    K 4.0 05/28/2021     06/02/2021    CO2 26 06/02/2021    BUN 45 06/02/2021    CREATININE 3.1 06/02/2021    CREATININE 4.1 06/01/2021    CREATININE 3.8 05/31/2021    GFRAA 24 06/02/2021    LABGLOM 20 06/02/2021    GLUCOSE 104 06/02/2021    PROT 6.6 05/31/2021    LABALBU 2.7 05/31/2021    CALCIUM 7.3 06/02/2021    BILITOT 0.9 05/31/2021    ALKPHOS 444 05/31/2021    AST 37 05/31/2021    ALT 43 05/31/2021     Lab Results   Component Value Date    CRP 4.6 (H) 05/28/2021    CRP 10.0 (H) 01/29/2021    CRP 7.4 (H) 12/19/2020     Lab Results   Component Value Date    SEDRATE 72 (H) 05/27/2021    SEDRATE 47 (H) 01/29/2021    SEDRATE 45 (H) 12/19/2020     Radiology:  noted    Microbiology:   Blood cultures from [facility] 5/26/2021: Citrobacter, Staphylococcus epidermidis   Blood cultures 5/27/2021: Negative so far  PICC tip culture 5/28/2021: >15 colonies Ampicillin resistant Citrobacter braakii, CoNS    ASSESSMENT:  · High-grade bacteremia, staph epidermidis, likely associated with PICC line  · History of AVR, need to rule out late prosthetic valve endocarditis  · Catheter associated UTI-Citrobacter, patient completed 5/7 days of therapy  · Gram-negative fidelina bacteremia, identification and process 1/6  · Need to rule out late prosthetic valve endocarditis  · Gastric outlet obstruction secondary to compression of duodenum by aortic aneurysm  · History of previous endovascular stent graft repair with a fenestrated stent graft of the AAA done in 2017     Plan:    · Continue Vancomycin- pharmacy dosing  · Continue Ceftriaxone   · Will need YNOG to evaluate AVR -- cardiology does not feel patient is appropriate at this time   · Will discuss PICC vs Vila for TPN with Dr. Anatoly Myers (PICC was ordered by surgery)   · Will follow with you    COREEN Tompkins - CNP  12:55 PM  6/2/2021     Patient seen and examined. I had a face to face encounter with the patient. Agree with exam.  Assessment and plan as outlined above and directed by me. Addition and corrections were done as deemed appropriate. My exam and plan include: No new changes. Cardiology does not feel that it is appropriate to do a YONG at this time. The order still stands. The patient may have a PICC from ID standpoint for purposes of TPN and IV antibiotics.     Jemma Reynolds MD  6/2/2021  2:19 PM

## 2021-06-02 NOTE — PROGRESS NOTES
Reached out to surgery regarding patients creat level of 3.1 & not use of contrast - Per surgery ok to d/c IV contrast but still use Oral. -CT updated    Call placed to Dr. Giovanni Riddle regarding YONG - sedation not appropriate d/t SBO. Will need to use Anesthesia.  Will await response    Electronically signed by Kirk Cox RN on 6/2/2021 at 9:22 AM

## 2021-06-02 NOTE — PROGRESS NOTES
Spoke with Dr. Connie Dyer, she says Dr. Anmol Arrington said it was ok to proceed with PICC line.  Notified IV team    Electronically signed by Vonda Burgos RN on 6/2/2021 at 2:22 PM

## 2021-06-03 NOTE — CONSULTS
Consulted to place PICC. GFR is 30. Floor staff informed we need renal clearance. Awaiting their call back. Tiffany Ham RN, CPUI, [facility]  6/3 1400 I called floor to check if renal clearance obtained.  They will perfect serve physician  Tiffany Ham RN, CPUI, [facility]

## 2021-06-03 NOTE — PROGRESS NOTES
Dr Ilan Palencia notified of inability to place PICC also about Surgery recommendation (ID in agreement)  for transfer to [facility]

## 2021-06-03 NOTE — CONSULTS
Consulted to place PICC. Pt assessed with ultrasound both upper arms. We are unable to place PICC on this patient as all vein sizes, both arms, are very inadequate. Largest being left brachial which is only 0.20cm,just below Axilla. Right arm veins are even smaller. Floor staff notified.   Nam Weiss RN, CPUI, [facility]

## 2021-06-03 NOTE — PLAN OF CARE
Problem: Pain:  Goal: Pain level will decrease  Description: Pain level will decrease  Outcome: Met This Shift  Goal: Control of acute pain  Description: Control of acute pain  Outcome: Met This Shift  Goal: Control of chronic pain  Description: Control of chronic pain  Outcome: Met This Shift     Problem: Falls - Risk of:  Goal: Will remain free from falls  Description: Will remain free from falls  Outcome: Met This Shift  Goal: Absence of physical injury  Description: Absence of physical injury  Outcome: Met This Shift     Problem: Skin Integrity:  Goal: Will show no infection signs and symptoms  Description: Will show no infection signs and symptoms  Outcome: Met This Shift  Goal: Absence of new skin breakdown  Description: Absence of new skin breakdown  Outcome: Met This Shift     Problem: INFECTION  Goal: Absence of urinary tract infection signs and symptoms  Description: Absence of urinary tract infection signs and symptoms  Outcome: Met This Shift     Problem: HEMODYNAMIC STATUS  Goal: Hemoglobin within specified parameters  Outcome: Ongoing     Problem: Nutrition Deficit:  Goal: Nutritional status is improving  Outcome: Ongoing

## 2021-06-03 NOTE — PROGRESS NOTES
Internal Medicine Progress Note      Synopsis: Patient admitted on 5/27/2021       Patient was admitted on the 28th. He has a previous history ofaortic valve surgery and COPD and abdominal aortic aneurysm repair and significantly compromised nutrition. He was admitted for positive blood cultures  He has been followed by infectious disease and surgery  Subjective  Seems the same. Then when I remember seeing him last  Exam:  /68   Pulse 74   Temp 97.6 °F (36.4 °C) (Oral)   Resp 18   Ht 5' 11\" (1.803 m)   Wt 97 lb (44 kg)   SpO2 95%   BMI 13.53 kg/m²   General appearance: No apparent distress, appears stated age and cooperative. HEENT: Pupils equal, round, and reactive to light. Conjunctivae/corneas clear. Neck: Supple. No jugular venous distention. Trachea midline. Respiratory:  Decreased but clear  Cardiovascular: Regular rate and rhythm with normal S1/S2 without murmurs, rubs or gallops. Abdomen: Soft, non-tender, non-distended with normal bowel sound ostomy bag noted.   Abdomen is very thin  Musculoskeletal: No clubbing, Skin:  No rashes    Neurologic: awake, alert and following commands   Able to move all of his limbs    Medications:  Reviewed    Infusion Medications    sodium chloride      sodium chloride       Scheduled Medications    vancomycin  500 mg Intravenous Once    vancomycin (VANCOCIN) intermittent dosing (placeholder)   Other RX Placeholder    metoprolol  2.5 mg Intravenous Q6H    lidocaine PF  5 mL Intradermal Once    sodium chloride flush  5-40 mL Intravenous 2 times per day    heparin flush  3 mL Intravenous 2 times per day    heparin (porcine)  5,000 Units Subcutaneous Q12H    cefTRIAXone (ROCEPHIN) IV  1,000 mg Intravenous Q24H    IV infusion builder   Intravenous Daily    And    dextrose 5% and 0.45% NaCl with KCl 20 mEq   Intravenous Daily    [Held by provider] metoprolol succinate  25 mg Oral Daily    pantoprazole  40 mg Oral BID AC    aspirin  81 mg Oral CT ABDOMEN PELVIS WO CONTRAST Additional Contrast? None    Result Date: 5/20/2021  1. Stable size of fusiform infrarenal abdominal aortic aneurysm measures up to 5.9 cm with endograft repair. 2.  No free fluid collections to suggest aneurysm leak. 3.  Multiple mildly distended loops of bowel notable in the with thickened wall and hazy appearance of mesenteric fat. Findings could suggest nonspecific infectious or inflammatory enteritis. 4.  No bowel obstruction, free air, or free fluid. 5.  Emphysematous changes seen in the lung bases. RECOMMENDATIONS: For management of fusiform AAA: Note: Recommend Vascular consultation if a fusiform AAA enlarges by >0.5 cm in 6 months or >1 cm in 1 year or for a saccular AAA of any size. References: Curtis Peyer Radiol 2013; 61(03):228-723; J Vasc Surg. 2018; 67:2-77     XR ABDOMEN FOR NG/OG/NE TUBE PLACEMENT    Result Date: 5/31/2021  NG tube tip is well within the gastric lumen. Dilated small bowel loops as noted. See above. XR CHEST ABDOMEN NG PLACEMENT    Result Date: 6/2/2021  Tip of NG tube in the stomach. Mildly dilated loops of bowel. ASSESSMENT:    Principal Problem:    Bacteremia  Active Problems:    Tobacco dependence    Severe protein-calorie malnutrition (HCC)    S/P AAA repair using bifurcation graft    ROBBIN (acute kidney injury) ([address]    History of aortic valve replacement with bioprosthetic valve    Gross hematuria    Chronic anemia  Resolved Problems:    * No resolved hospital problems. *       PLAN:    1.on IV antibioticsfor bacteremia. Vancomycin on board along with ceftriaxone. Pharmacy is dosing. 2.  Patient was recommended a YONG however at this point not felt to be appropriate by cardiology  3.   Long-term nutrition with TPN is the safest since he is nothing by mouth and currently still with NG and his aortic aneurysm is causing compression of his duodenum causing a gastric obstruction  4PICC is being recommended   NEEDS PICC  For TPN     Diet: Diet NPO Effective Now  Code Status: Full Code  PT/OT Eval Status:   Order  DVT Prophylaxis:   heparin  Recommended disposition at discharge:  unsure.    +++++++++++++++++++++++++++++++++++++++++++++++++  Esteban Araya MD   [facility].  +++++++++++++++++++++++++++++++++++++++++++++++++  NOTE: This report was transcribed using voice recognition software. Every effort was made to ensure accuracy; however, inadvertent computerized transcription errors may be present.

## 2021-06-03 NOTE — PROGRESS NOTES
alert.  No distress. Emaciated. Skin: Warm and dry. No rashes were noted. HEENT: Round and reactive pupils. Moist mucous membranes. No ulcerations or thrush. NG tube to suction. Neck: Supple to movements. Chest: No respiratory distres. Symmetrical expansion. No wheezing, crackles or rhonchi. Cardiovascular: Heart sounds rhythmic and regular. Abdomen: Positive bowel sounds to auscultation. Benign to palpation. Soft, nondistended. Ostomy appliance with liquid stool   Extremities: No edema. Muscle wasting.   Lines: peripheral    Laboratory and Tests Review:  Lab Results   Component Value Date    WBC 6.8 06/03/2021    WBC 8.4 06/02/2021    WBC 13.1 (H) 06/01/2021    HGB 8.6 (L) 06/03/2021    HCT 26.6 (L) 06/03/2021    .5 (H) 06/03/2021     (L) 06/03/2021     Lab Results   Component Value Date    NEUTROABS 4.59 06/03/2021    NEUTROABS 5.72 06/02/2021    NEUTROABS 9.84 (H) 06/01/2021     No results found for: CRP  Lab Results   Component Value Date    ALT 43 (H) 05/31/2021    AST 37 05/31/2021    ALKPHOS 444 (H) 05/31/2021    BILITOT 0.9 05/31/2021     Lab Results   Component Value Date     06/03/2021    K 3.6 06/03/2021    K 4.0 05/28/2021     06/03/2021    CO2 28 06/03/2021    BUN 35 06/03/2021    CREATININE 2.2 06/03/2021    CREATININE 3.1 06/02/2021    CREATININE 4.1 06/01/2021    GFRAA 36 06/03/2021    LABGLOM 30 06/03/2021    GLUCOSE 106 06/03/2021    PROT 6.6 05/31/2021    LABALBU 2.7 05/31/2021    CALCIUM 7.7 06/03/2021    BILITOT 0.9 05/31/2021    ALKPHOS 444 05/31/2021    AST 37 05/31/2021    ALT 43 05/31/2021     Lab Results   Component Value Date    CRP 4.6 (H) 05/28/2021    CRP 10.0 (H) 01/29/2021    CRP 7.4 (H) 12/19/2020     Lab Results   Component Value Date    SEDRATE 72 (H) 05/27/2021    SEDRATE 47 (H) 01/29/2021    SEDRATE 45 (H) 12/19/2020     Radiology:  noted    Microbiology:   Blood cultures from [facility] 5/26/2021: Citrobacter, Staphylococcus

## 2021-06-03 NOTE — PROGRESS NOTES
GENERAL SURGERY  DAILY PROGRESS NOTE  6/3/2021  Chief Complaint   Patient presents with    Abnormal Lab     Pt states was called by PCP to notify of abnormal labs and sent to ED. Unsure of which was affected. Pt denies symptoms. Subjective:  Pain stable, NG in place  Ostomy with a little output, not emptied overnight    Objective:  /80   Pulse 82   Temp 98.1 °F (36.7 °C) (Oral)   Resp 16   Ht 5' 11\" (1.803 m)   Wt 97 lb (44 kg)   SpO2 95%   BMI 13.53 kg/m²     GENERAL:  Laying in bed, awake, alert, cooperative, no apparent distress  HEAD: Normocephalic, atraumatic  EYES: No sclera icterus, pupils equal  LUNGS:  No increased work of breathing  CARDIOVASCULAR:  Regular rate  ABDOMEN:  Soft, mild diffuse tenderness, non-distended. Ostomy pink, liquid stool. EXTREMITIES: No edema or swelling  SKIN: Warm and dry    Assessment/Plan:  69 y.o. male with coffee ground emesis, SBO    Continue NPO  NGT to LIWS  Monitor abdominal exam  IVFs  Recommend PICC  Recommend starting TPN, awaiting central access - he has been on this chronically. Continue to recommend transfer to [facility] as this is where all his recent surgeries were done    Will be d/w Dr. Christopher Betancourt    Electronically signed by Alma Delia Nesbitt DO on 6/3/2021 at 6:00 AM     The patient was seen and examined and the chart was reviewed. Unfortunately, the patient is very complicated. The patient has had a previous bypass of the mycotic SMA aneurysm. Also, the patient had a total colectomy for ischemia. The ostomy is viable and functioning. Now, the patient has a small bowel obstruction. The patient will speak with his wife this evening but since she has agreed to be transferred back to the [facility] for continuity of care.

## 2021-06-03 NOTE — PROGRESS NOTES
Physical Therapy    Facility/Department: [address] MED SURG/TELE  Initial Assessment    NAME: Rock Le  : 1950  MRN: [id number]    Date of Service: 6/3/2021    Patient Diagnosis(es): The primary encounter diagnosis was Urinary tract infection with hematuria, site unspecified. A diagnosis of Anemia, unspecified type was also pertinent to this visit. has a past medical history of Abdominal aortic aneurysm without rupture (HCC), Arthritis, AS (aortic stenosis), Cerebral artery occlusion with cerebral infarction ([facility] [address] COPD (chronic obstructive pulmonary disease) ([facility] [address] History of blood transfusion, Hyperlipidemia, Pneumonia, and Tobacco dependence. has a past surgical history that includes hernia repair; Ankle surgery; Skull fracture elevation; eye surgery (Bilateral, approx ); Cardiac catheterization (2017); Tooth Extraction; Aortic valve replacement; AAA repair, endovascular (10/12/2017); AAA repair, endovascular (10/12/2017); Cardiac surgery; Upper gastrointestinal endoscopy (N/A, 2020); Colonoscopy (N/A, 2020); and   picc powerpicc double (4/15/2021). Physical Therapy Initial Assessment     Name: Rock Le  : 1950  MRN: [id number]      Date of Service: 6/3/2021      Referring provider/PT Order: Maximiliano Murphy MD / PT eval and treat     Evaluating PT:  Mark Ventura PT, DPT PT [id number]    Room #:  9991/1118-O  Diagnosis:  Bacteremia [R78.81]    Precautions:  Fall risk, NG  Equipment Needs:  None. Pt reported owing a w/w and w/c    SUBJECTIVE:    Pt lives with his wife in a 1 story home with 0 stairs to enter and 0 rail. Bed and bath is on first floor. Pt ambulated with no device in the home, and used walker or w/c in the community. OBJECTIVE:   Initial Evaluation  Date: 6/3 Treatment Short Term/ Long Term   Goals   Was pt agreeable to Eval/treatment? yes     Does pt have pain?  Mild abdominal pain     Bed Mobility  Rolling: SBA  Supine to sit: Min A  Sit to supine: SBA  Scooting: SBA to sitting EOB  Supervision/independent   Transfers Sit to stand: SBA  Stand to sit: SBA  Stand pivot: CGA without device  S/I   Ambulation    30 feet with no device Min A   100+ feet with AAD if needed SBA    Stair negotiation: ascended and descended  NT      ROM BLE:  WFL     Strength BLE:  Grossly 4/5  Grossly 4+/5   Balance Sitting EOB:  Supervision/independent  Dynamic Standing:  Min A without device  Sitting EOB:  independent  Dynamic Standing:  SBA with AAD   AM-PAC 6 Clicks 50/16       Pt is A & O x 3  Sensation:  Pt denies numbness and tingling to extremities      Vitals:  SPO2 after ambulation 90% on room air. Increased to 98% with seated rest.  Nursing reported pt's heart rate increased during ambulation. Patient education  Pt educated on PT objectives during eval and while in the hospital, calling for assistance    Patient response to education:   Pt verbalized understanding Pt demonstrated skill Pt requires further education in this area   yes   yes     ASSESSMENT:    Conditions Requiring Skilled Therapeutic Intervention:    [x]Decreased strength     []Decreased ROM  [x]Decreased functional mobility  [x]Decreased balance   [x]Decreased endurance   [x]Decreased posture  []Decreased sensation  [x]Decreased coordination   []Decreased vision  []Decreased safety awareness   []Increased pain       Comments:  Pt found in bed with wife present. Nursing disconnected NG for ambulation around the room. No report of dizziness during functional mobility. Mildly unsteady gait but no LOB. Pt reported feeling SOB with mobility. At end of eval, pt left in bed with call light in reach, bed alarm on and wife present. Pt's/ family goals   1. None stated    Prognosis is good for reaching above PT goals. Patient and or family understand(s) diagnosis, prognosis, and plan of care.   yes    PHYSICAL THERAPY PLAN OF CARE:    PT POC is established based on physician order and patient

## 2021-06-03 NOTE — PROGRESS NOTES
Pharmacy Consultation Note  (Antibiotic Dosing and Monitoring)    Initial consult date:   Consulting physician: Christine Corral  Drug: Vancomycin  Indication: Bloodstream infection    Age/  Gender Height Weight IBW  Allergy Information   70 y.o./male 5' 11\" (180.3 cm) 95 lb (43.1 kg)     Male patients must weigh at least 50 kg to calculate ideal body weight   Patient has no known allergies. Renal Function:  Recent Labs     21  0440 21  0352 21  0326   BUN 54* 45* 35*   CREATININE 4.1* 3.1* 2.2*       Intake/Output Summary (Last 24 hours) at 6/3/2021 1256  Last data filed at 6/3/2021 8489  Gross per 24 hour   Intake --   Output 2425 ml   Net -2425 ml       Vancomycin Monitorin/30 @ 1137 Trough: 16.9 mcg/mL    @ 1130 Rm: 25.9   @ 0352 Rm: 23.1  6/3 @ 0326 Rm: 19.3    Vancomycin Administration Times:  Recent vancomycin administrations                   vancomycin (VANCOCIN) 500 mg in sodium chloride 0.9 % 100 mL IVPB (mg) 500 mg New Bag 21 1208              Assessment:  · Patient is a 69 y.o. male who has been initiated on vancomycin  CrCl cannot be calculated (Unknown ideal weight. ).   · Goal AUC/JEN = 400-600; goal trough level = 10-20 mcg/mL    Plan:  · Will give vancomycin 500 mg IV x 1 dose  · Will check vancomycin levels when appropriate - vancomycin random tomorrow AM  · Will continue to monitor renal function   · Clinical pharmacy to follow    Manda Vicente, Tasia, [facility] Hospital[facility]  6/3/2021  12:56 PM

## 2021-06-03 NOTE — PROGRESS NOTES
Patient's NG tube displaced. Spoke with Dr. Juan Berry to make sure that he wanted it put back in. Said yes put it back in, then xray to verify placement.

## 2021-06-04 NOTE — PROGRESS NOTES
Occupational Therapy  Date:6/4/2021  Patient Name: Michelle Marinelli  Room: 8648/7517-G     Occupational Therapy (OT) order received, patient's medical record reviewed, and OT evaluation attempted this afternoon; patient declined to participate in [address] [place] [address] activities noting that he is awaiting transfer to [facility]. OT evaluation to be re-attempted at later date, as able/appropriate. Rona Aaron, OTR/L  License Number: [id number]

## 2021-06-04 NOTE — CARE COORDINATION
Unable to place PICC d/t poor access; [facility] 17/24; iv rocephin/vanco; ngt; SBO; consultants request transfer to [facility]. Pt has home services  Through [facility]/ [employer]; will continue to follow. Michele Avitia.

## 2021-06-04 NOTE — PROGRESS NOTES
Pharmacy Consultation Note  (Antibiotic Dosing and Monitoring)    Initial consult date:   Consulting physician: Emiliano Crawford  Drug: Vancomycin  Indication: Bloodstream infection    Age/  Gender Height Weight IBW  Allergy Information   70 y.o./male 5' 11\" (180.3 cm) 95 lb (43.1 kg)     Male patients must weigh at least 50 kg to calculate ideal body weight   Patient has no known allergies. Renal Function:  Recent Labs     21  0352 21  0326 21  0648   BUN 45* 35* 32*   CREATININE 3.1* 2.2* 2.1*       Intake/Output Summary (Last 24 hours) at 2021 1050  Last data filed at 2021 0815  Gross per 24 hour   Intake 166.6 ml   Output 1625 ml   Net -1458.4 ml       Vancomycin Monitorin/30 @ 1137 Trough: 16.9 mcg/mL    @ 1130 Rm: 25.9   @ 0352 Rm: 23.1  6/3 @ 0326 Rm: 19.3   @ 0648 Rm: 22     Vancomycin Administration Times:  Recent vancomycin administrations                   vancomycin (VANCOCIN) 500 mg in sodium chloride 0.9 % 100 mL IVPB (mg) 500 mg New Bag 21 1600    vancomycin (VANCOCIN) 500 mg in sodium chloride 0.9 % 100 mL IVPB (mg) 500 mg New Bag 21 1208              Assessment:  · Patient is a 69 y.o. male who has been initiated on vancomycin  CrCl cannot be calculated (Unknown ideal weight. ).   · Goal AUC/JEN = 400-600; goal trough level = 10-20 mcg/mL    Plan:  · Will hold additional vancomycin  · Will check vancomycin levels when appropriate - vancomycin random tomorrow AM  · Will continue to monitor renal function   · Clinical pharmacy to follow    Mee Irene, MichaelD, BCCCP  2021  10:50 AM

## 2021-06-04 NOTE — PROGRESS NOTES
Physical Therapy    Pt kindly refused Rx, states not feeling well today, might transfer to [facility]. Wishes respected. Will follow as able.   Froilan Aguilar PTA [id number]

## 2021-06-04 NOTE — PLAN OF CARE
Problem: Pain:  Goal: Pain level will decrease  Description: Pain level will decrease  Outcome: Met This Shift  Goal: Control of acute pain  Description: Control of acute pain  Outcome: Met This Shift  Goal: Control of chronic pain  Description: Control of chronic pain  Outcome: Met This Shift     Problem: Falls - Risk of:  Goal: Will remain free from falls  Description: Will remain free from falls  Outcome: Met This Shift  Goal: Absence of physical injury  Description: Absence of physical injury  Outcome: Met This Shift     Problem: INFECTION  Goal: Absence of urinary tract infection signs and symptoms  Description: Absence of urinary tract infection signs and symptoms  Outcome: Met This Shift     Problem: HEMODYNAMIC STATUS  Goal: Hemoglobin within specified parameters  Outcome: Met This Shift     Problem: Nutrition Deficit:  Goal: Nutritional status is improving  Outcome: Met This Shift     Problem: Skin Integrity:  Goal: Will show no infection signs and symptoms  Description: Will show no infection signs and symptoms  Outcome: Met This Shift  Goal: Absence of new skin breakdown  Description: Absence of new skin breakdown  Outcome: Met This Shift     Problem: Musculor/Skeletal Functional Status  Goal: Highest potential functional level  Outcome: Met This Shift  Goal: Absence of falls  Outcome: Met This Shift

## 2021-06-04 NOTE — PROGRESS NOTES
[address] [place] [address] [facility]  Progress Note    SUBJECTIVE:  Chief Complaint   Patient presents with    Abnormal Lab     Pt states was called by PCP to notify of abnormal labs and sent to ED. Unsure of which was affected. Pt denies symptoms. The patient continues to tolerate antibiotics. NG tube still to suction. No fevers. Lying in bed curled up in the fetal position       Review of systems:  As stated above in the chief complaint, otherwise negative. Medications:  Scheduled Meds:   IV infusion builder   Intravenous Daily    vancomycin (VANCOCIN) intermittent dosing (placeholder)   Other RX Placeholder    metoprolol  2.5 mg Intravenous Q6H    lidocaine PF  5 mL Intradermal Once    sodium chloride flush  5-40 mL Intravenous 2 times per day    heparin flush  3 mL Intravenous 2 times per day    heparin (porcine)  5,000 Units Subcutaneous Q12H    cefTRIAXone (ROCEPHIN) IV  1,000 mg Intravenous Q24H    [Held by provider] metoprolol succinate  25 mg Oral Daily    pantoprazole  40 mg Oral BID AC    aspirin  81 mg Oral Every Other Day    atorvastatin  20 mg Oral Daily    dronabinol  5 mg Oral BID AC     Continuous Infusions:   lactated ringers      dextrose 5% and 0.45% NaCl with KCl 20 mEq      sodium chloride      sodium chloride       PRN Meds:sodium chloride, diatrizoate meglumine-sodium, sodium chloride flush, sodium chloride, heparin flush, prochlorperazine, perflutren lipid microspheres, ipratropium-albuterol, promethazine **OR** [DISCONTINUED] ondansetron, polyethylene glycol, acetaminophen **OR** acetaminophen    OBJECTIVE:  /65   Pulse 89   Temp 97.7 °F (36.5 °C) (Oral)   Resp 16   Ht 5' 11\" (1.803 m)   Wt 97 lb (44 kg)   SpO2 94%   BMI 13.53 kg/m²   Temp  Av.8 °F (36.6 °C)  Min: 97.7 °F (36.5 °C)  Max: 97.8 °F (36.6 °C)  Constitutional: The patient is lying in bed. He is awake and alert. Emaciated. Skin: Warm and dry. No rashes were noted. HEENT: Round and reactive pupils. Moist mucous membranes. No ulcerations or thrush. NG tube to suction. Neck: Supple to movements. Chest: No respiratory distres. Symmetrical expansion. No wheezing, crackles or rhonchi. Cardiovascular: Heart sounds rhythmic and regular. Abdomen: Positive bowel sounds to auscultation. Benign to palpation. Soft, nondistended. Ostomy appliance with liquid stool   Extremities: No edema. Muscle wasting.   Lines: peripheral    Laboratory and Tests Review:  Lab Results   Component Value Date    WBC 7.1 06/04/2021    WBC 6.8 06/03/2021    WBC 8.4 06/02/2021    HGB 9.9 (L) 06/04/2021    HCT 31.1 (L) 06/04/2021    .0 (H) 06/04/2021     06/04/2021     Lab Results   Component Value Date    NEUTROABS 4.53 06/04/2021    NEUTROABS 4.59 06/03/2021    NEUTROABS 5.72 06/02/2021     No results found for: [facility]  Lab Results   Component Value Date    ALT 43 (H) 05/31/2021    AST 37 05/31/2021    ALKPHOS 444 (H) 05/31/2021    BILITOT 0.9 05/31/2021     Lab Results   Component Value Date     06/04/2021    K 3.6 06/04/2021    K 4.0 05/28/2021    CL 99 06/04/2021    CO2 33 06/04/2021    BUN 32 06/04/2021    CREATININE 2.1 06/04/2021    CREATININE 2.2 06/03/2021    CREATININE 3.1 06/02/2021    GFRAA 38 06/04/2021    LABGLOM 31 06/04/2021    GLUCOSE 118 06/04/2021    PROT 6.6 05/31/2021    LABALBU 2.7 05/31/2021    CALCIUM 8.1 06/04/2021    BILITOT 0.9 05/31/2021    ALKPHOS 444 05/31/2021    AST 37 05/31/2021    ALT 43 05/31/2021     Lab Results   Component Value Date    CRP 4.6 (H) 05/28/2021    CRP 10.0 (H) 01/29/2021    CRP 7.4 (H) 12/19/2020     Lab Results   Component Value Date    SEDRATE 72 (H) 05/27/2021    SEDRATE 47 (H) 01/29/2021    SEDRATE 45 (H) 12/19/2020     Radiology:  noted    Microbiology:   Blood cultures from [facility] 5/26/2021: Citrobacter, Staphylococcus epidermidis   Blood cultures 5/27/2021: Negative so far  PICC tip culture 5/28/2021: >15 colonies Ampicillin resistant Citrobacter braakii, CoNS    ASSESSMENT:  · High-grade bacteremia, staph epidermidis, likely associated with PICC line  · History of AVR, need to rule out late prosthetic valve endocarditis  · Catheter associated UTI-Citrobacter, patient completed 5/7 days of therapy  · Gram-negative fidelina bacteremia, identification and process 1/6  · Need to rule out late prosthetic valve endocarditis  · Gastric outlet obstruction secondary to compression of duodenum by aortic aneurysm  · Small bowel obstruction  · History of previous endovascular stent graft repair with a fenestrated stent graft of the AAA done in 2017     Plan:    · Continue Vancomycin- pharmacy dosing, currently on hold r/t random level 22.0  · Continue Ceftriaxone   · The patient still needs a YONG  · A PICC could not be inserted  · Transfer to [place] is pending - has not been started yet     COREEN Bates - CNP  1:30 PM  6/4/2021     Patient seen and examined. I had a face to face encounter with the patient. Agree with exam.  Assessment and plan as outlined above and directed by me. Addition and corrections were done as deemed appropriate. My exam and plan include: The patient is doing well with current antibiotics. He has no new complaints. He still has an NG tube to suction. He is now agreeable to go to [place].     Wen Baptiste MD  6/4/2021  2:22 PM

## 2021-06-04 NOTE — PROGRESS NOTES
Subjective:  Feeling ok does want transferred  No CP or SOB  No fever or chills   No uncontrolled pain  No  diarrhea, no vomiting    Objective:    BP (!) 113/96   Pulse 90   Temp 97.7 °F (36.5 °C) (Oral)   Resp 16   Ht 5' 11\" (1.803 m)   Wt 97 lb (44 kg)   SpO2 94%   BMI 13.53 kg/m²     24HR INTAKE/OUTPUT:      Intake/Output Summary (Last 24 hours) at 6/4/2021 1137  Last data filed at 6/4/2021 0815  Gross per 24 hour   Intake 166.6 ml   Output 1625 ml   Net -1458.4 ml       General appearance: Cachectic NAD, conversant, + NG  Neck: FROM, supple   Lungs: Clear bilaterally no wheezes, no rhonchi, no crackles  CV: RRR, no MRGs; normal carotid upstroke and amplitude without Bruits  Abdomen: Soft, +tender; no masses or HSM  Extremities: No edema, no cyanosis, no clubbing  Skin: Intact no rash, no lesions, no ulcers    Psych: Alert and oriented normal affect  Neuro: Nonfocal  Most Recent Labs  Lab Results   Component Value Date    WBC 7.1 06/04/2021    HGB 9.9 (L) 06/04/2021    HCT 31.1 (L) 06/04/2021     06/04/2021     06/04/2021    K 3.6 06/04/2021    CL 99 06/04/2021    CREATININE 2.1 (H) 06/04/2021    BUN 32 (H) 06/04/2021    CO2 33 (H) 06/04/2021    GLUCOSE 118 (H) 06/04/2021    ALT 43 (H) 05/31/2021    AST 37 05/31/2021    INR 1.0 05/27/2021    LABA1C 5.4 07/26/2017     Recent Labs     06/04/21  0648   MG 1.9     Lab Results   Component Value Date    CALCIUM 8.1 (L) 06/04/2021    PHOS 5.5 (H) 05/31/2021        XR CHEST ABDOMEN NG PLACEMENT   Final Result   Tip of NG tube in the stomach. Mildly dilated loops of bowel. CT ABDOMEN PELVIS WO CONTRAST Additional Contrast? Oral (gastrograffin contrast through NG)   Final Result   Findings consistent with small bowel obstruction. Transition point in the   mid abdomen, just proximal to the ostomy. XR ABDOMEN FOR NG/OG/NE TUBE PLACEMENT   Final Result   NG tube tip is well within the gastric lumen.   Dilated small bowel loops as noted.  See above. CT ABDOMEN PELVIS WO CONTRAST Additional Contrast? None   Final Result   Markedly dilated stomach with dilated fluid-filled small bowel loops,   increased compared to prior, compatible with developing small bowel   obstruction. Transition point appears to be in the right lower quadrant near   the ostomy site      Stable caliber of aorta status post aorto iliac stent graft. Emphysema             Assessment    Principal Problem:    Bacteremia  Active Problems:    Tobacco dependence    Severe protein-calorie malnutrition (HCC)    S/P AAA repair using bifurcation graft    ROBBIN (acute kidney injury) ([address]    History of aortic valve replacement with bioprosthetic valve    Gross hematuria    Chronic anemia  Resolved Problems:    * No resolved hospital problems.  *      Plan:     69-year-old male history of AS status post AVR AAA status post endovascular repair admitted with positive blood cultures    Bacteremia/CLABSI  Vancomycin/meropenem  Repeat blood cultures 5/27 NGTD  PICC line discontinued  -culture PICC tip +  Echocardiogram normal EF, no vegetations  Plan for YOGN  Procalcitonin 5.77<8.99  ID Consult appreciated-discussed case    Severe protein calorie malnutrition   Dietitian consult  Hold TPN pending central access  IVF    Gastric outlet obstruction--   NPO  NG  Previously reported secondary to aortic aneurysm causing compression of the duodenum  Plan to resume TPN,   -Plan for PICC --delayed due to vein size  Recommend tunneled catheter for long-term TPN  Gen Surg Consult recommending transfer to [facility]    Gross hematuria  Urology Consult appreciated    Anemia  monitor hemoglobin  Macrocytic  Iron studies reviewed,   B12 folate adequate   monitor hemoglobin    ROBBIN improving   LR bolus  Increase IV fluids  Discussed with her sister  Caution with vancomycin to avoid toxicity    Medications for other co morbidities cont as appropriate w dosage adjustments as necessary  PT/OT  DVT PPx  DC planning-discussed recommendation for transfer to [facility]. Patient now states he is going to be transferred. Transfer to [facility] initiated.     Electronically signed by Giovanna Blankenship MD on 6/4/2021 at 11:37 AM

## 2021-06-05 NOTE — PLAN OF CARE
Problem: Pain:  Goal: Pain level will decrease  Description: Pain level will decrease  6/5/2021 0054 by Irma Hay RN  Outcome: Ongoing  6/4/2021 1821 by Trav Julio RN  Outcome: Met This Shift  Goal: Control of acute pain  Description: Control of acute pain  6/5/2021 0054 by Irma Hay RN  Outcome: Ongoing  6/4/2021 1821 by Trav Julio RN  Outcome: Met This Shift  Goal: Control of chronic pain  Description: Control of chronic pain  6/5/2021 0054 by Irma Hay RN  Outcome: Ongoing  6/4/2021 1821 by Trav Julio RN  Outcome: Met This Shift     Problem: Falls - Risk of:  Goal: Will remain free from falls  Description: Will remain free from falls  6/5/2021 0054 by Irma Hay RN  Outcome: Ongoing  6/4/2021 1821 by Trav Julio RN  Outcome: Met This Shift  Goal: Absence of physical injury  Description: Absence of physical injury  6/5/2021 0054 by Irma Hay RN  Outcome: Ongoing  6/4/2021 1821 by Trav Julio RN  Outcome: Met This Shift     Problem: INFECTION  Goal: Absence of urinary tract infection signs and symptoms  Description: Absence of urinary tract infection signs and symptoms  6/5/2021 0054 by Irma Hay RN  Outcome: Ongoing  6/4/2021 1821 by Trav Julio RN  Outcome: Met This Shift     Problem: HEMODYNAMIC STATUS  Goal: Hemoglobin within specified parameters  6/5/2021 0054 by Irma Hay RN  Outcome: Ongoing  6/4/2021 1821 by Trav Julio RN  Outcome: Met This Shift     Problem: Nutrition Deficit:  Goal: Nutritional status is improving  6/5/2021 0054 by Irma Hay RN  Outcome: Ongoing  6/4/2021 1821 by Trav Julio RN  Outcome: Met This Shift     Problem: Skin Integrity:  Goal: Will show no infection signs and symptoms  Description: Will show no infection signs and symptoms  6/5/2021 0054 by Irma Hay RN  Outcome: Ongoing  6/4/2021 1821 by Trav Julio RN  Outcome: Met This Shift  Goal: Absence of new skin breakdown  Description: Absence of new skin breakdown  6/5/2021 0054 by Chuy Ward RN  Outcome: Ongoing  6/4/2021 1821 by Braeden Anderson RN  Outcome: Met This Shift     Problem: Musculor/Skeletal Functional Status  Goal: Highest potential functional level  6/5/2021 0054 by Chuy Ward RN  Outcome: Ongoing  6/4/2021 1821 by Braeden Anderson RN  Outcome: Met This Shift  Goal: Absence of falls  6/5/2021 0054 by Chuy Ward RN  Outcome: Ongoing  6/4/2021 1821 by Braeden Anderson RN  Outcome: Met This Shift

## 2021-06-05 NOTE — PROGRESS NOTES
[address] [place] [address] [facility]  Progress Note    SUBJECTIVE:  Chief Complaint   Patient presents with    Abnormal Lab     Pt states was called by PCP to notify of abnormal labs and sent to ED. Unsure of which was affected. Pt denies symptoms. The patient continues to tolerate antibiotics. NG tube still to suction. No fevers. Sitting up in bed, reports no issues   Awaiting transfer to [facility]       Review of systems:  As stated above in the chief complaint, otherwise negative. Medications:  Scheduled Meds:   IV infusion builder   Intravenous Daily    vancomycin (VANCOCIN) intermittent dosing (placeholder)   Other RX Placeholder    metoprolol  2.5 mg Intravenous Q6H    lidocaine PF  5 mL Intradermal Once    sodium chloride flush  5-40 mL Intravenous 2 times per day    heparin flush  3 mL Intravenous 2 times per day    heparin (porcine)  5,000 Units Subcutaneous Q12H    cefTRIAXone (ROCEPHIN) IV  1,000 mg Intravenous Q24H    [Held by provider] metoprolol succinate  25 mg Oral Daily    pantoprazole  40 mg Oral BID AC    aspirin  81 mg Oral Every Other Day    atorvastatin  20 mg Oral Daily    dronabinol  5 mg Oral BID AC     Continuous Infusions:   dextrose 5% and 0.45% NaCl with KCl 20 mEq 100 mL/hr at 21 0557    sodium chloride      sodium chloride       PRN Meds:sodium chloride, diatrizoate meglumine-sodium, sodium chloride flush, sodium chloride, heparin flush, prochlorperazine, perflutren lipid microspheres, ipratropium-albuterol, promethazine **OR** [DISCONTINUED] ondansetron, polyethylene glycol, acetaminophen **OR** acetaminophen    OBJECTIVE:  BP 96/60   Pulse 71   Temp 97.6 °F (36.4 °C) (Oral)   Resp 16   Ht 5' 11\" (1.803 m)   Wt 97 lb (44 kg)   SpO2 97%   BMI 13.53 kg/m²   Temp  Av.6 °F (36.4 °C)  Min: 97.6 °F (36.4 °C)  Max: 97.6 °F (36.4 °C)  Constitutional: The patient is sitting up in bed. Awake and alert. Emaciated. Skin: Warm and dry.  No far  PICC tip culture 5/28/2021: >15 colonies Ampicillin resistant Citrobacter braakii, CoNS    ASSESSMENT:  · High-grade bacteremia, staph epidermidis, likely associated with PICC line  · History of AVR, need to rule out late prosthetic valve endocarditis  · Catheter associated UTI-Citrobacter, patient completed 5/7 days of therapy  · Gram-negative fidelina bacteremia, identification and process 1/6  · Need to rule out late prosthetic valve endocarditis  · Gastric outlet obstruction secondary to compression of duodenum by aortic aneurysm  · Small bowel obstruction  · History of previous endovascular stent graft repair with a fenestrated stent graft of the AAA done in 2017     Plan:    · Continue Vancomycin- pharmacy dosing, random level today 15.6  · Continue Ceftriaxone   · The patient still needs a YONG  · A PICC could not be inserted  · Transfer to [facility] is pending     COREEN Sneed - CNP  11:59 AM  6/5/2021     Patient seen and examined. I had a face to face encounter with the patient. Agree with exam.  Assessment and plan as outlined above and directed by me. Addition and corrections were done as deemed appropriate. My exam and plan include: Patient doing well. Tolerating antibiotics. NG is still to suction. Wife at bedside. Awaiting transfer to [facility].     Hair Rodríguez MD  6/5/2021  3:01 PM

## 2021-06-05 NOTE — PROGRESS NOTES
Department of Surgery - Adult  General Surgery  Dr. Nell Sherman's Progress Note      SUBJECTIVE: The patient is resting comfortably. The patient denies abdominal pain. OBJECTIVE      Physical    VITALS:  /60   Pulse 93   Temp 97.6 °F (36.4 °C) (Oral)   Resp 16   Ht 5' 11\" (1.803 m)   Wt 97 lb (44 kg)   SpO2 95%   BMI 13.53 kg/m²   INTAKE/OUTPUT:      Intake/Output Summary (Last 24 hours) at 2021 0717  Last data filed at 2021 1200  Gross per 24 hour   Intake --   Output 150 ml   Net -150 ml     TEMPERATURE:  Current - Temp: 97.6 °F (36.4 °C); Max - Temp  Av.7 °F (36.5 °C)  Min: 97.6 °F (36.4 °C)  Max: 97.8 °F (36.6 °C)  RESPIRATIONS RANGE: Resp  Av  Min: 16  Max: 16  PULSE RANGE: Pulse  Av.6  Min: 89  Max: 96  BLOOD PRESSURE RANGE:  Systolic (39ZEZ), LEP:511 , Min:107 , DOL:114   ; Diastolic (22HKD), JHO:36, Min:60, Max:96    PULSE OXIMETRY RANGE: SpO2  Av.5 %  Min: 94 %  Max: 95 %  CONSTITUTIONAL:  awake, alert, cooperative, no apparent distress, and appears stated age  ABDOMEN: The abdomen is soft, distended and tympanic. The patient denies abdominal pain.   Data  CBC with Differential:    Lab Results   Component Value Date    WBC 7.1 2021    RBC 3.05 2021    HGB 9.9 2021    HCT 31.1 2021     2021    .0 2021    MCH 32.5 2021    MCHC 31.8 2021    RDW 16.1 2021    LYMPHOPCT 25.5 2021    MONOPCT 8.9 2021    BASOPCT 0.7 2021    MONOSABS 0.63 2021    LYMPHSABS 1.81 2021    EOSABS 0.05 2021    BASOSABS 0.05 2021     CMP:    Lab Results   Component Value Date     2021    K 3.6 2021    K 4.0 2021    CL 99 2021    CO2 33 2021    BUN 32 2021    CREATININE 2.1 2021    GFRAA 38 2021    LABGLOM 31 2021    GLUCOSE 118 2021    PROT 6.6 2021    LABALBU 2.7 2021    CALCIUM 8.1 2021    BILITOT 0.9 05/31/2021    ALKPHOS 444 05/31/2021    AST 37 05/31/2021    ALT 43 05/31/2021     BMP:  Hepatic Function Panel:  Ionized Calcium:  No results found for: IONCA  Magnesium:    Lab Results   Component Value Date    MG 1.9 06/04/2021     Phosphorus:    Lab Results   Component Value Date    PHOS 5.5 05/31/2021       Current Inpatient Medications    Current Facility-Administered Medications: adult multi-vitamin with vitamin k 10 mL, potassium chloride 20 mEq in dextrose 5 % and 0.45 % NaCl 1,000 mL infusion, , Intravenous, Daily **AND** dextrose 5 % and 0.45 % NaCl with KCl 20 mEq infusion, , Intravenous, Continuous  0.9 % sodium chloride infusion, , Intravenous, PRN  diatrizoate meglumine-sodium (GASTROGRAFIN) 66-10 % solution 30 mL, 30 mL, Per NG tube, ONCE PRN  vancomycin (VANCOCIN) intermittent dosing (placeholder), , Other, RX Placeholder  metoprolol (LOPRESSOR) injection 2.5 mg, 2.5 mg, Intravenous, Q6H  lidocaine PF 1 % injection 5 mL, 5 mL, Intradermal, Once  sodium chloride flush 0.9 % injection 5-40 mL, 5-40 mL, Intravenous, 2 times per day  sodium chloride flush 0.9 % injection 5-40 mL, 5-40 mL, Intravenous, PRN  0.9 % sodium chloride infusion, 25 mL, Intravenous, PRN  heparin flush 100 UNIT/ML injection 300 Units, 3 mL, Intravenous, 2 times per day  heparin flush 100 UNIT/ML injection 300 Units, 3 mL, Intracatheter, PRN  heparin (porcine) injection 5,000 Units, 5,000 Units, Subcutaneous, Q12H  cefTRIAXone (ROCEPHIN) 1,000 mg in sterile water 10 mL IV syringe, 1,000 mg, Intravenous, Q24H  prochlorperazine (COMPAZINE) injection 5 mg, 5 mg, Intravenous, Q6H PRN  [Held by provider] metoprolol succinate (TOPROL XL) extended release tablet 25 mg, 25 mg, Oral, Daily  pantoprazole (PROTONIX) tablet 40 mg, 40 mg, Oral, BID AC  perflutren lipid microspheres (DEFINITY) injection 1.65 mg, 1.5 mL, Intravenous, ONCE PRN  aspirin EC tablet 81 mg, 81 mg, Oral, Every Other Day  atorvastatin (LIPITOR) tablet 20 mg, 20 mg, Oral, Daily  dronabinol (MARINOL) capsule 5 mg, 5 mg, Oral, BID AC  ipratropium-albuterol (DUONEB) nebulizer solution 3 mL, 1 vial, Inhalation, Q4H PRN  promethazine (PHENERGAN) tablet 12.5 mg, 12.5 mg, Oral, Q6H PRN **OR** [DISCONTINUED] ondansetron (ZOFRAN) injection 4 mg, 4 mg, Intravenous, Q6H PRN  polyethylene glycol (GLYCOLAX) packet 17 g, 17 g, Oral, Daily PRN  acetaminophen (TYLENOL) tablet 650 mg, 650 mg, Oral, Q6H PRN **OR** acetaminophen (TYLENOL) suppository 650 mg, 650 mg, Rectal, Q6H PRN    ASSESSMENT:    69 y.o. 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 PLAN:    The patient will be referred back to the [facility] for appropriate management.     Good Brandon MD 1/2/28095:67 AM

## 2021-06-05 NOTE — DISCHARGE SUMMARY
Physician Discharge Summary     Patient ID:  Veronica Cornell  [id number]  69 y.o.  1950    Admit date: 5/27/2021    Discharge date and time:  6/5/2021     Admission Diagnoses:   Chief Complaint   Patient presents with    Abnormal Lab     Pt states was called by PCP to notify of abnormal labs and sent to ED. Unsure of which was affected. Pt denies symptoms. Bacteremia     Discharge Diagnoses:   Principal Problem:    Bacteremia  Active Problems:    Tobacco dependence    Severe protein-calorie malnutrition (HCC)    S/P AAA repair using bifurcation graft    ROBBIN (acute kidney injury) ([facility] [address]    History of aortic valve replacement with bioprosthetic valve    Gross hematuria    Chronic anemia  Resolved Problems:    * No resolved hospital problems. *       Consults: Surgery, ID    Procedures: PICC removal    Hospital Course:   Patient was sent in by his PCP due to several positive blood cultures for CONS. He was getting TPN via PICC. He was found to have CLABSI. His PICC was removed and he was started on broad spectrum antibiotics initially, and later narrowed to ceftriaxone and vanco.  His tip culture was positive for CONS and citrobacter and his subsequent blood cultures have remained negative. ID has recommended YONG but reportedly cardiology has been hesitant to do it, though I'm not sure exactly why because it's not well documented in the chart. His course was complicated by SBO. He has a very complicated abdominal history with multiple surgeries recently done at [facility]. Surgery recommended transfer back to [facility].     Discharge Exam:  Vitals:    06/04/21 1115 06/04/21 1315 06/04/21 2300 06/05/21 0728   BP: (!) 113/96 107/65 120/60 96/60   Pulse: 90 89 93 71   Resp:   16 16   Temp:   97.6 °F (36.4 °C) 97.6 °F (36.4 °C)   TempSrc:   Oral Oral   SpO2:   95% 97%   Weight:   97 lb (44 kg)    Height:   5' 11\" (1.803 m)         See progress note today    Condition:  Stable to best ability of our [facility].    Disposition: [facility]    Patient Instructions:     Current Facility-Administered Medications:     adult multi-vitamin with vitamin k 10 mL, potassium chloride 20 mEq in dextrose 5 % and 0.45 % NaCl 1,000 mL infusion, , Intravenous, Daily, Last Rate: 100 mL/hr at 06/05/21 1159, New Bag at 06/05/21 1159 **AND** dextrose 5 % and 0.45 % NaCl with KCl 20 mEq infusion, , Intravenous, Continuous, Nathalie Navarro MD, Last Rate: 100 mL/hr at 06/05/21 0557, New Bag at 06/05/21 0557    0.9 % sodium chloride infusion, , Intravenous, PRN, ETHAN Parker    diatrizoate meglumine-sodium (GASTROGRAFIN) 66-10 % solution 30 mL, 30 mL, Per NG tube, ONCE PRN, Alvenia Ny, DO, 30 mL at 06/02/21 1118    vancomycin (VANCOCIN) intermittent dosing (placeholder), , Other, RX Placeholder, Yusef Dhaliwal MD    metoprolol (LOPRESSOR) injection 2.5 mg, 2.5 mg, Intravenous, Q6H, Nathalie Navarro MD, 2.5 mg at 06/05/21 1817    lidocaine PF 1 % injection 5 mL, 5 mL, Intradermal, Once, Renetta Boaz, DO    sodium chloride flush 0.9 % injection 5-40 mL, 5-40 mL, Intravenous, 2 times per day, Renetta Boaz, DO, 10 mL at 06/05/21 0850    sodium chloride flush 0.9 % injection 5-40 mL, 5-40 mL, Intravenous, PRN, Renetta Oconee, DO, 10 mL at 06/04/21 0923    0.9 % sodium chloride infusion, 25 mL, Intravenous, PRN, Renetta [place]conee, DO    heparin flush 100 UNIT/ML injection 300 Units, 3 mL, Intravenous, 2 times per day, Renetta Boaz, DO    heparin flush 100 UNIT/ML injection 300 Units, 3 mL, Intracatheter, PRN, Renetta Boaz, DO    heparin (porcine) injection 5,000 Units, 5,000 Units, Subcutaneous, Q12H, Nathalie Navarro MD, 5,000 Units at 06/04/21 0841    cefTRIAXone (ROCEPHIN) 1,000 mg in sterile water 10 mL IV syringe, 1,000 mg, Intravenous, Q24H, Cony Ritchie MD, 1,000 mg at 06/05/21 1159    prochlorperazine (COMPAZINE) injection 5 mg, 5 mg, Intravenous, Q6H PRN, Nathalie Navarro MD, 5 mg at 06/01/21 0827    [Held by provider] metoprolol succinate (TOPROL XL) extended release tablet 25 mg, 25 mg, Oral, Daily, Ryan Cosme MD, 25 mg at 05/31/21 0936    pantoprazole (PROTONIX) tablet 40 mg, 40 mg, Oral, BID AC, Ryan Cosme MD, 40 mg at 06/04/21 1646    perflutren lipid microspheres (DEFINITY) injection 1.65 mg, 1.5 mL, Intravenous, ONCE PRN, Pushpa Adkins MD    aspirin EC tablet 81 mg, 81 mg, Oral, Every Other Day, Pushpa Adkins MD, 81 mg at 05/29/21 1001    atorvastatin (LIPITOR) tablet 20 mg, 20 mg, Oral, Daily, Pushpa Adkins MD, 20 mg at 06/05/21 6662    dronabinol (MARINOL) capsule 5 mg, 5 mg, Oral, BID AC, Pushpa Adkins MD, 5 mg at 06/04/21 1646    ipratropium-albuterol (DUONEB) nebulizer solution 3 mL, 1 vial, Inhalation, Q4H PRN, Pushpa Adkins MD    promethazine (PHENERGAN) tablet 12.5 mg, 12.5 mg, Oral, Q6H PRN **OR** [DISCONTINUED] ondansetron (ZOFRAN) injection 4 mg, 4 mg, Intravenous, Q6H PRN, Pushpa Adkins MD, 4 mg at 05/30/21 0650    polyethylene glycol (GLYCOLAX) packet 17 g, 17 g, Oral, Daily PRN, Pushpa Adkins MD    acetaminophen (TYLENOL) tablet 650 mg, 650 mg, Oral, Q6H PRN **OR** acetaminophen (TYLENOL) suppository 650 mg, 650 mg, Rectal, Q6H PRN, Pushpa Adkins MD       Activity: activity as tolerated  Diet: NPO    Follow-up with PCP in 1 week.     Note that over 30 minutes was spent in preparing discharge papers, discussing discharge with patient, medication review, etc.    Signed:  Pushpa Adkins MD    6/5/2021  6:54 PM

## 2021-06-05 NOTE — PROGRESS NOTES
-Vancomycin level is 15.6 mcg/mL 42 hours after dose of vancomycin 500 mg IV x 1.  -Renal function is improving as serum creatinine decreased from 2.1 yesterday to 1.7 today.  -With fluctuating renal function, predicting vancomycin levels becomes less accurate, however, vancomycin 750 mg IV q48h is likely an appropriate dose from trough projected to be between 15-20 and AUC/JEN to be between 400-600. Will order vancomycin 750 mg IV x 1 and will plan to order vancomycin random level for morning of 6/6 at 0600.  -With improving renal function, will potentially be able to place standing order based on level from 6/6.

## 2021-06-05 NOTE — PROGRESS NOTES
Chief Complaint:  Chief Complaint   Patient presents with    Abnormal Lab     Pt states was called by PCP to notify of abnormal labs and sent to ED. Unsure of which was affected. Pt denies symptoms. Bacteremia     Subjective:    He has no significant complaints today    Objective:    BP 96/60   Pulse 71   Temp 97.6 °F (36.4 °C) (Oral)   Resp 16   Ht 5' 11\" (1.803 m)   Wt 97 lb (44 kg)   SpO2 97%   BMI 13.53 kg/m²     Current medications that patient is taking have been reviewed.     General appearance: NAD, conversant, extremely frail appearing  HEENT: AT/NC, MMM, NG tube in place draining bilious fluid  Neck: FROM, supple  Lungs: Clear to auscultation, WOB normal  CV: RRR, no MRGs  Abdomen: Scaphoid, soft, nontender, present bowel sounds  Extremities: No peripheral edema or digital cyanosis  Skin: no rash, lesions or ulcers  Psych: Calm and cooperative  Neuro: Alert and interactive, face symmetric, moving all extremities, speech fluent    Labs:  CBC with Differential:    Lab Results   Component Value Date    WBC 6.6 06/05/2021    RBC 2.79 06/05/2021    HGB 9.2 06/05/2021    HCT 28.6 06/05/2021    PLT 96 06/05/2021    .5 06/05/2021    MCH 33.0 06/05/2021    MCHC 32.2 06/05/2021    RDW 15.5 06/05/2021    LYMPHOPCT 25.5 06/04/2021    MONOPCT 8.9 06/04/2021    BASOPCT 0.7 06/04/2021    MONOSABS 0.63 06/04/2021    LYMPHSABS 1.81 06/04/2021    EOSABS 0.05 06/04/2021    BASOSABS 0.05 06/04/2021     CMP:    Lab Results   Component Value Date     06/05/2021    K 3.2 06/05/2021    K 4.0 05/28/2021    CL 98 06/05/2021    CO2 34 06/05/2021    BUN 24 06/05/2021    CREATININE 1.7 06/05/2021    GFRAA 48 06/05/2021    LABGLOM 40 06/05/2021    GLUCOSE 120 06/05/2021    PROT 6.6 05/31/2021    LABALBU 2.7 05/31/2021    CALCIUM 7.5 06/05/2021    BILITOT 0.9 05/31/2021    ALKPHOS 444 05/31/2021    AST 37 05/31/2021    ALT 43 05/31/2021          Assessment/Plan:  Principal Problem:    Bacteremia  Active Problems:    Tobacco dependence    Severe protein-calorie malnutrition (HCC)    S/P AAA repair using bifurcation graft    ROBBIN (acute kidney injury) ([address]    History of aortic valve replacement with bioprosthetic valve    Gross hematuria    Chronic anemia  Resolved Problems:    * No resolved hospital problems. *       Continue ceftriaxone    Continue NG tube    ROBBIN improving.   Continue fluids    Per my discussion with Dr. Jason Bull, he has been accepted and is just waiting for a bed for Marilynn Mckee MD    2:08 PM  6/5/2021

## 2021-07-16 NOTE — PROGRESS NOTES
I put on a 14 day monitor per Dr Phil Parada, pt and wife understood all instructions.   Monitor # [id number]

## 2021-07-16 NOTE — PROGRESS NOTES
CHIEF COMPLAINT: Murmur    HPI: Patient is a 66 y.o. male seen at the request of Wendy Leon MD.      Patient presents in follow up after a recent admisson. Post AVR with 23mm CE 8/17. EVAR 10/17. No CP or SOB. Some palpitations.     Past Medical History:   Diagnosis Date    Abdominal aortic aneurysm without rupture ([facility] [address] 05/09/2017    Arthritis     AS (aortic stenosis)     Cerebral artery occlusion with cerebral infarction [facility]) 2009    TIA/CVA with aphasia that resolved    COPD (chronic obstructive pulmonary disease) ([address]     History of blood transfusion     Hyperlipidemia     Pneumonia     Tobacco dependence 05/09/2017     Patient Active Problem List   Diagnosis    Mixed hyperlipidemia    Acute hypoxemic respiratory failure (HCC)    Rhinovirus    History of stroke    Abdominal aneurysm (HCC)    Tobacco dependence    Aortic valve stenosis    Nonrheumatic aortic valve stenosis    Severe protein-calorie malnutrition (HCC)    COPD, moderate (HCC)    S/P AAA repair using bifurcation graft    Generalized abdominal pain    Failure to thrive in adult    Syncope and collapse    Acute respiratory failure with hypoxia (HCC)    COPD exacerbation ([facility] [address]    Essential hypertension    Emphysema of lung ([facility] [address]    ROBBIN (acute kidney injury) ([facility] [address]    Lactic acid acidosis    Hypercalcemia    Tachycardia    High anion gap metabolic acidosis    Bacteremia    History of aortic valve replacement with bioprosthetic valve    Gross hematuria    Chronic anemia       No Known Allergies    Current Outpatient Medications   Medication Sig Dispense Refill    metoprolol tartrate (LOPRESSOR) 25 MG tablet Take 25 mg by mouth 2 times daily       pantoprazole (PROTONIX) 40 MG tablet take 1 tablet by mouth once daily      tiotropium-olodaterol (STIOLTO RESPIMAT) 2.5-2.5 MCG/ACT AERS Inhale 2 puffs into the lungs      albuterol sulfate HFA (VENTOLIN HFA) 108 (90 Base) MCG/ACT inhaler Inhale 2 puffs into the lungs every 4 hours as needed for Wheezing or Shortness of Breath 1 Inhaler 6    dronabinol (MARINOL) 5 MG capsule Take 5 mg by mouth 2 times daily (before meals).  ondansetron (ZOFRAN-ODT) 4 MG disintegrating tablet Take 4 mg by mouth every 8 hours as needed for Nausea or Vomiting      ipratropium-albuterol (DUONEB) 0.5-2.5 (3) MG/3ML SOLN nebulizer solution Inhale 3 mLs into the lungs every 4 hours as needed for Shortness of Breath 360 mL 0    atorvastatin (LIPITOR) 20 MG tablet Take 20 mg by mouth daily   0    aspirin 81 MG tablet Take 81 mg by mouth every other day        No current facility-administered medications for this visit. Social History     Socioeconomic History    Marital status:      Spouse name: Not on file    Number of children: Not on file    Years of education: Not on file    Highest education level: Not on file   Occupational History    Occupation: retired-   Tobacco Use    Smoking status: Current Every Day Smoker     Packs/day: 1.00     Years: 50.00     Pack years: 50.00     Types: Cigarettes     Start date: 5/27/1970    Smokeless tobacco: Never Used    Tobacco comment: currently smokes 1.0 ppd   Vaping Use    Vaping Use: Never used   Substance and Sexual Activity    Alcohol use: No     Alcohol/week: 0.0 standard drinks    Drug use: Never    Sexual activity: Never   Other Topics Concern    Not on file   Social History Narrative    Not on file     Social Determinants of Health     Financial Resource Strain:     Difficulty of Paying Living Expenses:    Food Insecurity:     Worried About Running Out of Food in the Last Year:     [address] in the Last Year:    Transportation Needs:     Lack of Transportation (Medical):      Lack of Transportation (Non-Medical):    Physical Activity:     Days of Exercise per Week:     Minutes of Exercise per Session:    Stress:     Feeling of Stress :    Social Connections:     Frequency of Communication with Friends and Family:     Frequency of Social Gatherings with Friends and Family:     Attends Buddhist Services:     Active Member of Clubs or Organizations:     Attends Club or Organization Meetings:     Marital Status:    Intimate Partner Violence:     Fear of Current or Ex-Partner:     Emotionally Abused:     Physically Abused:     Sexually Abused:      Family History   Problem Relation Age of Onset    Heart Disease Mother     Stroke Father     Heart Disease Brother      Review of Systems:  Heart: as above   Lungs: as above   Eyes: denies changes in vision or discharge. Ears: denies changes in hearing or pain. Nose: denies epistaxis or masses   Throat: denies sore throat or trouble swallowing. Neuro: denies numbness, tingling, tremors. Skin: denies rashes or itching. : denies hematuria, dysuria   GI: denies vomiting, diarrhea   Psych: denies mood changed, anxiety, depression. All other systems negative. Physical Exam   /60   Pulse 97   Resp 16   Ht 5' 10\" (1.778 m)   Wt 106 lb (48.1 kg)   BMI 15.21 kg/m²   Constitutional: Oriented to person, place, and time. Well-developed and well-nourished. No distress. Head: Normocephalic and atraumatic. Eyes: EOM are normal. Pupils are equal, round, and reactive to light. Neck: Normal range of motion. Neck supple. No hepatojugular reflux and no JVD present. Carotid bruit is not present. No tracheal deviation present. No thyromegaly present. Cardiovascular: Normal rate, regular rhythm, MAGALYS 2/6  Pulmonary/Chest: Effort normal and breath sounds normal. No respiratory distress. No wheezes. No rales. No tenderness. Abdominal: Soft. Bowel sounds are normal. No distension and no mass. No tenderness. No rebound and no guarding. Musculoskeletal: Normal range of motion. No edema and no tenderness. Lymphadenopathy:   No cervical adenopathy. No groin adenopathy.   Neurological: Alert and oriented to person, place, and time.   Skin: Skin is warm and dry. No rash noted. Not diaphoretic. No erythema. Psychiatric: Normal mood and affect. Behavior is normal.     EKG:  normal sinus rhythm, nonspecific ST and T waves changes. ASSESSMENT AND PLAN:  Patient Active Problem List   Diagnosis    Mixed hyperlipidemia    Acute hypoxemic respiratory failure (HCC)    Rhinovirus    History of stroke    Abdominal aneurysm (HCC)    Tobacco dependence    Aortic valve stenosis    Nonrheumatic aortic valve stenosis    Severe protein-calorie malnutrition (HCC)    COPD, moderate ([facility])    S/P AAA repair using bifurcation graft    Generalized abdominal pain    Failure to thrive in adult    Syncope and collapse    Acute respiratory failure with hypoxia ([facility])    COPD exacerbation ([address]    Essential hypertension    [facility] [address]    ROBBIN (acute kidney injury) ([address]    Lactic acid acidosis    Hypercalcemia    Tachycardia    High anion gap metabolic acidosis    Bacteremia    History of aortic valve replacement with bioprosthetic valve    Gross hematuria    Chronic anemia     1. Hx of AVR:     AVR with 23 mm CE 8/4/17. Mean gradient 13 mmHg by 5/27/2021.     2. PAF: In sinus. Some palpitations. 2 week monitor. 3. AAA/Post EVAR: Per Vascular surgery. 4. COPD: Per primary service. 5. Lipids: Statin. 6. ID issues    Brielle Wood D.O.   Cardiologist  Cardiology, [address] [facility]

## 2021-07-19 NOTE — TELEPHONE ENCOUNTER
Please have patient increase metoprolol to 37.5 mg BID (1 and 1/2 twice a day). Pineda Bhandari D.O.   Cardiologist  Cardiology, [id number] [facility]

## 2021-08-18 NOTE — PROGRESS NOTES
CHIEF COMPLAINT: AVR/PAF    HPI: Patient is a 66 y.o. male seen at the request of Liliana Guerrero MD.      Patient presents in follow up. Post AVR with 23mm CE 8/17. EVAR 10/17. No CP or SOB. Some palpitations.     Past Medical History:   Diagnosis Date    Abdominal aortic aneurysm without rupture ([facility] [address] 05/09/2017    Arthritis     AS (aortic stenosis)     Cerebral artery occlusion with cerebral infarction [facility]) 2009    TIA/CVA with aphasia that resolved    COPD (chronic obstructive pulmonary disease) ([address]     History of blood transfusion     Hyperlipidemia     Pneumonia     Tobacco dependence 05/09/2017     Patient Active Problem List   Diagnosis    Mixed hyperlipidemia    Acute hypoxemic respiratory failure (HCC)    Rhinovirus    History of stroke    Abdominal aneurysm (HCC)    Tobacco dependence    Aortic valve stenosis    Nonrheumatic aortic valve stenosis    Severe protein-calorie malnutrition (HCC)    COPD, moderate (HCC)    S/P AAA repair using bifurcation graft    Generalized abdominal pain    Failure to thrive in adult    Syncope and collapse    Acute respiratory failure with hypoxia (HCC)    COPD exacerbation ([address]    Essential hypertension    Emphysema of lung ([facility] [address]    ROBBIN (acute kidney injury) ([address]    Lactic acid acidosis    Hypercalcemia    Tachycardia    High anion gap metabolic acidosis    Bacteremia    History of aortic valve replacement with bioprosthetic valve    Gross hematuria    Chronic anemia       No Known Allergies    Current Outpatient Medications   Medication Sig Dispense Refill    metoprolol tartrate (LOPRESSOR) 25 MG tablet Take 37.5 mg by mouth 2 times daily Take 1.5 tablets twice daily      pantoprazole (PROTONIX) 40 MG tablet take 1 tablet by mouth once daily      tiotropium-olodaterol (STIOLTO RESPIMAT) 2.5-2.5 MCG/ACT AERS Inhale 2 puffs into the lungs      albuterol sulfate HFA (VENTOLIN HFA) 108 (90 Base) MCG/ACT inhaler Inhale 2 puffs into the lungs every 4 hours as needed for Wheezing or Shortness of Breath 1 Inhaler 6    dronabinol (MARINOL) 5 MG capsule Take 5 mg by mouth 2 times daily (before meals).  ondansetron (ZOFRAN-ODT) 4 MG disintegrating tablet Take 4 mg by mouth every 8 hours as needed for Nausea or Vomiting      ipratropium-albuterol (DUONEB) 0.5-2.5 (3) MG/3ML SOLN nebulizer solution Inhale 3 mLs into the lungs every 4 hours as needed for Shortness of Breath 360 mL 0    atorvastatin (LIPITOR) 20 MG tablet Take 20 mg by mouth daily   0    aspirin 81 MG tablet Take 81 mg by mouth every other day        No current facility-administered medications for this visit. Social History     Socioeconomic History    Marital status:      Spouse name: Not on file    Number of children: Not on file    Years of education: Not on file    Highest education level: Not on file   Occupational History    Occupation: retired-   Tobacco Use    Smoking status: Current Every Day Smoker     Packs/day: 1.00     Years: 50.00     Pack years: 50.00     Types: Cigarettes     Start date: 5/27/1970    Smokeless tobacco: Never Used    Tobacco comment: currently smokes 1.0 ppd   Vaping Use    Vaping Use: Never used   Substance and Sexual Activity    Alcohol use: No     Alcohol/week: 0.0 standard drinks    Drug use: Never    Sexual activity: Never   Other Topics Concern    Not on file   Social History Narrative    Not on file     Social Determinants of Health     Financial Resource Strain:     Difficulty of Paying Living Expenses:    Food Insecurity:     Worried About Running Out of Food in the Last Year:     [address] in the Last Year:    Transportation Needs:     Lack of Transportation (Medical):      Lack of Transportation (Non-Medical):    Physical Activity:     Days of Exercise per Week:     Minutes of Exercise per Session:    Stress:     Feeling of Stress :    Social Connections:     Frequency of Communication with Friends and Family:     Frequency of Social Gatherings with Friends and Family:     Attends Confucianism Services:     Active Member of Clubs or Organizations:     Attends Club or Organization Meetings:     Marital Status:    Intimate Partner Violence:     Fear of Current or Ex-Partner:     Emotionally Abused:     Physically Abused:     Sexually Abused:      Family History   Problem Relation Age of Onset    Heart Disease Mother     Stroke Father     Heart Disease Brother      Review of Systems:  Heart: as above   Lungs: as above   Eyes: denies changes in vision or discharge. Ears: denies changes in hearing or pain. Nose: denies epistaxis or masses   Throat: denies sore throat or trouble swallowing. Neuro: denies numbness, tingling, tremors. Skin: denies rashes or itching. : denies hematuria, dysuria   GI: denies vomiting, diarrhea   Psych: denies mood changed, anxiety, depression. All other systems negative. Physical Exam   /80   Pulse 76   Resp 16   Ht 5' 11\" (1.803 m)   Wt 104 lb 6.4 oz (47.4 kg)   BMI 14.56 kg/m²   Constitutional: Oriented to person, place, and time. Well-developed and well-nourished. No distress. Head: Normocephalic and atraumatic. Eyes: EOM are normal. Pupils are equal, round, and reactive to light. Neck: Normal range of motion. Neck supple. No hepatojugular reflux and no JVD present. Carotid bruit is not present. No tracheal deviation present. No thyromegaly present. Cardiovascular: Normal rate, regular rhythm, MAGALYS 2/6  Pulmonary/Chest: Effort normal and breath sounds normal. No respiratory distress. No wheezes. No rales. No tenderness. Abdominal: Soft. Bowel sounds are normal. No distension and no mass. No tenderness. No rebound and no guarding. Musculoskeletal: Normal range of motion. No edema and no tenderness. Lymphadenopathy:   No cervical adenopathy. No groin adenopathy.   Neurological: Alert and oriented to person, place, and time. Skin: Skin is warm and dry. No rash noted. Not diaphoretic. No erythema. Psychiatric: Normal mood and affect. Behavior is normal.     EKG:  normal sinus rhythm, nonspecific ST and T waves changes. ASSESSMENT AND PLAN:  Patient Active Problem List   Diagnosis    Mixed hyperlipidemia    Acute hypoxemic respiratory failure (HCC)    Rhinovirus    History of stroke    Abdominal aneurysm (HCC)    Tobacco dependence    Aortic valve stenosis    Nonrheumatic aortic valve stenosis    Severe protein-calorie malnutrition ([facility])    COPD, moderate ([facility])    S/P AAA repair using bifurcation graft    Generalized abdominal pain    Failure to thrive in adult    Syncope and collapse    Acute respiratory failure with hypoxia ([facility])    COPD exacerbation ([address]    Essential hypertension    Emphysema of lung ([facility] [address]    ROBBIN (acute kidney injury) ([address]    Lactic acid acidosis    Hypercalcemia    Tachycardia    High anion gap metabolic acidosis    Bacteremia    History of aortic valve replacement with bioprosthetic valve    Gross hematuria    Chronic anemia     1. Hx of AVR:     AVR with 23 mm CE 8/4/17. Mean gradient 13 mmHg by 5/27/2021.     2. PAF: In sinus. PAF seen on monitor. Recent transfusion and anemia issues. ASA only. 3. AAA/Post EVAR: Per Vascular surgery. 4. COPD: Per primary service. 5. Lipids: Statin. 6. Anemia: Recent transfusion for anemia. Moises Marsh D.O.   Cardiologist  Cardiology, [address] [place] [address]

## 2021-09-16 PROBLEM — J18.9 PNA (PNEUMONIA): Status: ACTIVE | Noted: 2021-01-01

## 2021-09-16 NOTE — ED NOTES
FIRST PROVIDER CONTACT ASSESSMENT NOTE      Department of Emergency Medicine   9/16/21  10:31 AM EDT    Chief Complaint: Shortness of Breath (worsening  since this morning. Hx COPD. Denies home o2. Denies CP)      History of Present Illness:   Hannah Carlos is a 66 y.o. male who presents to the ED for shortness of breath that worsened this morning. Has history of COPD. Denies any chest pain. Has decreased eating. Medical History:  has a past medical history of Abdominal aortic aneurysm without rupture ([address] Arthritis, AS (aortic stenosis), Cerebral artery occlusion with cerebral infarction ([address] COPD (chronic obstructive pulmonary disease) ([facility] History of blood transfusion, Hyperlipidemia, Pneumonia, and Tobacco dependence. Surgical History:  has a past surgical history that includes hernia repair; Ankle surgery; Skull fracture elevation; eye surgery (Bilateral, approx 2014); Cardiac catheterization (07/14/2017); Tooth Extraction; Aortic valve replacement; AAA repair, endovascular (10/12/2017); AAA repair, endovascular (10/12/2017); Cardiac surgery; Upper gastrointestinal endoscopy (N/A, 12/21/2020); Colonoscopy (N/A, 12/21/2020);   picc powerpicc double (4/15/2021); other surgical history (05/2021); and hernia repair (06/2021). Social History:  reports that he has been smoking cigarettes. He started smoking about 51 years ago. He has a 50.00 pack-year smoking history. He has never used smokeless tobacco. He reports that he does not drink alcohol and does not use drugs. Family History: family history includes Heart Disease in his brother and mother; Stroke in his father. *ALLERGIES*     Patient has no known allergies.      Physical Exam:      VS:  BP (!) 85/55   Pulse 103   Ht 5' 10\" (1.778 m)   Wt 106 lb (48.1 kg)   SpO2 93%   BMI 15.21 kg/m²      Initial Plan of Care:  Initiate Treatment-Testing, Proceed toTreatment Area When Bed Available for ED Attending/MLP to Continue Care    -----------------END OF FIRST PROVIDER CONTACT ASSESSMENT NOTE--------------  Electronically signed by COREEN Esposito CNP   DD: 9/16/21             COREEN Esposito CNP  09/16/21 1031

## 2021-09-16 NOTE — ED PROVIDER NOTES
Mindy Patino is a 66 y.o. male with a significant PMHx of COPD, HLD, AAA repair who presents with shortness of breath which has been getting worse this morning. Patient stated that he has this ongoing for 3 days and also reported inability to clear his phlegm. His wife at bedside reported that he used Mucinex which did not seem to help. These symptoms have been ongoing  for 3 days now. He is not on oxygen at this time., He Denies any chest pain. Does have a history of COPD and follows with Dr. Dariana Rosario. He also reported that he saw Dr. Torrey Hurtado recently and his metoprolol was reduced. His wife stated that he is having hard time getting phlegm out. He has tried Mucinex but did not seem to help. She reports his phlegm is more like wax. The cough could be so bad that she gets sob. His wife stated that she thinks patient is not feeling her normal self lately. He denied any fall or any injury. These symptoms are moderate in severity. Symptoms are made better by nothing. Symptoms are made worse by nothing. The patient denies recent trauma, fever, chills, fatigue, HA, dizziness, vision changes, chest pain, palpitations, hx of MI, wheezing, abdominal pain, N/V/D/C, hematochezia, melena, dysuria, hematuria, generalized weakness and paresthesias. The patient is currently taking no blood thinners. Tobacco Hx:   reports that he has been smoking cigarettes. He started smoking about 51 years ago. He has a 50.00 pack-year smoking history. He has never used smokeless tobacco.    Alcohol Hx:   reports no history of alcohol use. Illicit Drug Hx:    The history is provided by the patient and family. Review of Systems   Constitutional: Negative for chills and fever. HENT: Negative for congestion, sore throat and trouble swallowing. Respiratory: Positive for cough and shortness of breath. Cardiovascular: Negative for chest pain and leg swelling.    Gastrointestinal: Negative for abdominal pain, constipation, diarrhea, nausea and vomiting. Genitourinary: Negative for difficulty urinating. Musculoskeletal: Negative for arthralgias and myalgias. Skin: Negative for rash and wound. Neurological: Negative for dizziness and headaches. Psychiatric/Behavioral: Negative for agitation. Physical Exam  Constitutional:       Appearance: Normal appearance. HENT:      Head: Normocephalic. Nose: Nose normal. No rhinorrhea. Eyes:      General: No scleral icterus. Conjunctiva/sclera: Conjunctivae normal.   Cardiovascular:      Rate and Rhythm: Normal rate and regular rhythm. Pulses: Normal pulses. Heart sounds: Normal heart sounds. No murmur heard. Pulmonary:      Breath sounds: Decreased breath sounds present. No wheezing, rhonchi or rales. Abdominal:      General: Abdomen is flat. Bowel sounds are normal.   Musculoskeletal:      Right lower leg: No edema. Left lower leg: No edema. Skin:     General: Skin is warm. Findings: No rash. Neurological:      General: No focal deficit present. Mental Status: He is alert. 65-year-old male with a past medical history of COPD who presented with shortness of breath and cough, and has been having a lot of mucus production recently and having hard time getting the phlegm out. He has tried Mucinex but did not seem to help. Hemogobin 7.5, WBC 7.6, potassium was 5.1, creatinine was 2.7, troponin was 52, repeat was 51, BNP was 1813, cultures were ordered, lactate was ordered, chest x-ray showed advanced emphysematous changes, prominence of interstitial markings, CT head showed no acute intracranial abnormality, showed atrophy and periventricular leukomalacia. Patient was given a dose of ceftriaxone doxycycline while in the ER and he was also given duo nebs. Pt will be admitted for further management.     ED Course as of Sep 18 1500   Thu Sep 16, 2021   [id number] Spoke to NP will admit patient.     [SS]      ED Course User Index  [SS] Shelly Ashby MD       --------------------------------------------- PAST HISTORY ---------------------------------------------  Past Medical History:  has a past medical history of Abdominal aortic aneurysm without rupture ([facility] [address] Arthritis, AS (aortic stenosis), Cerebral artery occlusion with cerebral infarction [facility]), COPD [facility]chronic obstructive [facility] disease[facility] History of blood transfusion, Hyperlipidemia, Pneumonia, and Tobacco dependence. Past Surgical History:  has a past surgical history that includes hernia repair; Ankle surgery; Skull fracture elevation; eye surgery (Bilateral, approx 2014); Cardiac catheterization (07/14/2017); Tooth Extraction; Aortic valve replacement; AAA repair, endovascular (10/12/2017); AAA repair, endovascular (10/12/2017); Cardiac surgery; Upper gastrointestinal endoscopy (N/A, 12/21/2020); Colonoscopy (N/A, 12/21/2020);   picc powerpicc double (4/15/2021); other surgical history (05/2021); and hernia repair (06/2021). Social History:  reports that he has been smoking cigarettes. He started smoking about 51 years ago. He has a 50.00 pack-year smoking history. He has never used smokeless tobacco. He reports that he does not drink alcohol and does not use drugs. Family History: family history includes Heart Disease in his brother and mother; Stroke in his father. The patients home medications have been reviewed. Allergies: Patient has no known allergies.     -------------------------------------------------- RESULTS -------------------------------------------------    LABS:  Results for orders placed or performed during the hospital encounter of 09/16/21   Culture, Blood 1    Specimen: Blood   Result Value Ref Range    Blood Culture, Routine 24 Hours no growth    Culture, Blood 2    Specimen: Blood   Result Value Ref Range    Culture, Blood 2 24 Hours no growth    COVID-19, Rapid    Specimen: Nasopharyngeal Swab   Result Value Ref Range SARS-CoV-2, NAAT Not Detected Not Detected   Strep Pneumoniae Antigen    Specimen: Urine, clean catch   Result Value Ref Range    STREP PNEUMONIAE ANTIGEN, URINE       Presumptive negative- suggests no current or recent  pneumococcal infection. Infection due to Strep pneumoniae cannot be  ruled out since the antigen present in the sample  may be below the detection limit of the test.  Normal Range:Presumptive Negative     Legionella antigen, urine    Specimen: Urine, clean catch   Result Value Ref Range    L. pneumophila Serogp 1 Ur Ag       Presumptive Negative -suggesting no recent or current infections  with Legionella pneumophila serogroup 1. Infection to Legionella cannot be ruled out since other serogroups  and species may cause infection, antigen may not be present in  early infection, or level of antigen may be below the  detection limit.   Normal Range: Presumptive Negative     Respiratory Panel, Molecular, with COVID-19 (Restricted: peds pts or suitable admitted adults)    Specimen: Nasopharyngeal; Nasal   Result Value Ref Range    Adenovirus by PCR Not Detected Not Detected    Bordetella parapertussis by PCR Not Detected Not Detected    Bordetella pertussis by PCR Not Detected Not Detected    Chlamydophilia pneumoniae by PCR Not Detected Not Detected    Coronavirus 229E by PCR Not Detected Not Detected    Coronavirus HKU1 by PCR Not Detected Not Detected    Coronavirus NL63 by PCR Not Detected Not Detected    Coronavirus OC43 by PCR Not Detected Not Detected    SARS-CoV-2, PCR Not Detected Not Detected    Human Metapneumovirus by PCR Not Detected Not Detected    Human Rhinovirus/Enterovirus by PCR Not Detected Not Detected    Influenza A by PCR Not Detected Not Detected    Influenza B by PCR Not Detected Not Detected    Mycoplasma pneumoniae by PCR Not Detected Not Detected    Parainfluenza Virus 1 by PCR Not Detected Not Detected    Parainfluenza Virus 2 by PCR Not Detected Not Detected    Parainfluenza Virus 3 by PCR Not Detected Not Detected    Parainfluenza Virus 4 by PCR Not Detected Not Detected    Respiratory Syncytial Virus by PCR Not Detected Not Detected   Culture, Respiratory    Specimen: Sputum Expectorated   Result Value Ref Range    CULTURE, RESPIRATORY Growth not present, incubation continues     Smear, Respiratory       Group 6: <25 PMN's/LPF and <25 Epithelial cells/LPF  Rare Polymorphonuclear leukocytes  Rare Epithelial cells  No organisms seen     CBC Auto Differential   Result Value Ref Range    WBC 7.6 4.5 - 11.5 E9/L    RBC 2.29 (L) 3.80 - 5.80 E12/L    Hemoglobin 7.5 (L) 12.5 - 16.5 g/dL    Hematocrit 23.2 (L) 37.0 - 54.0 %    .3 (H) 80.0 - 99.9 fL    MCH 32.8 26.0 - 35.0 pg    MCHC 32.3 32.0 - 34.5 %    RDW 15.9 (H) 11.5 - 15.0 fL    Platelets 157 (L) 944 - 450 E9/L    MPV 11.4 7.0 - 12.0 fL    Neutrophils % 79.8 43.0 - 80.0 %    Immature Granulocytes % 0.4 0.0 - 5.0 %    Lymphocytes % 11.7 (L) 20.0 - 42.0 %    Monocytes % 7.3 2.0 - 12.0 %    Eosinophils % 0.4 0.0 - 6.0 %    Basophils % 0.4 0.0 - 2.0 %    Neutrophils Absolute 6.09 1.80 - 7.30 E9/L    Immature Granulocytes # 0.03 E9/L    Lymphocytes Absolute 0.89 (L) 1.50 - 4.00 E9/L    Monocytes Absolute 0.56 0.10 - 0.95 E9/L    Eosinophils Absolute 0.03 (L) 0.05 - 0.50 E9/L    Basophils Absolute 0.03 0.00 - 0.20 F9/F   Basic Metabolic Panel w/ Reflex to MG   Result Value Ref Range    Sodium 139 132 - 146 mmol/L    Potassium reflex Magnesium 5.1 (H) 3.5 - 5.0 mmol/L    Chloride 101 98 - 107 mmol/L    CO2 28 22 - 29 mmol/L    Anion Gap 10 7 - 16 mmol/L    Glucose 115 (H) 74 - 99 mg/dL    BUN 91 (H) 6 - 23 mg/dL    CREATININE 2.7 (H) 0.7 - 1.2 mg/dL    GFR Non-African American 23 >=60 mL/min/1.73    GFR African American 28     Calcium 10.3 (H) 8.6 - 10.2 mg/dL   Troponin   Result Value Ref Range    Troponin, High Sensitivity 52 (H) 0 - 11 ng/L   Brain Natriuretic Peptide   Result Value Ref Range    Pro-BNP 1,813 (H) 0 - 125 pg/mL Troponin   Result Value Ref Range    Troponin, High Sensitivity 51 (H) 0 - 11 ng/L   Lactate, Sepsis   Result Value Ref Range    Lactic Acid, Sepsis 1.5 0.5 - 1.9 mmol/L   Urinalysis with Microscopic   Result Value Ref Range    Color, UA Yellow Straw/Yellow    Clarity, UA Clear Clear    Glucose, Ur Negative Negative mg/dL    Bilirubin Urine Negative Negative    Ketones, Urine Negative Negative mg/dL    Specific Gravity, UA 1.015 1.005 - 1.030    Blood, Urine LARGE (A) Negative    pH, UA 7.5 5.0 - 9.0    Protein, UA Negative Negative mg/dL    Urobilinogen, Urine 0.2 <2.0 E.U./dL    Nitrite, Urine Negative Negative    Leukocyte Esterase, Urine Negative Negative    WBC, UA 0-1 0 - 5 /HPF    RBC, UA 10-20 (A) 0 - 2 /HPF    Epithelial Cells, UA RARE /HPF    Bacteria, UA NONE SEEN None Seen /HPF   Basic Metabolic Panel w/ Reflex to MG   Result Value Ref Range    Sodium 141 132 - 146 mmol/L    Potassium reflex Magnesium 4.3 3.5 - 5.0 mmol/L    Chloride 105 98 - 107 mmol/L    CO2 26 22 - 29 mmol/L    Anion Gap 10 7 - 16 mmol/L    Glucose 76 74 - 99 mg/dL    BUN 83 (H) 6 - 23 mg/dL    CREATININE 2.8 (H) 0.7 - 1.2 mg/dL    GFR Non-African American 22 >=60 mL/min/1.73    GFR African American 27     Calcium 9.7 8.6 - 10.2 mg/dL   Lactic acid, plasma   Result Value Ref Range    Lactic Acid 0.5 0.5 - 2.2 mmol/L   Procalcitonin   Result Value Ref Range    Procalcitonin 2.87 (H) 0.00 - 0.08 ng/mL   CBC auto differential   Result Value Ref Range    WBC 4.1 (L) 4.5 - 11.5 E9/L    RBC 2.16 (L) 3.80 - 5.80 E12/L    Hemoglobin 7.0 (L) 12.5 - 16.5 g/dL    Hematocrit 21.8 (L) 37.0 - 54.0 %    .9 (H) 80.0 - 99.9 fL    MCH 32.4 26.0 - 35.0 pg    MCHC 32.1 32.0 - 34.5 %    RDW 16.0 (H) 11.5 - 15.0 fL    Platelets 475 (L) 322 - 450 E9/L    MPV 11.6 7.0 - 12.0 fL    Neutrophils % 52.3 43.0 - 80.0 %    Immature Granulocytes % 0.5 0.0 - 5.0 %    Lymphocytes % 33.2 20.0 - 42.0 %    Monocytes % 10.4 2.0 - 12.0 %    Eosinophils % 2.9 0.0 - P Axis 89 degrees    R Axis 12 degrees    T Axis 75 degrees       RADIOLOGY:  CT CHEST WO CONTRAST   Final Result   New pulmonary nodules measuring up to 8 mm. See recommendation below. RECOMMENDATIONS:   Fleischner Society guidelines for follow-up and management of incidentally   detected pulmonary nodules:      Multiple Solid Nodules:      Nodule size equals 6-8 mm   In a low-risk patient, CT at 3-6 months, then consider CT at 18-24 months. In a high-risk patient, CT at 3-6 months, then CT at 18-24 months. - Low risk patients include individuals with minimal or absent history of   smoking and other known risk factors. - High risk patients include individuals with a history or smoking or known   risk factors. Radiology 2017 http://pubs. rsna.org/doi/full/10.1148/radiol. 0435653309         CT HEAD WO CONTRAST   Final Result   1. There is no acute intracranial abnormality. Specifically, there is no   intracranial hemorrhage. 2. Atrophy and periventricular leukomalacia,   3. Old infarct within the posterior left parietal lobe. XR CHEST (2 VW)   Final Result   1. Advanced emphysematous changes. 2. Prominence of interstitial markings. 3. Given the prominent  interstitial markings underlying ground-glass   infiltrates cannot be excluded on plain radiographs. EKG:  This EKG is signed and interpreted by me. Rate: 99  Rhythm: Sinus and premature atrial complexes   Interpretation: left ventricular hypertrophy  Comparison: changes compared to previous EKG and no previous EKG available      ------------------------- NURSING NOTES AND VITALS REVIEWED ---------------------------  Date / Time Roomed:  9/16/2021 10:29 AM  ED Bed Assignment:  [id number]    The nursing notes within the ED encounter and vital signs as below have been reviewed.      Patient Vitals for the past 24 hrs:   BP Temp Temp src Pulse Resp SpO2 Weight   09/18/21 0830 117/66 97.9 °F (36.6 °C) Oral 79 16 95 % -- 09/18/21 0455 -- -- -- -- -- -- 98 lb 4.8 oz (44.6 kg)   09/17/21 2315 (!) 103/57 98 °F (36.7 °C) Oral 86 16 98 % --   09/17/21 2045 (!) 98/57 97.9 °F (36.6 °C) Oral 86 16 92 % --   09/17/21 1800 (!) 102/58 97.5 °F (36.4 °C) Oral 80 16 97 % --       Oxygen Saturation Interpretation: Normal    ------------------------------------------ PROGRESS NOTES ------------------------------------------  Re-evaluation(s):  Time: 1400  Patients symptoms are improving  Repeat physical examination is improved    Counseling:  I have spoken with the patient and discussed todays results, in addition to providing specific details for the plan of care and counseling regarding the diagnosis and prognosis. Their questions are answered at this time and they are agreeable with the plan of admission.    --------------------------------- ADDITIONAL PROVIDER NOTES ---------------------------------  Consultations:  Time: 1530. Spoke with NP with Dr. Melburn Felty  Discussed case. They will admit the patient. This patient's ED course included: a personal history and physicial examination, re-evaluation prior to disposition, multiple bedside re-evaluations and complex medical decision making and emergency management    This patient has remained hemodynamically stable during their ED course. Diagnosis:  1. Pneumonia due to infectious organism, unspecified laterality, unspecified part of lung    2. Acute respiratory failure with hypoxia (HCC)        Disposition:  Patient's disposition: Admit to telemetry  Patient's condition is stable.            Filemon Mckeon MD  Resident  09/18/21 1500

## 2021-09-17 PROBLEM — E87.5 HYPERKALEMIA: Status: ACTIVE | Noted: 2021-01-01

## 2021-09-17 PROBLEM — R77.8 ELEVATED TROPONIN: Status: ACTIVE | Noted: 2021-01-01

## 2021-09-17 PROBLEM — R79.89 ELEVATED TROPONIN: Status: ACTIVE | Noted: 2021-01-01

## 2021-09-17 NOTE — CONSULTS
Shanique Solorzano M.D.,[facility]  Jb Hilario D.O., FÁNGELA., Cali Dubois M.D. Penelope Villalba M.D. Mignon Richardson D.O. Patient:  Dianne Chapman 66 y.o. male MRN: [id number]     Date of Service: 9/17/2021      PULMONARY CONSULTATION    Reason for Consultation: COPD  Referring Physician: Dr. Aime Brand with the referring physician will be sent via the electronic medical record. Chief Complaint: SOB    CODE STATUS:Fill code    SUBJECTIVE:  HPI:  Dianne Chapman is a 66 y.o. well-known to my partner Dr. Debbie Alvarado. He has severe COPD with FEV1 of 1.4 L 46% of predicted, ongoing nicotine dependence, history of ex- lap with lysis of adhesion and reduction of hernia and revision of ileostomy for small bowel obstruction in June 2021 at [facility]. He also had SMA repair of mycotic aneurysm and total colectomy 2/12/2021. He presented to the emergency department with chief complaint of progressive shortness of breath for the past 2 to 3 days with chest tightness and productive cough. He used Mucinex at home along with his nebulizer with no improvement. Patient wife is his primary caregiver and make sure he takes his inhalers. Lab work in the emergency room showed white count of 7.6, H&H of 7.5/23.2 which is lower than his baseline back in June with MCV of 101. 3. He also was found to be in ROBBIN over chronic kidney disease creatinine of 2.7. We have been asked to see patient for further evaluation management of his COPD. Patient currently laying in bed very cachectic appearing. Has a productive cough. Saturating 94% on 2 L of oxygen.       Past Medical History:   Diagnosis Date    Abdominal aortic aneurysm without rupture ([facility] [address] 05/09/2017    Arthritis     AS (aortic stenosis)     Cerebral artery occlusion with cerebral infarction [facility]) 2009    TIA/CVA with aphasia that resolved    COPD (chronic obstructive pulmonary disease) ([facility] [address]     History of blood transfusion     Hyperlipidemia     Pneumonia     Tobacco dependence 05/09/2017       Past Surgical History:   Procedure Laterality Date    ABDOMINAL AORTIC ANEURYSM REPAIR, ENDOVASCULAR  10/12/2017    Zenith Fenestrated.   Delatore    ABDOMINAL AORTIC ANEURYSM REPAIR, ENDOVASCULAR  10/12/2017    ANKLE SURGERY      AORTIC VALVE REPLACEMENT      CARDIAC CATHETERIZATION  07/14/2017    Dr. Ravinder Britt- No blockages    CARDIAC SURGERY      COLONOSCOPY N/A 12/21/2020    COLONOSCOPY DIAGNOSTIC performed by Cindy Mulligan MD at [address] [place] [address] 63 Bilateral approx 2014    cataracts removal w/lens implants    [facility]  4/15/2021         HERNIA REPAIR      HERNIA REPAIR  06/2021    OTHER SURGICAL HISTORY  05/2021    ostomy repair    SKULL FRACTURE ELEVATION      TOOTH EXTRACTION      full mouth extraction    UPPER GASTROINTESTINAL ENDOSCOPY N/A 12/21/2020    EGD DIAGNOSTIC ONLY performed by Cindy Mulligan MD at [place] [id number] History   Problem Relation Age of Onset    Heart Disease Mother     Stroke Father     Heart Disease Brother        Social History:   Social History     Socioeconomic History    Marital status:      Spouse name: Not on file    Number of children: Not on file    Years of education: Not on file    Highest education level: Not on file   Occupational History    Occupation: retired-   Tobacco Use    Smoking status: Current Every Day Smoker     Packs/day: 1.00     Years: 50.00     Pack years: 50.00     Types: Cigarettes     Start date: 5/27/1970    Smokeless tobacco: Never Used    Tobacco comment: currently smokes 1.0 ppd   Vaping Use    Vaping Use: Never used   Substance and Sexual Activity    Alcohol use: No     Alcohol/week: 0.0 standard drinks    Drug use: Never    Sexual activity: Never   Other Topics Concern    Not on file   Social History Narrative    Not on file     Social Determinants of Health     Financial Resource Strain:     Difficulty of Paying Living Expenses:    Food Insecurity:     Worried About Running Out of Food in the Last Year:     [address] in the Last Year:    Transportation Needs:     Lack of Transportation (Medical):  Lack of Transportation (Non-Medical):    Physical Activity:     Days of Exercise per Week:     Minutes of Exercise per Session:    Stress:     Feeling of Stress :    Social Connections:     Frequency of Communication with Friends and Family:     Frequency of Social Gatherings with Friends and Family:     Attends Religion Services:     Active Member of Clubs or Organizations:     Attends Club or Organization Meetings:     Marital Status:    Intimate Partner Violence:     Fear of Current or Ex-Partner:     Emotionally Abused:     Physically Abused:     Sexually Abused:      Smoking history: The patient is a current smoker unfortunately continues to smoke a pack a day. Has a 50-pack-year history of smoking. ETOH:   reports no history of alcohol use. Exposures: There  is not history of TB or TB exposure. There is not asbestos or silica dust exposure. The patient reports does not have coal, foundry, quarry or [employer] exposure. Recent travel history none. There is not  history of recreational or IV drug use.       Vaccines:      Immunization History   Administered Date(s) Administered    COVID-19, Moderna, PF, 100mcg/0.5mL 03/11/2021, 04/08/2021    Influenza Vaccine, unspecified formulation 10/27/2016, 09/20/2017    Influenza, High-dose, Quadv, 65 yrs +, IM (Fluzone) 01/30/2021    Influenza, MDCK Quadv, with preserv IM (Flucelvax 2 yrs and older) 10/10/2019    Influenza, MDCK, Preservative free 09/29/2015        Home Meds: Medications Prior to Admission: sulfamethoxazole-trimethoprim (BACTRIM DS;SEPTRA DS) 800-160 MG per tablet, Take 1 tablet by mouth 2 times daily  metoprolol tartrate (LOPRESSOR) 25 MG tablet, Take 37.5 mg by mouth 2 times daily pantoprazole (PROTONIX) 40 MG tablet, Take 40 mg by mouth daily   tiotropium-olodaterol (STIOLTO RESPIMAT) 2.5-2.5 MCG/ACT AERS, Inhale 2 puffs into the lungs nightly   albuterol sulfate HFA (VENTOLIN HFA) 108 (90 Base) MCG/ACT inhaler, Inhale 2 puffs into the lungs every 4 hours as needed for Wheezing or Shortness of Breath  ipratropium-albuterol (DUONEB) 0.5-2.5 (3) MG/3ML SOLN nebulizer solution, Inhale 3 mLs into the lungs every 4 hours as needed for Shortness of Breath  atorvastatin (LIPITOR) 20 MG tablet, Take 20 mg by mouth nightly   aspirin 81 MG tablet, Take 81 mg by mouth daily     CURRENT MEDS :  Scheduled Meds:   aspirin  81 mg Oral Daily    atorvastatin  20 mg Oral Nightly    metoprolol tartrate  37.5 mg Oral BID    pantoprazole  40 mg Oral Daily    sodium chloride flush  5-40 mL IntraVENous 2 times per day    cefTRIAXone (ROCEPHIN) IV  1,000 mg IntraVENous Q24H    And    doxycycline hyclate  100 mg Oral 2 times per day    enoxaparin  30 mg SubCUTAneous Daily    ipratropium  0.5 mg Nebulization 4x daily    Arformoterol Tartrate  15 mcg Nebulization BID       Continuous Infusions:   sodium chloride         No Known Allergies    REVIEW OF SYSTEMS:  Constitutional: Positive for fatigue weakness and weight loss skin: Denies pigmentation, dark lesions, and rashes   HEENT: Denies hearing loss, tinnitus, ear drainage, epistaxis, sore throat, and hoarseness. Cardiovascular: Denies palpitations, chest pain, and chest pressure.   Respiratory: Positive for dyspnea positive productive cough positive for chest tightness and wheezing gastrointestinal: Denies nausea, vomiting, poor appetite, diarrhea, heartburn or reflux  Genitourinary: Denies dysuria, frequency, urgency or hematuria  Musculoskeletal: Denies myalgias, positive for muscle weakness,  Neurological: Denies dizziness, vertigo, headache, and focal weakness  Psychological: Denies anxiety and depression  Endocrine: Denies heat intolerance and cold intolerance  Hematopoietic/Lymphatic: Denies bleeding problems and blood transfusions    OBJECTIVE:   BP (!) 107/51   Pulse 89   Temp 97.6 °F (36.4 °C) (Oral)   Resp 18   Ht 5' 10\" (1.778 m)   Wt 102 lb 14.4 oz (46.7 kg)   SpO2 94%   BMI 14.76 kg/m²   SpO2 Readings from Last 1 Encounters:   09/17/21 94%        I/O:    Intake/Output Summary (Last 24 hours) at 9/17/2021 0902  Last data filed at 9/16/2021 2339  Gross per 24 hour   Intake --   Output 250 ml   Net -250 ml     Vent Information  SpO2: 94 %                Physical Exam:  General: Patient is a very cachectic gentleman laying in bed in mild distress. HEENT: Pupils are equal round and reactive to light, there are no oral lesions and no post-nasal drip   Neck: supple without adenopathy  Cardiovascular: regular rate and rhythm without murmur or gallop  Respiratory: Has bilateral scattered rhonchi with diminished air movement  Abdomen: soft, non-tender, non-distended, normal bowel sounds  Extremities: warm, no edema, has mild clubbing  Skin: no rash or lesion  Neurologic: CN II-XII grossly intact, no focal deficits      Pulmonary Function Testing personally reviewed and interpreted    DATA: Spirometry done in the office today demonstrates an FVC of 2.53 liters which is 62% of predicted with an FEV1 of  1.40 liters which is 46% of predicted.  FEV1/FVC ratio is 55%. Mid expiratory flow rates are 34% of predicted.  Maximum voluntary ventilation is 44 liters per minute or 36% of predicted. Total lung capacity is 5.71 liters which is 83% of predicted. DLCO is 11.57 mm/min/mmHg which is 46% of predicted.  Flow volume loop shows no signs of intrathoracic or extrathoracic obstruction.       PFT interpretation:   1.  Severe obstruction without response to bronchodilator   2.  Increased RV/TLC indicating air trapping   3.  Moderate decrease in diffusion       Specimen Collected: 08/24/21          Imaging personally reviewed:      Echo:        Summary   Left ventricular internal dimensions were normal in diastole and systole. No regional wall motion abnormalities seen. Normal left ventricular ejection fraction. The left atrium is mildly dilated. Mildly enlarged right atrium size. Mild mitral annular calcification. Mild centrally directed mitral regurgitation. Mild aortic stenosis. Mild tricuspid regurgitation. Pulmonary hypertension is mild . Signature      ----------------------------------------------------------------   Electronically signed by Barrera Rivera MD(Interpreting   physician) on 05/28/2021 04:22 PM   ----------------------------------------------------------------       Labs:  Lab Results   Component Value Date    WBC 4.1 09/17/2021    HGB 7.0 09/17/2021    HCT 21.8 09/17/2021    .9 09/17/2021    MCH 32.4 09/17/2021    MCHC 32.1 09/17/2021    RDW 16.0 09/17/2021     09/17/2021    MPV 11.6 09/17/2021     Lab Results   Component Value Date     09/17/2021    K 4.3 09/17/2021     09/17/2021    CO2 26 09/17/2021    BUN 83 09/17/2021    CREATININE 2.8 09/17/2021    LABALBU 2.7 05/31/2021    CALCIUM 9.7 09/17/2021    GFRAA 27 09/17/2021    LABGLOM 22 09/17/2021     Lab Results   Component Value Date    PROTIME 11.3 05/27/2021    INR 1.0 05/27/2021     Recent Labs     09/16/21  1149   PROBNP 1,813*     No results for input(s): TROPONINI in the last 72 hours. No results for input(s): PROCAL in the last 72 hours. This SmartLink has not been configured with any valid records. Micro:  No results for input(s): CULTRESP in the last 72 hours. No results for input(s): LABGRAM in the last 72 hours. No results for input(s): LEGUR in the last 72 hours. No results for input(s): STREPNEUMAGU in the last 72 hours. No results for input(s): LP1UAG in the last 72 hours. Assessment:  1. Acute COPD exacerbation  2. Severe COPD  3. Ongoing nicotine dependence  4. 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  Acute on chronic macrocytic anemia  6. Acute on chronic ROBBIN    Plan:  1. We will start patient on IV Solu-Medrol 40 mg every 12  2. Bronchodilators regimen including Brovana and Pulmicort twice daily and DuoNeb's every 4 hours while awake  3. We will add Mucomyst twice daily  4. We will check a respiratory molecular panel and also respiratory cultures  5. Start Levaquin 500 mg daily  6. We will add a NicoDerm patch  7. Will obtain a CT of the chest without contrast for evaluation of pulmonary nodules  8. We will check for alpha-1 antitrypsin  9. Add Robinul for appetite  10. We will consult dietary for recommendation and calorie count  11. Patient has macrocytic anemia H&H down to 7 we will check a folate and B12 level. If his H&H trends any low we will recommend to transfuse a unit of blood  12. To consider consulting nephrology      Thank you for allowing me to participate in the care of Yana Hyde. Please feel free to call with questions. Electronically signed by Liza Lay MD on 9/17/2021 at 9:02 AM      Note: This report was completed utilizing computer voice recognition software.  Every effort has been made to ensure accuracy, however; inadvertent computerized transcription errors may be present

## 2021-09-17 NOTE — H&P
[address] [facility]  History and Physical      CHIEF COMPLAINT:    Chief Complaint   Patient presents with    Shortness of Breath     worsening  since this morning. Hx COPD. Denies home o2. Denies CP        Patient of Kimmie Ruiz MD presents with:  PNA (pneumonia)    History of Present Illness:   Patient is a 66year old male with a PMH of AAA, aortic stenosis, COPD, CVA, Hyperlipidemia and tobacco dependence presents to the emergency room for shortness of breath that has worsened over the last 2-3 days. Patient is well known to Dr Eleni Izquierdo outpatient who has been following the patient for his severe COPD. Patient admits to breathing feeling at worst when he first wakes up in the morning. States he does have a cough and feels it could be productive if he was able to have enough strength to bring it up. Labs revealed patient had an elevated BUN/Creat 91/2.7 as well as his Pro-BNP 1,813. CT Chest did reveal new pulmonary nodules measuring up to 8 mm. Pulmonology will be consulted to follow in the care of the patient. Patient denies N/V/D, fevers, chills, chest pain or abdominal pain. Patient to be admitted to an intermediate telemetry unit for further workup and treatment. REVIEW OF SYSTEMS:  Pertinent negatives are above in HPI. 10 point ROS otherwise negative. Past Medical History:   Diagnosis Date    Abdominal aortic aneurysm without rupture ([facility] [address] 05/09/2017    Arthritis     AS (aortic stenosis)     Cerebral artery occlusion with cerebral infarction [facility]) 2009    TIA/CVA with aphasia that resolved    COPD (chronic obstructive pulmonary disease) ([facility] [address]     History of blood transfusion     Hyperlipidemia     Pneumonia     Tobacco dependence 05/09/2017         Past Surgical History:   Procedure Laterality Date    ABDOMINAL AORTIC ANEURYSM REPAIR, ENDOVASCULAR  10/12/2017    Zenith Fenestrated.   Delatore    ABDOMINAL AORTIC ANEURYSM REPAIR, ENDOVASCULAR  10/12/2017    ANKLE alcohol and does not use drugs. Family History:   family history includes Heart Disease in his brother and mother; Stroke in his father. PHYSICAL EXAM:    Vitals:  BP (!) 103/54   Pulse 77   Temp 98.1 °F (36.7 °C) (Oral)   Resp 16   Ht 5' 10\" (1.778 m)   Wt 102 lb 14.4 oz (46.7 kg)   SpO2 94%   BMI 14.76 kg/m²       General appearance: NAD, conversant  Eyes: Sclerae anicteric, PERRLA  HEENT: AT/NC, MMM  Neck: FROM, supple, no thyromegaly  Lymph: No cervical / supraclavicular lymphadenopathy  Lungs: Clear to auscultation, WOB normal  CV: RRR, no MRGs, no lower extremity edema  Abdomen: Soft, non-tender; no masses or HSM, +BS  Extremities: FROM without synovitis. No clubbing or cyanosis of the hands. Skin: no rash, induration, lesions, or ulcers  Psych: Calm and cooperative. Normal judgement and insight. Normal mood and affect. Neuro: Alert and interactive, face symmetric, speech fluent. LABS:  All labs reviewed. Of note:  CBC with Differential:    Lab Results   Component Value Date    WBC 4.1 09/17/2021    RBC 2.16 09/17/2021    HGB 7.0 09/17/2021    HCT 21.8 09/17/2021     09/17/2021    .9 09/17/2021    MCH 32.4 09/17/2021    MCHC 32.1 09/17/2021    RDW 16.0 09/17/2021    LYMPHOPCT 33.2 09/17/2021    MONOPCT 10.4 09/17/2021    BASOPCT 0.7 09/17/2021    MONOSABS 0.43 09/17/2021    LYMPHSABS 1.37 09/17/2021    EOSABS 0.12 09/17/2021    BASOSABS 0.03 09/17/2021     CMP:    Lab Results   Component Value Date     09/17/2021    K 4.3 09/17/2021     09/17/2021    CO2 26 09/17/2021    BUN 83 09/17/2021    CREATININE 2.8 09/17/2021    GFRAA 27 09/17/2021    LABGLOM 22 09/17/2021    GLUCOSE 76 09/17/2021    PROT 6.6 05/31/2021    LABALBU 2.7 05/31/2021    CALCIUM 9.7 09/17/2021    BILITOT 0.9 05/31/2021    ALKPHOS 444 05/31/2021    AST 37 05/31/2021    ALT 43 05/31/2021       Imaging:  CXR: Advanced emphysematous changes. CT Chest: new pulmonary nodules measuring up to 8 mm. EKG:  Sinus Rhythm with premature atrial complexes. Telemetry:  SR with PAC's    ASSESSMENT/PLAN:  Principal Problem:    PNA (pneumonia)  Active Problems:    Mixed hyperlipidemia    History of stroke    Abdominal aneurysm (HCC)    Tobacco dependence    Aortic valve stenosis    S/P AAA repair using bifurcation graft    Failure to thrive in adult    COPD exacerbation (HCC)    Essential hypertension    ROBBIN (acute kidney injury) ([facility] [address]    Hypercalcemia    History of aortic valve replacement with bioprosthetic valve    Chronic anemia    Hyperkalemia    Elevated troponin  Resolved Problems:    * No resolved hospital problems. *    Plan:   Patient is a 66year old male with a PMH of AAA, aortic stenosis, COPD, CVA, Hyperlipidemia and tobacco dependence presents to the emergency room for shortness of breath that has worsened over the last 2-3 days. COPD  -Supplement O2 demands keeping oxygen saturation greater than 92%. -DuoNebs, Mucomyst, Brovana, Pulmicort  -Requiring 2L nasal cannula   -IV Solumedrol 40 mg q12h  -Monitor h/h -7.0/21.8  -Consult pulmonology-input appreciated  -PO Levaquin   -Resp culture/viral panel, strep and Legionella antigens    ROBBIN  -BUN/Creat 91/2.7 on admission, 83/2.8 today  -Strict I/O's  -Monitor labs, BMP daily  -Gentle hydration [employer]    Anemia  Monitor H&H-7.5/23 on admission  Transfuse for hemoglobin less than 7  Likely needs scopes at some point in the near future    Cachexia/weight loss of approximately 90 pounds over 40 months. Probable underlying malignancy in a heavy smoker  Defer work-up to pulmonology    Medication for other comorbidities continue as appropriate dose adjustment as necessary.     DVT Prophylaxis   PT/OT [facility]/  Discharge planning     Code status: Full  Requires Inpatient level of care  COREEN Esquivel CNP    1:43 PM  9/17/2021     Above note edited to reflect my thoughts     I personally saw, examined and provided care for the patient.

## 2021-09-17 NOTE — PROGRESS NOTES
Comprehensive Nutrition Assessment    Type and Reason for Visit:  Initial, Positive Nutrition Screen, Calorie Count    Nutrition Recommendations/Plan: Continue current diet and added ONS to help optimize nutrient needs. Nutrition Assessment:  Pt presents to the ED for SOB that worsened this morning. Has history of COPD. Pt adm with pneumonia. Pt has severe cachexia. Consult for calorie count. Pt at risk d/t severe malnutrition d/t COPD. Will start ONS to help optimize nutrient needs. Malnutrition Assessment:  Malnutrition Status:  Severe malnutrition    Context:  Chronic Illness     Findings of the 6 clinical characteristics of malnutrition:  Energy Intake:  7 - 75% or less estimated energy requirements for 1 month or longer  Weight Loss:  1 - Mild weight loss (specify amount and time period) (2%  over 1month)     Body Fat Loss:  7 - Severe body fat loss Triceps, Orbital   Muscle Mass Loss:  7 - Severe muscle mass loss Temples (temporalis), Clavicles (pectoralis & deltoids), Hand (interosseous)  Fluid Accumulation:  No significant fluid accumulation     Strength:  7 - Measurable reduction in  strength    Estimated Daily Nutrient Needs:  Energy (kcal):  14-1600kcal (30-35kcal/kg); Weight Used for Energy Requirements:  Current     Protein (g):  70-80g (1.5-1.8g/kg); Weight Used for Protein Requirements:  Current        Fluid (ml/day):  ; Method Used for Fluid Requirements:  1 ml/kcal      Nutrition Related Findings:  A&Ox4, BS hypoactive, ileostomy, Abd tender, -I/Os, no Alvarez a      Wounds:  None       Current Nutrition Therapies:    ADULT DIET; Regular;  Low Sodium (2 gm)    Anthropometric Measures:  · Height: 5' 10\" (177.8 cm)  · Current Body Weight: 102 lb 14.4 oz (46.7 kg) (9/16)     · Usual Body Weight: 104 lb 6.4 oz (47.4 kg) (8/18/21)     · Ideal Body Weight: 166 lbs; % Ideal Body Weight 62 %   · BMI: 14.8    · BMI Categories: Underweight (BMI less than 22) age over 72       Nutrition Diagnosis:   · Inadequate oral intake related to impaired respiratory function (pneumonia w/COPD andsevere cachexia) as evidenced by intake 0-25%, poor intake prior to admission    Nutrition Interventions:   Food and/or Nutrient Delivery:  Continue Current Diet, Start Oral Nutrition Supplement (Ensure Enlive TID and Boost pudding BID)  Nutrition Education/Counseling:  Education initiated (discussed importance of ONS)   Coordination of Nutrition Care:  Continue to monitor while inpatient    Goals:  >50% of meals and ONS consumed       Nutrition Monitoring and Evaluation:   Behavioral-Environmental Outcomes:  None Identified   Food/Nutrient Intake Outcomes:  Food and Nutrient Intake, Supplement Intake  Physical Signs/Symptoms Outcomes:  Biochemical Data, Fluid Status or Edema, Nutrition Focused Physical Findings, Skin, Weight     Discharge Planning:     Too soon to determine     Electronically signed by Jl Montiel RD, LD on 9/17/21 at 1:20 PM EDT    Contact: [phone]

## 2021-09-17 NOTE — PROGRESS NOTES
Physical Therapy    Facility/Department: [facility] MED SURG  Initial Assessment    NAME: Balwinder Cardona  : 1950  MRN: [id number]    Date of Service: 2021      Patient Diagnosis(es): There were no encounter diagnoses. has a past medical history of Abdominal aortic aneurysm without rupture (HCC), Arthritis, AS (aortic stenosis), Cerebral artery occlusion with cerebral infarction ([facility] COPD (chronic obstructive pulmonary disease) ([facility] [facility] History of blood transfusion, Hyperlipidemia, Pneumonia, and Tobacco dependence. has a past surgical history that includes hernia repair; Ankle surgery; Skull fracture elevation; eye surgery (Bilateral, approx ); Cardiac catheterization (2017); Tooth Extraction; Aortic valve replacement; AAA repair, endovascular (10/12/2017); AAA repair, endovascular (10/12/2017); Cardiac surgery; Upper gastrointestinal endoscopy (N/A, 2020); Colonoscopy (N/A, 2020); hc  picc powerpicc double (4/15/2021); other surgical history (2021); and hernia repair (2021). Evaluating Therapist: Ruth Truong PT      Room #:  7555/4092-Z  Diagnosis:  PNA (pneumonia) [J18.9]  PMHx/PSHx:  COPD  Precautions:  Falls, O2      Social:  Pt lives with wife in a 1 floor plan 0 steps to enter. Prior to admission independent with no device in home, uses ww when out     Initial Evaluation  Date: 21 Treatment      Short Term/ Long Term   Goals   Was pt agreeable to Eval/treatment? yes     Does pt have pain? No c/o pain     Bed Mobility  Rolling: SBA  Supine to sit: SBA  Sit to supine: SBA  Scooting: SBA  Independent    Transfers Pt unable due to severe shortness of breath when sitting edge of bed.    SBA   Ambulation    NT  25 feet with ww with SBA   Stair Negotiation  Ascended and descended  NT      LE strength     3+/5    4-/5   balance      Good sitting standing NT     AM-PAC Raw score               14/24         Pt is alert and Oriented   LE ROM: WFL  Sensation: intact  Edema: none  Endurance: poor     ASSESSMENT:    Pt displays functional ability as noted in the objective portion of this evaluation. Patient education  Pt educated on PT objectives    Patient response to education:   Pt verbalized understanding Pt demonstrated skill Pt requires further education in this area   yes           Comments:  Pt short of breath with minimal activity. Pt sat up to edge of bed. Sat edge of bed x5 minutes but unable to stand due to severely short of breath sitting of bed SpO2 on O2 89%. Pt returned to bed per his request.     Pt's/ family goals   1. To improve endurance    Conditions Requiring Skilled Therapeutic Intervention:    [x]Decreased strength     []Decreased ROM  [x]Decreased functional mobility  [x]Decreased balance   [x]Decreased endurance   []Decreased posture  []Decreased sensation  []Decreased coordination   []Decreased vision  []Decreased safety awareness   []Increased pain       Patient and or family understand(s) diagnosis, prognosis, and plan of care.     Prognosis is good for reaching above PT goals    PHYSICAL THERAPY PLAN OF CARE:    PT POC is established based on physician order and patient diagnosis     Referring provider/PT Order: COREEN Jones CNP/ PT eval and treat      Current Treatment Recommendations:     [x] Strengthening to improve independence with functional mobility   [] ROM to improve independence with functional mobility   [x] Balance Training to improve static/dynamic balance and to reduce fall risk  [x] Endurance Training to improve activity tolerance during functional mobility   [x] Transfer Training to improve safety and independence with all functional transfers   [x] Gait Training to improve gait mechanics, endurance and assess need for appropriate assistive device  [] Stair Training in preparation for safe discharge home and/or into the community   [] Positioning to prevent skin breakdown and contractures  [x] Safety and Education Training   [x] Patient/Caregiver Education   [] HEP  [] Other     PT long term treatment goals are located in above grid    Frequency of treatments: 2-5x/week x 5-7 days. Time in  0835  Time out  0850        Evaluation Time includes thorough review of current medical information, gathering information on past medical history/social history and prior level of function, completion of standardized testing/informal observation of tasks, assessment of data and education on plan of care and goals.       CPT codes:  [x] Low Complexity PT evaluation 75619  [] Moderate Complexity PT evaluation 22174  [] High Complexity PT evaluation 62923  [] PT Re-evaluation 04401  [] Gait training 69020 minutes  [] Manual therapy 40175 minutes  [] Therapeutic activities 68061 minutes  [] Therapeutic exercises 12455 minutes  [] Neuromuscular reeducation 47775 minutes     Yony Pace PT [id number]

## 2021-09-17 NOTE — CARE COORDINATION
Social Work/Discharge Planning:  Social Work consult noted. Met with patient and his wife Lianna Bean and completed initial assessment. Explained Social Work role and discussed transition of care/discharge planning. COVID negative 9/16. Patient lives with his wife in a one story house. PTA he is independent with no adaptive device. He has a nebulizer, walker, and wheelchair. He has hhc history with [address] He is currently on two liters of oxygen and RN to wean as tolerated. Patient will use [employer] is oxygen is needed at discharge. His PCP is Dr. Freddy Eduardo and pharmacy is [facility] on [phone]. He plans to return home at discharge and denies any need for hhc at this time. Will continue to follow and assist with discharge planning.   Electronically signed by TOMEKA Nance on 9/17/2021 at 2:58 PM

## 2021-09-17 NOTE — PROGRESS NOTES
Occupational Therapy  OCCUPATIONAL THERAPY INITIAL EVALUATION      Date:2021  Patient Name: Abbey Snyder  MRN: [id number]  : 1950  [facility]    OCCUPATIONAL THERAPY INITIAL EVALUATION    [facility], [place]         Date:2021                                                  Patient Name: Abbey Snyder    MRN: [id number]    : 1950    Room: 710393G      Evaluating OT: Nena Oliva OTR/L   [id number]      Referring Smith SOURAV CardozoN - RACHEL    Specific Provider Orders/Date:OT eval and treat 2021      Diagnosis: Pneumonia       Pertinent Medical History: COPD,     Precautions:  Fall Risk, alarm , O2     Assessment of current deficits    [x] Functional mobility  [x]ADLs  [x] Strength               []Cognition    [x] Functional transfers   [x] IADLs         [x] Safety Awareness   [x]Endurance    [] Fine Coordination              [x] Balance      [] Vision/perception   []Sensation     []Gross Motor Coordination  [] ROM  [] Delirium                   [] Motor Control     OT PLAN OF CARE   OT POC based on physician orders, patient diagnosis and results of clinical assessment    Frequency/Duration  2-4 days/wk for 2 weeks PRN   Specific OT Treatment Interventions to include:   ADL retraining/adapted techniques and AE recommendations to increase functional independence within precautions                    Energy conservation techniques to improve tolerance for selfcare routine   Functional transfer/mobility training/DME recommendations for increased independence, safety and fall prevention         Patient/family education to increase safety and functional independence             Environmental modifications for safe mobility and completion of ADLs                             Therapeutic activity to improve functional performance during ADLs.                                          Therapeutic exercise to improve tolerance and functional strength for ADLs    Balance retraining/tolerance tasks for facilitation of postural control with dynamic challenges during ADLs .       Positioning to improve functional independence      Recommended Adaptive Equipment: TBD     Home Living: Pt lives with wife, 1 story     Equipment owned: walker     Prior Level of Function: assist   with ADLs , assist  with IADLs; ambulated with walker     Pain Level: no pain this session ; patient reports just feeling SOB and weak   Cognition: A&O:following commands, conversign    Memory:  Fair +   Sequencing:  Fair +   Problem solving:  Fair+   Judgement/safety:  Fair +     Functional Assessment:  AM-PAC Daily Activity Raw Score: 14/24   Initial Eval Status  Date: 9/17/21 Treatment Status  Date: STGs = LTGs  Time frame: 10-14 days   Feeding Independent      Grooming Rosalino ,seated   Decrease standing tolerance   Supervision    UB Dressing Min A  Patient demonstrates UE and LE ROM to participate in ADL activity   May require increase assist d/t resp status/SOB  and overall decrease endurance   Supervision    LB Dressing Max A   Rosalino    Bathing Max A   Min A   Toileting Assist with thorough hygiene   Supervision    Bed Mobility  SBA  Supine <> sit   O2 on   Supervision    Functional Transfers Patient sat EOB only  Upon coming to EOB - patient stated he just felt too weak and SOB - wanted to return to bed   CGA/SBA    Functional Mobility NT   CGA/SBA  with good tolerance    Balance Sitting:     Static:  SBA - EOB     Dynamic:Mod A   Standing: NT   Independent - sitting   CGA/SBA - standing    Activity Tolerance Poor with light activity   SOB/resp status limting   Fair+ with ADL activity    Visual/  Perceptual Glasses: none by bedside                 Hand Dominance right    AROM (PROM) Strength Additional Info:    ZEUS  WFL WFL good  and wfl FMC/dexterity noted during ADL tasks       [facility] WFL good  and wfl FMC/dexterity noted during ADL tasks       Hearing: [facility]   Sensation:  No c/o numbness or tingling   Tone: WFL  Edema: none observed     Comments: Upon arrival patient lying inbed . At end of session, patient returned to bed  with call light and phone within reach, all lines and tubes intact. *ALARM ON      Overall patient demonstrated  decreased independence and safety during completion of ADL/functional transfer/mobility tasks. Pt would benefit from continued skilled OT to increase safety and independence with completion of ADL/IADL tasks for functional independence and quality of life. Rehab Potential: fair + for established goals     Patient / Family Goal: none stated       Patient and/or family were instructed on functional diagnosis, prognosis/goals and OT plan of care. Demonstrated fair _  understanding. Eval Complexity: Low    Time In: 0809  Time Out: 0819  T    Min Units   OT Eval Low 97165 x  1   OT Eval Medium 81817      OT Eval High 41779      OT Re-Eval [id number]       Therapeutic Ex [phone]       Therapeutic Activities 30051       ADL/Self Care 82479       Orthotic Management 61103       Manual 89470     Neuro Re-Ed 00308       Non-Billable Time          Evaluation Time additionally includes thorough review of current medical information, gathering information on past medical history/social history and prior level of function, interpretation of standardized testing/informal observation of tasks, assessment of data and development of plan of care and goals.             Shemar Ruelas  OTR/L  OT [id number]

## 2021-09-18 PROBLEM — R91.8 PULMONARY NODULES: Status: ACTIVE | Noted: 2021-01-01

## 2021-09-18 NOTE — PROGRESS NOTES
Chief Complaint:  Chief Complaint   Patient presents with    Shortness of Breath     worsening  since this morning. Hx COPD. Denies home o2. Denies CP     COPD exacerbation ([address]     Subjective:    Patient sitting up in bed, on nasal cannula  Denies SOB, chest pain      Objective:    /66   Pulse 79   Temp 97.9 °F (36.6 °C) (Oral)   Resp 16   Ht 5' 10\" (1.778 m)   Wt 98 lb 4.8 oz (44.6 kg)   SpO2 95%   BMI 14.10 kg/m²     Current medications that patient is taking have been reviewed.     General appearance: NAD, conversant  HEENT: AT/NC, MMM  Neck: FROM, supple  Lungs: Clear to auscultation, WOB normal  CV: RRR, no MRGs  Abdomen: Soft, non-tender; no masses or HSM, +BS  Extremities: No peripheral edema or digital cyanosis  Skin: no rash, lesions or ulcers  Psych: Calm and cooperative  Neuro: Alert and interactive, face symmetric, moving all extremities, speech fluent    Labs:  CBC with Differential:    Lab Results   Component Value Date    WBC 4.1 09/17/2021    RBC 2.16 09/17/2021    HGB 7.0 09/17/2021    HCT 21.8 09/17/2021     09/17/2021    .9 09/17/2021    MCH 32.4 09/17/2021    MCHC 32.1 09/17/2021    RDW 16.0 09/17/2021    LYMPHOPCT 33.2 09/17/2021    MONOPCT 10.4 09/17/2021    BASOPCT 0.7 09/17/2021    MONOSABS 0.43 09/17/2021    LYMPHSABS 1.37 09/17/2021    EOSABS 0.12 09/17/2021    BASOSABS 0.03 09/17/2021     CMP:    Lab Results   Component Value Date     09/18/2021    K 5.2 09/18/2021    K 4.3 09/17/2021     09/18/2021    CO2 21 09/18/2021    BUN 78 09/18/2021    CREATININE 2.9 09/18/2021    GFRAA 26 09/18/2021    LABGLOM 21 09/18/2021    GLUCOSE 108 09/18/2021    PROT 6.6 05/31/2021    LABALBU 2.7 05/31/2021    CALCIUM 9.0 09/18/2021    BILITOT 0.9 05/31/2021    ALKPHOS 444 05/31/2021    AST 37 05/31/2021    ALT 43 05/31/2021        Imaging:  I've personally reviewed the patient's CT chest  New pulmonary nodules measuring up to 8 mm.  Recommendation for repeat scan at 3-6 months and then 18-24 months. Telemetry:  I've personally reviewed the patient's telemetry:  NSR    Assessment/Plan:  Principal Problem:    COPD exacerbation (HCC)  Active Problems:    History of stroke    Abdominal aneurysm (HCC)    Tobacco dependence    Aortic valve stenosis    S/P AAA repair using bifurcation graft    Failure to thrive in adult    Essential hypertension    ROBBIN (acute kidney injury) ([address]    Hypercalcemia    History of aortic valve replacement with bioprosthetic valve    Chronic anemia    Hyperkalemia    Elevated troponin    Pulmonary nodules  Resolved Problems:    * No resolved hospital problems. *       Plan:   Patient is a 66year old male with a PMH of AAA, aortic stenosis, COPD, CVA, Hyperlipidemia and tobacco dependence presents to the emergency room for shortness of breath that has worsened over the last 2-3 days.     COPD  -Supplement O2 demands keeping oxygen saturation greater than 92%. -DuoNebs, Mucomyst, Brovana, Pulmicort  -Requiring 2L nasal cannula   -IV Solumedrol 40 mg q12h  -Monitor h/h -7.0/21.8-AM labs peding  -Consult pulmonology-input appreciated  -PO Levaquin   -Resp culture/viral panel, strep and Legionella antigens-negative  -CT chest with pulm nodules     ROBBIN  -BUN/Creat 91/2.7 on admission, 83/2.8 today  -Strict I/O's  -Monitor labs, BMP daily  -Gentle hydration [email or web address]     Anemia  Monitor H&H-7.5/23 on admission-AM LABS PENDING  Transfuse for hemoglobin less than 7  Likely needs scopes at some point in the near future     Cachexia/weight loss of approximately 90 pounds over 40 months.   Noemy added for appetite by pulm   Dietary consult     COREEN Vaca - CNP    10:26 AM  9/18/2021

## 2021-09-18 NOTE — PROGRESS NOTES
Pulmonary Progress Note    Admit Date: 2021                            PCP: Evelina Kennedy MD  Principal Problem:    COPD exacerbation [facility])  Active Problems:    History of stroke    Abdominal aneurysm ([facility] [address]    Tobacco dependence    Aortic valve stenosis    S/P AAA repair using bifurcation graft    Failure to thrive in adult    Essential hypertension    ROBBIN (acute kidney injury) ([facility] [address]    Hypercalcemia    History of aortic valve replacement with bioprosthetic valve    Chronic anemia    Hyperkalemia    Elevated troponin    Pulmonary nodules  Resolved Problems:    * No resolved hospital problems. *      Subjective:  Resting in bed on 2LNC. Feels breathing is slightly improved. Still coughing and unable to bring up mucous its too thick.      Medications:   sodium chloride      sodium chloride 75 mL/hr at 21 1322        [START ON 2021] levoFLOXacin  500 mg Oral Every Other Day    aspirin  81 mg Oral Daily    atorvastatin  20 mg Oral Nightly    metoprolol tartrate  37.5 mg Oral BID    pantoprazole  40 mg Oral Daily    sodium chloride flush  5-40 mL IntraVENous 2 times per day    enoxaparin  30 mg SubCUTAneous Daily    Arformoterol Tartrate  15 mcg Nebulization BID    budesonide  500 mcg Nebulization BID    ipratropium-albuterol  1 ampule Inhalation Q4H While awake    nicotine  1 patch TransDERmal Daily    dronabinol  2.5 mg Oral BID    acetylcysteine  4 mL Inhalation BID    methylPREDNISolone  40 mg IntraVENous Q12H       Vitals:  VITALS:  /66   Pulse 79   Temp 97.9 °F (36.6 °C) (Oral)   Resp 16   Ht 5' 10\" (1.778 m)   Wt 98 lb 4.8 oz (44.6 kg)   SpO2 95%   BMI 14.10 kg/m²   24HR INTAKE/OUTPUT:      Intake/Output Summary (Last 24 hours) at 2021 1457  Last data filed at 2021 1300  Gross per 24 hour   Intake 860 ml   Output 925 ml   Net -65 ml     CURRENT PULSE OXIMETRY:  SpO2: 95 %  24HR PULSE OXIMETRY RANGE:  SpO2  Av.5 %  Min: 92 %  Max: 98 %  CVP: VENT SETTINGS:   Vent Information  SpO2: 95 %  Additional Respiratory  Assessments  Pulse: 79  Resp: 16  SpO2: 95 %      EXAM:  General: Alert, in NAD  ENT: No discharge. Pharynx clear. membranes moist   Neck: Supple, Trachea midline. Resp: No accessory muscle use. Non-labored. Lungs clear with No crackles. No wheezing. No rhonchi. CV: Regular rate. Regular rhythm. No murmur . No edema. ABD: Non-tender. Non-distended. Soft, round . Normal bowel sounds. Skin: Warm and dry. M/S: No cyanosis. No joint deformity. No clubbing. Neuro: Awake. Follows commands. O x 3, JUAREZ  Psych:  calm and interactive     I/O: I/O last 3 completed shifts: In: 18 [P.O.:840; I.V.:20]  Out: 1675 [Urine:1125; Stool:550]  I/O this shift:  In: 360 [P.O.:360]  Out: 550 [Urine:550]     Results:  CBC:   Recent Labs     09/16/21  1149 09/17/21  0455 09/18/21  1138   WBC 7.6 4.1* 11.3   HGB 7.5* 7.0* 7.6*   HCT 23.2* 21.8* 23.5*   .3* 100.9* 102.2*   * 127* 145     BMP:   Recent Labs     09/16/21  1149 09/17/21  0455 09/18/21  0245    141 138   K 5.1* 4.3 5.2*    105 106   CO2 28 26 21*   BUN 91* 83* 78*   CREATININE 2.7* 2.8* 2.9*     LFT: No results for input(s): ALKPHOS, ALT, AST, PROT, BILITOT, BILIDIR, LABALBU in the last 72 hours. PT/INR: No results for input(s): PROTIME, INR in the last 72 hours. Procalcitonin:   Recent Labs     09/17/21  0455   PROCAL 2.87*     Cultures:  Recent Labs     09/17/21  1330   CULTRESP Growth not present, incubation continues     Pulmonary Function Testing personally reviewed and interpreted     DATA: Spirometry done in the office today demonstrates an FVC of 2.53 liters which is 62% of predicted with an FEV1 of  1.40 liters which is 46% of predicted.  FEV1/FVC ratio is 55%. Mid expiratory flow rates are 34% of predicted.  Maximum voluntary ventilation is 44 liters per minute or 36% of predicted. Total lung capacity is 5.71 liters which is 83% of predicted.  DLCO is 11.57 mm/min/mmHg which is 46% of predicted. Flow volume loop shows no signs of intrathoracic or extrathoracic obstruction.       PFT interpretation:   1.  Severe obstruction without response to bronchodilator   2.  Increased RV/TLC indicating air trapping   3.  Moderate decrease in diffusion       Specimen Collected: 08/24/21             ABG:   No results for input(s): PH, PO2, PCO2, HCO3, BE, O2SAT in the last 72 hours. Films:  XR CHEST (2 VW)    Result Date: 9/16/2021  EXAMINATION: TWO XRAY VIEWS OF THE CHEST 9/16/2021 10:59 am COMPARISON: 04/15/2021 HISTORY: ORDERING SYSTEM PROVIDED HISTORY: shortness of breath TECHNOLOGIST PROVIDED HISTORY: Reason for exam:->shortness of breath FINDINGS: The cardiac silhouette is within normals. There is hyperinflation of the lungs consistent with COPD. The interstitial markings are prominent. There is chronic pleuroparenchymal scarring seen within the right lung apex. There are no findings to suggest pneumonia. There is no right or left pleural effusion. 1. Advanced emphysematous changes. 2. Prominence of interstitial markings. 3. Given the prominent  interstitial markings underlying ground-glass infiltrates cannot be excluded on plain radiographs. CT HEAD WO CONTRAST    Result Date: 9/16/2021  EXAMINATION: CT OF THE HEAD WITHOUT CONTRAST  9/16/2021 2:03 pm TECHNIQUE: CT of the head was performed without the administration of intravenous contrast. Dose modulation, iterative reconstruction, and/or weight based adjustment of the mA/kV was utilized to reduce the radiation dose to as low as reasonably achievable. COMPARISON: None. HISTORY: ORDERING SYSTEM PROVIDED HISTORY: Evaluate intracranial abnormality TECHNOLOGIST PROVIDED HISTORY: Has a \"code stroke\" or \"stroke alert\" been called? ->No Reason for exam:->Evaluate intracranial abnormality Decision Support Exception - unselect if not a suspected or confirmed emergency medical condition->Emergency Medical Condition (MA) FINDINGS: BRAIN/VENTRICLES: There is no acute intracranial hemorrhage, mass effect or midline shift. No abnormal extra-axial fluid collection. The gray-white differentiation is maintained without evidence of an acute infarct. There is no evidence of hydrocephalus. The ventricles, cisterns and sulci are prominent consistent with atrophy. There is decreased attenuation within the periventricular white matter consistent with periventricular leukomalacia. There is encephalomalacia is seen within the posterior left parietal lobe consistent with an old infarct. ORBITS: The visualized portion of the orbits demonstrate no acute abnormality. SINUSES: The visualized paranasal sinuses and mastoid air cells demonstrate no acute abnormality. SOFT TISSUES/SKULL:  No acute abnormality of the visualized skull or soft tissues. 1. There is no acute intracranial abnormality. Specifically, there is no intracranial hemorrhage. 2. Atrophy and periventricular leukomalacia, 3. Old infarct within the posterior left parietal lobe. CT CHEST WO CONTRAST    Result Date: 9/17/2021  EXAMINATION: CT OF THE CHEST WITHOUT CONTRAST 9/17/2021 10:18 am TECHNIQUE: CT of the chest was performed without the administration of intravenous contrast. Multiplanar reformatted images are provided for review. Dose modulation, iterative reconstruction, and/or weight based adjustment of the mA/kV was utilized to reduce the radiation dose to as low as reasonably achievable. COMPARISON: CT chest Mague 15, 2020 HISTORY: ORDERING SYSTEM PROVIDED HISTORY: pulmonary nodule TECHNOLOGIST PROVIDED HISTORY: Reason for exam:->pulmonary nodule FINDINGS: Mediastinum: No abnormal lymphadenopathy. The pulmonary trunk is normal in size Lungs/pleura:   Background of severe bilateral emphysema again demonstrated. 5 focal scarring again demonstrated. New 8 mm nodule identified in left upper lobe, image 40 series 3. New 6 mm nodule in left lower lobe, image 79 series 3. Upper Abdomen: No acute process identified Soft Tissues/Bones: No aggressive osseous lesions. New pulmonary nodules measuring up to 8 mm. See recommendation below. RECOMMENDATIONS: Fleischner Society guidelines for follow-up and management of incidentally detected pulmonary nodules: Multiple Solid Nodules: Nodule size equals 6-8 mm In a low-risk patient, CT at 3-6 months, then consider CT at 18-24 months. In a high-risk patient, CT at 3-6 months, then CT at 18-24 months. - Low risk patients include individuals with minimal or absent history of smoking and other known risk factors. - High risk patients include individuals with a history or smoking or known risk factors. Radiology 2017 http://pubs. rsna.org/doi/full/10.1148/radiol. 0967845123     Echo:         Summary   Left ventricular internal dimensions were normal in diastole and systole.   No regional wall motion abnormalities seen.   Normal left ventricular ejection fraction.   The left atrium is mildly dilated.   Mildly enlarged right atrium size.   Mild mitral annular calcification.   Mild centrally directed mitral regurgitation.   Mild aortic stenosis.   Mild tricuspid regurgitation.   Pulmonary hypertension is mild .      Signature      ----------------------------------------------------------------   Electronically signed by Salomón Spaulding MD(Interpreting   physician) on 05/28/2021 04:22 PM   ----------------------------------------------------------------     Assessment:  1. Acute COPD exacerbation  2. Severe COPD  3. Ongoing nicotine dependence  4. 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 Acute on chronic macrocytic anemia  6. Acute on chronic ROBBIN     Plan:  1. continue  IV Solu-Medrol 40 mg every 12, one more day then decrease to taper   2. Continue  Brovana and Pulmicort twice daily   3.  Stop  DuoNeb's every 4 hours while awake c/o thick mucous, add albuterol   4. mucinex and PEP, stop mucomyst   5.  respiratory molecular panel negative and also respiratory cultures  6. Continue  Levaquin 500 mg daily pct 2.87  7. NicoDerm patch  8. Will obtain a CT of the chest without contrast for evaluation of pulmonary nodules--new JACLYN 8mm and LLL 6mm nodules in high risk patient active smoker will need further work up as out patient   9. We will check for alpha-1 antitrypsin  10. Continue  Robinul for appetite  11. Dietary following   12. Patient has macrocytic anemia H&H down to 7 we will check a folate and B12 level. If his H&H trends any low we will recommend to transfuse a unit of blood  13. To consider consulting nephrology  14.  IS for pulmonary hygiene          Electronically signed by COREEN Martin on 9/18/2021 at 2:57 PM

## 2021-09-18 NOTE — CONSULTS
Patient seen and examined. Consult dictated. Thank You for allowing me to participate in the care of this patient. Will follow the patient with you.     Kelsie Brannon MD  Nephrology    Electronically signed by Darian Miller MD on 9/18/2021 at 4:00 PM

## 2021-09-18 NOTE — PROGRESS NOTES
9/18/2021  12:11 PM           Nutrition Note    9/17 Daily Calorie Count:     Breakfast Lunch Dinner Total   Calories (kcal) 0 45 78 123   Protein (gm) 0 1 2 3     Comments: In addition to eating <50% at meals, pt with very limited meal choices (Ice cream, crackers and soup). ONS ordered 9/17 and may help to supplement inadequate oral intake. Estimated Daily Nutrient Needs:  Energy (kcal):  14-1600kcal (30-35kcal/kg); Weight Used for Energy Requirements:  Current     Protein (g):  70-80g (1.5-1.8g/kg); Weight Used for Protein Requirements:  Current        Fluid (ml/day):  ; Method Used for Fluid Requirements:  1 ml/kcal      Will Continue and Complete oral Calorie count on 9/20. Severely suboptimal PO in the setting of increased demand d/t COPD and altered GI function d/t ileostomy, all contribute to severe wt loss and malnutrition. Will continue inpatient monitoring.     Electronically signed by Argelia Roberts RD, CNSC, LD on 9/18/21 at 12:11 PM EDT    Contact: [phone]

## 2021-09-19 NOTE — PROGRESS NOTES
[facility]  Nephrology Attending Progress Note          SUBJECTIVE: We are seeing this patient for ROBBIN secondary to volume depletion. 9/19/21: Alert, states he feels tired,denies sob, pain. Appetite is poor    PROBLEM LIST:    Patient Active Problem List   Diagnosis    Mixed hyperlipidemia    Acute hypoxemic respiratory failure (HCC)    Rhinovirus    History of stroke    Abdominal aneurysm (HCC)    Tobacco dependence    Aortic valve stenosis    Nonrheumatic aortic valve stenosis    Severe protein-calorie malnutrition (HCC)    COPD, moderate (HCC)    S/P AAA repair using bifurcation graft    Generalized abdominal pain    Failure to thrive in adult    Syncope and collapse    Acute respiratory failure with hypoxia (HCC)    COPD exacerbation ([address]    Essential hypertension    Emphysema of lung ([address]    ROBBIN (acute kidney injury) ([address]    Lactic acid acidosis    Hypercalcemia    Tachycardia    High anion gap metabolic acidosis    Bacteremia    History of aortic valve replacement with bioprosthetic valve    Gross hematuria    Chronic anemia    Hyperkalemia    Elevated troponin    Pulmonary nodules        PAST MEDICAL HISTORY:    Past Medical History:   Diagnosis Date    Abdominal aortic aneurysm without rupture ([facility] [address] 05/09/2017    Arthritis     AS (aortic stenosis)     Cerebral artery occlusion with cerebral infarction [facility]) 2009    TIA/CVA with aphasia that resolved    COPD (chronic obstructive pulmonary disease) ([facility] [address]     History of blood transfusion     Hyperlipidemia     Pneumonia     Tobacco dependence 05/09/2017       DIET:    ADULT DIET; Regular; Low Sodium (2 gm)  Adult Oral Nutrition Supplement; Standard High Calorie/High Protein Oral Supplement  Adult Oral Nutrition Supplement;  Fortified Pudding Oral Supplement     PHYSICAL EXAM:     Patient Vitals for the past 24 hrs:   BP Temp Temp src Pulse Resp SpO2   09/18/21 2119 (!) 97/56 97.9 °F (36.6 °C) Oral 82 18 96 % 09/18/21 1630 104/60 98.1 °F (36.7 °C) Oral 88 16 94 %   09/18/21 0830 117/66 97.9 °F (36.6 °C) Oral 79 16 95 %   @      Intake/Output Summary (Last 24 hours) at 9/19/2021 0170  Last data filed at 9/18/2021 2119  Gross per 24 hour   Intake 360 ml   Output 1300 ml   Net -940 ml         Wt Readings from Last 3 Encounters:   09/18/21 98 lb 4.8 oz (44.6 kg)   08/24/21 103 lb (46.7 kg)   08/18/21 104 lb 6.4 oz (47.4 kg)       General appearance: emaciated, in no acute distress  Skin: No rashes or lesions on exposed skin  Neck: No JVD  Lungs: Clear upper, diminished lower lobes, no adventitious sounds  Heart: RRR, no rub  Abdomen: Soft, non-tender, + bowel sounds, ileostomy  Extremities: No edema  Neurologic: Mental status: Alert, oriented      MEDS (scheduled):    [START ON 9/20/2021] levoFLOXacin  500 mg Oral Every Other Day    guaiFENesin  400 mg Oral 4x Daily    aspirin  81 mg Oral Daily    atorvastatin  20 mg Oral Nightly    metoprolol tartrate  37.5 mg Oral BID    pantoprazole  40 mg Oral Daily    sodium chloride flush  5-40 mL IntraVENous 2 times per day    enoxaparin  30 mg SubCUTAneous Daily    Arformoterol Tartrate  15 mcg Nebulization BID    budesonide  500 mcg Nebulization BID    nicotine  1 patch TransDERmal Daily    dronabinol  2.5 mg Oral BID    methylPREDNISolone  40 mg IntraVENous Q12H       MEDS (infusions):   sodium chloride      sodium chloride         MEDS (prn):  sodium chloride, albuterol, sodium chloride flush, sodium chloride, ondansetron **OR** ondansetron, acetaminophen **OR** acetaminophen, senna    DATA:    Recent Labs     09/17/21  0455 09/18/21  1138 09/19/21  0430   WBC 4.1* 11.3 7.4   HGB 7.0* 7.6* 6.9*   HCT 21.8* 23.5* 21.5*   .9* 102.2* 102.9*   * 145 119*     Recent Labs     09/17/21  0455 09/18/21  0245 09/19/21  0430    138 135   K 4.3 5.2* 5.2*    106 106   CO2 26 21* 19*   BUN 83* 78* 77*   CREATININE 2.8* 2.9* 2.5*   LABGLOM 22 21 25 GLUCOSE 76 108* 132*   CALCIUM 9.7 9.0 8.0*   MG  --   --  2.0   PHOS  --   --  4.7*       Lab Results   Component Value Date    LABALBU 2.7 (L) 05/31/2021    LABALBU 2.6 (L) 05/27/2021    LABALBU 2.7 (L) 05/20/2021     No results found for: TSH    Iron Studies  Lab Results   Component Value Date    IRON 48 (L) 04/13/2021    TIBC 156 (L) 04/13/2021    FERRITIN 1,006 04/13/2021     Vitamin B-12   Date Value Ref Range Status   09/17/2021 608 211 - 946 pg/mL Final     Folate   Date Value Ref Range Status   09/17/2021 19.5 4.8 - 24.2 ng/mL Final       Vit D, 25-Hydroxy   Date Value Ref Range Status   04/13/2021 32 30 - 100 ng/mL Final     Comment:     <20 ng/mL. ........... [place] [facility] Deficient  20-30 ng/mL. ......... [facility]   ng/mL. ........ [place] Sufficient  >100 ng/mL. .......... [place] Toxic       PTH   Date Value Ref Range Status   04/13/2021 101 (H) 15 - 65 pg/mL Final       No components found for: URIC    Lab Results   Component Value Date    COLORU Yellow 09/16/2021    NITRU Negative 09/16/2021    GLUCOSEU Negative 09/16/2021    KETUA Negative 09/16/2021    UROBILINOGEN 0.2 09/16/2021    BILIRUBINUR Negative 09/16/2021       No results found for: Hakeem Schwab      IMPRESSION/RECOMMENDATIONS:      1. Acute kidney injury. Acute kidney injury in this patient possibly secondary to volume depletion with poor oral intake and output from the ostomy and concurrent use of Bactrim. Bactrim has been discontinued. Off IVF  Urine osmo 490, chloride <20  FENa 1.5% supports intrarenal etiology  Cr improved to 2.5 today    2. Hyperkalemia. Hyperkalemia is a reflection of acute kidney injury. Low K+ diet  Follow serial electrolytes. Give a dose of Ritta Ellie today as he is receiving a transfusion which may increase K+    3. Metabolic acidosis.   The patient has mild metabolic acidosis, which may be a reflection of acute kidney injury with normal anion gap and that may be secondary to GI factors  Monitor CO2  CO2 19 - add Na+ bicarbonate BID    4. Anemia. Follow hemoglobin and hematocrit. Folate, B12 wnl  Iron, TIBC , Ferritin  Hgb 6.9 - receiving transfusion      5. Underlying chronic kidney disease with a serum creatinine of  1.5 mg/dL. Vit D 26, PTH 32, PO4 4.7  Add vitamin D 2000 units daily     Thank you for the opportunity to participate in the care of COREEN Churcihll-CNS    Patient seen and examined. Patient's wife is present at the bedside. Patient is feeling better. Chart reviewed. Presently off IV fluids. I had a face to face encounter with the patient. Agree with exam.    Agree with  formulation, assessment and plan as outlined above and directed by me. Addition and corrections were done as deemed appropriate. My exam and plan include: We will restart the patient on gentle hydration. Continue current treatment.       Gio Monterroso MD  Nephrology        Electronically signed by Gio Monterroso MD on 9/19/2021 at 3:00 PM

## 2021-09-19 NOTE — PROGRESS NOTES
Chief Complaint:  Chief Complaint   Patient presents with    Shortness of Breath     worsening  since this morning. Hx COPD. Denies home o2. Denies CP     COPD exacerbation ([address]     Subjective:    Patient resting in bed, on nasal cannula  Denies SOB, chest pain  States he tries to eat but \"gags\" on food     Objective:    /62   Pulse 75   Temp 97.4 °F (36.3 °C) (Oral)   Resp 16   Ht 5' 10\" (1.778 m)   Wt 98 lb 4.8 oz (44.6 kg)   SpO2 97%   BMI 14.10 kg/m²     Current medications that patient is taking have been reviewed.     General appearance: NAD, conversant  HEENT: AT/NC, MMM  Neck: FROM, supple  Lungs: Diminished bilaterally, WOB normal  CV: RRR, no MRGs  Abdomen: Soft, non-tender; no masses or HSM, +BS  Extremities: No peripheral edema or digital cyanosis  Skin: no rash, lesions or ulcers  Psych: Calm and cooperative  Neuro: Alert and interactive, face symmetric, moving all extremities, speech fluent    Labs:  CBC with Differential:    Lab Results   Component Value Date    WBC 7.4 09/19/2021    RBC 2.09 09/19/2021    HGB 6.9 09/19/2021    HCT 21.5 09/19/2021     09/19/2021    .9 09/19/2021    MCH 33.0 09/19/2021    MCHC 32.1 09/19/2021    RDW 15.3 09/19/2021    LYMPHOPCT 4.6 09/19/2021    MONOPCT 2.0 09/19/2021    BASOPCT 0.1 09/19/2021    MONOSABS 0.15 09/19/2021    LYMPHSABS 0.34 09/19/2021    EOSABS 0.00 09/19/2021    BASOSABS 0.01 09/19/2021     CMP:    Lab Results   Component Value Date     09/19/2021    K 5.2 09/19/2021    K 4.3 09/17/2021     09/19/2021    CO2 19 09/19/2021    BUN 77 09/19/2021    CREATININE 2.5 09/19/2021    GFRAA 31 09/19/2021    LABGLOM 25 09/19/2021    GLUCOSE 132 09/19/2021    PROT 6.6 05/31/2021    LABALBU 2.7 05/31/2021    CALCIUM 8.0 09/19/2021    BILITOT 0.9 05/31/2021    ALKPHOS 444 05/31/2021    AST 37 05/31/2021    ALT 43 05/31/2021        Imaging:  I've personally reviewed the patient's CT chest  New pulmonary nodules measuring up to 8 mm.  Recommendation for repeat scan at 3-6 months and then 18-24 months. Telemetry:  I've personally reviewed the patient's telemetry:  NSR    Assessment/Plan:  Principal Problem:    COPD exacerbation (HCC)  Active Problems:    History of stroke    Abdominal aneurysm (HCC)    Tobacco dependence    Aortic valve stenosis    S/P AAA repair using bifurcation graft    Failure to thrive in adult    Essential hypertension    ROBBIN (acute kidney injury) ([address]    Hypercalcemia    History of aortic valve replacement with bioprosthetic valve    Chronic anemia    Hyperkalemia    Elevated troponin    Pulmonary nodules  Resolved Problems:    * No resolved hospital problems. *       Plan:   Patient is a 66year old male with a PMH of AAA, aortic stenosis, COPD, CVA, Hyperlipidemia and tobacco dependence presents to the emergency room for shortness of breath that has worsened over the last 2-3 days.     COPD  -Supplement O2 demands keeping oxygen saturation greater than 92%. -DuoNebs, Mucomyst, Brovana, Pulmicort  -Requiring 2L nasal cannula   -IV Solumedrol 40 mg q12h  -Monitor h/h -7.0/21.8-AM labs peding  -Consult pulmonology-input appreciated  -PO Levaquin   -Resp culture/viral panel, strep and Legionella antigens-negative  -CT chest with pulm nodules     ROBBIN  -BUN/Creat 91/2.7 on admission, 77/2.5 today   -Strict I/O's  -Monitor labs, BMP daily  -Appreciate nephrology input      Anemia  Monitor H&H-7.5/23 on admission, 6.9/21.5 today-pt to receive transfusion   Transfuse for hemoglobin less than 7  Likely needs scopes at some point in the near future     Cachexia/weight loss of approximately 90 pounds over 40 months.   Noemy added for appetite by pulm   Dietary consult     COREEN Turner - CNP    9:02 AM  9/19/2021

## 2021-09-19 NOTE — CONSULTS
[address] [address]                                  CONSULTATION    PATIENT NAME: Ashok Newman                   :        1950  MED REC NO:   [id number]                            ROOM:       1166  ACCOUNT NO:   [de-identified]                           ADMIT DATE: 2021  PROVIDER:     Jeremias Garg MD    CONSULT DATE:  2021    ADMITTING PROVIDER:  Mandy Rivera MD.    AGE OF THE PATIENT:  69-year-old. REASON FOR CONSULTATION:  Acute kidney injury. The patient is being  seen in consultation at the request of Dr. Jarod Uribe. HISTORY OF PRESENT ILLNESS:  The patient is a 69-year-old   gentleman with a prior episode of acute kidney injury and a baseline  serum creatinine lately in the range of 1.5 mg/dL to 1.7 mg/dL. He has  been seen earlier this year for acute kidney injury. The patient is now  admitted after he presents to the emergency room with chief complaints  of shortness of breath. The patient had cough which was productive of  yellowish sputum. He had taken Mucinex. As per his home medications,  he apparently was also on Bactrim DS one table twice a day. Upon  presentation, the patient was found to have a serum creatinine of 2.7  mg/dL and is up to 2.9 mg/dL this morning. Urine output is fair. He is  being hydrated. He is less short of breath. When seen upon the floor,  the patient has no specific complaints. He has been evaluated by  Pulmonary Service and he has been believed to have acute exacerbation of  his underlying severe COPD. PAST MEDICAL HISTORY:  Significant for history of prior episode of acute  kidney injury earlier this year in 2021. He has a history of  hyperlipidemia, history of pneumonia, history of superficial mesenteric  artery with mycotic aneurysm and history of cerebrovascular accident  with TIAs and CVAs.   He has history of severe COPD and tobacco use.  He  has a history of aortic stenosis. He has a history of anemia. History  of secondary hyperparathyroidism due to renal insufficiency with a prior  PTH of 101. He has a history of Staph bacteremia. History of abdominal  aortic aneurysm, status post repair. PAST SURGICAL HISTORY:  Significant for total colectomy, ileostomy, and  repair of superficial mesenteric artery aneurysm. He has had aortic  aneurysm repair, history of hernia repair surgery, history of upper and  lower gastrointestinal tract endoscopy. History of aortic valve  replacement. ALLERGIES:  The patient has no known drug allergies. HOME MEDICATIONS:  The patient's medications prior to admission included  Bactrim DS one b.i.d., metoprolol 25 mg b.i.d., pantoprazole 40 mg  daily, albuterol inhaler q.4 hours p.r.n., DuoNeb inhaler q.4 hours  p.r.n., atorvastatin 20 mg daily, and aspirin 81 mg daily. CURRENT MEDICATIONS IN THE HOSPITAL:  The patient is on IV fluids in the  form of 0.9 normal saline at the rate of 70 mL an hour. In addition,  the patient is on metoprolol 37.5 mg twice a day, Pulmicort inhaler  twice a day, Marinol 2.5 mg twice a day, guaifenesin 400 mg four times a  day, aspirin 81 mg daily, pantoprazole 40 mg daily, nicotine patch once  daily, Senokot one tablet daily p.r.n., and Lipitor 20 mg at bedtime. SOCIAL HISTORY:  The patient is . He is a smoker. He has a  50-pack-year history of smoking. No alcohol use. FAMILY HISTORY:  Significant for atherosclerotic heart disease in his  mother and stroke in his father. REVIEW OF SYSTEMS:  Significant for weight loss with a high output from  his ostomy. He has occasional urinary incontinence. No chest pain or  palpitations. He has had cough and shortness of breath. No abdominal  pain. He is denying any use of over-the-counter nonsteroidal  anti-inflammatory drugs. The rest of the review of systems is negative.     PHYSICAL EXAMINATION:  GENERAL: The patient is awake and alert. He is in no acute distress. VITAL SIGNS:  Blood pressure is 117/66, pulse is 79, respiratory rate is  16, temperature is 97.9 degrees Fahrenheit. HEENT:  Head is normocephalic and atraumatic. Eyes:  Sclerae are  nonicteric. Pupils are equal and reactive to light and accommodation. Fundi examination is deferred. Mouth and throat:  Dry oral mucosa  without any mucosal ulceration or exudate. NECK:  Supple. No jugular venous distention. No carotid bruits. No  palpable masses. CHEST:  With increased AP diameter, symmetrical.  LUNGS:  Breath sounds are decreased at the bases. No rales or rhonchi. HEART:  Regular rate and rhythm without any pericardial rub or gallop. There is a grade 1/6 systolic murmur along the left sternal border. ABDOMEN:  Soft with no tenderness or rebound tenderness. Normoactive  bowel sounds. EXTREMITIES:  The patient has no pedal edema. LABORATORY DATA:  Lab data from today:  Sodium 138 mmol/L, potassium 5.2  mmol/L, chloride 106 mmol/L, CO2 of 21 mmol/L, BUN 78 mg/dL, creatinine  2.9 mg/dL, calcium 9.0 mg/dL. Hemoglobin 7.6 gm/dL. Platelet count is  418,804. IMPRESSION:  1. Acute kidney injury. Acute kidney injury in this patient possibly  secondary to volume depletion with poor oral intake and output from the  ostomy and concurrent use of Bactrim. Bactrim has been discontinued. Agree with hydration. Check urine for sodium, creatinine, and chloride. 2.  Hyperkalemia. Hyperkalemia is a reflection of acute kidney injury. We will follow. Restrict the potassium of the patient's diet. 3.  Metabolic acidosis. The patient has mild metabolic acidosis, which  may be a reflection of acute kidney injury with normal anion gap and  that may be secondary to GI factors. 4.  Anemia. Follow hemoglobin and hematocrit. 5.  Underlying chronic kidney disease  Stage G3b with a serum creatinine of  1.5 mg/dL.     PLAN:  Plan is to continue hydration. Check urine for sodium,  creatinine, and chloride. Follow serial electrolytes. Restrict the  potassium of the patient's diet.         Benson Kwok MD    D: 09/18/2021 16:10:26       T: 09/18/2021 21:49:42     [email or web address]  Job#: [id number]     Doc#: [id number]    CC:

## 2021-09-20 NOTE — PROGRESS NOTES
[facility] Quality Flow/Interdisciplinary Rounds Progress Note        Quality Flow Rounds held on September 20, 2021    Disciplines Attending:  Bedside Nurse, ,  and Nursing Unit Leadership    Stewart Interiano was admitted on 9/16/2021 10:29 AM    Anticipated Discharge Date:  Expected Discharge Date: 09/21/21    Disposition:    Tera Score:  Tera Scale Score: 18    Readmission Risk              Risk of Unplanned Readmission:  35           Discussed patient goal for the day, patient clinical progression, and barriers to discharge. The following Goal(s) of the Day/Commitment(s) have been identified: IV solumedrol Q12 transitioned to PO Prednisone daily, monitor labs and vitals, check nephrology plan, and discharge planning home.     Jasen Moser RN  September 20, 2021

## 2021-09-20 NOTE — PROGRESS NOTES
Physical Therapy    Facility/Department: [facility] MED SURG  Initial Assessment    NAME: Balwinder Cardona  : 1950  MRN: [id number]    Date of Service: 2021        Patient Diagnosis(es): The primary encounter diagnosis was Pneumonia due to infectious organism, unspecified laterality, unspecified part of lung. A diagnosis of Acute respiratory failure with hypoxia (HCC) was also pertinent to this visit. has a past medical history of Abdominal aortic aneurysm without rupture (HCC), Arthritis, AS (aortic stenosis), Cerebral artery occlusion with cerebral infarction ([facility] COPD (chronic obstructive pulmonary disease) ([facility] [address] History of blood transfusion, Hyperlipidemia, Pneumonia, and Tobacco dependence. has a past surgical history that includes hernia repair; Ankle surgery; Skull fracture elevation; eye surgery (Bilateral, approx ); Cardiac catheterization (2017); Tooth Extraction; Aortic valve replacement; AAA repair, endovascular (10/12/2017); AAA repair, endovascular (10/12/2017); Cardiac surgery; Upper gastrointestinal endoscopy (N/A, 2020); Colonoscopy (N/A, 2020);   picc powerpicc double (4/15/2021); other surgical history (2021); and hernia repair (2021). Evaluating Therapist: Ruth Truong PT        Room #:  6486/0243-B  Diagnosis:  PNA (pneumonia) [J18.9]  PMHx/PSHx:  COPD  Precautions:  Falls, O2        Social:  Pt lives with wife in a 1 floor plan 0 steps to enter. Prior to admission independent with no device in home, uses ww when out       Initial Evaluation  Date: 21 Treatment   2021   Short Term/ Long Term   Goals   Was pt agreeable to Eval/treatment? yes  yes     Does pt have pain?  No c/o pain  denies      Bed Mobility  Rolling: SBA  Supine to sit: SBA  Sit to supine: SBA  Scooting: SBA  rolling : independent   Supine to sit : S/I  Independent    Transfers Pt unable due to severe shortness of breath when sitting edge of bed.   Sit <> stand : CGA  SBA Ambulation    NT  15 and 40 feet with no AD CGA. 120 feet x 1 with ww SBA  25 feet with ww with SBA   Stair Negotiation  Ascended and descended  NT  NT     LE strength     3+/5     4-/5   balance      Good sitting standing NT       AM-PAC Raw score               14/24 18/ 24            Pt is alert and Oriented   LE ROM: WFL    Endurance: decreased      ASSESSMENT:     Pt displays functional ability as noted in the objective portion of this treatment        Patient education  Pt educated on safety with mobility      Patient response to education:   Pt verbalized understanding Pt demonstrated skill Pt requires further education in this area   yes    x         Comments:  Pt in bed upon arrival. Agreeable to therapy on second attempt. Unsteady without use of AD. Gait steadiness improved with use of ww. Rec ww use until balance and strength improves. Pt on RA. Pulse ox at rest 95%, decreased to 88% with gait, recovered to 90%. Cues given for proper breathing technique       Conditions Requiring Skilled Therapeutic Intervention:     [x]? Decreased strength                                      []?Decreased ROM  [x]? Decreased functional mobility  [x]? Decreased balance   [x]? Decreased endurance   []? Decreased posture  []? Decreased sensation  []? Decreased coordination   []? Decreased vision  []? Decreased safety awareness   []? Increased pain             Patient and or family understand(s) diagnosis, prognosis, and plan of care.     Prognosis is good for reaching above PT goals     PHYSICAL THERAPY PLAN OF CARE:     PT POC is established based on physician order and patient diagnosis      Referring provider/PT Order: COREEN Calderon CNP/ PT eval and treat        Current Treatment Recommendations:      [x]? Strengthening to improve independence with functional mobility   []? ROM to improve independence with functional mobility   [x]? Balance Training to improve static/dynamic balance and to reduce fall risk  [x]? Endurance Training to improve activity tolerance during functional mobility   [x]? Transfer Training to improve safety and independence with all functional transfers   [x]? Gait Training to improve gait mechanics, endurance and assess need for appropriate assistive device  []? Stair Training in preparation for safe discharge home and/or into the community   []? Positioning to prevent skin breakdown and contractures  [x]? Safety and Education Training   [x]? Patient/Caregiver Education   []? HEP  []? Other      PT long term treatment goals are located in above grid     Frequency of treatments: 2-5x/week x 5-7 days.     Time in  1310   Time out  1325          Con't with PT POC         CPT codes:  []? Low Complexity PT evaluation [id number]  []? Moderate Complexity PT evaluation 13086  []? High Complexity PT evaluation [id number]  []? PT Re-evaluation [id number]  []? Gait training 87627 minutes  []? Manual therapy 35332 minutes  [x]? Therapeutic activities 38551 15  minutes  []? Therapeutic exercises 11413 minutes  []?  Neuromuscular reeducation 96011 minutes     Prince Prather   PT 1909

## 2021-09-20 NOTE — PROGRESS NOTES
Chief Complaint:  Chief Complaint   Patient presents with    Shortness of Breath     worsening  since this morning. Hx COPD. Denies home o2. Denies CP     COPD exacerbation ([address]     Subjective:    Patient resting in bed, on room air  Anxious to go home  Denies CP, SOB      Objective:    /64   Pulse 76   Temp 97.3 °F (36.3 °C) (Oral)   Resp 16   Ht 5' 10\" (1.778 m)   Wt 101 lb 3.2 oz (45.9 kg)   SpO2 94%   BMI 14.52 kg/m²     Current medications that patient is taking have been reviewed.     General appearance: NAD, conversant  HEENT: AT/NC, MMM  Neck: FROM, supple  Lungs: Diminished bilaterally, WOB normal  CV: RRR, no MRGs  Abdomen: Soft, non-tender; no masses or HSM, +BS  Extremities: No peripheral edema or digital cyanosis  Skin: no rash, lesions or ulcers  Psych: Calm and cooperative  Neuro: Alert and interactive, face symmetric, moving all extremities, speech fluent    Labs:  CBC with Differential:    Lab Results   Component Value Date    WBC 5.3 09/20/2021    RBC 2.58 09/20/2021    HGB 8.2 09/20/2021    HCT 25.3 09/20/2021     09/20/2021    MCV 98.1 09/20/2021    MCH 31.8 09/20/2021    MCHC 32.4 09/20/2021    RDW 18.1 09/20/2021    LYMPHOPCT 8.2 09/20/2021    MONOPCT 2.2 09/20/2021    BASOPCT 0.0 09/20/2021    MONOSABS 0.12 09/20/2021    LYMPHSABS 0.44 09/20/2021    EOSABS 0.00 09/20/2021    BASOSABS 0.00 09/20/2021     CMP:    Lab Results   Component Value Date     09/20/2021    K 4.4 09/20/2021    K 4.3 09/17/2021     09/20/2021    CO2 19 09/20/2021    BUN 69 09/20/2021    CREATININE 2.2 09/20/2021    GFRAA 36 09/20/2021    LABGLOM 30 09/20/2021    GLUCOSE 115 09/20/2021    PROT 6.6 05/31/2021    LABALBU 2.7 05/31/2021    CALCIUM 7.8 09/20/2021    BILITOT 0.9 05/31/2021    ALKPHOS 444 05/31/2021    AST 37 05/31/2021    ALT 43 05/31/2021        Imaging:  I've personally reviewed the patient's CT chest  New pulmonary nodules measuring up to 8 mm.  Recommendation for repeat scan at 3-6 months and then 18-24 months. Telemetry:  I've personally reviewed the patient's telemetry:  NSR    Assessment/Plan:  Principal Problem:    COPD exacerbation (HCC)  Active Problems:    History of stroke    Abdominal aneurysm (HCC)    Tobacco dependence    Aortic valve stenosis    S/P AAA repair using bifurcation graft    Failure to thrive in adult    Essential hypertension    ROBBIN (acute kidney injury) ([address]    Hypercalcemia    History of aortic valve replacement with bioprosthetic valve    Chronic anemia    Hyperkalemia    Elevated troponin    Pulmonary nodules  Resolved Problems:    * No resolved hospital problems. *       Plan:   Patient is a 66year old male with a PMH of AAA, aortic stenosis, COPD, CVA, Hyperlipidemia and tobacco dependence presents to the emergency room for shortness of breath that has worsened over the last 2-3 days.     COPD  -Supplement O2 demands keeping oxygen saturation greater than 92%. -DuoNebs, Mucomyst, Brovana, Pulmicort  -Requiring 2L nasal cannula   -IV Solumedrol 40 mg [id number] to hopefully p.o. by tomorrow  -Consult pulmonology-input appreciated  -PO Levaquin-to be continued for 7 days post discharge  -Resp culture/viral panel, strep and Legionella antigens-negative  -CT chest with pulm nodules     ROBBIN  -BUN/Creat 91/2.7 on admission, 69/2.2 today   -Strict I/O's  -Normal saline infusion   -Monitor labs, BMP daily  -Appreciate nephrology input      Anemia  Monitor H&H-7.5/23 on admission, 8.2/25.3 today, received transfusion yesterday  Transfuse for hemoglobin less than 7  Likely needs scopes at some point in the near future     Cachexia/weight loss of approximately 90 pounds over 4 months. Giancarloinul added for appetite by pulm   Dietary consult       Medication for other comorbidities continue as appropriate dose adjustment as necessary.     DVT prophylaxis  PT OT  Discharge planning-when okay with consultants-hopefully next 24 hours if no further work-up/procedures planned    Case discussed with attending and agreed upon plan of care. COREEN Jarrett CNP    1:59 PM  9/20/2021     Above note edited to reflect my thoughts    I personally saw, examined and provided care for the patient. Radiographs, labs and medication list were reviewed by me independently. The case was discussed in detail and plans for care were established. Review of COREEN Jarrett CNP, documentation was conducted and revisions were made as appropriate directly by me. I agree with the above documented exam, problem list, and plan of care.      Aixa Puentes MD  7:57 PM  9/20/2021

## 2021-09-20 NOTE — PROGRESS NOTES
Adaptive Equipment: TBD      Home Living: Pt lives with wife, 1 story     Equipment owned: walker      Prior Level of Function: assist   with ADLs , assist  with IADLs; ambulated with walker      Pain Level: no pain reported during this session. Cognition: Awake and alert. Min cues for safety. Functional Assessment:  AM-PAC Daily Activity Raw Score: 18/24    Initial Eval Status  Date: 9/17/21 Treatment Status  Date: 9/20/21 STGs = LTGs  Time frame: 10-14 days   Feeding Independent        Grooming Rosalino ,seated   Decrease standing tolerance  SBA for balance while standing   Supervision    UB Dressing Min A  Patient demonstrates UE and LE ROM to participate in ADL activity   May require increase assist d/t resp status/SOB  and overall decrease endurance   SBA Supervision    LB Dressing Max A  Declined to don brief.    Rosalino    Bathing Max A    Min A   Toileting Assist with thorough hygiene  Pt states he manages his own ostomy bag at home while seated on the toilet. Pt completed toilet transfer with SBA.   Supervision    Bed Mobility  SBA  Supine <> sit   O2 on  supervision  Supervision    Functional Transfers Patient sat EOB only  Upon coming to EOB - patient stated he just felt too weak and SOB - wanted to return to bed  SBA to stand from bed and toilet surfaces.    CGA/SBA    Functional Mobility NT  Mobility to bathroom SBA without device. Unsteady 1x with CGA for balance. Pt used w/w for further distance. States he has walker at home.    CGA/SBA  with good tolerance    Balance Sitting:     Static:  SBA - EOB     Dynamic:Mod A   Standing: NT    Independent - sitting   CGA/SBA - standing    Activity Tolerance Poor with light activity   SOB/resp status limting  Fair SOB with exertion. O2 monitored during session. 93% sitting on the side of the bed, 88% after exertion, and increased back to 90% with deep breathing techniques.    Fair+ with ADL activity        Comments:  Pt pleasant and cooperative. Cues for safety. Pt remained in the chair at end of the session. Education/treatment: AdL retraining with facilitation of movement to increase self care skills. Therapeutic activity to address balance and endurance for ADL and transfers. Pt education of energy conservation, walker safety, and transfer safety. · Pt has made  progress towards set goals.    ·   Time In: 1:10   Time Out: 1:30      Min Units   Therapeutic Ex 86119     Therapeutic Activities 13763 9    ADL/Self Care 10053 11 1   Orthotic Management 56053     Neuro Re-Ed 62567     Non-Billable Time     TOTAL TIMED TREATMENT 20 300 [facility] ZAYNAB/L 27870

## 2021-09-20 NOTE — PROGRESS NOTES
Comprehensive Nutrition Assessment    Type and Reason for Visit:  Reassess, Calorie Count    Nutrition Recommendations/Plan:   Will Continue and Complete oral Calorie counts. Severely suboptimal PO in the setting of increased demand d/t COPD and altered GI function d/t ileostomy, all contribute to severe wt loss and malnutrition. Will continue inpatient monitoring. Summary for Calorie count for the day based on 80% consumed:  9/18  Calories= 600  Protein (g)= 42g  9/19  Calories= 800  Protein= 49g  Comments:  pt with very limited meal choices (Ice cream, crackers and soup). ONS ordered 9/17 and may help to supplement inadequate oral intake. Nutrition Assessment:  Pt adm with pneumonia and COPD. Pt has improved with intake. Will Continue and Complete oral Calorie count on 9/20. Severely suboptimal PO in the setting of increased demand d/t COPD and altered GI function d/t ileostomy, all contribute to severe wt loss and malnutrition. Pt still at risk d/t severe malnutrition d/t COPD. Will continue inpatient monitoring. Malnutrition Assessment:  Malnutrition Status:  Severe malnutrition    Context:  Chronic Illness     Findings of the 6 clinical characteristics of malnutrition:  Energy Intake:  7 - 75% or less estimated energy requirements for 1 month or longer  Weight Loss:  1 - Mild weight loss (specify amount and time period) (2%  over 1month)     Body Fat Loss:  7 - Severe body fat loss Triceps, Orbital   Muscle Mass Loss:  7 - Severe muscle mass loss Temples (temporalis), Clavicles (pectoralis & deltoids), Hand (interosseous)  Fluid Accumulation:  No significant fluid accumulation     Strength:  7 - Measurable reduction in  strength    Estimated Daily Nutrient Needs:  Energy (kcal):  14-1600kcal (30-35kcal/kg); Weight Used for Energy Requirements:  Current     Protein (g):  70-80g (1.5-1.8g/kg);  Weight Used for Protein Requirements:  Current        Fluid (ml/day):  ; Method Used for Fluid Requirements:  1 ml/kcal      Nutrition Related Findings:  A&Ox4, BS active, ileostomy, no Edema      Wounds:  None       Current Nutrition Therapies:    ADULT DIET; Regular; Low Sodium (2 gm)  Adult Oral Nutrition Supplement; Standard High Calorie/High Protein Oral Supplement  Adult Oral Nutrition Supplement; Fortified Pudding Oral Supplement    Anthropometric Measures:  · Height: 5' 10\" (177.8 cm)  · Current Body Weight: 101 lb 3.2 oz (45.9 kg)   · Admission Body Weight:      · Usual Body Weight: 104 lb 6.4 oz (47.4 kg) (8/18/21 actual)     · Ideal Body Weight: 166 lbs; % Ideal Body Weight 61 %   · BMI: 14.5  · Adjusted Body Weight:  ; No Adjustment   · Adjusted BMI:      · BMI Categories: Underweight (BMI less than 22) age over 72       Nutrition Diagnosis:   · Severe malnutrition, In context of chronic illness related to impaired respiratory function (pneumonia w/ COPD) as evidenced by poor intake prior to admission, intake 51-75%, severe muscle loss, severe loss of subcutaneous fat      Nutrition Interventions:   Food and/or Nutrient Delivery:  Continue Current Diet, Continue Oral Nutrition Supplement, Continue Calorie Count  Nutrition Education/Counseling:  No recommendation at this time   Coordination of Nutrition Care:  Continue to monitor while inpatient    Goals:  >75% of meals and ONS consumed       Nutrition Monitoring and Evaluation:   Behavioral-Environmental Outcomes:  None Identified   Food/Nutrient Intake Outcomes:  Food and Nutrient Intake, Supplement Intake  Physical Signs/Symptoms Outcomes:  Biochemical Data, Fluid Status or Edema, Nutrition Focused Physical Findings, Skin, Weight     Discharge Planning:     Too soon to determine     Electronically signed by Meño Alves RD, LD on 9/20/21 at 9:50 AM EDT    Contact: [phone]

## 2021-09-20 NOTE — PROGRESS NOTES
I have seen and examined the patient and agree with the NP/PA's note with following additions/exceptions:    Principal Problem:    COPD exacerbation ([address]  Active Problems:    History of stroke    Abdominal aneurysm ([address]    Tobacco dependence    Aortic valve stenosis    S/P AAA repair using bifurcation graft    Failure to thrive in adult    Essential hypertension    ROBBIN (acute kidney injury) ([address]    Hypercalcemia    History of aortic valve replacement with bioprosthetic valve    Chronic anemia    Hyperkalemia    Elevated troponin    Pulmonary nodules  Resolved Problems:    * No resolved hospital problems. *       Breathing seems stable. Lungs super distant but clear. WOB normal.  Extremely frail appearing man    Continu steroids and bronchodilators    Continue fluids. ROBBIN improving a little    Anemia of chronic illness.   Transfuse 1 U PRBC today    Requires continued inpatient level of care     Juanita Martin MD  9/19/2021   [phone]

## 2021-09-20 NOTE — CARE COORDINATION
Met with patient along with - discussed plan of care and discharge planning. Pt resides at home with wife, daughter, and grand daughter. PTA pt was independent. Discussed most recent therapy eval, [facility] 14/24. Pt stated he DOES NOT want to go to [facility] KENYATTA informed pt that we will have therapy re-eval him today to help assist with safe discharge planning. Pt verbalized understanding and is agreeable to work with therapy. Message sent to therapy requesting that patient is seen today to assist with discharge planning. Await PT/OT evals. ,

## 2021-09-20 NOTE — PROGRESS NOTES
Initial Inpatient Wound Care    Admit Date: 9/16/2021 10:29 AM    Reason for consult:  ileostomy  Significant history:  Adm with SOB  Has COPD  CVA      Findings:  Awake and verbal  States he can take care of his ostomy. But at home his wipe manages it.   Pouch intact, states has changed yesterday  Large amount liquid brown    Plan: routine ostomy care    COREEN Martínez - CNS 9/20/2021 11:20 AM

## 2021-09-20 NOTE — PROGRESS NOTES
Azar Beltran M.D.,[facility]  Mona Baumgarten, D.O., FTERIOShakirI., Tim Garcia M.D. Moises Hernández M.D. Nani Chicas D.O. Daily Pulmonary Progress Note    Patient:  Erin Nieves 66 y.o. male MRN: [id number]     Date of Service: 9/20/2021      Synopsis     We are following patient for COPD    \"CC\" SOB    Code status: FULL      Subjective      Patient was seen and examined. Laying in bed on RA without rest.  States he is waiting for physical therapy to come and evaluate him so he can walk in the mar. Patient states he is anxious to go home because he has bills that need taking care of. Patient denies cough, shortness of breath, chest pain, chest palpitations. He states his breathing feels much better. Review of Systems:  Constitutional: Denies fever, weight loss, night sweats, and fatigue  Skin: Denies pigmentation, dark lesions, and rashes   HEENT: Denies hearing loss, tinnitus, ear drainage, epistaxis, sore throat, and hoarseness. Cardiovascular: Denies palpitations, chest pain, and chest pressure. Respiratory: Denies cough, dyspnea at rest, hemoptysis, apnea, and choking.   Gastrointestinal: Denies nausea, vomiting, poor appetite, diarrhea, heartburn or reflux  Genitourinary: Denies dysuria, frequency, urgency or hematuria  Musculoskeletal: Denies myalgias, muscle weakness, and bone pain  Neurological: Denies dizziness, vertigo, headache, and focal weakness  Psychological: Denies anxiety and depression  Endocrine: Denies heat intolerance and cold intolerance  Hematopoietic/Lymphatic: Denies bleeding problems and blood transfusions    24-hour events:  None    Objective   Vitals: /64   Pulse 76   Temp 97.3 °F (36.3 °C) (Oral)   Resp 16   Ht 5' 10\" (1.778 m)   Wt 101 lb 3.2 oz (45.9 kg)   SpO2 94%   BMI 14.52 kg/m²     I/O:    Intake/Output Summary (Last 24 hours) at 9/20/2021 1533  Last data filed at 9/20/2021 1535  Gross per 24 hour   Intake --   Output 1700 ml   Net -1700 ml       Vent Information  SpO2: 94 %    CURRENT MEDS :  Scheduled Meds:   Vitamin D  2,000 Units Oral Daily    sodium bicarbonate  1,300 mg Oral BID    levoFLOXacin  500 mg Oral Every Other Day    guaiFENesin  400 mg Oral 4x Daily    aspirin  81 mg Oral Daily    atorvastatin  20 mg Oral Nightly    metoprolol tartrate  37.5 mg Oral BID    pantoprazole  40 mg Oral Daily    sodium chloride flush  5-40 mL IntraVENous 2 times per day    enoxaparin  30 mg SubCUTAneous Daily    Arformoterol Tartrate  15 mcg Nebulization BID    budesonide  500 mcg Nebulization BID    nicotine  1 patch TransDERmal Daily    dronabinol  2.5 mg Oral BID    methylPREDNISolone  40 mg IntraVENous Q12H       Physical Exam:  General Appearance: appears comfortable in no acute distress. HEENT: Normocephalic atraumatic without obvious abnormality   Neck: Lips, mucosa, and tongue normal.  Supple, symmetrical, trachea midline, no adenopathy;thyroid:  no enlargement/tenderness/nodules or JVD. Lung: Breath sounds CTA, diminished. Respirations unlabored. Symmetrical expansion. Heart: RRR, normal S1, S2. No MRG  Abdomen: Soft, NT, ND. BS present x 4 quadrants. No bruit or organomegaly. Extremities: Pedal pulses 2+ symmetric b/l. Extremities normal, no cyanosis, clubbing, or edema. Musculokeletal: No joint swelling, no muscle tenderness. ROM normal in all joints of extremities. Neurologic: Mental status: Alert and Oriented X3 . Pertinent/ New Labs and Imaging Studies     Imaging Personally Reviewed:  CXR 9/16  1. Advanced emphysematous changes. 2. Prominence of interstitial markings. 3. Given the prominent  interstitial markings underlying ground-glass   infiltrates cannot be excluded on plain radiographs. CT chest WO contrast 9/18  FINDINGS:   Mediastinum: No abnormal lymphadenopathy.  The pulmonary trunk is normal in   size       Lungs/pleura:   Background of severe bilateral emphysema again demonstrated.    5 focal scarring again demonstrated.       New 8 mm nodule identified in left upper lobe, image 40 series 3.       New 6 mm nodule in left lower lobe, image 79 series 3.       Upper Abdomen: No acute process identified       Soft Tissues/Bones: No aggressive osseous lesions. ECHO 5/28/21   Left ventricular internal dimensions were normal in diastole and systole. No regional wall motion abnormalities seen. Normal left ventricular ejection fraction. The left atrium is mildly dilated. Mildly enlarged right atrium size. Mild mitral annular calcification. Mild centrally directed mitral regurgitation. Mild aortic stenosis. Mild tricuspid regurgitation. Pulmonary hypertension is mild . Signature      ----------------------------------------------------------------   Electronically signed by Erum Orta MD(Interpreting   physician) on 05/28/2021 04:22 PM      Labs:  Lab Results   Component Value Date    WBC 5.3 09/20/2021    HGB 8.2 09/20/2021    HCT 25.3 09/20/2021    MCV 98.1 09/20/2021    MCH 31.8 09/20/2021    MCHC 32.4 09/20/2021    RDW 18.1 09/20/2021     09/20/2021    MPV 11.4 09/20/2021     Lab Results   Component Value Date     09/20/2021    K 4.4 09/20/2021    K 4.3 09/17/2021     09/20/2021    CO2 19 09/20/2021    BUN 69 09/20/2021    CREATININE 2.2 09/20/2021    LABALBU 2.7 05/31/2021    CALCIUM 7.8 09/20/2021    GFRAA 36 09/20/2021    LABGLOM 30 09/20/2021     Lab Results   Component Value Date    PROTIME 11.3 05/27/2021    INR 1.0 05/27/2021     No results for input(s): PROBNP in the last 72 hours. No results for input(s): PROCAL in the last 72 hours. This SmartLink has not been configured with any valid records.        Micro:  9/16 blood cultures negative  9/17 legionella negative  9/17 respiratory panel negative  CULTURE, RESPIRATORY Abnormal  09/17/2021  1:30 PM  - [facility]   Oral Pharyngeal Sarah absent    Smear, Respiratory 09/17/2021  1:30 PM [facility]   Group 6: <25 PMN's/LPF and <25 Epithelial cells/LPF   Rare Polymorphonuclear leukocytes   Rare Epithelial cells   No organisms seen    Organism Abnormal  09/17/2021  1:30 PM [facility]   Candida albicans        No results for input(s): CULTRESP in the last 72 hours. No results for input(s): LABGRAM in the last 72 hours. No results for input(s): LEGUR in the last 72 hours. No results for input(s): STREPNEUMAGU in the last 72 hours. No results for input(s): LP1UAG in the last 72 hours. Assessment:    1. Acute COPD exacerbation  2. Severe COPD  3. Ongoing nicotine dependence  4. 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 Acute on chronic macrocytic anemia  6. Acute on chronic ROBBIN      Plan:   7. Start Prednisone taper tomorrow  8. Bronchodilators regimen including Brovana and Pulmicort twice daily and add  DuoNeb's every 4 hours while awake  9. Levaquin 500 mg daily  10. Tobacco cessation: NicoDerm patch  11. CT chest reviewed, new nodules present. Will need outpatient monitoring    12. Alpha-1 antitrypsin pending  13. Marinol for appetite, dietician following for calorie count  14. Nephrology following for ROBBIN  13. PT/OT      This plan of care was reviewed in collaboration with Dr. Julius Esquivel  Electronically signed by COREEN Betts CNP on 9/20/2021 at 3:37 PM    I personally saw, examined and provided care for the patient. Radiographs, labs and medication list were reviewed by me independently. The case was discussed in detail and plans for care were established. Review of CNP documentation was conducted and revisions were made as appropriate. I agree with the above documented exam, problem list and plan of care.     Abimbola Andersen MD

## 2021-09-20 NOTE — PROGRESS NOTES
[facility]  Nephrology Attending Progress Note          SUBJECTIVE: We are seeing this patient for ROBBIN secondary to volume depletion. 9/20/21: Alert, denies any new issues. Breathing is OK    PROBLEM LIST:    Patient Active Problem List   Diagnosis    Mixed hyperlipidemia    Acute hypoxemic respiratory failure (HCC)    Rhinovirus    History of stroke    Abdominal aneurysm (HCC)    Tobacco dependence    Aortic valve stenosis    Nonrheumatic aortic valve stenosis    Severe protein-calorie malnutrition (HCC)    COPD, moderate ([facility])    S/P AAA repair using bifurcation graft    Generalized abdominal pain    Failure to thrive in adult    Syncope and collapse    Acute respiratory failure with hypoxia ([facility])    COPD exacerbation ([address]    Essential hypertension    Emphysema of lung ([address]    ROBBIN (acute kidney injury) ([address]    Lactic acid acidosis    Hypercalcemia    Tachycardia    High anion gap metabolic acidosis    Bacteremia    History of aortic valve replacement with bioprosthetic valve    Gross hematuria    Chronic anemia    Hyperkalemia    Elevated troponin    Pulmonary nodules        PAST MEDICAL HISTORY:    Past Medical History:   Diagnosis Date    Abdominal aortic aneurysm without rupture (HCC) 05/09/2017    Arthritis     AS (aortic stenosis)     Cerebral artery occlusion with cerebral infarction [facility]) 2009    TIA/CVA with aphasia that resolved    COPD (chronic obstructive pulmonary disease) ([facility])     History of blood transfusion     Hyperlipidemia     Pneumonia     Tobacco dependence 05/09/2017       DIET:    ADULT DIET; Regular; Low Sodium (2 gm)  Adult Oral Nutrition Supplement; Standard High Calorie/High Protein Oral Supplement  Adult Oral Nutrition Supplement;  Fortified Pudding Oral Supplement     PHYSICAL EXAM:     Patient Vitals for the past 24 hrs:   BP Temp Temp src Pulse Resp SpO2 Weight   09/20/21 0811 117/64 97.3 °F (36.3 °C) Oral 76 16 94 % --   09/20/21 5663 -- -- -- -- 18 96 % --   09/20/21 0541 -- -- -- -- -- -- 101 lb 3.2 oz (45.9 kg)   09/19/21 2042 118/73 97.9 °F (36.6 °C) Oral 76 16 94 % --   09/19/21 1815 -- -- -- -- -- 94 % --   09/19/21 1645 120/65 97.5 °F (36.4 °C) Oral 72 16 98 % --   09/19/21 0949 117/61 97.5 °F (36.4 °C) Oral 77 16 96 % --   @      Intake/Output Summary (Last 24 hours) at 9/20/2021 7942  Last data filed at 9/20/2021 0515  Gross per 24 hour   Intake 180 ml   Output 1600 ml   Net -1420 ml         Wt Readings from Last 3 Encounters:   09/20/21 101 lb 3.2 oz (45.9 kg)   08/24/21 103 lb (46.7 kg)   08/18/21 104 lb 6.4 oz (47.4 kg)       General appearance: emaciated, in no acute distress  Skin: No rashes or lesions on exposed skin  Neck: No JVD  Lungs: Clear upper, diminished lower lobes  Heart: S1 S2 no S3 or rub  Abdomen: Soft, non-tender, + bowel sounds, ileostomy  Extremities: No edema  Neurologic: Mental status: Alert, oriented      MEDS (scheduled):     Vitamin D  2,000 Units Oral Daily    sodium bicarbonate  1,300 mg Oral BID    levoFLOXacin  500 mg Oral Every Other Day    guaiFENesin  400 mg Oral 4x Daily    aspirin  81 mg Oral Daily    atorvastatin  20 mg Oral Nightly    metoprolol tartrate  37.5 mg Oral BID    pantoprazole  40 mg Oral Daily    sodium chloride flush  5-40 mL IntraVENous 2 times per day    enoxaparin  30 mg SubCUTAneous Daily    Arformoterol Tartrate  15 mcg Nebulization BID    budesonide  500 mcg Nebulization BID    nicotine  1 patch TransDERmal Daily    dronabinol  2.5 mg Oral BID    methylPREDNISolone  40 mg IntraVENous Q12H       MEDS (infusions):   sodium chloride      sodium chloride 50 mL/hr at 09/19/21 1706    sodium chloride         MEDS (prn):  sodium chloride, albuterol, sodium chloride flush, sodium chloride, ondansetron **OR** ondansetron, acetaminophen **OR** acetaminophen, senna    DATA:    Recent Labs     09/18/21  1138 09/18/21  1138 09/19/21  0430 09/19/21  1128 09/20/21  0428 WBC 11.3  --  7.4  --  5.3   HGB 7.6*   < > 6.9* 8.6* 8.2*   HCT 23.5*   < > 21.5* 26.5* 25.3*   .2*  --  102.9*  --  98.1     --  119*  --  117*    < > = values in this interval not displayed. Recent Labs     09/18/21  0245 09/19/21  0430 09/20/21  0425    135 138   K 5.2* 5.2* 4.4    106 110*   CO2 21* 19* 19*   BUN 78* 77* 69*   CREATININE 2.9* 2.5* 2.2*   LABGLOM 21 25 30   GLUCOSE 108* 132* 115*   CALCIUM 9.0 8.0* 7.8*   MG  --  2.0 1.9   PHOS  --  4.7* 3.6       Lab Results   Component Value Date    LABALBU 2.7 (L) 05/31/2021    LABALBU 2.6 (L) 05/27/2021    LABALBU 2.7 (L) 05/20/2021     No results found for: TSH    Iron Studies  Lab Results   Component Value Date    IRON 70 09/19/2021    TIBC 107 (L) 09/19/2021    FERRITIN 545 09/19/2021     Vitamin B-12   Date Value Ref Range Status   09/17/2021 608 211 - 946 pg/mL Final     Folate   Date Value Ref Range Status   09/17/2021 19.5 4.8 - 24.2 ng/mL Final       Vit D, 25-Hydroxy   Date Value Ref Range Status   09/19/2021 26 (L) 30 - 100 ng/mL Final     Comment:     <20 ng/mL. ........... Kathrene Bolk Deficient  20-30 ng/mL. ......... Kathrene Bolk Insufficient   ng/mL. ........ Kathrene Bolk Sufficient  >100 ng/mL. .......... Kathrene Bolk Toxic       PTH   Date Value Ref Range Status   09/19/2021 32 15 - 65 pg/mL Final       No components found for: URIC    Lab Results   Component Value Date    COLORU Yellow 09/16/2021    NITRU Negative 09/16/2021    GLUCOSEU Negative 09/16/2021    KETUA Negative 09/16/2021    UROBILINOGEN 0.2 09/16/2021    BILIRUBINUR Negative 09/16/2021       No results found for: Erica Higgins      IMPRESSION/RECOMMENDATIONS:      1. Acute kidney injury. Acute kidney injury in this patient possibly secondary to volume depletion with poor oral intake and output from the ostomy and concurrent use of Bactrim. Bactrim has been discontinued. On  IVF  Urine osmo 490, chloride <20  FeNa >1.0 % supports intrarenal etiology  Cr improved to 2.2 today    2. Hyperkalemia  In the setting of  acute kidney injury. Low K+ diet  Follow serial electrolytes. Given a dose of Lokelma 9/19  K improved     3. Metabolic acidosis. The patient has mild metabolic acidosis, which may be a reflection of acute kidney injury with normal anion gap and that may be secondary to GI factors  Monitor CO2  CO2 19 - on oral HCO3 BID    4. Anemia. Follow hemoglobin and hematocrit. Folate, B12 wnl  Iron, TIBC , Ferritin- adequate   Hgb 6.9--> 8.6-->8.2 S/P transfusion 9/19      5. Underlying chronic kidney disease with a serum creatinine of 1.5 mg/dL. Vit D 26, PTH 32, PO4 3.6  Add vitamin D 2000 units daily     Thank you for the opportunity to participate in the care of [address] [place] [address]   Patient seen and examined. I had a face to face encounter with the patient. Pt states when he was up to ambulate, he had SOB  Agree with exam.    Agree with  formulation, assessment and plan as outlined above and directed by me. Addition and corrections were done as deemed appropriate. My exam and plan include:     A/P:  1. Stage I ROBBIN-creatinine trending down-continue to follow    Continue current treatment.     RUTH ANN Owens MD

## 2021-09-21 NOTE — PROGRESS NOTES
[facility]  Nephrology Attending Progress Note          SUBJECTIVE: We are seeing this patient for ROBBIN secondary to volume depletion. 9/21/21: Pt eager to go home, he denies CP or SOB    PROBLEM LIST:    Patient Active Problem List   Diagnosis    Mixed hyperlipidemia    Acute hypoxemic respiratory failure (HCC)    Rhinovirus    History of stroke    Abdominal aneurysm (HCC)    Tobacco dependence    Aortic valve stenosis    Nonrheumatic aortic valve stenosis    Severe protein-calorie malnutrition (HCC)    COPD, moderate (HCC)    S/P AAA repair using bifurcation graft    Generalized abdominal pain    Failure to thrive in adult    Syncope and collapse    Acute respiratory failure with hypoxia (HCC)    COPD exacerbation ([address]    Essential hypertension    [facility]    ROBBIN (acute kidney injury) ([address]    Lactic acid acidosis    Hypercalcemia    Tachycardia    High anion gap metabolic acidosis    Bacteremia    History of aortic valve replacement with bioprosthetic valve    Gross hematuria    Chronic anemia    Hyperkalemia    Elevated troponin    Pulmonary nodules        PAST MEDICAL HISTORY:    Past Medical History:   Diagnosis Date    Abdominal aortic aneurysm without rupture ([facility] 05/09/2017    Arthritis     AS (aortic stenosis)     Cerebral artery occlusion with cerebral infarction [facility]) 2009    TIA/CVA with aphasia that resolved    COPD (chronic obstructive pulmonary disease) ([facility]     History of blood transfusion     Hyperlipidemia     Pneumonia     Tobacco dependence 05/09/2017       DIET:    ADULT DIET; Regular; Low Sodium (2 gm)  Adult Oral Nutrition Supplement; Standard High Calorie/High Protein Oral Supplement  Adult Oral Nutrition Supplement;  Fortified Pudding Oral Supplement     PHYSICAL EXAM:     Patient Vitals for the past 24 hrs:   BP Temp Temp src Pulse Resp SpO2   09/21/21 1228 -- -- -- -- 18 95 %   09/21/21 1145 121/67 97.6 °F (36.4 °C) Oral 67 18 95 %   09/21/21 0800 136/75 97.3 °F (36.3 °C) Axillary 68 16 --   09/21/21 0755 -- -- -- -- 16 94 %   09/20/21 2359 (!) 106/56 97.6 °F (36.4 °C) Oral 77 16 94 %   09/20/21 2100 117/63 97.4 °F (36.3 °C) Oral 92 18 95 %   09/20/21 1540 125/69 97.4 °F (36.3 °C) Oral 69 16 95 %   @      Intake/Output Summary (Last 24 hours) at 9/21/2021 1247  Last data filed at 9/21/2021 1200  Gross per 24 hour   Intake --   Output 2375 ml   Net -2375 ml         Wt Readings from Last 3 Encounters:   09/20/21 101 lb 3.2 oz (45.9 kg)   08/24/21 103 lb (46.7 kg)   08/18/21 104 lb 6.4 oz (47.4 kg)       General appearance: emaciated, in no acute distress  Skin: No rashes or lesions on exposed skin  Neck: No JVD  Lungs: Clear upper, diminished lower lobes  Heart: S1 S2 no S3 or rub  Abdomen: Soft, non-tender, + bowel sounds, ileostomy  Extremities: No edema  Neurologic: Mental status: Alert, oriented      MEDS (scheduled):    [START ON 9/22/2021] predniSONE  40 mg Oral Daily    ipratropium-albuterol  1 ampule Inhalation Q4H WA    Vitamin D  2,000 Units Oral Daily    sodium bicarbonate  1,300 mg Oral BID    levoFLOXacin  500 mg Oral Every Other Day    guaiFENesin  400 mg Oral 4x Daily    aspirin  81 mg Oral Daily    atorvastatin  20 mg Oral Nightly    metoprolol tartrate  37.5 mg Oral BID    pantoprazole  40 mg Oral Daily    sodium chloride flush  5-40 mL IntraVENous 2 times per day    enoxaparin  30 mg SubCUTAneous Daily    Arformoterol Tartrate  15 mcg Nebulization BID    budesonide  500 mcg Nebulization BID    nicotine  1 patch TransDERmal Daily    dronabinol  2.5 mg Oral BID       MEDS (infusions):   sodium chloride      sodium chloride 50 mL/hr at 09/19/21 1706    sodium chloride         MEDS (prn):  sodium chloride, albuterol, sodium chloride flush, sodium chloride, ondansetron **OR** ondansetron, acetaminophen **OR** acetaminophen, senna    DATA:    Recent Labs     09/19/21  0430 09/19/21  0430 09/19/21  1122 09/20/21 0425 09/21/21 0345   WBC 7.4  --   --  5.3 4.1*   HGB 6.9*   < > 8.6* 8.2* 8.2*   HCT 21.5*   < > 26.5* 25.3* 25.9*   .9*  --   --  98.1 99.2   *  --   --  117* 110*    < > = values in this interval not displayed. Recent Labs     09/19/21  0430 09/20/21 0425 09/21/21 0345    138 137   K 5.2* 4.4 4.4    110* 109*   CO2 19* 19* 17*   BUN 77* 69* 55*   CREATININE 2.5* 2.2* 1.7*   LABGLOM 25 30 40   GLUCOSE 132* 115* 110*   CALCIUM 8.0* 7.8* 7.6*   MG 2.0 1.9 1.8   PHOS 4.7* 3.6 2.1*       Lab Results   Component Value Date    LABALBU 2.7 (L) 05/31/2021    LABALBU 2.6 (L) 05/27/2021    LABALBU 2.7 (L) 05/20/2021     No results found for: TSH    Iron Studies  Lab Results   Component Value Date    IRON 70 09/19/2021    TIBC 107 (L) 09/19/2021    FERRITIN 545 09/19/2021     Vitamin B-12   Date Value Ref Range Status   09/17/2021 608 211 - 946 pg/mL Final     Folate   Date Value Ref Range Status   09/17/2021 19.5 4.8 - 24.2 ng/mL Final       Vit D, 25-Hydroxy   Date Value Ref Range Status   09/19/2021 26 (L) 30 - 100 ng/mL Final     Comment:     <20 ng/mL. ........... [place] Deficient  20-30 ng/mL. ......... [place] Insufficient   ng/mL. ........ [place] Sufficient  >100 ng/mL. .......... [place] Toxic       PTH   Date Value Ref Range Status   09/19/2021 32 15 - 65 pg/mL Final       No components found for: URIC    Lab Results   Component Value Date    COLORU Yellow 09/16/2021    NITRU Negative 09/16/2021    GLUCOSEU Negative 09/16/2021    KETUA Negative 09/16/2021    UROBILINOGEN 0.2 09/16/2021    BILIRUBINUR Negative 09/16/2021       No results found for: Maria Ines Fuentes      IMPRESSION/RECOMMENDATIONS:      1. Acute kidney injury. Acute kidney injury in this patient possibly secondary to volume depletion with poor oral intake and output from the ostomy and concurrent use of Bactrim. Bactrim has been discontinued.   On  IVF  Urine osmo 490, chloride <20  FeNa >1.0 % supports intrarenal etiology  Cr improved to 2.2 -->1.7  PLAN:  1. Follow Cr as an outpt    2. Hyperkalemia  In the setting of  acute kidney injury. Low K+ diet  Follow serial electrolytes. Given a dose of Lokelma 9/19  K improved     3. Metabolic acidosis. The patient has mild metabolic acidosis, which may be a reflection of acute kidney injury with normal anion gap and that may be secondary to GI factors  Monitor CO2  CO2 17   PLAN:  1. Continue on oral HCO3 BID    4. Anemia. Follow hemoglobin and hematocrit. Folate, B12 wnl  Iron, TIBC , Ferritin- adequate   Hgb 8.2 S/P transfusion 9/19  PLAN:  1. Set up DAVE as an outpt       5. Underlying chronic kidney disease with a serum creatinine of 1.5 mg/dL.   Vit D 26, PTH 32, PO4 3.6  Add vitamin D 2000 units daily     Thank you for the opportunity to participate in the care of Seda Santo MD

## 2021-09-21 NOTE — PROGRESS NOTES
Physician Progress Note      Kwadwo Chamberlain  CSN #:                  [id number]  :                       1950  ADMIT DATE:       2021 10:29 AM  DISCH DATE:  RESPONDING  PROVIDER #:        JACI Garnica MD          QUERY TEXT:    Dear Attending Physician,    Patient admitted with COPD Ex . Noted documentation of Severe Malnutrition on   2021 note by dietary consult. If possible, please document in progress   notes and discharge summary:    The medical record reflects the following:  Risk Factors: severe cachexia,  COPD ,Wt loss  Clinical Indicators: Per Dietician ,\". ...severe malnutrition . [facility]   Diagnosis:  Inadequate oral intake related to impaired respiratory function   (pneumonia w/COPD and severe cachexia) as evidenced by intake 0-25%, poor   intake prior to admission. .\"  Per PCP ,\" . [facility] Cachexia/weight loss of   approximately 90 pounds over 40 months. Probable underlying malignancy in a   heavy smoker. [facility] \"    BMI 14.1  Treatment: current diet and added ONS    Thank you,  Chad Ybarra RN [facility]  Clinical Documentation Improvement Specialist  [phone]  Options provided:  -- Severe Protein Calorie Malnutrition confirmed POA  -- Severe Protein Calorie Malnutrition ruled out  -- Other - I will add my own diagnosis  -- Disagree - Not applicable / Not valid  -- Disagree - Clinically unable to determine / Unknown  -- Refer to Clinical Documentation Reviewer    PROVIDER RESPONSE TEXT:    The diagnosis of Severe Protein Calorie Malnutrition was confirmed as POA.     Query created by: Katherine Vogt on 2021 1:41 PM      Electronically signed by:  JACI Garnica MD 2021 8:31 AM

## 2021-09-21 NOTE — DISCHARGE SUMMARY
Physician Discharge Summary     Patient ID:  Chinedu Sampson  [id number]  8 y.o.  1950    Admit date: 9/16/2021    Discharge date and time: 9/21/2021    Admission Diagnoses:   Patient Active Problem List   Diagnosis    Mixed hyperlipidemia    Acute hypoxemic respiratory failure (HCC)    Rhinovirus    History of stroke    Abdominal aneurysm (HCC)    Tobacco dependence    Aortic valve stenosis    Nonrheumatic aortic valve stenosis    Severe protein-calorie malnutrition (HCC)    COPD, moderate (HCC)    S/P AAA repair using bifurcation graft    Generalized abdominal pain    Failure to thrive in adult    Syncope and collapse    Acute respiratory failure with hypoxia (HCC)    COPD exacerbation (HCC)    Essential hypertension    Emphysema of lung (HCC)    ROBBIN (acute kidney injury) ([facility] [address]    Lactic acid acidosis    Hypercalcemia    Tachycardia    High anion gap metabolic acidosis    Bacteremia    History of aortic valve replacement with bioprosthetic valve    Gross hematuria    Chronic anemia    Hyperkalemia    Elevated troponin    Pulmonary nodules       Discharge Diagnoses: COPD exacerbation    Consults: pulmonary and nephrology    Procedures: None    Hospital Course: Patient is a 66year old male with a PMH of AAA, aortic stenosis, COPD, CVA, Hyperlipidemia and tobacco dependence presents to the emergency room for shortness of breath that has worsened over the last 2-3 days. Patient is well known to Dr Stephany Mckeon outpatient who has been following the patient for his severe COPD. Patient admits to breathing feeling at worst when he first wakes up in the morning. States he does have a cough and feels it could be productive if he was able to have enough strength to bring it up. Labs revealed patient had an elevated BUN/Creat 91/2.7 as well as his Pro-BNP 1,813. CT Chest did reveal new pulmonary nodules measuring up to 8 mm. Pulmonology will be consulted to follow in the care of the patient.      Patient is a 66year old male with a PMH of AAA, aortic stenosis, COPD, CVA, Hyperlipidemia and tobacco dependence presents to the emergency room for shortness of breath that has worsened over the last 2-3 days. COPD  -Supplement O2 demands keeping oxygen saturation greater than 92%. -DuoNebs, Mucomyst, Brovana, Pulmicort  -Requiring 2L nasal cannula   -IV Solumedrol 40 mg [id number] to hopefully p.o. by tomorrow  -Consult pulmonology-input appreciated  -PO Levaquin-to be continued for 7 days post discharge  -Resp culture/viral panel, strep and Legionella antigens-negative  -CT chest with pulm nodules     ROBBIN  -BUN/Creat 91/2.7 on admission, 69/2.2 today   -Strict I/O's  -Normal saline infusion   -Monitor labs, BMP daily  -Appreciate nephrology input      Anemia  Monitor H&H-7.5/23 on admission, 8.2/25.3 today, received transfusion yesterday  Transfuse for hemoglobin less than 7  Likely needs scopes at some point in the near future     Cachexia/weight loss of approximately 90 pounds over 4 months. Noemy added for appetite by pulm   Dietary consult             Recent Labs     09/19/21  0430 09/19/21  0430 09/19/21  1128 09/20/21  0425 09/21/21  0345   WBC 7.4  --   --  5.3 4.1*   HGB 6.9*   < > 8.6* 8.2* 8.2*   HCT 21.5*   < > 26.5* 25.3* 25.9*   *  --   --  117* 110*    < > = values in this interval not displayed. Recent Labs     09/19/21  0430 09/20/21  0425 09/21/21  0345    138 137   K 5.2* 4.4 4.4    110* 109*   CO2 19* 19* 17*   BUN 77* 69* 55*   CREATININE 2.5* 2.2* 1.7*   CALCIUM 8.0* 7.8* 7.6*       XR CHEST (2 VW)    Result Date: 9/16/2021  EXAMINATION: TWO XRAY VIEWS OF THE CHEST 9/16/2021 10:59 am COMPARISON: 04/15/2021 HISTORY: ORDERING SYSTEM PROVIDED HISTORY: shortness of breath TECHNOLOGIST PROVIDED HISTORY: Reason for exam:->shortness of breath FINDINGS: The cardiac silhouette is within normals. There is hyperinflation of the lungs consistent with COPD.   The interstitial markings are prominent. There is chronic pleuroparenchymal scarring seen within the right lung apex. There are no findings to suggest pneumonia. There is no right or left pleural effusion. 1. Advanced emphysematous changes. 2. Prominence of interstitial markings. 3. Given the prominent  interstitial markings underlying ground-glass infiltrates cannot be excluded on plain radiographs. CT HEAD WO CONTRAST    Result Date: 9/16/2021  EXAMINATION: CT OF THE HEAD WITHOUT CONTRAST  9/16/2021 2:03 pm TECHNIQUE: CT of the head was performed without the administration of intravenous contrast. Dose modulation, iterative reconstruction, and/or weight based adjustment of the mA/kV was utilized to reduce the radiation dose to as low as reasonably achievable. COMPARISON: None. HISTORY: ORDERING SYSTEM PROVIDED HISTORY: Evaluate intracranial abnormality TECHNOLOGIST PROVIDED HISTORY: Has a \"code stroke\" or \"stroke alert\" been called? ->No Reason for exam:->Evaluate intracranial abnormality Decision Support Exception - unselect if not a suspected or confirmed emergency medical condition->Emergency Medical Condition (MA) FINDINGS: BRAIN/VENTRICLES: There is no acute intracranial hemorrhage, mass effect or midline shift. No abnormal extra-axial fluid collection. The gray-white differentiation is maintained without evidence of an acute infarct. There is no evidence of hydrocephalus. The ventricles, cisterns and sulci are prominent consistent with atrophy. There is decreased attenuation within the periventricular white matter consistent with periventricular leukomalacia. There is encephalomalacia is seen within the posterior left parietal lobe consistent with an old infarct. ORBITS: The visualized portion of the orbits demonstrate no acute abnormality. SINUSES: The visualized paranasal sinuses and mastoid air cells demonstrate no acute abnormality. SOFT TISSUES/SKULL:  No acute abnormality of the visualized skull or soft tissues.      1. There is no acute intracranial abnormality. Specifically, there is no intracranial hemorrhage. 2. Atrophy and periventricular leukomalacia, 3. Old infarct within the posterior left parietal lobe. CT CHEST WO CONTRAST    Result Date: 9/17/2021  EXAMINATION: CT OF THE CHEST WITHOUT CONTRAST 9/17/2021 10:18 am TECHNIQUE: CT of the chest was performed without the administration of intravenous contrast. Multiplanar reformatted images are provided for review. Dose modulation, iterative reconstruction, and/or weight based adjustment of the mA/kV was utilized to reduce the radiation dose to as low as reasonably achievable. COMPARISON: CT chest Mague 15, 2020 HISTORY: ORDERING SYSTEM PROVIDED HISTORY: pulmonary nodule TECHNOLOGIST PROVIDED HISTORY: Reason for exam:->pulmonary nodule FINDINGS: Mediastinum: No abnormal lymphadenopathy. The pulmonary trunk is normal in size Lungs/pleura:   Background of severe bilateral emphysema again demonstrated. 5 focal scarring again demonstrated. New 8 mm nodule identified in left upper lobe, image 40 series 3. New 6 mm nodule in left lower lobe, image 79 series 3. Upper Abdomen: No acute process identified Soft Tissues/Bones: No aggressive osseous lesions. New pulmonary nodules measuring up to 8 mm. See recommendation below. RECOMMENDATIONS: Fleischner Society guidelines for follow-up and management of incidentally detected pulmonary nodules: Multiple Solid Nodules: Nodule size equals 6-8 mm In a low-risk patient, CT at 3-6 months, then consider CT at 18-24 months. In a high-risk patient, CT at 3-6 months, then CT at 18-24 months. - Low risk patients include individuals with minimal or absent history of smoking and other known risk factors. - High risk patients include individuals with a history or smoking or known risk factors. Radiology 2017 http://pubs. rsna.org/doi/full/10.1148/radiol. 2900124411       Discharge Exam:    HEENT: NCAT,  PERRLA, No JVD  Heart:  RRR, no murmurs, gallops, or rubs. Lungs:  CTA bilaterally, no wheeze, rales or rhonchi  Abd: bowel sounds present, nontender, nondistended, no masses  Extrem:  No clubbing, cyanosis, or edema    Disposition: home     Patient Condition at Discharge: Stable    Patient Instructions:      Medication List        START taking these medications      dronabinol 2.5 MG capsule  Commonly known as: MARINOL  Take 1 capsule by mouth 2 times daily for 30 days. guaiFENesin 400 MG tablet  Take 1 tablet by mouth as needed for Cough     levoFLOXacin 500 MG tablet  Commonly known as: LEVAQUIN  Take 1 tablet by mouth every other day for 10 days  Start taking on: September 22, 2021     * predniSONE 20 MG tablet  Commonly known as: DELTASONE  Take 2 tablets by mouth daily for 3 days  Start taking on: September 22, 2021     * predniSONE 20 MG tablet  Commonly known as: DELTASONE  Take 1 tablet by mouth daily for 3 days  Start taking on: September 25, 2021     * predniSONE 10 MG tablet  Commonly known as: DELTASONE  Take 1 tablet by mouth daily for 4 days  Start taking on: September 28, 2021     sodium bicarbonate 650 MG tablet  Take 2 tablets by mouth 2 times daily     vitamin D 50 MCG (2000 UT) Tabs tablet  Commonly known as: CHOLECALCIFEROL  Take 1 tablet by mouth daily  Start taking on: September 22, 2021           * This list has 3 medication(s) that are the same as other medications prescribed for you. Read the directions carefully, and ask your doctor or other care provider to review them with you.                 CONTINUE taking these medications      albuterol sulfate  (90 Base) MCG/ACT inhaler  Commonly known as: Ventolin HFA  Inhale 2 puffs into the lungs every 4 hours as needed for Wheezing or Shortness of Breath     aspirin 81 MG tablet     atorvastatin 20 MG tablet  Commonly known as: LIPITOR     ipratropium-albuterol 0.5-2.5 (3) MG/3ML Soln nebulizer solution  Commonly known as: DUONEB  Inhale 3 mLs into the lungs every 4 hours as needed for Shortness of Breath     metoprolol tartrate 25 MG tablet  Commonly known as: LOPRESSOR     pantoprazole 40 MG tablet  Commonly known as: PROTONIX     Stiolto Respimat 2.5-2.5 MCG/ACT Aers  Generic drug: tiotropium-olodaterol            STOP taking these medications      sulfamethoxazole-trimethoprim 800-160 MG per tablet  Commonly known as: BACTRIM DS;SEPTRA DS               Where to Get Your Medications        These medications were sent to [address] [place] [address], [address] [place] [address] [phone] Nicole Braga [phone]  [address] [place] [address] RD, [place] [address]      Phone: [phone]   dronabinol 2.5 MG capsule  guaiFENesin 400 MG tablet  levoFLOXacin 500 MG tablet  predniSONE 10 MG tablet  predniSONE 20 MG tablet  predniSONE 20 MG tablet  sodium bicarbonate 650 MG tablet  vitamin D 50 MCG (2000 UT) Tabs tablet       Activity: activity as tolerated  Diet:  Low sodium diet    Pt has been advised to: Follow-up with Wendy eLon MD in 1 week. Follow-up with consultants as recommended by them    Note that over 30 minutes was spent in preparing discharge papers, discussing discharge with patient, medication review, etc.    Signed:  COREEN Galaviz CNP  9/21/2021  10:48 AM    Above note edited to reflect my thoughts    I personally saw, examined and provided care for the patient. Radiographs, labs and medication list were reviewed by me independently. The case was discussed in detail and plans for care were established. Review of COREEN Galaviz CNP, documentation was conducted and revisions were made as appropriate directly by me. I agree with the above documented exam, problem list, and plan of care.      Hero Vaz MD  6:36 PM  9/21/2021

## 2021-09-21 NOTE — CARE COORDINATION
Social work / Discharge Planning:         AM PAC 18/24. Patient anticipates discharge home with no needs. He remains on room air and has been transitioned to oral prednisone. Per IDR, discharge anticipated today. Social work will follow to assist with any discharge needs identified.   Electronically signed by TOMEKA Le on 9/21/2021 at 10:34 AM

## 2021-09-24 PROBLEM — N18.31 CHRONIC KIDNEY DISEASE, STAGE 3A (HCC): Status: ACTIVE | Noted: 2021-01-01

## 2021-10-07 NOTE — PROGRESS NOTES
Patient tolerated retacrit injection well. Therapy plan reviewed with patient. Verbalizes understanding. Reviewed AVS with patient, reviewed medication information, verbalizes good knowledge of current plan, and has no signs or symptoms to report at this time. Declines copy of AVS.  Next appointment made and patient instructed on lab draw and procedure for next injection. Patient tolerated gillis flush well. Blood work drawn through [facility]. Therapy plan reviewed with patient. Verbalizes understanding. Declines copy of AVS and any medication information, verbalizes good knowledge of current plan, and has no signs or symptoms to report at this time.

## 2021-10-17 PROBLEM — R79.89 ELEVATED TROPONIN: Status: RESOLVED | Noted: 2021-01-01 | Resolved: 2021-01-01

## 2021-10-17 PROBLEM — R77.8 ELEVATED TROPONIN: Status: RESOLVED | Noted: 2021-01-01 | Resolved: 2021-01-01

## 2021-10-20 NOTE — PROGRESS NOTES
Patient tolerated port flush well. Therapy plan reviewed with patient. Verbalizes understanding. Declines copy of AVS and any medication information, verbalizes good knowledge of current plan, and has no signs or symptoms to report at this time.  Next appointment made.  hemoglobimn 11.8   Cbc within limits per porder  No need for retacrit injection

## 2021-10-27 NOTE — TELEPHONE ENCOUNTER
Left message to call to reschedule AAA testing from 11- with Dr Kalpana Brannon to another facility as we have closed our lab. Need to move Dr phoenix to after testing.

## 2021-10-27 NOTE — PROGRESS NOTES
Labs drawn HGB 11.6, retacrit  injection not needed. Next appointment made and patient instructed on lab draw and procedure for next injection.

## 2021-10-28 NOTE — TELEPHONE ENCOUNTER
Patient notified of ultrasound aorta at [facility] on 11-8-21 at 3:15 pm.  Cook at 2:45 pm. NPO after 9:15 am.

## 2021-11-03 NOTE — PROGRESS NOTES
Blood was drawn through gillis catheter. No need for retacrit injection due to hemoglobin of 11.5 from today. Next appointment scheduled.

## 2021-11-10 NOTE — PROGRESS NOTES
Called to Dr. Joseph Figueroa office and spoke with Estrellita Lee notified patient is here for glilis care and labs and while here wife reported weight 81lbs office has recently seen him and aware of decline. Also told her patient reported gillis dressing has been itching he did scratch a little and noted where the tape edges were it was red but not at the insertion site.

## 2021-11-23 NOTE — PROGRESS NOTES
Vascular Surgery Outpatient Progress Note      Chief Complaint   Patient presents with    Follow-up     s/p AAA       HISTORY OF PRESENT ILLNESS:      Pt seen and plan reviewed with Dr. Cedrick Mock. The patient is a 66 y.o. male who returns for follow-up evaluation of endovascular repair of abdominal aortic aneurysm with fenestrated graft. The patient has hx of gastric outlet syndrome and had been receiving TPN. He is no longer on TPN and has lost approximately 30 lbs since it was discontinued. He denies back pain, or leg pain. He reports abdominal pain after eating. He is accompanied by his wife. Past Medical History:        Diagnosis Date    Abdominal aortic aneurysm without rupture ([facility] 05/09/2017    Arthritis     AS (aortic stenosis)     Cerebral artery occlusion with cerebral infarction [facility]) 2009    TIA/CVA with aphasia that resolved    COPD (chronic obstructive pulmonary disease) ([facility] [address] [facility]     History of blood transfusion     Hyperlipidemia     Pneumonia     Tobacco dependence 05/09/2017     Past Surgical History:        Procedure Laterality Date    ABDOMINAL AORTIC ANEURYSM REPAIR, ENDOVASCULAR  10/12/2017    Zenith Fenestrated.   [place]    ABDOMINAL AORTIC ANEURYSM REPAIR, ENDOVASCULAR  10/12/2017    ANKLE SURGERY      AORTIC VALVE REPLACEMENT      CARDIAC CATHETERIZATION  07/14/2017    Dr. Qing Vogel- No blockages    CARDIAC SURGERY      COLONOSCOPY N/A 12/21/2020    COLONOSCOPY DIAGNOSTIC performed by Andrew Peter MD at [place] [address]. Bilateral approx 2014    cataracts removal w/lens implants    [facility].  PICC [address] DOUBLE  4/15/2021         HERNIA REPAIR      HERNIA REPAIR  06/2021    OTHER SURGICAL HISTORY  05/2021    ostomy repair    SKULL FRACTURE ELEVATION      TOOTH EXTRACTION      full mouth extraction    UPPER GASTROINTESTINAL ENDOSCOPY N/A 12/21/2020    EGD DIAGNOSTIC ONLY performed by Andrew Peter MD at [address] [place] [address] Medications:   Prior to Admission medications    Medication Sig Start Date End Date Taking? Authorizing Provider   sodium bicarbonate 325 MG tablet Take 325 mg by mouth 2 times daily   Yes Historical Provider, MD   albuterol (PROVENTIL) (2.5 MG/3ML) 0.083% nebulizer solution Take 3 mLs by nebulization every 6 hours as needed for Wheezing or Shortness of Breath 9/21/21  Yes COREEN Doty CNP   metoprolol tartrate (LOPRESSOR) 25 MG tablet Take 37.5 mg by mouth 2 times daily  6/17/21  Yes Historical Provider, MD   pantoprazole (PROTONIX) 40 MG tablet Take 40 mg by mouth daily  6/18/21  Yes Historical Provider, MD   tiotropium-olodaterol (Bekah Noun) 2.5-2.5 MCG/ACT AERS Inhale 2 puffs into the lungs nightly  12/22/20  Yes Historical Provider, MD   albuterol sulfate HFA (VENTOLIN HFA) 108 (90 Base) MCG/ACT inhaler Inhale 2 puffs into the lungs every 4 hours as needed for Wheezing or Shortness of Breath 7/1/21  Yes Bradley Spence DO   atorvastatin (LIPITOR) 20 MG tablet Take 20 mg by mouth nightly  8/6/15  Yes Historical Provider, MD   aspirin 81 MG tablet Take 81 mg by mouth daily    Yes Historical Provider, MD     Allergies:  Patient has no known allergies.     Social History     Socioeconomic History    Marital status:      Spouse name: Not on file    Number of children: Not on file    Years of education: Not on file    Highest education level: Not on file   Occupational History    Occupation: retired-   Tobacco Use    Smoking status: Current Every Day Smoker     Packs/day: 1.00     Years: 50.00     Pack years: 50.00     Types: Cigarettes     Start date: 5/27/1970    Smokeless tobacco: Never Used    Tobacco comment: currently smokes 1.0 ppd   Vaping Use    Vaping Use: Never used   Substance and Sexual Activity    Alcohol use: No     Alcohol/week: 0.0 standard drinks    Drug use: Never    Sexual activity: Never   Other Topics Concern    Not on file   Social History Narrative  Not on file     Social Determinants of Health     Financial Resource Strain:     Difficulty of Paying Living Expenses: Not on file   Food Insecurity:     Worried About Running Out of Food in the Last Year: Not on file    [place] of Food in the Last Year: Not on file   Transportation Needs:     Lack of Transportation (Medical): Not on file    Lack of Transportation (Non-Medical):  Not on file   Physical Activity:     Days of Exercise per Week: Not on file    Minutes of Exercise per Session: Not on file   Stress:     Feeling of Stress : Not on file   Social Connections:     Frequency of Communication with Friends and Family: Not on file    Frequency of Social Gatherings with Friends and Family: Not on file    Attends Roman [employer] Services: Not on file    Active Member of [address] [place] [address] or Organizations: Not on file    Attends Club or Organization Meetings: Not on file    Marital Status: Not on file   Intimate Partner Violence:     Fear of Current or Ex-Partner: Not on file    Emotionally Abused: Not on file    Physically Abused: Not on file    Sexually Abused: Not on file   Housing Stability:     Unable to Pay for Housing in the Last Year: Not on file    Number of [place] in the Last Year: Not on file    Unstable Housing in the Last Year: Not on file        Family History   Problem Relation Age of Onset    Heart Disease Mother     Stroke Father     Heart Disease Brother        REVIEW OF SYSTEMS (New symptoms):    Eyes:      Blurred vision:  No [x]/Yes []               Diplopia:   No [x]/Yes []               Vision loss:       No [x]/Yes []   Ears, nose, throat:             Hearing loss:    No [x]/Yes []      Vertigo:   No [x]/Yes []                       Swallowing problem:  No [x]/Yes []               Nose bleeds:   No [x]/Yes []      Voice hoarseness:  No [x]/Yes []  Respiratory:             Cough:   No [x]/Yes []      Pleuritic chest pain:  No [x]/Yes []                        Dyspnea:   No [x]/Yes []      Wheezing:   No [x]/Yes []  Cardiovascular:             Angina:   No [x]/Yes []      Palpitations:   No [x]/Yes []          Claudication:    No [x]/Yes []      Leg swelling:   No [x]/Yes []  Gastrointestinal:             Nausea or vomiting:  No [x]/Yes []               Abdominal pain:  No [x]/Yes []                     Intestinal bleeding: No [x]/Yes []  Musculoskeletal:             Leg pain:   No [x]/Yes []      Back pain:   No [x]/Yes []                    Weakness:   No [x]/Yes []  Neurologic:             Numbness:   No [x]/Yes []      Paralysis:   No [x]/Yes []                       Headaches:   No [x]/Yes []  Hematologic, lymphatic:   Anemia:   No [x]/Yes []              Bleeding or bruising:  No [x]/Yes []              Fevers or chills: No [x]/Yes []  Endocrine:             Temp intolerance:   No [x]/Yes []                       Polydipsia, polyuria:  No [x]/Yes []  Skin:              Rash:    No [x]/Yes []      Ulcers:   No [x]/Yes []              Abnorm pigment: No [x]/Yes []  :              Frequency/urgency:  No [x]/Yes []      Hematuria:    No []/Yes [x]                      Incontinence:    No [x]/Yes []    PHYSICAL EXAM:  There were no vitals filed for this visit.   General Appearance: alert and oriented to person, place and time, in no acute distress, frail  Neurologic: no cranial nerve deficit, speech normal  Head: normocephalic and atraumatic  Eyes: extraocular eye movements intact, conjunctivae normal  ENT: external ear and ear canal normal bilaterally, nose without deformity, no carotid bruits  Pulmonary/Chest: normal air movement, no respiratory distress  Cardiovascular: normal rate, regular rhythm, no murmur  Abdomen: non-distended, no masses  Musculoskeletal: no joint deformity or tenderness  Extremities: no leg edema bilaterally  Skin: warm and dry, no rash or erythema    PULSE EXAM      Right      Left   Brachial     Radial     Femoral     Popliteal     Dorsalis Pedis Posterior Tibial     (3=normal, 2=diminished, 1=barely palpable, 4=widened)    RADIOLOGY: SA today    Problem List Items Addressed This Visit     S/P repair of abdominal aortic aneurysm using bifurcation graft - Primary        I reviewed the results of the ultrasound with the patient and his wife. Stable Abdominal aortic aneurysm s/p EVAR. I reviewed with the patient based on the CTA of the abdomen his SMA appears to be patent. I reviewed with the patient and his wife that hopefully his symptoms will continue to improve with weight gain. I do not feel that he requires any additional testing at this time. I will see him again in one year with a repeat ultrasound of the aorta to measure the size of the aneurysm. I asked him to call sooner with any changes. Pt seen and plan reviewed with Dr. Xavi Campos. COREEN Hernandez NP    Return in about 1 year (around 11/23/2022) for ultrasound studies. I reviewed the ultrasound results with the patient and his wife that shows stable size of the native aorta. He remains quite frail. He continues to smoke. I do not feel that he would survive any surgery to reduce the size of the native aneurysm which would essentially be an open AAA repair with explantation of the graft. His mesenteric arteries appear patent and unobstructed based upon the most recent CT angiogram.    I will plan to see him again next year with a repeat ultrasound but asked him to call sooner with any new problems.

## 2021-12-01 NOTE — PROGRESS NOTES
Patient tolerated catheter lab draw and flush well. Therapy plan reviewed with patient. Verbalizes understanding. Declines copy of AVS and any medication information, verbalizes good knowledge of current plan, and has no signs or symptoms to report at this time. Next appointment made.

## 2021-12-08 NOTE — PROGRESS NOTES
Patient tolerated injection/hickaman care well. Therapy plan reviewed with patient. Verbalizes understanding. Reviewed AVS with patient, reviewed medication information, verbalizes good knowledge of current plan, and has no signs or symptoms to report at this time.  Declines copy of AVS.  Next appointment made

## 2021-12-15 NOTE — PROGRESS NOTES
Patient tolerated injection, central chalino flush, dressing change and labs well. Therapy plan reviewed with patient. Verbalizes understanding. Reviewed AVS with patient, reviewed medication information, verbalizes good knowledge of current plan, and has no signs or symptoms to report at this time. Declines copy of AVS.  Next appointment made and patient instructed on lab draw and procedure for next injection.  Denies any bleeding

## 2021-12-22 NOTE — PROGRESS NOTES
Patient tolerated hieckman flush well. Patient tolerated injection well. Therapy plan reviewed with patient. Verbalizes understanding. Reviewed AVS with patient, reviewed medication information, verbalizes good knowledge of current plan, and has no signs or symptoms to report at this time. Declines copy of AVS.  Next appointment made and patient instructed on lab draw and procedure for next injection. Therapy plan reviewed with patient. Verbalizes understanding.
Respiratory

## 2021-12-29 NOTE — PROGRESS NOTES
Patient tolerated retacrit injection well. Therapy plan reviewed with patient. Verbalizes understanding. Reviewed AVS with patient, reviewed medication information, verbalizes good knowledge of current plan, and has no signs or symptoms to report at this time. Declines copy of AVS.  Patient instructed on lab draw and procedure for next injection. Patient tolerated central line flush well. Therapy plan reviewed with patient. Verbalizes understanding. Declines copy of AVS and any medication information, verbalizes good knowledge of current plan, and has no signs or symptoms to report at this time. Next appointment made.

## 2022-01-01 ENCOUNTER — APPOINTMENT (OUTPATIENT)
Dept: GENERAL RADIOLOGY | Age: 72
End: 2022-01-01
Attending: FAMILY MEDICINE

## 2022-01-01 ENCOUNTER — HOSPITAL ENCOUNTER (OUTPATIENT)
Dept: INFUSION THERAPY | Age: 72
Setting detail: INFUSION SERIES
Discharge: HOME | End: 2022-01-05
Payer: MEDICARE

## 2022-01-01 ENCOUNTER — APPOINTMENT (OUTPATIENT)
Dept: GENERAL RADIOLOGY | Age: 72
End: 2022-01-01

## 2022-01-01 ENCOUNTER — HOSPITAL ENCOUNTER (OUTPATIENT)
Dept: INFUSION THERAPY | Age: 72
Setting detail: INFUSION SERIES
Discharge: HOME | End: 2022-01-12
Payer: MEDICARE

## 2022-01-01 ENCOUNTER — APPOINTMENT (OUTPATIENT)
Dept: CT IMAGING | Age: 72
End: 2022-01-01

## 2022-01-01 ENCOUNTER — HOSPITAL ENCOUNTER (OUTPATIENT)
Age: 72
End: 2022-01-20
Attending: FAMILY MEDICINE | Admitting: FAMILY MEDICINE

## 2022-01-01 ENCOUNTER — HOSPITAL ENCOUNTER (EMERGENCY)
Age: 72
Discharge: HOME | End: 2022-01-19
Attending: STUDENT IN AN ORGANIZED HEALTH CARE EDUCATION/TRAINING PROGRAM

## 2022-01-01 ENCOUNTER — HOSPITAL ENCOUNTER (OUTPATIENT)
Dept: INFUSION THERAPY | Age: 72
Setting detail: INFUSION SERIES
End: 2022-01-01

## 2022-01-01 VITALS
DIASTOLIC BLOOD PRESSURE: 52 MMHG | HEART RATE: 77 BPM | WEIGHT: 82 LBS | RESPIRATION RATE: 18 BRPM | TEMPERATURE: 97.8 F | OXYGEN SATURATION: 99 % | HEIGHT: 71 IN | BODY MASS INDEX: 11.48 KG/M2 | SYSTOLIC BLOOD PRESSURE: 79 MMHG

## 2022-01-01 VITALS
HEART RATE: 86 BPM | TEMPERATURE: 94 F | WEIGHT: 80 LBS | SYSTOLIC BLOOD PRESSURE: 74 MMHG | BODY MASS INDEX: 11.16 KG/M2 | RESPIRATION RATE: 24 BRPM | DIASTOLIC BLOOD PRESSURE: 35 MMHG | OXYGEN SATURATION: 100 %

## 2022-01-01 VITALS
RESPIRATION RATE: 16 BRPM | TEMPERATURE: 97.8 F | DIASTOLIC BLOOD PRESSURE: 52 MMHG | SYSTOLIC BLOOD PRESSURE: 82 MMHG | BODY MASS INDEX: 11.47 KG/M2 | OXYGEN SATURATION: 94 % | HEART RATE: 76 BPM | WEIGHT: 81.9 LBS | HEIGHT: 71 IN

## 2022-01-01 DIAGNOSIS — N18.31 CHRONIC KIDNEY DISEASE, STAGE 3A (HCC): Primary | ICD-10-CM

## 2022-01-01 DIAGNOSIS — N17.9 ACUTE RENAL FAILURE, UNSPECIFIED ACUTE RENAL FAILURE TYPE (HCC): ICD-10-CM

## 2022-01-01 DIAGNOSIS — J18.9 PNEUMONIA OF LEFT UPPER LOBE DUE TO INFECTIOUS ORGANISM: ICD-10-CM

## 2022-01-01 DIAGNOSIS — E87.5 HYPERKALEMIA: ICD-10-CM

## 2022-01-01 DIAGNOSIS — I95.9 HYPOTENSION, UNSPECIFIED HYPOTENSION TYPE: Primary | ICD-10-CM

## 2022-01-01 DIAGNOSIS — A41.9 SEPTICEMIA (HCC): ICD-10-CM

## 2022-01-01 DIAGNOSIS — I50.9 ACUTE ON CHRONIC CONGESTIVE HEART FAILURE, UNSPECIFIED HEART FAILURE TYPE (HCC): ICD-10-CM

## 2022-01-01 DIAGNOSIS — U07.1 COVID: ICD-10-CM

## 2022-01-01 DIAGNOSIS — R10.84 GENERALIZED ABDOMINAL PAIN: ICD-10-CM

## 2022-01-01 DIAGNOSIS — N30.01 ACUTE CYSTITIS WITH HEMATURIA: ICD-10-CM

## 2022-01-01 LAB
ABO/RH: NORMAL
ACINETOBACTER CALCOAC BAUMANNII COMPLEX BY PCR: NOT DETECTED
ALBUMIN SERPL-MCNC: 3.3 G/DL (ref 3.5–5.2)
ALP BLD-CCNC: 161 U/L (ref 40–129)
ALT SERPL-CCNC: 17 U/L (ref 0–40)
ANION GAP SERPL CALCULATED.3IONS-SCNC: 33 MMOL/L (ref 7–16)
ANTIBODY SCREEN: NORMAL
APTT: 33 SEC (ref 24.5–35.1)
AST SERPL-CCNC: 32 U/L (ref 0–39)
BACTERIA: ABNORMAL /HPF
BACTEROIDES FRAGILIS BY PCR: NOT DETECTED
BASOPHILS ABSOLUTE: 0.01 E9/L (ref 0–0.2)
BASOPHILS RELATIVE PERCENT: 0.1 % (ref 0–2)
BILIRUB SERPL-MCNC: 0.4 MG/DL (ref 0–1.2)
BILIRUBIN URINE: NEGATIVE
BLOOD, URINE: ABNORMAL
BOTTLE TYPE: ABNORMAL
BUN BLDV-MCNC: 165 MG/DL (ref 6–23)
CALCIUM SERPL-MCNC: 8.7 MG/DL (ref 8.6–10.2)
CANDIDA ALBICANS BY PCR: NOT DETECTED
CANDIDA AURIS BY PCR: NOT DETECTED
CANDIDA GLABRATA BY PCR: NOT DETECTED
CANDIDA KRUSEI BY PCR: NOT DETECTED
CANDIDA PARAPSILOSIS BY PCR: NOT DETECTED
CANDIDA TROPICALIS BY PCR: NOT DETECTED
CARBAPENEM RESISTANCE IMP GENE BY PCR: NOT DETECTED
CARBAPENEM RESISTANCE KPC BY PCR: NOT DETECTED
CARBAPENEM RESISTANCE NDM GENE BY PCR: NOT DETECTED
CARBAPENEM RESISTANCE OXA-48 GENE BY PCR: NOT DETECTED
CARBAPENEM RESISTANCE VIM GENE BY PCR: NOT DETECTED
CEPHALOSPORIN RESISTANCE CTX-M GENE BY PCR: NOT DETECTED
CHLORIDE BLD-SCNC: 95 MMOL/L (ref 98–107)
CLARITY: ABNORMAL
CO2: 4 MMOL/L (ref 22–29)
COLISTIN RESISTANCE MCR-1 GENE BY PCR: NOT DETECTED
COLOR: YELLOW
CREAT SERPL-MCNC: 9.1 MG/DL (ref 0.7–1.2)
CRYPTOCOCCUS NEOFORMANS/GATTII BY PCR: NOT DETECTED
ENTEROBACTER CLOACAE COMPLEX BY PCR: NOT DETECTED
ENTEROBACTERALES BY PCR: DETECTED
ENTEROCOCCUS FAECALIS BY PCR: NOT DETECTED
ENTEROCOCCUS FAECIUM BY PCR: NOT DETECTED
EOSINOPHILS ABSOLUTE: 0 E9/L (ref 0.05–0.5)
EOSINOPHILS RELATIVE PERCENT: 0 % (ref 0–6)
ESCHERICHIA COLI BY PCR: NOT DETECTED
GFR AFRICAN AMERICAN: 7
GFR NON-AFRICAN AMERICAN: 6 ML/MIN/1.73
GLUCOSE BLD-MCNC: 76 MG/DL (ref 74–99)
GLUCOSE URINE: NEGATIVE MG/DL
HAEMOPHILUS INFLUENZAE BY PCR: NOT DETECTED
HCT VFR BLD CALC: 31.9 % (ref 37–54)
HCT VFR BLD CALC: 34.6 % (ref 37–54)
HCT VFR BLD CALC: 41.1 % (ref 37–54)
HEMOGLOBIN: 10.1 G/DL (ref 12.5–16.5)
HEMOGLOBIN: 10.8 G/DL (ref 12.5–16.5)
HEMOGLOBIN: 13.1 G/DL (ref 12.5–16.5)
IMMATURE GRANULOCYTES #: 0.05 E9/L
IMMATURE GRANULOCYTES %: 0.6 % (ref 0–5)
INR BLD: 0.9
KETONES, URINE: ABNORMAL MG/DL
KLEBSIELLA AEROGENES BY PCR: NOT DETECTED
KLEBSIELLA OXYTOCA BY PCR: DETECTED
KLEBSIELLA PNEUMONIAE GROUP BY PCR: NOT DETECTED
LACTIC ACID, SEPSIS: 2.2 MMOL/L (ref 0.5–1.9)
LACTIC ACID: 2.8 MMOL/L (ref 0.5–2.2)
LEUKOCYTE ESTERASE, URINE: ABNORMAL
LIPASE: 735 U/L (ref 13–60)
LISTERIA MONOCYTOGENES BY PCR: NOT DETECTED
LYMPHOCYTES ABSOLUTE: 0.7 E9/L (ref 1.5–4)
LYMPHOCYTES RELATIVE PERCENT: 8.1 % (ref 20–42)
MCH RBC QN AUTO: 32.3 PG (ref 26–35)
MCH RBC QN AUTO: 32.7 PG (ref 26–35)
MCH RBC QN AUTO: 33.1 PG (ref 26–35)
MCHC RBC AUTO-ENTMCNC: 31.2 % (ref 32–34.5)
MCHC RBC AUTO-ENTMCNC: 31.7 % (ref 32–34.5)
MCHC RBC AUTO-ENTMCNC: 31.9 % (ref 32–34.5)
MCV RBC AUTO: 101.2 FL (ref 80–99.9)
MCV RBC AUTO: 104.6 FL (ref 80–99.9)
MCV RBC AUTO: 104.8 FL (ref 80–99.9)
MONOCYTES ABSOLUTE: 0.49 E9/L (ref 0.1–0.95)
MONOCYTES RELATIVE PERCENT: 5.7 % (ref 2–12)
NEISSERIA MENINGITIDIS BY PCR: NOT DETECTED
NEUTROPHILS ABSOLUTE: 7.42 E9/L (ref 1.8–7.3)
NEUTROPHILS RELATIVE PERCENT: 85.5 % (ref 43–80)
NITRITE, URINE: NEGATIVE
ORDER NUMBER: ABNORMAL
PDW BLD-RTO: 16.4 FL (ref 11.5–15)
PDW BLD-RTO: 16.8 FL (ref 11.5–15)
PDW BLD-RTO: 17.4 FL (ref 11.5–15)
PH UA: 6 (ref 5–9)
PLATELET # BLD: 116 E9/L (ref 130–450)
PLATELET # BLD: 119 E9/L (ref 130–450)
PLATELET # BLD: 69 E9/L (ref 130–450)
PLATELET CONFIRMATION: NORMAL
PMV BLD AUTO: 10.4 FL (ref 7–12)
PMV BLD AUTO: 11.4 FL (ref 7–12)
PMV BLD AUTO: 12.4 FL (ref 7–12)
POTASSIUM REFLEX MAGNESIUM: 6.6 MMOL/L (ref 3.5–5)
PRO-BNP: ABNORMAL PG/ML (ref 0–125)
PROTEIN UA: 100 MG/DL
PROTEUS SPECIES BY PCR: NOT DETECTED
PROTHROMBIN TIME: 10.4 SEC (ref 9.3–12.4)
PSEUDOMONAS AERUGINOSA BY PCR: NOT DETECTED
RBC # BLD: 3.05 E12/L (ref 3.8–5.8)
RBC # BLD: 3.3 E12/L (ref 3.8–5.8)
RBC # BLD: 4.06 E12/L (ref 3.8–5.8)
RBC UA: ABNORMAL /HPF (ref 0–2)
SALMONELLA SPECIES BY PCR: NOT DETECTED
SARS-COV-2, NAAT: DETECTED
SERRATIA MARCESCENS BY PCR: NOT DETECTED
SODIUM BLD-SCNC: 132 MMOL/L (ref 132–146)
SOURCE OF BLOOD CULTURE: ABNORMAL
SPECIFIC GRAVITY UA: 1.02 (ref 1–1.03)
STAPHYLOCOCCUS AUREUS BY PCR: NOT DETECTED
STAPHYLOCOCCUS EPIDERMIDIS BY PCR: NOT DETECTED
STAPHYLOCOCCUS LUGDUNENSIS BY PCR: NOT DETECTED
STAPHYLOCOCCUS SPECIES BY PCR: NOT DETECTED
STENOTROPHOMONAS MALTOPHILIA BY PCR: NOT DETECTED
STREPTOCOCCUS AGALACTIAE BY PCR: NOT DETECTED
STREPTOCOCCUS PNEUMONIAE BY PCR: NOT DETECTED
STREPTOCOCCUS PYOGENES  BY PCR: NOT DETECTED
STREPTOCOCCUS SPECIES BY PCR: NOT DETECTED
TOTAL PROTEIN: 7 G/DL (ref 6.4–8.3)
TROPONIN, HIGH SENSITIVITY: 75 NG/L (ref 0–11)
TROPONIN, HIGH SENSITIVITY: 97 NG/L (ref 0–11)
UROBILINOGEN, URINE: 0.2 E.U./DL
WBC # BLD: 6.3 E9/L (ref 4.5–11.5)
WBC # BLD: 6.6 E9/L (ref 4.5–11.5)
WBC # BLD: 8.7 E9/L (ref 4.5–11.5)
WBC UA: >20 /HPF (ref 0–5)

## 2022-01-01 PROCEDURE — 2580000003 HC RX 258: Performed by: FAMILY MEDICINE

## 2022-01-01 PROCEDURE — 87077 CULTURE AEROBIC IDENTIFY: CPT

## 2022-01-01 PROCEDURE — 6360000002 HC RX W HCPCS: Performed by: STUDENT IN AN ORGANIZED HEALTH CARE EDUCATION/TRAINING PROGRAM

## 2022-01-01 PROCEDURE — 96523 IRRIG DRUG DELIVERY DEVICE: CPT

## 2022-01-01 PROCEDURE — 81001 URINALYSIS AUTO W/SCOPE: CPT

## 2022-01-01 PROCEDURE — 36592 COLLECT BLOOD FROM PICC: CPT

## 2022-01-01 PROCEDURE — 6370000000 HC RX 637 (ALT 250 FOR IP): Performed by: STUDENT IN AN ORGANIZED HEALTH CARE EDUCATION/TRAINING PROGRAM

## 2022-01-01 PROCEDURE — 83690 ASSAY OF LIPASE: CPT

## 2022-01-01 PROCEDURE — 36415 COLL VENOUS BLD VENIPUNCTURE: CPT

## 2022-01-01 PROCEDURE — 86850 RBC ANTIBODY SCREEN: CPT

## 2022-01-01 PROCEDURE — 85610 PROTHROMBIN TIME: CPT

## 2022-01-01 PROCEDURE — 2580000003 HC RX 258: Performed by: STUDENT IN AN ORGANIZED HEALTH CARE EDUCATION/TRAINING PROGRAM

## 2022-01-01 PROCEDURE — 2580000003 HC RX 258: Performed by: EMERGENCY MEDICINE

## 2022-01-01 PROCEDURE — 6360000002 HC RX W HCPCS: Performed by: NURSE PRACTITIONER

## 2022-01-01 PROCEDURE — 2580000003 HC RX 258: Performed by: INTERNAL MEDICINE

## 2022-01-01 PROCEDURE — 96372 THER/PROPH/DIAG INJ SC/IM: CPT

## 2022-01-01 PROCEDURE — 6360000002 HC RX W HCPCS: Performed by: EMERGENCY MEDICINE

## 2022-01-01 PROCEDURE — 85730 THROMBOPLASTIN TIME PARTIAL: CPT

## 2022-01-01 PROCEDURE — 36591 DRAW BLOOD OFF VENOUS DEVICE: CPT

## 2022-01-01 PROCEDURE — 2500000003 HC RX 250 WO HCPCS: Performed by: STUDENT IN AN ORGANIZED HEALTH CARE EDUCATION/TRAINING PROGRAM

## 2022-01-01 PROCEDURE — 6360000002 HC RX W HCPCS: Performed by: INTERNAL MEDICINE

## 2022-01-01 PROCEDURE — 6360000002 HC RX W HCPCS: Performed by: FAMILY MEDICINE

## 2022-01-01 PROCEDURE — 6360000002 HC RX W HCPCS

## 2022-01-01 PROCEDURE — 2500000003 HC RX 250 WO HCPCS: Performed by: INTERNAL MEDICINE

## 2022-01-01 PROCEDURE — 85027 COMPLETE CBC AUTOMATED: CPT

## 2022-01-01 PROCEDURE — 87150 DNA/RNA AMPLIFIED PROBE: CPT

## 2022-01-01 PROCEDURE — 83880 ASSAY OF NATRIURETIC PEPTIDE: CPT

## 2022-01-01 PROCEDURE — 96375 TX/PRO/DX INJ NEW DRUG ADDON: CPT

## 2022-01-01 PROCEDURE — 96365 THER/PROPH/DIAG IV INF INIT: CPT

## 2022-01-01 PROCEDURE — 87635 SARS-COV-2 COVID-19 AMP PRB: CPT

## 2022-01-01 PROCEDURE — 96376 TX/PRO/DX INJ SAME DRUG ADON: CPT

## 2022-01-01 PROCEDURE — 83605 ASSAY OF LACTIC ACID: CPT

## 2022-01-01 PROCEDURE — 87186 SC STD MICRODIL/AGAR DIL: CPT

## 2022-01-01 PROCEDURE — 85025 COMPLETE CBC W/AUTO DIFF WBC: CPT

## 2022-01-01 PROCEDURE — 87040 BLOOD CULTURE FOR BACTERIA: CPT

## 2022-01-01 PROCEDURE — 86900 BLOOD TYPING SEROLOGIC ABO: CPT

## 2022-01-01 PROCEDURE — 99283 EMERGENCY DEPT VISIT LOW MDM: CPT

## 2022-01-01 PROCEDURE — 86901 BLOOD TYPING SEROLOGIC RH(D): CPT

## 2022-01-01 PROCEDURE — 84484 ASSAY OF TROPONIN QUANT: CPT

## 2022-01-01 PROCEDURE — 2580000003 HC RX 258

## 2022-01-01 PROCEDURE — 71045 X-RAY EXAM CHEST 1 VIEW: CPT

## 2022-01-01 PROCEDURE — 80053 COMPREHEN METABOLIC PANEL: CPT

## 2022-01-01 RX ORDER — MORPHINE SULFATE 2 MG/ML
2 INJECTION, SOLUTION INTRAMUSCULAR; INTRAVENOUS ONCE
Status: COMPLETED | OUTPATIENT
Start: 2022-01-01 | End: 2022-01-01

## 2022-01-01 RX ORDER — SODIUM CHLORIDE 0.9 % (FLUSH) 0.9 %
5-40 SYRINGE (ML) INJECTION EVERY 12 HOURS SCHEDULED
Status: DISCONTINUED | OUTPATIENT
Start: 2022-01-01 | End: 2022-01-01 | Stop reason: HOSPADM

## 2022-01-01 RX ORDER — SODIUM CHLORIDE 0.9 % (FLUSH) 0.9 %
SYRINGE (ML) INJECTION
Status: COMPLETED
Start: 2022-01-01 | End: 2022-01-01

## 2022-01-01 RX ORDER — SODIUM CHLORIDE FOR INHALATION 0.9 %
3 VIAL, NEBULIZER (ML) INHALATION
Status: DISCONTINUED | OUTPATIENT
Start: 2022-01-01 | End: 2022-01-01

## 2022-01-01 RX ORDER — LORAZEPAM 2 MG/ML
INJECTION INTRAMUSCULAR
Status: COMPLETED
Start: 2022-01-01 | End: 2022-01-01

## 2022-01-01 RX ORDER — MORPHINE SULFATE 4 MG/ML
4 INJECTION, SOLUTION INTRAMUSCULAR; INTRAVENOUS
Status: DISCONTINUED | OUTPATIENT
Start: 2022-01-01 | End: 2022-01-01

## 2022-01-01 RX ORDER — GUAIFENESIN/DEXTROMETHORPHAN 100-10MG/5
10 SYRUP ORAL EVERY 4 HOURS PRN
Status: DISCONTINUED | OUTPATIENT
Start: 2022-01-01 | End: 2022-01-01

## 2022-01-01 RX ORDER — GLYCOPYRROLATE 0.2 MG/ML
0.4 INJECTION INTRAMUSCULAR; INTRAVENOUS EVERY 4 HOURS PRN
Status: DISCONTINUED | OUTPATIENT
Start: 2022-01-01 | End: 2022-01-01

## 2022-01-01 RX ORDER — HEPARIN SODIUM (PORCINE) LOCK FLUSH IV SOLN 100 UNIT/ML 100 UNIT/ML
300 SOLUTION INTRAVENOUS PRN
Status: CANCELLED | OUTPATIENT
Start: 2022-01-01

## 2022-01-01 RX ORDER — HYDROXYZINE HYDROCHLORIDE 10 MG/1
10 TABLET, FILM COATED ORAL EVERY 4 HOURS PRN
Status: DISCONTINUED | OUTPATIENT
Start: 2022-01-01 | End: 2022-01-01

## 2022-01-01 RX ORDER — SENNA PLUS 8.6 MG/1
1 TABLET ORAL NIGHTLY PRN
Status: DISCONTINUED | OUTPATIENT
Start: 2022-01-01 | End: 2022-01-01

## 2022-01-01 RX ORDER — SODIUM CHLORIDE 0.9 % (FLUSH) 0.9 %
10 SYRINGE (ML) INJECTION PRN
Status: DISCONTINUED | OUTPATIENT
Start: 2022-01-01 | End: 2022-01-01

## 2022-01-01 RX ORDER — OLANZAPINE 5 MG/1
5 TABLET, ORALLY DISINTEGRATING ORAL DAILY PRN
Status: DISCONTINUED | OUTPATIENT
Start: 2022-01-01 | End: 2022-01-01

## 2022-01-01 RX ORDER — LOPERAMIDE HYDROCHLORIDE 2 MG/1
2 CAPSULE ORAL PRN
Status: DISCONTINUED | OUTPATIENT
Start: 2022-01-01 | End: 2022-01-01

## 2022-01-01 RX ORDER — SODIUM CHLORIDE 0.9 % (FLUSH) 0.9 %
10 SYRINGE (ML) INJECTION PRN
Status: CANCELLED | OUTPATIENT
Start: 2022-01-01

## 2022-01-01 RX ORDER — NICOTINE POLACRILEX 4 MG
15 LOZENGE BUCCAL PRN
Status: DISCONTINUED | OUTPATIENT
Start: 2022-01-01 | End: 2022-01-01 | Stop reason: HOSPADM

## 2022-01-01 RX ORDER — MORPHINE SULFATE 4 MG/ML
4 INJECTION, SOLUTION INTRAMUSCULAR; INTRAVENOUS EVERY 8 HOURS
Status: DISCONTINUED | OUTPATIENT
Start: 2022-01-01 | End: 2022-01-01

## 2022-01-01 RX ORDER — LORAZEPAM 2 MG/ML
0.5 INJECTION INTRAMUSCULAR EVERY 8 HOURS
Status: DISCONTINUED | OUTPATIENT
Start: 2022-01-01 | End: 2022-01-01

## 2022-01-01 RX ORDER — FENTANYL CITRATE 50 UG/ML
25 INJECTION, SOLUTION INTRAMUSCULAR; INTRAVENOUS ONCE
Status: DISCONTINUED | OUTPATIENT
Start: 2022-01-01 | End: 2022-01-01 | Stop reason: HOSPADM

## 2022-01-01 RX ORDER — SODIUM CHLORIDE 9 MG/ML
25 INJECTION, SOLUTION INTRAVENOUS PRN
Status: DISCONTINUED | OUTPATIENT
Start: 2022-01-01 | End: 2022-01-01 | Stop reason: HOSPADM

## 2022-01-01 RX ORDER — ALBUTEROL SULFATE 2.5 MG/3ML
2.5 SOLUTION RESPIRATORY (INHALATION)
Status: DISCONTINUED | OUTPATIENT
Start: 2022-01-01 | End: 2022-01-01

## 2022-01-01 RX ORDER — MAGNESIUM HYDROXIDE/ALUMINUM HYDROXICE/SIMETHICONE 120; 1200; 1200 MG/30ML; MG/30ML; MG/30ML
30 SUSPENSION ORAL EVERY 4 HOURS PRN
Status: DISCONTINUED | OUTPATIENT
Start: 2022-01-01 | End: 2022-01-01

## 2022-01-01 RX ORDER — HEPARIN SODIUM (PORCINE) LOCK FLUSH IV SOLN 100 UNIT/ML 100 UNIT/ML
300 SOLUTION INTRAVENOUS PRN
Status: DISCONTINUED | OUTPATIENT
Start: 2022-01-01 | End: 2022-01-01 | Stop reason: HOSPADM

## 2022-01-01 RX ORDER — MORPHINE SULFATE 4 MG/ML
4 INJECTION, SOLUTION INTRAMUSCULAR; INTRAVENOUS EVERY 4 HOURS PRN
Status: DISCONTINUED | OUTPATIENT
Start: 2022-01-01 | End: 2022-01-01 | Stop reason: HOSPADM

## 2022-01-01 RX ORDER — ONDANSETRON 4 MG/1
4 TABLET, ORALLY DISINTEGRATING ORAL EVERY 6 HOURS PRN
Status: DISCONTINUED | OUTPATIENT
Start: 2022-01-01 | End: 2022-01-01

## 2022-01-01 RX ORDER — DEXTROSE MONOHYDRATE 25 G/50ML
12.5 INJECTION, SOLUTION INTRAVENOUS PRN
Status: DISCONTINUED | OUTPATIENT
Start: 2022-01-01 | End: 2022-01-01 | Stop reason: HOSPADM

## 2022-01-01 RX ORDER — ACETAMINOPHEN 650 MG/1
650 SUPPOSITORY RECTAL EVERY 4 HOURS PRN
Status: DISCONTINUED | OUTPATIENT
Start: 2022-01-01 | End: 2022-01-01

## 2022-01-01 RX ORDER — ACETAMINOPHEN 325 MG/1
650 TABLET ORAL EVERY 4 HOURS PRN
Status: DISCONTINUED | OUTPATIENT
Start: 2022-01-01 | End: 2022-01-01

## 2022-01-01 RX ORDER — 0.9 % SODIUM CHLORIDE 0.9 %
1000 INTRAVENOUS SOLUTION INTRAVENOUS ONCE
Status: COMPLETED | OUTPATIENT
Start: 2022-01-01 | End: 2022-01-01

## 2022-01-01 RX ORDER — ONDANSETRON 4 MG/1
4 TABLET, FILM COATED ORAL EVERY 6 HOURS PRN
Status: DISCONTINUED | OUTPATIENT
Start: 2022-01-01 | End: 2022-01-01

## 2022-01-01 RX ORDER — SODIUM CHLORIDE 0.9 % (FLUSH) 0.9 %
10 SYRINGE (ML) INJECTION PRN
Status: DISCONTINUED | OUTPATIENT
Start: 2022-01-01 | End: 2022-01-01 | Stop reason: HOSPADM

## 2022-01-01 RX ORDER — BISACODYL 10 MG
10 SUPPOSITORY, RECTAL RECTAL DAILY PRN
Status: DISCONTINUED | OUTPATIENT
Start: 2022-01-01 | End: 2022-01-01

## 2022-01-01 RX ORDER — LORAZEPAM 2 MG/ML
0.5 INJECTION INTRAMUSCULAR EVERY 4 HOURS PRN
Status: DISCONTINUED | OUTPATIENT
Start: 2022-01-01 | End: 2022-01-01

## 2022-01-01 RX ORDER — BACLOFEN 10 MG/1
5 TABLET ORAL EVERY 12 HOURS PRN
Status: DISCONTINUED | OUTPATIENT
Start: 2022-01-01 | End: 2022-01-01

## 2022-01-01 RX ORDER — LORAZEPAM 2 MG/ML
0.5 INJECTION INTRAMUSCULAR
Status: DISCONTINUED | OUTPATIENT
Start: 2022-01-01 | End: 2022-01-01

## 2022-01-01 RX ORDER — SODIUM CHLORIDE 0.9 % (FLUSH) 0.9 %
5-40 SYRINGE (ML) INJECTION PRN
Status: DISCONTINUED | OUTPATIENT
Start: 2022-01-01 | End: 2022-01-01 | Stop reason: HOSPADM

## 2022-01-01 RX ORDER — DEXAMETHASONE SODIUM PHOSPHATE 10 MG/ML
10 INJECTION INTRAMUSCULAR; INTRAVENOUS ONCE
Status: DISCONTINUED | OUTPATIENT
Start: 2022-01-01 | End: 2022-01-01 | Stop reason: HOSPADM

## 2022-01-01 RX ORDER — CALCIUM GLUCONATE 94 MG/ML
1000 INJECTION, SOLUTION INTRAVENOUS ONCE
Status: DISCONTINUED | OUTPATIENT
Start: 2022-01-01 | End: 2022-01-01 | Stop reason: SDUPTHER

## 2022-01-01 RX ORDER — DEXTROSE MONOHYDRATE 25 G/50ML
25 INJECTION, SOLUTION INTRAVENOUS ONCE
Status: COMPLETED | OUTPATIENT
Start: 2022-01-01 | End: 2022-01-01

## 2022-01-01 RX ORDER — DEXTROSE MONOHYDRATE 50 MG/ML
100 INJECTION, SOLUTION INTRAVENOUS PRN
Status: DISCONTINUED | OUTPATIENT
Start: 2022-01-01 | End: 2022-01-01 | Stop reason: HOSPADM

## 2022-01-01 RX ORDER — POLYVINYL ALCOHOL 14 MG/ML
2 SOLUTION/ DROPS OPHTHALMIC
Status: DISCONTINUED | OUTPATIENT
Start: 2022-01-01 | End: 2022-01-01

## 2022-01-01 RX ORDER — MORPHINE SULFATE 4 MG/ML
INJECTION, SOLUTION INTRAMUSCULAR; INTRAVENOUS
Status: COMPLETED
Start: 2022-01-01 | End: 2022-01-01

## 2022-01-01 RX ORDER — GUAIFENESIN 100 MG/5ML
10 SOLUTION ORAL EVERY 4 HOURS PRN
Status: DISCONTINUED | OUTPATIENT
Start: 2022-01-01 | End: 2022-01-01

## 2022-01-01 RX ADMIN — Medication 10 ML: at 13:44

## 2022-01-01 RX ADMIN — Medication 300 UNITS: at 13:40

## 2022-01-01 RX ADMIN — SODIUM CHLORIDE 1000 ML: 9 INJECTION, SOLUTION INTRAVENOUS at 11:00

## 2022-01-01 RX ADMIN — Medication 300 UNITS: at 13:38

## 2022-01-01 RX ADMIN — LORAZEPAM 0.5 MG: 2 INJECTION INTRAMUSCULAR; INTRAVENOUS at 19:35

## 2022-01-01 RX ADMIN — Medication 10 ML: at 13:47

## 2022-01-01 RX ADMIN — SODIUM CHLORIDE 1000 ML: 9 INJECTION, SOLUTION INTRAVENOUS at 12:00

## 2022-01-01 RX ADMIN — Medication 300 UNITS: at 13:48

## 2022-01-01 RX ADMIN — SODIUM CHLORIDE, PRESERVATIVE FREE 10 ML: 5 INJECTION INTRAVENOUS at 20:22

## 2022-01-01 RX ADMIN — INSULIN HUMAN 5 UNITS: 100 INJECTION, SOLUTION PARENTERAL at 13:18

## 2022-01-01 RX ADMIN — EPOETIN ALFA-EPBX 10000 UNITS: 10000 INJECTION, SOLUTION INTRAVENOUS; SUBCUTANEOUS at 14:11

## 2022-01-01 RX ADMIN — MORPHINE SULFATE 4 MG: 4 INJECTION, SOLUTION INTRAMUSCULAR; INTRAVENOUS at 21:35

## 2022-01-01 RX ADMIN — MORPHINE SULFATE 4 MG: 4 INJECTION, SOLUTION INTRAMUSCULAR; INTRAVENOUS at 19:35

## 2022-01-01 RX ADMIN — EPOETIN ALFA-EPBX 10000 UNITS: 10000 INJECTION, SOLUTION INTRAVENOUS; SUBCUTANEOUS at 14:47

## 2022-01-01 RX ADMIN — MORPHINE SULFATE 4 MG: 4 INJECTION, SOLUTION INTRAMUSCULAR; INTRAVENOUS at 14:46

## 2022-01-01 RX ADMIN — Medication 300 UNITS: at 13:46

## 2022-01-01 RX ADMIN — SODIUM CHLORIDE, PRESERVATIVE FREE 10 ML: 5 INJECTION INTRAVENOUS at 13:38

## 2022-01-01 RX ADMIN — LORAZEPAM 0.5 MG: 2 INJECTION INTRAMUSCULAR; INTRAVENOUS at 01:30

## 2022-01-01 RX ADMIN — SODIUM CHLORIDE, PRESERVATIVE FREE 10 ML: 5 INJECTION INTRAVENOUS at 13:37

## 2022-01-01 RX ADMIN — LORAZEPAM 0.5 MG: 2 INJECTION INTRAMUSCULAR; INTRAVENOUS at 13:41

## 2022-01-01 RX ADMIN — SODIUM CHLORIDE, PRESERVATIVE FREE 10 ML: 5 INJECTION INTRAVENOUS at 13:39

## 2022-01-01 RX ADMIN — SODIUM BICARBONATE 50 MEQ: 84 INJECTION, SOLUTION INTRAVENOUS at 13:17

## 2022-01-01 RX ADMIN — Medication 10 ML: at 13:46

## 2022-01-01 RX ADMIN — MORPHINE SULFATE 4 MG: 4 INJECTION, SOLUTION INTRAMUSCULAR; INTRAVENOUS at 13:41

## 2022-01-01 RX ADMIN — MORPHINE SULFATE 4 MG: 4 INJECTION, SOLUTION INTRAMUSCULAR; INTRAVENOUS at 01:30

## 2022-01-01 RX ADMIN — Medication 10 ML: at 13:30

## 2022-01-01 RX ADMIN — DEXTROSE MONOHYDRATE 25 G: 25 INJECTION, SOLUTION INTRAVENOUS at 13:17

## 2022-01-01 RX ADMIN — MORPHINE SULFATE 2 MG: 2 INJECTION, SOLUTION INTRAMUSCULAR; INTRAVENOUS at 17:26

## 2022-01-01 RX ADMIN — DEXTROSE MONOHYDRATE 1000 MG: 50 INJECTION, SOLUTION INTRAVENOUS at 13:18

## 2022-01-01 RX ADMIN — MORPHINE SULFATE 4 MG: 4 INJECTION, SOLUTION INTRAMUSCULAR; INTRAVENOUS at 22:59

## 2022-01-01 RX ADMIN — SODIUM BICARBONATE: 84 INJECTION, SOLUTION INTRAVENOUS at 13:16

## 2022-01-01 ASSESSMENT — PAIN SCALES - GENERAL
PAINLEVEL_OUTOF10: 3
PAINLEVEL_OUTOF10: 7
PAINLEVEL_OUTOF10: 6
PAINLEVEL_OUTOF10: 6
PAINLEVEL_OUTOF10: 10
PAINLEVEL_OUTOF10: 6

## 2022-01-05 NOTE — PROGRESS NOTES
Patient tolerated injection well. Therapy plan reviewed with patient. Verbalizes understanding. Reviewed AVS with patient, reviewed medication information, verbalizes good knowledge of current plan, and has no signs or symptoms to report at this time. Declines copy of AVS.  Next appointment made and patient instructed on lab draw and procedure for next injection.   Santiago Oliva RN  1/5/2022  2:51 PM

## 2022-01-19 NOTE — ED NOTES
Bed:  ROBERTS-06  Expected date:   Expected time:   Means of arrival:   Comments:  EMS     Diandra Guo RN  01/19/22 [id number]

## 2022-01-19 NOTE — CONSULTS
Consult deferred as pt now [facility] and for transfer to [place] [address] this PM.    RUTH ANN Owens MD

## 2022-01-19 NOTE — PROGRESS NOTES
[facility] OF THE [facility] Information Visit Note          Patient Name: Justine Patel   Admit date:  1/19/2022   Hospital Admitting Physician:  No admitting provider for patient encounter. PCP:  Cami Kumar MD  Primary Insurance: Payor: Nakita Carlson /  /  /    Emergency Contact: Janel Freitas  Terminal Diagnosis Respiratory failure due to covid pna; severe protein calorie malnutrition     Current Hospital Problem List:   Patient Active Problem List   Diagnosis Code    Mixed hyperlipidemia E78.2    Acute hypoxemic respiratory failure ([address] J96.01    Rhinovirus B34.8    History of stroke Z86.73    Abdominal aneurysm (HCC) I71.4    Tobacco dependence F17.200    Aortic valve stenosis I35.0    Nonrheumatic aortic valve stenosis I35.0    Severe protein-calorie malnutrition ([address] E43    COPD, moderate (HCC) J44.9    S/P repair of abdominal aortic aneurysm using bifurcation graft Z95.828, Z86.79    Generalized abdominal pain R10.84    Failure to thrive in adult R62.7    Syncope and collapse R55    Acute respiratory failure with hypoxia ([address] J96.01    COPD exacerbation ([address] J44.1    Essential hypertension I10    Emphysema of lung ([address] J43.9    ROBBIN (acute kidney injury) ([address] N17.9    Lactic acid acidosis E87.2    Hypercalcemia E83.52    Tachycardia R00.0    High anion gap metabolic acidosis O92.0    Bacteremia R78.81    History of aortic valve replacement with bioprosthetic valve Z95.3    Gross hematuria R31.0    Chronic anemia D64.9    Hyperkalemia E87.5    Pulmonary nodules R91.8    Chronic kidney disease, stage 3a ([address] N18.31     Code Status Order: DNR-CC   Allergies:  Patient has no known allergies. Family Goal: Comfort Care  Meeting held with [facility]  The hospice benefit and philosophy were explained including that hospice is end of life care in which, per Medicare, a patient has a terminal diagnosis that life expectancy would be 6 months or less.   Hospice care is a service that is covered by most insurance plans. The following levels of hospice care were discussed: routine level of hospice care at private [facility]- patient/family are responsible for any room and board fees at the facility, and general in patient level of care (GIP) at the [facility] for short term symptom management. Per Medicare guidelines, a patient is considered appropriate for GIP if they have uncontrolled symptoms such as pain, agitation, labored breathing or nausea/vomiting that are not being managed with current medication regimen. Once symptoms become managed, the patient would need to be moved to a lower level of care such as home with hospice, or ECF with hospice. Family informed that with the routine level of care at home or ECF,  the hospice team consists of the RN, a , and personal care team who make intermittent visits along with optional services of non-medical volunteers and Chaplains. Explained that at home in routine level of care, familles are responsible for the 24 hour care. Discussed that under hospice care patient would not receive chemotherapy, radiation, immune therapy, IV antibiotics, dialysis or blood transfusions. Explained that once in hospice care, all aggressive treatments would be stopped and allow nature to takes its course with focus on comfort care for the patient. Met with Harpreet Cosme, patient's spouse at bedside. Yanna Kirkland is resting. Harpreet Cosme understanding of hospice information. She is tearful. Emotional support provided. She has decided to pursue hospice. Yanna Kirkland is alert and oriented. He is hard of hearing. He is hypothermic. He is having increased respirations and is on a non-rebreather at 15L. He is hypotensive. He reports a pain level of 9. He has had one dose of morphine and continues to have symptoms. He is very cachectic. He tested positive for covid pna.      Spoke with [facility] who accepted patient for inpatient level of care hospice. Received bed 112 with a transport time of 5-6. [facility] able to transport 5-6. Wife Monique Hunt will signs consents at hospice house.  Updated beside RN, SW, and ER MD.     Electronically signed by Candelaria Sprague RN on 1/19/2022 at 2:40 PM

## 2022-01-19 NOTE — ED NOTES
Hospice at bedside speaking with pts wife, comfort measures explained and options discussed.       Don Earl RN  01/19/22 [id number]

## 2022-01-19 NOTE — CARE COORDINATION
Social Work / Discharge Planning:    Patient will discharge to the [facility].  Social work contacted [employer] and scheduled transportation by ambulance, ETA between 5 and 6:00PM. Electronically signed by TOMEKA Mensah on 1/19/22 at 3:02 PM EST

## 2022-01-20 NOTE — PROGRESS NOTES
[facility] [id number].  Hospice Admission Note   Provider Radha Wu March, APRN - [address] [facility]  [id number]  Hospital Day: 2    HPI:   Rylan Bosch is a 66 y.o. with a past medical history of AAA s/p endovascular repair in 2017, arthritis, aortic stenosis, CVA, COPD, hyperlipidemia, tobacco abuse who was admitted on 1/19/2022 from [facility] with a CHIEF COMPLAINT of uncontrolled pain and dyspnea. He presented to the ED on 1/19/2022 for failure to thrive. He had been declining at home over the past couple weeks with very poor p.o. intake. He was found to be hypoxic and hypotensive in the ED. He was positive for COVID-19. X-ray revealed left-sided pneumonia and severe emphysematous changes. He required high flow supplemental oxygen support. He was bolused with IV fluid and started on Levophed for hypotension. ED physician performed bedside ultrasound revealing poor ejection fraction and multiple wall motion abnormalities. Patient also had an elevated proBNP of 11,000. He was also found in acute renal failure with a creatinine of 9.1. Patient's family decided against pursuing further aggressive measures and opted for comfort care. He was started on comfort medications and was evaluated at the bedside by hospice of the [facility]. He was transferred to the hospice house on 1/19/2022 for GIP level of care for symptom management related to terminal diagnosis of acute hypoxic respiratory failure due to viral pneumonia-COVID-19. SUBJECTIVE:   Chart reviewed and spoke with bedside RN. Overnight his ostomy had 200 cc of bright red output. He was minimal to unresponsive overnight. He has been receiving comfort medications IV route. He has not had p.o. intake since he was admitted. He has required 2 as needed doses of lorazepam and 4 as needed doses of morphine sulfate since his admission. He was seen and examined at the bedside with his wife present.   He is lying in bed with eyes parted open and in no apparent distress. There is no tracking noted. Respirations are shallow with periods of apnea. Unable to obtain history from patient due to level of consciousness. Family Meeting: Met with patient's wife at the bedside. We discussed events leading up to transition into hospice care. Patient's wife reported that he has been struggling for a long time. We discussed his exam findings and what is to be expected as he transitions towards end-of-life. Explained medications being utilized for his comfort. All questions answered to her satisfaction. Emotional support provided via active listening validation of feelings. Review of Systems   Unable to perform ROS: Patient unresponsive      OBJECTIVE:     Past Medical History:   Diagnosis Date    Abdominal aortic aneurysm without rupture ([facility] [address] 05/09/2017    Arthritis     AS (aortic stenosis)     Cerebral artery occlusion with cerebral infarction [facility]) 2009    TIA/CVA with aphasia that resolved    COPD (chronic obstructive pulmonary disease) ([facility] [address]     History of blood transfusion     Hyperlipidemia     Pneumonia     Tobacco dependence 05/09/2017       Past Surgical History:   Procedure Laterality Date    ABDOMINAL AORTIC ANEURYSM REPAIR, ENDOVASCULAR  10/12/2017    Zenith Fenestrated.   [place]    ABDOMINAL AORTIC ANEURYSM REPAIR, ENDOVASCULAR  10/12/2017    ANKLE SURGERY      AORTIC VALVE REPLACEMENT      CARDIAC CATHETERIZATION  07/14/2017    Dr. Kishor Molina- No blockages    CARDIAC SURGERY      COLONOSCOPY N/A 12/21/2020    COLONOSCOPY DIAGNOSTIC performed by Belle Farooq MD at [address] Bilateral approx 2014    cataracts removal w/lens implants    [facility]  PICC [address] DOUBLE  4/15/2021         HERNIA REPAIR      HERNIA REPAIR  06/2021    OTHER SURGICAL HISTORY  05/2021    ostomy repair    SKULL FRACTURE ELEVATION      TOOTH EXTRACTION      full mouth extraction    UPPER GASTROINTESTINAL ENDOSCOPY N/A 12/21/2020    EGD DIAGNOSTIC ONLY performed by Priyank Johnson MD at [facility] History   Problem Relation Age of Onset    Heart Disease Mother     Stroke Father     Heart Disease Brother        Social History     Tobacco History     Smoking Status  Current Every Day Smoker Smoking Start Date  5/27/1970 Smoking Frequency  1 pack/day for 50 years (48 pk yrs) Smoking Tobacco Type  Cigarettes    Smokeless Tobacco Use  Never Used    Tobacco Comment  currently smokes 1.0 ppd          Alcohol History     Alcohol Use Status  No          Drug Use     Drug Use Status  Never          Sexual Activity     Sexually Active  Never                No Known Allergies     Imaging:   ONE XRAY VIEW OF THE CHEST 1/19/2022  Impression   1. Left lower lobe pneumonia   2. Extensive emphysematous changes     Vital Signs:   97.5 °F, HR 54, RR 16, BP impalpable   Physical Exam  Vitals and nursing note reviewed. Constitutional:       General: He is not in acute distress. Appearance: He is cachectic. He is ill-appearing (chronically). Interventions: Nasal cannula in place. Comments: ill-appearing, elderly male lying in bed with eyes parted open, unresponsive   HENT:      Head: Normocephalic and atraumatic. Comments: Sunken facial features, brow relaxed     Mouth/Throat:      Mouth: Mucous membranes are pale and dry. Neck:      Trachea: No abnormal tracheal secretions. Cardiovascular:      Rate and Rhythm: Normal rate. Rhythm irregular. Pulses: Decreased pulses (Weak and thready radial pulses bilaterally). Radial pulses are 1+ on the right side and 1+ on the left side. Dorsalis pedis pulses are 0 on the right side and 0 on the left side. Heart sounds: Normal heart sounds. No murmur heard. Pulmonary:      Effort: Bradypnea (Periods of apnea) present. No accessory muscle usage or respiratory distress. Breath sounds: Decreased air movement present. Decreased breath sounds present. No wheezing, rhonchi or rales. Chest:      Comments: Vila catheter right anterior chest  Abdominal:      General: Abdomen is scaphoid. Bowel sounds are decreased. Palpations: Abdomen is soft. Tenderness: There is no abdominal tenderness. There is no guarding. Comments: Right lower quadrant colostomy bag with small amount of red drainage   Genitourinary:     Comments: Suárez catheter  Musculoskeletal:      Cervical back: Neck supple. Right lower leg: No edema. Left lower leg: No edema. Skin:     General: Skin is cool and dry. Capillary Refill: Capillary refill takes more than 3 seconds. Coloration: Skin is mottled and pale. Findings: Ecchymosis (scattered) present. Neurological:      Mental Status: He is unresponsive. Comments: Eyes parted open, no tracking, nonverbal, body posture relaxed   Psychiatric:         Speech: He is noncommunicative. Physical Function:  PPS: 10%    Objective data reviewed: labs, images, records, medication use, vitals and chart    ASSESSMENT/PLAN:   Terminal diagnosis: Acute hypoxic respiratory failure due to viral pneumonia-COVID-19    Acute hypoxic respiratory failure due to viral pneumonia-COVID-19   Patient's wife brought him in for failure to thrive.  He was found hypoxic and hypotensive requiring high flow oxygen.  He was found positive for COVID-19.  He required vasopressor support for hypotension.  Patient's wife decided against further work-up and opted for comfort measures.  Terminal diagnosis. Severe COPD  · Patient is with a longstanding history of COPD. · Scans revealed severe emphysematous changes. · Overall contributory to poor prognosis. Dyspnea  · He was started on as needed morphine sulfate overnight.   · Schedule morphine sulfate 4 mg IV every 8 hours around-the-clock and every 1 hour as needed for pain or dyspnea with parameters to hold for respiratory rate below 8/min. · Continue low-flow supplemental oxygen as needed for comfort. Pain   Unable to obtain full pain history from patient due to level of consciousness   PAINAD score 0/10   Morphine sulfate as above. Anxiety/agitation  · Schedule lorazepam 0.5 mg IV every 8 hours around-the-clock and every 2 hours as needed for anxiety or agitation. Congestion  · Continue glycopyrrolate 0.4 mg IV every 4 hours as needed for congestion. Hospice Patient   · Enrolled for terminal diagnosis of acute hypoxic respiratory failure due to viral pneumonia-COVID-19.  · Goal of care for comfort and a peaceful death. · DNR-CC  · Patient is appropriate for continued GIP level of care due to uncontrolled symptoms requiring close monitoring of medication adjustments. Prognosis: hours-to-days and Poor    Spiritual assessment: no spiritual distress identified  Bereavement and grief: to be determined    Discussed patient and the plan of care with the other IDT members: Nursing staff, social work, and patient's family at bedside. Time/Communication  Greater than 50% of time spent, total 70 minutes in counseling and coordination of care at the bedside regarding goals of care, symptom management and diagnosis and prognosis. Thank you for allowing [facility] to participate in the care of Fabrizio Quezada. Note: This report was completed using computer[employer]lam[employer] voiced recognition software. Every effort has been made to ensure accuracy; however, inadvertent computerized transcription errors may be present.

## 2022-01-22 LAB
CULTURE, BLOOD 2: ABNORMAL
ORGANISM: ABNORMAL
ORGANISM: ABNORMAL
